# Patient Record
Sex: FEMALE | Race: WHITE
[De-identification: names, ages, dates, MRNs, and addresses within clinical notes are randomized per-mention and may not be internally consistent; named-entity substitution may affect disease eponyms.]

---

## 2017-03-14 ENCOUNTER — MEDICATION RENEWAL (OUTPATIENT)
Age: 62
End: 2017-03-14

## 2017-03-31 ENCOUNTER — APPOINTMENT (OUTPATIENT)
Dept: FAMILY MEDICINE | Facility: CLINIC | Age: 62
End: 2017-03-31

## 2017-03-31 VITALS
SYSTOLIC BLOOD PRESSURE: 128 MMHG | RESPIRATION RATE: 15 BRPM | HEART RATE: 84 BPM | BODY MASS INDEX: 35.61 KG/M2 | HEIGHT: 68 IN | TEMPERATURE: 98 F | DIASTOLIC BLOOD PRESSURE: 88 MMHG | WEIGHT: 235 LBS

## 2017-04-11 ENCOUNTER — MEDICATION RENEWAL (OUTPATIENT)
Age: 62
End: 2017-04-11

## 2017-06-21 ENCOUNTER — MEDICATION RENEWAL (OUTPATIENT)
Age: 62
End: 2017-06-21

## 2017-07-14 ENCOUNTER — APPOINTMENT (OUTPATIENT)
Dept: FAMILY MEDICINE | Facility: CLINIC | Age: 62
End: 2017-07-14

## 2017-07-14 VITALS
RESPIRATION RATE: 16 BRPM | HEART RATE: 86 BPM | HEIGHT: 68 IN | SYSTOLIC BLOOD PRESSURE: 130 MMHG | WEIGHT: 229 LBS | BODY MASS INDEX: 34.71 KG/M2 | DIASTOLIC BLOOD PRESSURE: 76 MMHG

## 2017-09-19 ENCOUNTER — MOBILE ON CALL (OUTPATIENT)
Age: 62
End: 2017-09-19

## 2017-09-20 ENCOUNTER — MESSAGE (OUTPATIENT)
Age: 62
End: 2017-09-20

## 2017-10-03 ENCOUNTER — MEDICATION RENEWAL (OUTPATIENT)
Age: 62
End: 2017-10-03

## 2017-10-20 ENCOUNTER — APPOINTMENT (OUTPATIENT)
Dept: FAMILY MEDICINE | Facility: CLINIC | Age: 62
End: 2017-10-20
Payer: COMMERCIAL

## 2017-10-20 VITALS
TEMPERATURE: 98.2 F | RESPIRATION RATE: 16 BRPM | OXYGEN SATURATION: 98 % | SYSTOLIC BLOOD PRESSURE: 120 MMHG | HEIGHT: 68 IN | WEIGHT: 226 LBS | DIASTOLIC BLOOD PRESSURE: 80 MMHG | HEART RATE: 93 BPM | BODY MASS INDEX: 34.25 KG/M2

## 2017-10-20 DIAGNOSIS — S83.511S SPRAIN OF ANTERIOR CRUCIATE LIGAMENT OF RIGHT KNEE, SEQUELA: ICD-10-CM

## 2017-10-20 DIAGNOSIS — Z79.4 TYPE 2 DIABETES MELLITUS WITH HYPERGLYCEMIA: ICD-10-CM

## 2017-10-20 DIAGNOSIS — E11.65 TYPE 2 DIABETES MELLITUS WITH HYPERGLYCEMIA: ICD-10-CM

## 2017-10-20 PROCEDURE — G0008: CPT

## 2017-10-20 PROCEDURE — 99214 OFFICE O/P EST MOD 30 MIN: CPT | Mod: 25

## 2017-10-20 PROCEDURE — 90686 IIV4 VACC NO PRSV 0.5 ML IM: CPT

## 2017-10-24 ENCOUNTER — MEDICATION RENEWAL (OUTPATIENT)
Age: 62
End: 2017-10-24

## 2017-10-27 ENCOUNTER — OTHER (OUTPATIENT)
Age: 62
End: 2017-10-27

## 2017-10-27 DIAGNOSIS — I87.8 OTHER SPECIFIED DISORDERS OF VEINS: ICD-10-CM

## 2017-11-05 ENCOUNTER — TRANSCRIPTION ENCOUNTER (OUTPATIENT)
Age: 62
End: 2017-11-05

## 2017-11-13 ENCOUNTER — RESULT REVIEW (OUTPATIENT)
Age: 62
End: 2017-11-13

## 2017-11-21 ENCOUNTER — APPOINTMENT (OUTPATIENT)
Dept: RHEUMATOLOGY | Facility: CLINIC | Age: 62
End: 2017-11-21
Payer: COMMERCIAL

## 2017-11-21 ENCOUNTER — LABORATORY RESULT (OUTPATIENT)
Age: 62
End: 2017-11-21

## 2017-11-21 VITALS
SYSTOLIC BLOOD PRESSURE: 122 MMHG | DIASTOLIC BLOOD PRESSURE: 82 MMHG | WEIGHT: 225 LBS | TEMPERATURE: 98 F | RESPIRATION RATE: 16 BRPM | BODY MASS INDEX: 34.1 KG/M2 | HEIGHT: 68 IN | HEART RATE: 90 BPM | OXYGEN SATURATION: 98 %

## 2017-11-21 PROCEDURE — 99205 OFFICE O/P NEW HI 60 MIN: CPT

## 2017-11-28 ENCOUNTER — MEDICATION RENEWAL (OUTPATIENT)
Age: 62
End: 2017-11-28

## 2017-11-30 ENCOUNTER — MEDICATION RENEWAL (OUTPATIENT)
Age: 62
End: 2017-11-30

## 2017-12-11 ENCOUNTER — RX RENEWAL (OUTPATIENT)
Age: 62
End: 2017-12-11

## 2017-12-12 ENCOUNTER — MEDICATION RENEWAL (OUTPATIENT)
Age: 62
End: 2017-12-12

## 2017-12-13 DIAGNOSIS — E56.9 VITAMIN DEFICIENCY, UNSPECIFIED: ICD-10-CM

## 2017-12-13 LAB
24R-OH-CALCIDIOL SERPL-MCNC: 33.8 PG/ML
25(OH)D3 SERPL-MCNC: 5.2 NG/ML
ACE BLD-CCNC: 30 U/L
ALBUMIN MFR SERPL ELPH: 59.9 %
ALBUMIN SERPL-MCNC: 3.9 G/DL
ALBUMIN/GLOB SERPL: 1.5 RATIO
ALPHA1 GLOB MFR SERPL ELPH: 5 %
ALPHA1 GLOB SERPL ELPH-MCNC: 0.3 G/DL
ALPHA2 GLOB MFR SERPL ELPH: 12.3 %
ALPHA2 GLOB SERPL ELPH-MCNC: 0.8 G/DL
ANA SER IF-ACNC: NEGATIVE
B-GLOBULIN MFR SERPL ELPH: 11.7 %
B-GLOBULIN SERPL ELPH-MCNC: 0.8 G/DL
CCP AB SER IA-ACNC: <8 UNITS
CK SERPL-CCNC: 54 U/L
CRP SERPL-MCNC: 0.4 MG/DL
DEPRECATED KAPPA LC FREE/LAMBDA SER: 1.22 RATIO
GAMMA GLOB FLD ELPH-MCNC: 0.7 G/DL
GAMMA GLOB MFR SERPL ELPH: 11.1 %
IGA SER QL IEP: 81 MG/DL
IGE SER-MCNC: 314 IU/ML
IGG SER QL IEP: 814 MG/DL
IGM SER QL IEP: 50 MG/DL
INTERPRETATION SERPL IEP-IMP: NORMAL
KAPPA LC CSF-MCNC: 1.17 MG/DL
KAPPA LC SERPL-MCNC: 1.43 MG/DL
M PROTEIN SPEC IFE-MCNC: NORMAL
MISCELLANEOUS TEST: NORMAL
MPO AB + PR3 PNL SER: NORMAL
MYOGLOBIN SERPL-MCNC: 45 NG/ML
MYOMARKER PANEL 1: NORMAL
PROC NAME: NORMAL
PROT SERPL-MCNC: 6.5 G/DL
PROT SERPL-MCNC: 6.5 G/DL
RF+CCP IGG SER-IMP: NEGATIVE
RHEUMATOID FACT SER QL: <7 IU/ML

## 2017-12-21 ENCOUNTER — APPOINTMENT (OUTPATIENT)
Dept: FAMILY MEDICINE | Facility: CLINIC | Age: 62
End: 2017-12-21
Payer: COMMERCIAL

## 2017-12-21 VITALS
HEART RATE: 95 BPM | SYSTOLIC BLOOD PRESSURE: 110 MMHG | RESPIRATION RATE: 16 BRPM | WEIGHT: 225.5 LBS | TEMPERATURE: 98.1 F | DIASTOLIC BLOOD PRESSURE: 80 MMHG | OXYGEN SATURATION: 96 % | BODY MASS INDEX: 34.17 KG/M2 | HEIGHT: 68 IN

## 2017-12-21 DIAGNOSIS — R25.1 TREMOR, UNSPECIFIED: ICD-10-CM

## 2017-12-21 PROCEDURE — 99214 OFFICE O/P EST MOD 30 MIN: CPT

## 2017-12-21 RX ORDER — POLYETHYLENE GLYCOL 3350, SODIUM SULFATE, SODIUM CHLORIDE, POTASSIUM CHLORIDE, ASCORBIC ACID, SODIUM ASCORBATE 7.5-2.691G
100 KIT ORAL
Qty: 1 | Refills: 0 | Status: COMPLETED | COMMUNITY
Start: 2017-10-30

## 2017-12-22 ENCOUNTER — RESULT REVIEW (OUTPATIENT)
Age: 62
End: 2017-12-22

## 2017-12-22 LAB
CREAT SPEC-SCNC: 99 MG/DL
MICROALBUMIN 24H UR DL<=1MG/L-MCNC: 2.2 MG/DL
MICROALBUMIN/CREAT 24H UR-RTO: 22 MG/G

## 2017-12-31 ENCOUNTER — RX RENEWAL (OUTPATIENT)
Age: 62
End: 2017-12-31

## 2018-01-15 PROBLEM — Z87.09 HISTORY OF ACUTE BRONCHITIS: Status: RESOLVED | Noted: 2017-12-21 | Resolved: 2018-01-15

## 2018-01-16 ENCOUNTER — APPOINTMENT (OUTPATIENT)
Dept: FAMILY MEDICINE | Facility: CLINIC | Age: 63
End: 2018-01-16
Payer: COMMERCIAL

## 2018-01-16 VITALS
HEART RATE: 102 BPM | BODY MASS INDEX: 34.36 KG/M2 | TEMPERATURE: 97.6 F | WEIGHT: 226 LBS | OXYGEN SATURATION: 97 % | SYSTOLIC BLOOD PRESSURE: 120 MMHG | DIASTOLIC BLOOD PRESSURE: 82 MMHG | RESPIRATION RATE: 16 BRPM

## 2018-01-16 DIAGNOSIS — M54.17 RADICULOPATHY, LUMBOSACRAL REGION: ICD-10-CM

## 2018-01-16 DIAGNOSIS — M54.5 LOW BACK PAIN: ICD-10-CM

## 2018-01-16 DIAGNOSIS — Z87.09 PERSONAL HISTORY OF OTHER DISEASES OF THE RESPIRATORY SYSTEM: ICD-10-CM

## 2018-01-16 PROCEDURE — 99214 OFFICE O/P EST MOD 30 MIN: CPT

## 2018-01-16 RX ORDER — CHOLECALCIFEROL (VITAMIN D3) 1250 MCG
1.25 MG CAPSULE ORAL
Qty: 4 | Refills: 0 | Status: COMPLETED | COMMUNITY
Start: 2017-12-13 | End: 2017-12-31

## 2018-02-09 ENCOUNTER — MEDICATION RENEWAL (OUTPATIENT)
Age: 63
End: 2018-02-09

## 2018-02-23 ENCOUNTER — MEDICATION RENEWAL (OUTPATIENT)
Age: 63
End: 2018-02-23

## 2018-03-20 ENCOUNTER — APPOINTMENT (OUTPATIENT)
Dept: RHEUMATOLOGY | Facility: CLINIC | Age: 63
End: 2018-03-20
Payer: COMMERCIAL

## 2018-03-20 VITALS
SYSTOLIC BLOOD PRESSURE: 160 MMHG | TEMPERATURE: 97.8 F | HEIGHT: 68 IN | BODY MASS INDEX: 34.25 KG/M2 | HEART RATE: 98 BPM | OXYGEN SATURATION: 97 % | RESPIRATION RATE: 16 BRPM | WEIGHT: 226 LBS | DIASTOLIC BLOOD PRESSURE: 82 MMHG

## 2018-03-20 PROCEDURE — 99214 OFFICE O/P EST MOD 30 MIN: CPT | Mod: 25

## 2018-03-20 PROCEDURE — 20610 DRAIN/INJ JOINT/BURSA W/O US: CPT | Mod: RT

## 2018-03-20 RX ORDER — LIDOCAINE HYDROCHLORIDE 10 MG/ML
1 INJECTION, SOLUTION INFILTRATION; PERINEURAL
Refills: 0 | Status: COMPLETED | OUTPATIENT
Start: 2018-03-20

## 2018-03-20 RX ORDER — METHYLPRED ACET/NACL,ISO-OS/PF 40 MG/ML
40 VIAL (ML) INJECTION
Qty: 1 | Refills: 0 | Status: COMPLETED | OUTPATIENT
Start: 2018-03-20

## 2018-03-20 RX ADMIN — METHYLPREDNISOLONE ACETATE 1 MG/ML: 40 INJECTION, SUSPENSION INTRA-ARTICULAR; INTRALESIONAL; INTRAMUSCULAR; SOFT TISSUE at 00:00

## 2018-03-20 RX ADMIN — LIDOCAINE HYDROCHLORIDE %: 10 INJECTION, SOLUTION INFILTRATION; PERINEURAL at 00:00

## 2018-03-21 LAB — 25(OH)D3 SERPL-MCNC: 29.8 NG/ML

## 2018-03-23 LAB
HBA1C MFR BLD HPLC: 6.7
LDLC SERPL DIRECT ASSAY-MCNC: 133

## 2018-04-02 ENCOUNTER — MOBILE ON CALL (OUTPATIENT)
Age: 63
End: 2018-04-02

## 2018-04-04 ENCOUNTER — MESSAGE (OUTPATIENT)
Age: 63
End: 2018-04-04

## 2018-04-09 LAB — LDLC SERPL DIRECT ASSAY-MCNC: 129

## 2018-04-10 ENCOUNTER — APPOINTMENT (OUTPATIENT)
Dept: FAMILY MEDICINE | Facility: CLINIC | Age: 63
End: 2018-04-10
Payer: COMMERCIAL

## 2018-04-10 VITALS — SYSTOLIC BLOOD PRESSURE: 110 MMHG | DIASTOLIC BLOOD PRESSURE: 74 MMHG

## 2018-04-10 VITALS
HEIGHT: 68 IN | WEIGHT: 220 LBS | BODY MASS INDEX: 33.34 KG/M2 | RESPIRATION RATE: 16 BRPM | HEART RATE: 89 BPM | OXYGEN SATURATION: 97 % | TEMPERATURE: 97 F

## 2018-04-10 DIAGNOSIS — R26.9 UNSPECIFIED ABNORMALITIES OF GAIT AND MOBILITY: ICD-10-CM

## 2018-04-10 PROCEDURE — 99214 OFFICE O/P EST MOD 30 MIN: CPT

## 2018-04-10 RX ORDER — AZITHROMYCIN 250 MG/1
250 TABLET, FILM COATED ORAL
Qty: 1 | Refills: 0 | Status: COMPLETED | COMMUNITY
Start: 2017-12-21 | End: 2018-04-10

## 2018-04-10 NOTE — COUNSELING
[Weight management counseling provided] : Weight management [Healthy eating counseling provided] : healthy eating [Activity counseling provided] : activity [Disease Management counseling provided] : disease management  [Behavioral health counseling provided] : behavioral health  [Take medications as directed] : Take medications as directed [Check feet daily] : Check feet daily [Take your weight daily] : Take your weight daily [Avoid asthma triggers] : Avoid asthma triggers [Sleep ___ hours/day] : Sleep [unfilled] hours/day [Engage in a relaxing activity] : Engage in a relaxing activity

## 2018-04-10 NOTE — HEALTH RISK ASSESSMENT
[Any fall with injury in past year] : Patient reported fall with injury in the past year [0] : 2) Feeling down, depressed, or hopeless: Not at all (0) [] : No [de-identified] : rhuematology and neuro- notes reviewed [[id number]] : 0

## 2018-04-10 NOTE — CURRENT MEDS
[Takes medication as prescribed] : takes [None] : Patient does not have any barriers to medication adherence

## 2018-04-10 NOTE — PHYSICAL EXAM
[No Acute Distress] : no acute distress [Well Nourished] : well nourished [PERRL] : pupils equal round and reactive to light [Fundoscopic Exam Performed] : fundoscopic ~T exam ~C was performed [Normal Outer Ear/Nose] : the outer ears and nose were normal in appearance [Normal TMs] : both tympanic membranes were normal [No JVD] : no jugular venous distention [Thyroid Normal, No Nodules] : the thyroid was normal and there were no nodules present [No Respiratory Distress] : no respiratory distress  [Clear to Auscultation] : lungs were clear to auscultation bilaterally [No Accessory Muscle Use] : no accessory muscle use [Normal Rate] : normal rate  [Regular Rhythm] : with a regular rhythm [Normal S1, S2] : normal S1 and S2 [No Murmur] : no murmur heard [Pedal Pulses Present] : the pedal pulses are present [No Edema] : there was no peripheral edema [Soft] : abdomen soft [Non Tender] : non-tender [Non-distended] : non-distended [No Masses] : no abdominal mass palpated [No HSM] : no HSM [Normal Supraclavicular Nodes] : no supraclavicular lymphadenopathy [Normal Posterior Cervical Nodes] : no posterior cervical lymphadenopathy [Normal Anterior Cervical Nodes] : no anterior cervical lymphadenopathy [No Spinal Tenderness] : no spinal tenderness [No Rash] : no rash [No Focal Deficits] : no focal deficits [Normal Affect] : the affect was normal [Alert and Oriented x3] : oriented to person, place, and time [Normal Insight/Judgement] : insight and judgment were intact [de-identified] : obese [de-identified] : minimal rhinorrhea [de-identified] : mildly decreased breath sounds but clear [de-identified] : some right sciatic notch tenderness [de-identified] : walker in use for ambulation, mild hand tremor noted again [de-identified] : unable to examine since patient was just seen by podiatry will examine at next visit

## 2018-04-10 NOTE — HISTORY OF PRESENT ILLNESS
[Asthma] : Asthma [Diabetes Mellitus] : Diabetes Mellitus [Hyperlipidemia] : Hyperlipidemia [Obesity] : Obesity [Check glucose ___ x/week] : Patient checks blood glucose [unfilled]  times a week [Regular podiatrist visits] : Patient had regular podiatrist visits [Understanding of foot care] : Patient expressed understanding of foot care [Most Recent A1C: ___] : Most recent A1C was [unfilled] [Target A1C:  ___] : Target A1C is [unfilled] [Target goal met] : A1C target goal met [Neuropathy] :  neuropathy [Severe Persistent] : severe persistent [Cough] : cough [Wheezing] : wheezing [Missed School/Work ___ days/yr] : [unfilled] has missed [unfilled] days of school/work in the past twelve months [No nocturnal awakening] : Patient denies nocturnal awakening [Inhaler Use] : inhaler use [Other: ___] : [unfilled] [Does not check Peak Flow] : The patient is not checking peak flow [Daily] : Daily [Stable] : Condition is stable [Fasting:  ___] : Fasting Blood Sugar: [unfilled] mg/dL [Managed with medications] : managed with  medication [Bile acid sequestrants] : Patient is on nonstatin therapy - Bile acid sequestrants

## 2018-04-10 NOTE — REVIEW OF SYSTEMS
[Fatigue] : fatigue [Wheezing] : wheezing [Dyspnea on Exertion] : dyspnea on exertion [Nausea] : nausea [Diarrhea] : diarrhea [Heartburn] : heartburn [Joint Pain] : joint pain [Muscle Weakness] : muscle weakness [Muscle Pain] : muscle pain [Memory Loss] : memory loss [Easy Bruising] : easy bruising [Negative] : Psychiatric [Abdominal Pain] : no abdominal pain [Constipation] : no constipation [Vomiting] : no vomiting [Melena] : no melena [Dysuria] : no dysuria [Incontinence] : no incontinence [Hematuria] : no hematuria [Frequency] : no frequency [Headache] : no headache [Easy Bleeding] : no easy bleeding [Swollen Glands] : no swollen glands [FreeTextEntry2] : feels poorly in general [FreeTextEntry8] : nocturia [de-identified] : none

## 2018-05-21 ENCOUNTER — MOBILE ON CALL (OUTPATIENT)
Age: 63
End: 2018-05-21

## 2018-05-24 ENCOUNTER — APPOINTMENT (OUTPATIENT)
Dept: RHEUMATOLOGY | Facility: CLINIC | Age: 63
End: 2018-05-24
Payer: COMMERCIAL

## 2018-05-24 VITALS
SYSTOLIC BLOOD PRESSURE: 122 MMHG | OXYGEN SATURATION: 97 % | HEART RATE: 80 BPM | WEIGHT: 220 LBS | TEMPERATURE: 97.8 F | BODY MASS INDEX: 33.34 KG/M2 | HEIGHT: 68 IN | RESPIRATION RATE: 16 BRPM | DIASTOLIC BLOOD PRESSURE: 78 MMHG

## 2018-05-24 PROCEDURE — 99214 OFFICE O/P EST MOD 30 MIN: CPT

## 2018-06-18 ENCOUNTER — RX RENEWAL (OUTPATIENT)
Age: 63
End: 2018-06-18

## 2018-06-21 ENCOUNTER — MESSAGE (OUTPATIENT)
Age: 63
End: 2018-06-21

## 2018-06-27 ENCOUNTER — RX RENEWAL (OUTPATIENT)
Age: 63
End: 2018-06-27

## 2018-06-27 ENCOUNTER — MEDICATION RENEWAL (OUTPATIENT)
Age: 63
End: 2018-06-27

## 2018-07-09 LAB
HBA1C MFR BLD HPLC: 6.7
LDLC SERPL DIRECT ASSAY-MCNC: 160

## 2018-07-10 ENCOUNTER — APPOINTMENT (OUTPATIENT)
Dept: FAMILY MEDICINE | Facility: CLINIC | Age: 63
End: 2018-07-10
Payer: MEDICARE

## 2018-07-10 VITALS
HEIGHT: 68 IN | BODY MASS INDEX: 33.34 KG/M2 | TEMPERATURE: 97 F | RESPIRATION RATE: 18 BRPM | OXYGEN SATURATION: 97 % | HEART RATE: 93 BPM | WEIGHT: 220 LBS

## 2018-07-10 VITALS — DIASTOLIC BLOOD PRESSURE: 60 MMHG | SYSTOLIC BLOOD PRESSURE: 110 MMHG

## 2018-07-10 DIAGNOSIS — M17.11 UNILATERAL PRIMARY OSTEOARTHRITIS, RIGHT KNEE: ICD-10-CM

## 2018-07-10 PROCEDURE — 90732 PPSV23 VACC 2 YRS+ SUBQ/IM: CPT

## 2018-07-10 PROCEDURE — G0009: CPT

## 2018-07-10 PROCEDURE — 99214 OFFICE O/P EST MOD 30 MIN: CPT | Mod: 25

## 2018-07-10 NOTE — HISTORY OF PRESENT ILLNESS
[Asthma] : Asthma [Diabetes Mellitus] : Diabetes Mellitus [Hyperlipidemia] : Hyperlipidemia [Obesity] : Obesity [No episodes] : No hypoglycemic episodes since the last visit. [Check glucose ___ x/day] : Patient checks  blood glucose [unfilled]  times a day [Review glucose log over ___ months] : Glucose logs reviewed over the past [unfilled] months reveal: [Fasting:  ___] : Fasting Blood Sugar: [unfilled] mg/dL [Post-Prandial: ___] : Post-Prandial Blood Sugar: [unfilled] mg/dL [Severe Persistent] : severe persistent [Shortness of Breath] : shortness of breath [Dyspnea on Exertion] : dyspnea on exertion [Wheezing] : wheezing [___ x/month] : Patient awakes at night [unfilled] times per month [Allergies] : allergies [Inhaler Use] : inhaler use [Does not check Peak Flow] : The patient is not checking peak flow [Daily] : Daily [Most Recent A1C: ___] : Most recent A1C was [unfilled] [Target A1C:  ___] : Target A1C is [unfilled] [Target goal met] : A1C target goal met [Neuropathy] :  neuropathy [Contraindication to therapy ___] : Contraindications to statin  therapy: [unfilled] [Doing Poorly] : Patient is doing poorly [Lindaifestyle management] : lifestyle management [No therapy] : Patient is not on statin therapy [Regular podiatrist visits] : Patient had regular podiatrist visits [Understanding of foot care] : Patient expressed understanding of foot care [de-identified] : recent well controlled numbers fasting and also postprandial on current regimen.  [Sputum Production] : non productive cough [FreeTextEntry1] : also has tremor and muscle weakness - now off statin and was advised by neurology to consider inderal for tremor suppression but this would be a poor plan given her severe asthma requiring regular use of beta agonists for reversal. She agrees this is not a plan of treatment she will pursue.

## 2018-07-10 NOTE — DATA REVIEWED
[FreeTextEntry1] : reviewed entire labs from outside lab and discusses with patient need to continue current diet reduced in fat and carbs, with attmepts at weight loss

## 2018-07-10 NOTE — PHYSICAL EXAM
[No Acute Distress] : no acute distress [Well Nourished] : well nourished [PERRL] : pupils equal round and reactive to light [Fundoscopic Exam Performed] : fundoscopic ~T exam ~C was performed [Normal Outer Ear/Nose] : the outer ears and nose were normal in appearance [No JVD] : no jugular venous distention [Thyroid Normal, No Nodules] : the thyroid was normal and there were no nodules present [No Respiratory Distress] : no respiratory distress  [Clear to Auscultation] : lungs were clear to auscultation bilaterally [No Accessory Muscle Use] : no accessory muscle use [Normal Rate] : normal rate  [Regular Rhythm] : with a regular rhythm [Normal S1, S2] : normal S1 and S2 [No Murmur] : no murmur heard [Pedal Pulses Present] : the pedal pulses are present [No Edema] : there was no peripheral edema [No Spinal Tenderness] : no spinal tenderness [No Rash] : no rash [No Focal Deficits] : no focal deficits [Normal Affect] : the affect was normal [Alert and Oriented x3] : oriented to person, place, and time [Normal Insight/Judgement] : insight and judgment were intact [] : both feet [de-identified] : obese [de-identified] : minimal rhinorrhea [de-identified] : mildly decreased breath sounds but clear [de-identified] : walker in use for ambulation, mild hand tremor noted again but less than prior exam [de-identified] : unable to examine since patient was just seen by podiatry will examine at next visit [de-identified] : some hyperesthesia of the left foot

## 2018-07-10 NOTE — HEALTH RISK ASSESSMENT
[0] : 2) Feeling down, depressed, or hopeless: Not at all (0) [] : No [de-identified] : rheumatology, neuro- notes reviewed [de-identified] : near misses with use of cane for balance [[id number]] : 0

## 2018-07-10 NOTE — REVIEW OF SYSTEMS
[Fatigue] : fatigue [Wheezing] : wheezing [Dyspnea on Exertion] : dyspnea on exertion [Joint Pain] : joint pain [Muscle Pain] : muscle pain [Back Pain] : back pain [Nail Changes] : nail changes [Unsteady Walking] : ataxia [Easy Bleeding] : easy bleeding [Easy Bruising] : easy bruising [Negative] : Psychiatric [Swollen Glands] : no swollen glands [FreeTextEntry2] : increasing fatigue, not driving much at this time, stopped acupuncture not really improving anything at this point [FreeTextEntry6] : had one episode of asthma [de-identified] : both great toes with some changes- seen by podiatry [de-identified] : using walker, rollator, tremors about the same maybe slightly improved

## 2018-07-12 ENCOUNTER — APPOINTMENT (OUTPATIENT)
Dept: RHEUMATOLOGY | Facility: CLINIC | Age: 63
End: 2018-07-12
Payer: MEDICARE

## 2018-07-12 VITALS
HEIGHT: 68 IN | SYSTOLIC BLOOD PRESSURE: 146 MMHG | RESPIRATION RATE: 16 BRPM | OXYGEN SATURATION: 98 % | HEART RATE: 88 BPM | DIASTOLIC BLOOD PRESSURE: 81 MMHG

## 2018-07-12 PROCEDURE — 99214 OFFICE O/P EST MOD 30 MIN: CPT

## 2018-08-07 ENCOUNTER — APPOINTMENT (OUTPATIENT)
Dept: FAMILY MEDICINE | Facility: CLINIC | Age: 63
End: 2018-08-07
Payer: MEDICARE

## 2018-08-07 VITALS — DIASTOLIC BLOOD PRESSURE: 84 MMHG | SYSTOLIC BLOOD PRESSURE: 130 MMHG

## 2018-08-07 VITALS
WEIGHT: 225 LBS | OXYGEN SATURATION: 98 % | HEART RATE: 93 BPM | RESPIRATION RATE: 15 BRPM | HEIGHT: 68 IN | TEMPERATURE: 97 F | BODY MASS INDEX: 34.1 KG/M2

## 2018-08-07 DIAGNOSIS — Z80.3 FAMILY HISTORY OF MALIGNANT NEOPLASM OF BREAST: ICD-10-CM

## 2018-08-07 DIAGNOSIS — Z82.61 FAMILY HISTORY OF ARTHRITIS: ICD-10-CM

## 2018-08-07 PROCEDURE — 99214 OFFICE O/P EST MOD 30 MIN: CPT

## 2018-08-07 NOTE — HISTORY OF PRESENT ILLNESS
[___ Weeks ago] :  [unfilled] weeks ago [Paroxysmal] : paroxysmal [Cough] : cough [Wheezing] : wheezing [Moderate] : moderate [Congestion] : congestion [Shortness Of Breath] : shortness of breath [Headache] : headache [At Night] : at night [Worsening] : worsening [Sore Throat] : no sore throat [Chills] : no chills [Anorexia] : no anorexia [Earache] : no earache [Fatigue] : not fatigue [Fever] : no fever [de-identified] : asthma symptoms for about 3 weeks and getting worse with increased mucous production now thick and colored.  [FreeTextEntry7] : anterior chest [FreeTextEntry5] : prednisone and mucinexmucinex

## 2018-08-07 NOTE — COUNSELING
[Weight management counseling provided] : Weight management [Healthy eating counseling provided] : healthy eating [Activity counseling provided] : activity [Behavioral health counseling provided] : behavioral health  [Decrease Portions] : Decrease food portions [Sleep ___ hours/day] : Sleep [unfilled] hours/day [Engage in a relaxing activity] : Engage in a relaxing activity [None] : None [Good understanding] : Patient has a good understanding of lifestyle changes and the steps needed to achieve self management goals

## 2018-08-07 NOTE — HEALTH RISK ASSESSMENT
[Any fall with injury in past year] : Patient reported fall with injury in the past year [0] : 2) Feeling down, depressed, or hopeless: Not at all (0) [] : No [de-identified] : neuro- notes reviewed [[id number]] : 0

## 2018-08-07 NOTE — REVIEW OF SYSTEMS
[Postnasal Drip] : postnasal drip [Wheezing] : wheezing [Cough] : cough [Dyspnea on Exertion] : dyspnea on exertion [Negative] : Cardiovascular [Earache] : no earache [Hearing Loss] : no hearing loss [Hoarseness] : no hoarseness [Nasal Discharge] : no nasal discharge [Sore Throat] : no sore throat [Shortness Of Breath] : no shortness of breath [FreeTextEntry6] : her usual sob with activity but not at rest, increased wheezing over the last few weeks worst the last couple days

## 2018-08-07 NOTE — PHYSICAL EXAM
[No Acute Distress] : no acute distress [Normal Sclera/Conjunctiva] : normal sclera/conjunctiva [Normal Outer Ear/Nose] : the outer ears and nose were normal in appearance [Normal Oropharynx] : the oropharynx was normal [Normal TMs] : both tympanic membranes were normal [No JVD] : no jugular venous distention [No Respiratory Distress] : no respiratory distress  [No Accessory Muscle Use] : no accessory muscle use [Normal Rate] : normal rate  [Regular Rhythm] : with a regular rhythm [Normal S1, S2] : normal S1 and S2 [No Murmur] : no murmur heard [No Edema] : there was no peripheral edema [Normal Affect] : the affect was normal [Alert and Oriented x3] : oriented to person, place, and time [Normal Insight/Judgement] : insight and judgment were intact [de-identified] : obese [de-identified] : minimal rhinorrhea [de-identified] : diffuse wheezes throughout both lung fields [de-identified] : walker in use for ambulation, mild hand tremor noted again but less than prior exam

## 2018-08-24 ENCOUNTER — MEDICATION RENEWAL (OUTPATIENT)
Age: 63
End: 2018-08-24

## 2018-10-05 ENCOUNTER — APPOINTMENT (OUTPATIENT)
Dept: FAMILY MEDICINE | Facility: CLINIC | Age: 63
End: 2018-10-05
Payer: MEDICARE

## 2018-10-05 VITALS
TEMPERATURE: 98 F | WEIGHT: 220 LBS | BODY MASS INDEX: 33.34 KG/M2 | SYSTOLIC BLOOD PRESSURE: 116 MMHG | HEART RATE: 95 BPM | DIASTOLIC BLOOD PRESSURE: 80 MMHG | RESPIRATION RATE: 14 BRPM | HEIGHT: 68 IN | OXYGEN SATURATION: 98 %

## 2018-10-05 PROCEDURE — G0008: CPT

## 2018-10-05 PROCEDURE — 90686 IIV4 VACC NO PRSV 0.5 ML IM: CPT

## 2018-10-05 PROCEDURE — 99214 OFFICE O/P EST MOD 30 MIN: CPT | Mod: 25

## 2018-10-05 NOTE — COUNSELING
[Weight management counseling provided] : Weight management [Healthy eating counseling provided] : healthy eating [Activity counseling provided] : activity [Disease Management counseling provided] : disease management  [Behavioral health counseling provided] : behavioral health  [Take medications as directed] : Take medications as directed [Check feet daily] : Check feet daily [Keep FS  log to bring to next visit] : Keep finger stick log and  bring  to next visit [Take your weight daily] : Take your weight daily [Maintain weight log] : Maintain weight log [Sleep ___ hours/day] : Sleep [unfilled] hours/day [Engage in a relaxing activity] : Engage in a relaxing activity

## 2018-10-06 RX ORDER — METHOCARBAMOL 500 MG/1
500 TABLET, FILM COATED ORAL
Qty: 120 | Refills: 0 | Status: DISCONTINUED | COMMUNITY
Start: 2018-06-04 | End: 2018-10-06

## 2018-10-06 NOTE — PHYSICAL EXAM
[No Acute Distress] : no acute distress [Normal Sclera/Conjunctiva] : normal sclera/conjunctiva [Normal Outer Ear/Nose] : the outer ears and nose were normal in appearance [Normal Oropharynx] : the oropharynx was normal [Normal TMs] : both tympanic membranes were normal [No JVD] : no jugular venous distention [No Respiratory Distress] : no respiratory distress  [Clear to Auscultation] : lungs were clear to auscultation bilaterally [No Accessory Muscle Use] : no accessory muscle use [Normal Rate] : normal rate  [Regular Rhythm] : with a regular rhythm [Normal S1, S2] : normal S1 and S2 [No Murmur] : no murmur heard [No Edema] : there was no peripheral edema [Normal Affect] : the affect was normal [Alert and Oriented x3] : oriented to person, place, and time [Normal Insight/Judgement] : insight and judgment were intact [de-identified] : obese [de-identified] : minimal rhinorrhea [de-identified] : walker in use for ambulation, mild hand tremor noted again but less than prior exam

## 2018-10-06 NOTE — REVIEW OF SYSTEMS
[Postnasal Drip] : postnasal drip [Cough] : cough [Negative] : Gastrointestinal [Earache] : no earache [Hearing Loss] : no hearing loss [Hoarseness] : no hoarseness [Nasal Discharge] : no nasal discharge [Sore Throat] : no sore throat [Shortness Of Breath] : no shortness of breath [Wheezing] : no wheezing [Dyspnea on Exertion] : no dyspnea on exertion [FreeTextEntry6] : much better still slight cough

## 2018-10-06 NOTE — HEALTH RISK ASSESSMENT
[No falls in past year] : Patient reported no falls in the past year [] : No [de-identified] : PT- notes reviewed

## 2018-10-06 NOTE — HISTORY OF PRESENT ILLNESS
[Diabetes Mellitus] : Diabetes Mellitus [Hyperlipidemia] : Hyperlipidemia [Hypertension] : Hypertension [Obesity] : Obesity [Most Recent A1C: ___] : Most recent A1C was [unfilled] [Target A1C:  ___] : Target A1C is [unfilled] [Contraindication to therapy ___] : Contraindications to statin  therapy: [unfilled] [No episodes] : No hypoglycemic episodes since the last visit. [Check glucose ___ x/day] : Patient checks  blood glucose [unfilled]  times a day [Fasting:  ___] : Fasting Blood Sugar: [unfilled] mg/dL [Post-Prandial: ___] : Post-Prandial Blood Sugar: [unfilled] mg/dL [Near target goal] : A1C near target goal [Neuropathy] :  neuropathy [Checks BP Sporadically] : The patient checks ~his/her~ blood pressure sporadically [<130/90] : Target blood pressure is  <130/90 [Target goal met] : BP target goal met [Managed with medications] : managed with  medication [No therapy] : Patient is not on statin therapy [Bile acid sequestrants] : Patient is on nonstatin therapy - Bile acid sequestrants [Severe Persistent] : severe persistent [Cough] : cough [No nocturnal awakening] : Patient denies nocturnal awakening [Allergies] : allergies [Inhaler Use] : inhaler use [Other: ___] : [unfilled] [Retinopathy] : No retinopathy [Shortness of Breath] : no shortness of breath [Dyspnea on Exertion] : no dyspnea on exertion [Chest Pain] : no chest pain [Sputum Production] : non productive cough [Wheezing] : no wheezing [FreeTextEntry1] : Myopathy remain problematic as does her tremor. She has had some improvement in her quadriceps strength  with PT. The tremor goes from barely noticeable to significant enough to prevent her from using a hand for writing or eating etc. She continues f/u with Rhematology for her joint symptoms work up has been basically negative,.

## 2018-10-15 ENCOUNTER — MEDICATION RENEWAL (OUTPATIENT)
Age: 63
End: 2018-10-15

## 2018-10-16 ENCOUNTER — MESSAGE (OUTPATIENT)
Age: 63
End: 2018-10-16

## 2018-10-16 ENCOUNTER — MEDICATION RENEWAL (OUTPATIENT)
Age: 63
End: 2018-10-16

## 2018-10-25 ENCOUNTER — MEDICATION RENEWAL (OUTPATIENT)
Age: 63
End: 2018-10-25

## 2018-11-07 ENCOUNTER — APPOINTMENT (OUTPATIENT)
Dept: RHEUMATOLOGY | Facility: CLINIC | Age: 63
End: 2018-11-07
Payer: MEDICARE

## 2018-11-07 VITALS
OXYGEN SATURATION: 98 % | SYSTOLIC BLOOD PRESSURE: 124 MMHG | RESPIRATION RATE: 16 BRPM | BODY MASS INDEX: 33.34 KG/M2 | HEIGHT: 68 IN | TEMPERATURE: 98.1 F | HEART RATE: 90 BPM | DIASTOLIC BLOOD PRESSURE: 68 MMHG | WEIGHT: 220 LBS

## 2018-11-07 DIAGNOSIS — M54.31 SCIATICA, RIGHT SIDE: ICD-10-CM

## 2018-11-07 PROCEDURE — 99214 OFFICE O/P EST MOD 30 MIN: CPT

## 2018-12-04 LAB
HBA1C MFR BLD HPLC: 7.2
LDLC SERPL DIRECT ASSAY-MCNC: 128

## 2018-12-07 ENCOUNTER — EMERGENCY (EMERGENCY)
Facility: HOSPITAL | Age: 63
LOS: 1 days | Discharge: ANOTHER HOSPITAL | End: 2018-12-07
Attending: EMERGENCY MEDICINE
Payer: MEDICARE

## 2018-12-07 ENCOUNTER — INPATIENT (INPATIENT)
Facility: HOSPITAL | Age: 63
LOS: 11 days | Discharge: NURSING, REHAB OR LONG-TERM CARE | DRG: 299 | End: 2018-12-19
Attending: THORACIC SURGERY (CARDIOTHORACIC VASCULAR SURGERY) | Admitting: THORACIC SURGERY (CARDIOTHORACIC VASCULAR SURGERY)
Payer: MEDICARE

## 2018-12-07 ENCOUNTER — MESSAGE (OUTPATIENT)
Age: 63
End: 2018-12-07

## 2018-12-07 VITALS
DIASTOLIC BLOOD PRESSURE: 94 MMHG | HEART RATE: 119 BPM | SYSTOLIC BLOOD PRESSURE: 159 MMHG | OXYGEN SATURATION: 99 % | TEMPERATURE: 98 F | RESPIRATION RATE: 24 BRPM

## 2018-12-07 VITALS
HEART RATE: 125 BPM | DIASTOLIC BLOOD PRESSURE: 87 MMHG | HEIGHT: 68 IN | WEIGHT: 225.09 LBS | TEMPERATURE: 98 F | OXYGEN SATURATION: 97 % | SYSTOLIC BLOOD PRESSURE: 148 MMHG | RESPIRATION RATE: 22 BRPM

## 2018-12-07 VITALS
RESPIRATION RATE: 25 BRPM | HEIGHT: 68 IN | TEMPERATURE: 100 F | OXYGEN SATURATION: 92 % | WEIGHT: 235.23 LBS | HEART RATE: 79 BPM

## 2018-12-07 DIAGNOSIS — Z90.49 ACQUIRED ABSENCE OF OTHER SPECIFIED PARTS OF DIGESTIVE TRACT: Chronic | ICD-10-CM

## 2018-12-07 DIAGNOSIS — I71.01 DISSECTION OF THORACIC AORTA: ICD-10-CM

## 2018-12-07 DIAGNOSIS — I71.00 DISSECTION OF UNSPECIFIED SITE OF AORTA: ICD-10-CM

## 2018-12-07 DIAGNOSIS — Z98.49 CATARACT EXTRACTION STATUS, UNSPECIFIED EYE: Chronic | ICD-10-CM

## 2018-12-07 LAB
ALBUMIN SERPL ELPH-MCNC: 3.1 G/DL — LOW (ref 3.3–5)
ALBUMIN SERPL ELPH-MCNC: 3.6 G/DL — SIGNIFICANT CHANGE UP (ref 3.3–5)
ALBUMIN SERPL ELPH-MCNC: 4 G/DL — SIGNIFICANT CHANGE UP (ref 3.3–5)
ALP SERPL-CCNC: 62 U/L — SIGNIFICANT CHANGE UP (ref 40–120)
ALP SERPL-CCNC: 65 U/L — SIGNIFICANT CHANGE UP (ref 40–120)
ALP SERPL-CCNC: 71 U/L — SIGNIFICANT CHANGE UP (ref 40–120)
ALT FLD-CCNC: 33 U/L — SIGNIFICANT CHANGE UP (ref 12–78)
ALT FLD-CCNC: 34 U/L — SIGNIFICANT CHANGE UP (ref 10–45)
ALT FLD-CCNC: 38 U/L — SIGNIFICANT CHANGE UP (ref 10–45)
AMYLASE P1 CFR SERPL: 34 U/L — SIGNIFICANT CHANGE UP (ref 25–125)
ANION GAP SERPL CALC-SCNC: 18 MMOL/L — HIGH (ref 5–17)
ANION GAP SERPL CALC-SCNC: 19 MMOL/L — HIGH (ref 5–17)
ANION GAP SERPL CALC-SCNC: 9 MMOL/L — SIGNIFICANT CHANGE UP (ref 5–17)
APPEARANCE UR: CLEAR — SIGNIFICANT CHANGE UP
APTT BLD: 23.6 SEC — LOW (ref 27.5–36.3)
APTT BLD: 26.4 SEC — LOW (ref 27.5–36.3)
APTT BLD: 26.5 SEC — LOW (ref 27.5–36.3)
AST SERPL-CCNC: 19 U/L — SIGNIFICANT CHANGE UP (ref 10–40)
AST SERPL-CCNC: 22 U/L — SIGNIFICANT CHANGE UP (ref 10–40)
AST SERPL-CCNC: 29 U/L — SIGNIFICANT CHANGE UP (ref 15–37)
BACTERIA # UR AUTO: ABNORMAL
BASE EXCESS BLDV CALC-SCNC: -0.5 MMOL/L — SIGNIFICANT CHANGE UP (ref -2–2)
BASOPHILS # BLD AUTO: 0 K/UL — SIGNIFICANT CHANGE UP (ref 0–0.2)
BASOPHILS # BLD AUTO: 0 K/UL — SIGNIFICANT CHANGE UP (ref 0–0.2)
BASOPHILS NFR BLD AUTO: 0 % — SIGNIFICANT CHANGE UP (ref 0–2)
BILIRUB SERPL-MCNC: 0.4 MG/DL — SIGNIFICANT CHANGE UP (ref 0.2–1.2)
BILIRUB SERPL-MCNC: 0.5 MG/DL — SIGNIFICANT CHANGE UP (ref 0.2–1.2)
BILIRUB SERPL-MCNC: 0.5 MG/DL — SIGNIFICANT CHANGE UP (ref 0.2–1.2)
BILIRUB UR-MCNC: NEGATIVE — SIGNIFICANT CHANGE UP
BLD GP AB SCN SERPL QL: NEGATIVE — SIGNIFICANT CHANGE UP
BUN SERPL-MCNC: 17 MG/DL — SIGNIFICANT CHANGE UP (ref 7–23)
BUN SERPL-MCNC: 20 MG/DL — SIGNIFICANT CHANGE UP (ref 7–23)
BUN SERPL-MCNC: 21 MG/DL — SIGNIFICANT CHANGE UP (ref 7–23)
CALCIUM SERPL-MCNC: 10 MG/DL — SIGNIFICANT CHANGE UP (ref 8.4–10.5)
CALCIUM SERPL-MCNC: 9.3 MG/DL — SIGNIFICANT CHANGE UP (ref 8.5–10.1)
CALCIUM SERPL-MCNC: 9.5 MG/DL — SIGNIFICANT CHANGE UP (ref 8.4–10.5)
CHLORIDE SERPL-SCNC: 103 MMOL/L — SIGNIFICANT CHANGE UP (ref 96–108)
CHLORIDE SERPL-SCNC: 104 MMOL/L — SIGNIFICANT CHANGE UP (ref 96–108)
CHLORIDE SERPL-SCNC: 108 MMOL/L — SIGNIFICANT CHANGE UP (ref 96–108)
CK MB BLD-MCNC: 1.7 % — SIGNIFICANT CHANGE UP (ref 0–3.5)
CK MB CFR SERPL CALC: 1.4 NG/ML — SIGNIFICANT CHANGE UP (ref 0–3.6)
CK SERPL-CCNC: 84 U/L — SIGNIFICANT CHANGE UP (ref 26–192)
CO2 BLDV-SCNC: 26 MMOL/L — SIGNIFICANT CHANGE UP (ref 22–30)
CO2 SERPL-SCNC: 18 MMOL/L — LOW (ref 22–31)
CO2 SERPL-SCNC: 19 MMOL/L — LOW (ref 22–31)
CO2 SERPL-SCNC: 25 MMOL/L — SIGNIFICANT CHANGE UP (ref 22–31)
COLOR SPEC: SIGNIFICANT CHANGE UP
CREAT SERPL-MCNC: 0.57 MG/DL — SIGNIFICANT CHANGE UP (ref 0.5–1.3)
CREAT SERPL-MCNC: 0.59 MG/DL — SIGNIFICANT CHANGE UP (ref 0.5–1.3)
CREAT SERPL-MCNC: 0.75 MG/DL — SIGNIFICANT CHANGE UP (ref 0.5–1.3)
D DIMER BLD IA.RAPID-MCNC: HIGH NG/ML DDU
DIFF PNL FLD: NEGATIVE — SIGNIFICANT CHANGE UP
EOSINOPHIL # BLD AUTO: 0 K/UL — SIGNIFICANT CHANGE UP (ref 0–0.5)
EOSINOPHIL # BLD AUTO: 0 K/UL — SIGNIFICANT CHANGE UP (ref 0–0.5)
EOSINOPHIL NFR BLD AUTO: 0 % — SIGNIFICANT CHANGE UP (ref 0–6)
EPI CELLS # UR: 0 /HPF — SIGNIFICANT CHANGE UP
GAS PNL BLDA: SIGNIFICANT CHANGE UP
GLUCOSE SERPL-MCNC: 286 MG/DL — HIGH (ref 70–99)
GLUCOSE SERPL-MCNC: 295 MG/DL — HIGH (ref 70–99)
GLUCOSE SERPL-MCNC: 432 MG/DL — HIGH (ref 70–99)
GLUCOSE UR QL: ABNORMAL
HBA1C BLD-MCNC: 6.4 % — HIGH (ref 4–5.6)
HCO3 BLDV-SCNC: 25 MMOL/L — SIGNIFICANT CHANGE UP (ref 21–29)
HCT VFR BLD CALC: 30.4 % — LOW (ref 34.5–45)
HCT VFR BLD CALC: 30.4 % — LOW (ref 34.5–45)
HCT VFR BLD CALC: 38.5 % — SIGNIFICANT CHANGE UP (ref 34.5–45)
HGB BLD-MCNC: 11.6 G/DL — SIGNIFICANT CHANGE UP (ref 11.5–15.5)
HGB BLD-MCNC: 9.7 G/DL — LOW (ref 11.5–15.5)
HGB BLD-MCNC: 9.8 G/DL — LOW (ref 11.5–15.5)
HOROWITZ INDEX BLDV+IHG-RTO: 40 — SIGNIFICANT CHANGE UP
HYALINE CASTS # UR AUTO: 3 /LPF — HIGH (ref 0–2)
INR BLD: 1.36 RATIO — HIGH (ref 0.88–1.16)
INR BLD: 1.52 RATIO — HIGH (ref 0.88–1.16)
INR BLD: 1.81 RATIO — HIGH (ref 0.88–1.16)
KETONES UR-MCNC: NEGATIVE — SIGNIFICANT CHANGE UP
LEUKOCYTE ESTERASE UR-ACNC: NEGATIVE — SIGNIFICANT CHANGE UP
LIDOCAIN IGE QN: 29 U/L — SIGNIFICANT CHANGE UP (ref 7–60)
LYMPHOCYTES # BLD AUTO: 0.4 K/UL — LOW (ref 1–3.3)
LYMPHOCYTES # BLD AUTO: 1.33 K/UL — SIGNIFICANT CHANGE UP (ref 1–3.3)
LYMPHOCYTES # BLD AUTO: 2 % — LOW (ref 13–44)
LYMPHOCYTES # BLD AUTO: 6 % — LOW (ref 13–44)
MAGNESIUM SERPL-MCNC: 1.3 MG/DL — LOW (ref 1.6–2.6)
MCHC RBC-ENTMCNC: 28.8 PG — SIGNIFICANT CHANGE UP (ref 27–34)
MCHC RBC-ENTMCNC: 29 PG — SIGNIFICANT CHANGE UP (ref 27–34)
MCHC RBC-ENTMCNC: 29.2 PG — SIGNIFICANT CHANGE UP (ref 27–34)
MCHC RBC-ENTMCNC: 30.1 GM/DL — LOW (ref 32–36)
MCHC RBC-ENTMCNC: 31.8 GM/DL — LOW (ref 32–36)
MCHC RBC-ENTMCNC: 32.3 GM/DL — SIGNIFICANT CHANGE UP (ref 32–36)
MCV RBC AUTO: 90.6 FL — SIGNIFICANT CHANGE UP (ref 80–100)
MCV RBC AUTO: 91 FL — SIGNIFICANT CHANGE UP (ref 80–100)
MCV RBC AUTO: 95.5 FL — SIGNIFICANT CHANGE UP (ref 80–100)
MONOCYTES # BLD AUTO: 0.5 K/UL — SIGNIFICANT CHANGE UP (ref 0–0.9)
MONOCYTES # BLD AUTO: 1.11 K/UL — HIGH (ref 0–0.9)
MONOCYTES NFR BLD AUTO: 3 % — SIGNIFICANT CHANGE UP (ref 2–14)
MONOCYTES NFR BLD AUTO: 5 % — SIGNIFICANT CHANGE UP (ref 2–14)
NEUTROPHILS # BLD AUTO: 14.6 K/UL — HIGH (ref 1.8–7.4)
NEUTROPHILS # BLD AUTO: 18.81 K/UL — HIGH (ref 1.8–7.4)
NEUTROPHILS NFR BLD AUTO: 77 % — SIGNIFICANT CHANGE UP (ref 43–77)
NEUTROPHILS NFR BLD AUTO: 91 % — HIGH (ref 43–77)
NEUTS BAND # BLD: 4 % — SIGNIFICANT CHANGE UP (ref 0–8)
NITRITE UR-MCNC: POSITIVE
NT-PROBNP SERPL-SCNC: 1056 PG/ML — HIGH (ref 0–300)
PCO2 BLDV: 48 MMHG — SIGNIFICANT CHANGE UP (ref 35–50)
PH BLDV: 7.34 — LOW (ref 7.35–7.45)
PH UR: 6 — SIGNIFICANT CHANGE UP (ref 5–8)
PHOSPHATE SERPL-MCNC: 3.8 MG/DL — SIGNIFICANT CHANGE UP (ref 2.5–4.5)
PLAT MORPH BLD: NORMAL — SIGNIFICANT CHANGE UP
PLATELET # BLD AUTO: 185 K/UL — SIGNIFICANT CHANGE UP (ref 150–400)
PLATELET # BLD AUTO: 192 K/UL — SIGNIFICANT CHANGE UP (ref 150–400)
PLATELET # BLD AUTO: 294 K/UL — SIGNIFICANT CHANGE UP (ref 150–400)
PO2 BLDV: 34 MMHG — SIGNIFICANT CHANGE UP (ref 25–45)
POTASSIUM SERPL-MCNC: 4.6 MMOL/L — SIGNIFICANT CHANGE UP (ref 3.5–5.3)
POTASSIUM SERPL-MCNC: 5.1 MMOL/L — SIGNIFICANT CHANGE UP (ref 3.5–5.3)
POTASSIUM SERPL-MCNC: 5.2 MMOL/L — SIGNIFICANT CHANGE UP (ref 3.5–5.3)
POTASSIUM SERPL-SCNC: 4.6 MMOL/L — SIGNIFICANT CHANGE UP (ref 3.5–5.3)
POTASSIUM SERPL-SCNC: 5.1 MMOL/L — SIGNIFICANT CHANGE UP (ref 3.5–5.3)
POTASSIUM SERPL-SCNC: 5.2 MMOL/L — SIGNIFICANT CHANGE UP (ref 3.5–5.3)
PROT SERPL-MCNC: 5.6 G/DL — LOW (ref 6–8.3)
PROT SERPL-MCNC: 6.2 G/DL — SIGNIFICANT CHANGE UP (ref 6–8.3)
PROT SERPL-MCNC: 6.2 G/DL — SIGNIFICANT CHANGE UP (ref 6–8.3)
PROT UR-MCNC: ABNORMAL
PROTHROM AB SERPL-ACNC: 15.6 SEC — HIGH (ref 10–12.9)
PROTHROM AB SERPL-ACNC: 17.6 SEC — HIGH (ref 10–12.9)
PROTHROM AB SERPL-ACNC: 20.8 SEC — HIGH (ref 10–12.9)
RBC # BLD: 3.34 M/UL — LOW (ref 3.8–5.2)
RBC # BLD: 3.36 M/UL — LOW (ref 3.8–5.2)
RBC # BLD: 4.03 M/UL — SIGNIFICANT CHANGE UP (ref 3.8–5.2)
RBC # FLD: 15.3 % — HIGH (ref 10.3–14.5)
RBC # FLD: 15.4 % — HIGH (ref 10.3–14.5)
RBC # FLD: 15.9 % — HIGH (ref 10.3–14.5)
RBC BLD AUTO: SIGNIFICANT CHANGE UP
RBC CASTS # UR COMP ASSIST: 3 /HPF — SIGNIFICANT CHANGE UP (ref 0–4)
RH IG SCN BLD-IMP: POSITIVE — SIGNIFICANT CHANGE UP
RH IG SCN BLD-IMP: POSITIVE — SIGNIFICANT CHANGE UP
SAO2 % BLDV: 54 % — LOW (ref 67–88)
SODIUM SERPL-SCNC: 139 MMOL/L — SIGNIFICANT CHANGE UP (ref 135–145)
SODIUM SERPL-SCNC: 142 MMOL/L — SIGNIFICANT CHANGE UP (ref 135–145)
SODIUM SERPL-SCNC: 142 MMOL/L — SIGNIFICANT CHANGE UP (ref 135–145)
SP GR SPEC: >1.05 (ref 1.01–1.02)
TROPONIN I SERPL-MCNC: 0.08 NG/ML — HIGH (ref 0.01–0.04)
UROBILINOGEN FLD QL: NEGATIVE — SIGNIFICANT CHANGE UP
WBC # BLD: 15.6 K/UL — HIGH (ref 3.8–10.5)
WBC # BLD: 16.2 K/UL — HIGH (ref 3.8–10.5)
WBC # BLD: 22.13 K/UL — HIGH (ref 3.8–10.5)
WBC # FLD AUTO: 15.6 K/UL — HIGH (ref 3.8–10.5)
WBC # FLD AUTO: 16.2 K/UL — HIGH (ref 3.8–10.5)
WBC # FLD AUTO: 22.13 K/UL — HIGH (ref 3.8–10.5)
WBC UR QL: 1 /HPF — SIGNIFICANT CHANGE UP (ref 0–5)

## 2018-12-07 PROCEDURE — 82553 CREATINE MB FRACTION: CPT

## 2018-12-07 PROCEDURE — 99291 CRITICAL CARE FIRST HOUR: CPT

## 2018-12-07 PROCEDURE — 99291 CRITICAL CARE FIRST HOUR: CPT | Mod: 24

## 2018-12-07 PROCEDURE — 85730 THROMBOPLASTIN TIME PARTIAL: CPT

## 2018-12-07 PROCEDURE — 71045 X-RAY EXAM CHEST 1 VIEW: CPT | Mod: 26

## 2018-12-07 PROCEDURE — 74174 CTA ABD&PLVS W/CONTRAST: CPT

## 2018-12-07 PROCEDURE — 99291 CRITICAL CARE FIRST HOUR: CPT | Mod: 25

## 2018-12-07 PROCEDURE — 96375 TX/PRO/DX INJ NEW DRUG ADDON: CPT

## 2018-12-07 PROCEDURE — 94640 AIRWAY INHALATION TREATMENT: CPT

## 2018-12-07 PROCEDURE — 71275 CT ANGIOGRAPHY CHEST: CPT

## 2018-12-07 PROCEDURE — 71045 X-RAY EXAM CHEST 1 VIEW: CPT | Mod: 26,77

## 2018-12-07 PROCEDURE — 82550 ASSAY OF CK (CPK): CPT

## 2018-12-07 PROCEDURE — 93005 ELECTROCARDIOGRAM TRACING: CPT

## 2018-12-07 PROCEDURE — 74174 CTA ABD&PLVS W/CONTRAST: CPT | Mod: 26

## 2018-12-07 PROCEDURE — 93306 TTE W/DOPPLER COMPLETE: CPT | Mod: 26

## 2018-12-07 PROCEDURE — 71045 X-RAY EXAM CHEST 1 VIEW: CPT

## 2018-12-07 PROCEDURE — 36415 COLL VENOUS BLD VENIPUNCTURE: CPT

## 2018-12-07 PROCEDURE — 74174 CTA ABD&PLVS W/CONTRAST: CPT | Mod: 26,77

## 2018-12-07 PROCEDURE — 96374 THER/PROPH/DIAG INJ IV PUSH: CPT

## 2018-12-07 PROCEDURE — 84484 ASSAY OF TROPONIN QUANT: CPT

## 2018-12-07 PROCEDURE — 80053 COMPREHEN METABOLIC PANEL: CPT

## 2018-12-07 PROCEDURE — 71275 CT ANGIOGRAPHY CHEST: CPT | Mod: 26,77

## 2018-12-07 PROCEDURE — 85027 COMPLETE CBC AUTOMATED: CPT

## 2018-12-07 PROCEDURE — 85610 PROTHROMBIN TIME: CPT

## 2018-12-07 PROCEDURE — 71275 CT ANGIOGRAPHY CHEST: CPT | Mod: 26

## 2018-12-07 RX ORDER — ALBUTEROL 90 UG/1
1 AEROSOL, METERED ORAL EVERY 4 HOURS
Qty: 0 | Refills: 0 | Status: DISCONTINUED | OUTPATIENT
Start: 2018-12-07 | End: 2018-12-19

## 2018-12-07 RX ORDER — ALBUMIN HUMAN 25 %
250 VIAL (ML) INTRAVENOUS ONCE
Qty: 0 | Refills: 0 | Status: COMPLETED | OUTPATIENT
Start: 2018-12-07 | End: 2018-12-07

## 2018-12-07 RX ORDER — INSULIN HUMAN 100 [IU]/ML
2 INJECTION, SOLUTION SUBCUTANEOUS
Qty: 100 | Refills: 0 | Status: DISCONTINUED | OUTPATIENT
Start: 2018-12-07 | End: 2018-12-08

## 2018-12-07 RX ORDER — TIOTROPIUM BROMIDE 18 UG/1
1 CAPSULE ORAL; RESPIRATORY (INHALATION) DAILY
Qty: 0 | Refills: 0 | Status: DISCONTINUED | OUTPATIENT
Start: 2018-12-07 | End: 2018-12-19

## 2018-12-07 RX ORDER — LEVOTHYROXINE SODIUM 125 MCG
68 TABLET ORAL AT BEDTIME
Qty: 0 | Refills: 0 | Status: DISCONTINUED | OUTPATIENT
Start: 2018-12-07 | End: 2018-12-08

## 2018-12-07 RX ORDER — PHYTONADIONE (VIT K1) 5 MG
10 TABLET ORAL ONCE
Qty: 0 | Refills: 0 | Status: COMPLETED | OUTPATIENT
Start: 2018-12-07 | End: 2018-12-07

## 2018-12-07 RX ORDER — LABETALOL HCL 100 MG
10 TABLET ORAL ONCE
Qty: 0 | Refills: 0 | Status: COMPLETED | OUTPATIENT
Start: 2018-12-07 | End: 2018-12-07

## 2018-12-07 RX ORDER — LABETALOL HCL 100 MG
0.5 TABLET ORAL
Qty: 200 | Refills: 0 | Status: DISCONTINUED | OUTPATIENT
Start: 2018-12-07 | End: 2018-12-10

## 2018-12-07 RX ORDER — IPRATROPIUM/ALBUTEROL SULFATE 18-103MCG
3 AEROSOL WITH ADAPTER (GRAM) INHALATION ONCE
Qty: 0 | Refills: 0 | Status: COMPLETED | OUTPATIENT
Start: 2018-12-07 | End: 2018-12-07

## 2018-12-07 RX ORDER — PANTOPRAZOLE SODIUM 20 MG/1
40 TABLET, DELAYED RELEASE ORAL
Qty: 0 | Refills: 0 | Status: DISCONTINUED | OUTPATIENT
Start: 2018-12-07 | End: 2018-12-08

## 2018-12-07 RX ORDER — IPRATROPIUM/ALBUTEROL SULFATE 18-103MCG
3 AEROSOL WITH ADAPTER (GRAM) INHALATION EVERY 6 HOURS
Qty: 0 | Refills: 0 | Status: DISCONTINUED | OUTPATIENT
Start: 2018-12-07 | End: 2018-12-19

## 2018-12-07 RX ORDER — LABETALOL HCL 100 MG
0.5 TABLET ORAL
Qty: 200 | Refills: 0 | Status: DISCONTINUED | OUTPATIENT
Start: 2018-12-07 | End: 2018-12-09

## 2018-12-07 RX ORDER — BUDESONIDE, MICRONIZED 100 %
0.5 POWDER (GRAM) MISCELLANEOUS
Qty: 0 | Refills: 0 | Status: DISCONTINUED | OUTPATIENT
Start: 2018-12-07 | End: 2018-12-11

## 2018-12-07 RX ORDER — DEXTROSE 50 % IN WATER 50 %
50 SYRINGE (ML) INTRAVENOUS
Qty: 0 | Refills: 0 | Status: DISCONTINUED | OUTPATIENT
Start: 2018-12-07 | End: 2018-12-19

## 2018-12-07 RX ORDER — NICARDIPINE HYDROCHLORIDE 30 MG/1
5 CAPSULE, EXTENDED RELEASE ORAL
Qty: 40 | Refills: 0 | Status: DISCONTINUED | OUTPATIENT
Start: 2018-12-07 | End: 2018-12-12

## 2018-12-07 RX ORDER — HYDROMORPHONE HYDROCHLORIDE 2 MG/ML
0.5 INJECTION INTRAMUSCULAR; INTRAVENOUS; SUBCUTANEOUS ONCE
Qty: 0 | Refills: 0 | Status: DISCONTINUED | OUTPATIENT
Start: 2018-12-07 | End: 2018-12-07

## 2018-12-07 RX ORDER — MORPHINE SULFATE 50 MG/1
2 CAPSULE, EXTENDED RELEASE ORAL ONCE
Qty: 0 | Refills: 0 | Status: DISCONTINUED | OUTPATIENT
Start: 2018-12-07 | End: 2018-12-07

## 2018-12-07 RX ADMIN — MORPHINE SULFATE 2 MILLIGRAM(S): 50 CAPSULE, EXTENDED RELEASE ORAL at 12:57

## 2018-12-07 RX ADMIN — Medication 125 MILLILITER(S): at 22:59

## 2018-12-07 RX ADMIN — Medication 40 MILLIGRAM(S): at 19:50

## 2018-12-07 RX ADMIN — Medication 10 MILLIGRAM(S): at 14:34

## 2018-12-07 RX ADMIN — Medication 0.5 MILLIGRAM(S): at 19:30

## 2018-12-07 RX ADMIN — Medication 125 MILLIGRAM(S): at 12:13

## 2018-12-07 RX ADMIN — Medication 3 MILLILITER(S): at 12:14

## 2018-12-07 RX ADMIN — HYDROMORPHONE HYDROCHLORIDE 0.5 MILLIGRAM(S): 2 INJECTION INTRAMUSCULAR; INTRAVENOUS; SUBCUTANEOUS at 21:00

## 2018-12-07 RX ADMIN — Medication 3 MILLILITER(S): at 19:30

## 2018-12-07 RX ADMIN — Medication 102 MILLIGRAM(S): at 19:00

## 2018-12-07 RX ADMIN — PANTOPRAZOLE SODIUM 40 MILLIGRAM(S): 20 TABLET, DELAYED RELEASE ORAL at 19:01

## 2018-12-07 RX ADMIN — Medication 30 MG/MIN: at 14:44

## 2018-12-07 RX ADMIN — HYDROMORPHONE HYDROCHLORIDE 0.5 MILLIGRAM(S): 2 INJECTION INTRAMUSCULAR; INTRAVENOUS; SUBCUTANEOUS at 20:50

## 2018-12-07 RX ADMIN — Medication 3 MILLILITER(S): at 12:13

## 2018-12-07 NOTE — ED PROVIDER NOTE - OBJECTIVE STATEMENT
64 y/o female presents to ED c/o sob and upper back pain between her shoulder blades x 1 hour. Rates pain 8/10, no radiation of pain, sudden onset, no qualifying factors. Pt with h/o PEs, on coumadin and has green field filter. Pt with h/o asthma also, on advair BID. +chest pain. Denies any other complaints. Denies n/v, f/c, numbness, tingling, headache, lightheadedness, dizziness, visual changes, recent traveling.

## 2018-12-07 NOTE — H&P ADULT - PMH
Arthritis    Asthma    Diabetes mellitus    Hyperlipidemia    Hypothyroidism    PE (pulmonary embolism)    Shingles

## 2018-12-07 NOTE — H&P ADULT - NSHPPHYSICALEXAM_GEN_ALL_CORE
PHYSICAL EXAM:  Constitutional: obese F ill appearing   Respiratory: SOB , mild resp distress,   Cardiovascular: S1, S2. Weak radial and pedal pulses palpable. Cool extremities  Gastrointestinal: obese Nt, ND  Genitourinary: clear urine  Extremities: cool , no edema. Weal palpable DP  Neurological: A and O x 4  Skin: dry and intact  Psychiatric: normal affect

## 2018-12-07 NOTE — ED PROVIDER NOTE - CHPI ED SYMPTOMS NEG
no diaphoresis/no headache/no wheezing/no edema/no fever/no hemoptysis/no chills/no body aches/no cough

## 2018-12-07 NOTE — ED PROVIDER NOTE - ATTENDING CONTRIBUTION TO CARE
pt with hx dm, pe, asthma, hypothyroid c/o approx 1 hr of sharp upper back pain between scapulas and sob. no fever, cp, abd pain, d/n/v, edema, calf pain.  wd, wn, female, mild distress, perrl, mmm, s1s2 rrr, lungs diminished b/l, abd soft, nt, ext nt, full rom, cn 2-12 intact, no motor or sensory deficit, skin warm dry no rash

## 2018-12-07 NOTE — PROGRESS NOTE ADULT - SUBJECTIVE AND OBJECTIVE BOX
7:30pm-8pm    Remained critically ill on continuos ICU monitoring.      OBJECTIVE:  ICU Vital Signs Last 24 Hrs  T(C): 37.4 (07 Dec 2018 20:00), Max: 37.8 (07 Dec 2018 16:30)  T(F): 99.3 (07 Dec 2018 20:00), Max: 100 (07 Dec 2018 16:30)  HR: 85 (07 Dec 2018 21:15) (75 - 125)  BP: 159/94 (07 Dec 2018 14:29) (120/55 - 159/94)  BP(mean): --  ABP: 132/50 (07 Dec 2018 21:15) (103/49 - 142/53)  ABP(mean): 72 (07 Dec 2018 21:15) (66 - 95)  RR: 38 (07 Dec 2018 21:15) (22 - 45)  SpO2: 97% (07 Dec 2018 21:15) (90% - 99%)         @ 07:01  -  07 @ 21:55  --------------------------------------------------------  IN: 840 mL / OUT: 850 mL / NET: -10 mL      CAPILLARY BLOOD GLUCOSE          PHYSICAL EXAM:Daily Height in cm: 172.72 (07 Dec 2018 16:30)    Daily Weight in k.7 (07 Dec 2018 16:30)  General: morbidly obese in bed c/o lower back pain,  Neurology: A&Ox3, nonfocal, ODONNELL x 4  Eyes: PERRLA/ EOMI, Gross vision intact  ENT/Neck: Neck supple, trachea midline, No JVD, Gross hearing intact  Respiratory: B/L,  rales,  CV: RRR, S1S2, no murmurs, rubs or gallops  Abdominal: Soft, NT, ND +BS, + scar   Extremities: No edema, + peripheral pulses  Skin: No Rashes, Hematoma, Ecchymosis          HOSPITAL MEDICATIONS:  MEDICATIONS  (STANDING):  ALBUTerol    90 MICROgram(s) HFA Inhaler 1 Puff(s) Inhalation every 4 hours  ALBUTerol/ipratropium for Nebulization 3 milliLiter(s) Nebulizer every 6 hours  buDESOnide   0.5 milliGRAM(s) Respule 0.5 milliGRAM(s) Inhalation two times a day  dextrose 50% Injectable 50 milliLiter(s) IV Push every 15 minutes  insulin Infusion 2 Unit(s)/Hr (2 mL/Hr) IV Continuous <Continuous>  labetalol Infusion 0.5 mG/Min (30 mL/Hr) IV Continuous <Continuous>  levothyroxine Injectable 68 MICROGram(s) IV Push at bedtime  methylPREDNISolone sodium succinate Injectable 40 milliGRAM(s) IV Push every 12 hours  niCARdipine Infusion 5 mG/Hr (25 mL/Hr) IV Continuous <Continuous>  pantoprazole  Injectable 40 milliGRAM(s) IV Push two times a day  tiotropium 18 MICROgram(s) Capsule 1 Capsule(s) Inhalation daily    MEDICATIONS  (PRN):      LABS:                        9.7    15.6  )-----------( 192      ( 07 Dec 2018 18:49 )             30.4         139  |  103  |  20  ----------------------------<  432<H>  5.2   |  18<L>  |  0.57    Ca    9.5      07 Dec 2018 18:49  Phos  3.8       Mg     1.3         TPro  5.6<L>  /  Alb  3.6  /  TBili  0.4  /  DBili  x   /  AST  22  /  ALT  34  /  AlkPhos  62      PT/INR - ( 07 Dec 2018 18:49 )   PT: 17.6 sec;   INR: 1.52 ratio         PTT - ( 07 Dec 2018 18:49 )  PTT:26.4 sec  Urinalysis Basic - ( 07 Dec 2018 18:49 )    Color: Light Yellow / Appearance: Clear / SG: >1.050 / pH: x  Gluc: x / Ketone: Negative  / Bili: Negative / Urobili: Negative   Blood: x / Protein: Trace / Nitrite: Positive   Leuk Esterase: Negative / RBC: 3 /hpf / WBC 1 /hpf   Sq Epi: x / Non Sq Epi: 0 /hpf / Bacteria: Many      Arterial Blood Gas:   @ 21:22  7.40/36/81/22/96/-2.2  ABG lactate: --  Arterial Blood Gas:   @ 19:12  7.39/37/76/22/93/-2.2  ABG lactate: --    Venous Blood Gas:   @ 19:56  7.34/48/34/25/54  VBG Lactate: --    CARDIAC MARKERS ( 07 Dec 2018 12:26 )  .083 ng/mL / x     / 84 U/L / x     / 1.4 ng/mL    LIVER FUNCTIONS - ( 07 Dec 2018 18:49 )  Alb: 3.6 g/dL / Pro: 5.6 g/dL / ALK PHOS: 62 U/L / ALT: 34 U/L / AST: 22 U/L / GGT: x           Urinalysis Basic - ( 07 Dec 2018 18:49 )    Color: Light Yellow / Appearance: Clear / SG: >1.050 / pH: x  Gluc: x / Ketone: Negative  / Bili: Negative / Urobili: Negative   Blood: x / Protein: Trace / Nitrite: Positive   Leuk Esterase: Negative / RBC: 3 /hpf / WBC 1 /hpf   Sq Epi: x / Non Sq Epi: 0 /hpf / Bacteria: Many    MICROBIOLOGY:     RADIOLOGY:  X Reviewed and interpreted by me        .

## 2018-12-07 NOTE — PROCEDURE NOTE - NSPOSTPRCRAD_GEN_A_CORE
no pneumothorax/depth of insertion/post-procedure radiography performed/central line located in the superior vena cava

## 2018-12-07 NOTE — PROGRESS NOTE ADULT - ASSESSMENT
63 yr old morbidly obese female, with long standing history of asthma, with chronic steroid use, hypothyroid, DM2 pulmonary emboli with IVC filter placement, colon Ca s/p hemicolectomy with multiple f/u surgeries for ventral hernia, admitted today at outside hospital with acute chest  & back pain with CTA + Type B aortic dissection with hemopericardium, transferred here for surgical intervention.    Plan cont supplemental Oxygen, A-alisa & CVP line placement, NPO, type & screen, supplemental oxygen, cont IV solumedrol, inhaled bronchodilators.  SR, HTN, started on IV Cardene target SBP < 120,    TTE + pericardial effusion with tamponade, plan to correct coagulopathy, hold Coumadin repeat INR s/p Vit K & FFP, f/u perfusion indices, Cardiac CT done reviewed by Dr Kumar   Hyperglycemia on INS gtt.  Panda f/u UO  +ua f/u Urine culture  Synthroid IV   Pat at risk for sudden catastrophic decompensation   I have spent 30 minutes providing critical care management to this patient.

## 2018-12-07 NOTE — H&P ADULT - PROBLEM SELECTOR PLAN 1
Type B w/ intramural hematoma  - Labetolol gtt and possibly cardene to maintain MAPs around 50. Currently Mapping 70  - STAT echo to check for tamponade; may need drain  - Given steroid use and comorbidities she is not a surgical canditate. Medical therapy.  - INR 1.8 give Vit K and FFP to reverse INR and stop progression of hemoperotenuem  - Case discussed w/ Dr. Kumar and patient

## 2018-12-07 NOTE — ED ADULT NURSE NOTE - NSIMPLEMENTINTERV_GEN_ALL_ED
Implemented All Universal Safety Interventions:  [place] to call system. Call bell, personal items and telephone within reach. Instruct patient to call for assistance. Room bathroom lighting operational. Non-slip footwear when patient is off stretcher. Physically safe environment: no spills, clutter or unnecessary equipment. Stretcher in lowest position, wheels locked, appropriate side rails in place.

## 2018-12-07 NOTE — PROCEDURE NOTE - NSINDICATIONS_GEN_A_CORE
critical illness/hypertonic/irritant infusion/hemodynamic monitoring/emergency venous access/venous access 23-Jan-2018 05:46

## 2018-12-07 NOTE — H&P ADULT - HISTORY OF PRESENT ILLNESS
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 Pt states pain was 8/10, nonradiating. Pt denies LOC, fevers/chills, dizziness, numbness/tingling, chest pain, N/V/D.     At [place], CTA of the chest revealed probable type A dissection w/ intramural hematoma w/ active hemmorhage. Pt transferred urgently to [facility] for possible CT surgery.  Upon arrival to [facility], pt placed on Labetolol drip for blood pressure control and patient taken for a CT of the Chest. Dr. Kumar reviewed images which revealed Type B dissection w/ hemoperiteneum. INR at outside hospital 1.8.

## 2018-12-07 NOTE — H&P ADULT - NSHPREVIEWOFSYSTEMS_GEN_ALL_CORE
REVIEW OF SYSTEMS  General:	feeling unwell with 8/10 back pain  Ophthalmologic: denies visual changes  ENMT:	denies   Respiratory and Thorax: complaining of shortness of breath with chest tightness  Cardiovascular:	chest pain but states that likely secondary to asthma and chest tightness  Gastrointestinal:	denies N/V/D  Genitourinary:	denies  Musculoskeletal:	denies  Neurological:	denies loss of conciousness  Psychiatric:	denies  Hematology/Lymphatics:	denies  Endocrine:	denies  Allergic/Immunologic: denies

## 2018-12-07 NOTE — H&P ADULT - NSHPLABSRESULTS_GEN_ALL_CORE
11.6   22.13 )-----------( 294      ( 07 Dec 2018 12:26 )             38.5     12-07    142  |  108  |  17  ----------------------------<  286<H>  5.1   |  25  |  0.75    Ca    9.3      07 Dec 2018 12:26    TPro  6.2  /  Alb  3.1<L>  /  TBili  0.5  /  DBili  x   /  AST  29  /  ALT  33  /  AlkPhos  71  12-07    PT/INR - ( 07 Dec 2018 14:28 )   PT: 20.8 sec;   INR: 1.81 ratio         PTT - ( 07 Dec 2018 14:28 )  PTT:26.5 sec    < from: CT Angio Abdomen and Pelvis w/ IV Cont (12.07.18 @ 16:18) >    Impression: Findings consistent with aortic dissection from the proximal   aortic arch extending into the distal abdominal aorta as described above.   The ascending aorta is unremarkable.    Findings suggestive of hemorrhage within the mediastinum of uncertain   etiology.    Hemopericardium.    < end of copied text >

## 2018-12-07 NOTE — ED PROVIDER NOTE - PROGRESS NOTE DETAILS
d/w christina cardiothoracic NP, will contact attending dr puri ([facility]) will accept xfer to CTU

## 2018-12-07 NOTE — H&P ADULT - ASSESSMENT
62 yo F w. multiple comorbities including long standing asthma ( chronic steroid use), DM, HLD, hypothyroid who p/w Type B dissection c/b intramural hematoma

## 2018-12-08 LAB
ALBUMIN SERPL ELPH-MCNC: 4.2 G/DL — SIGNIFICANT CHANGE UP (ref 3.3–5)
ALP SERPL-CCNC: 59 U/L — SIGNIFICANT CHANGE UP (ref 40–120)
ALT FLD-CCNC: 33 U/L — SIGNIFICANT CHANGE UP (ref 10–45)
ANION GAP SERPL CALC-SCNC: 17 MMOL/L — SIGNIFICANT CHANGE UP (ref 5–17)
AST SERPL-CCNC: 19 U/L — SIGNIFICANT CHANGE UP (ref 10–40)
BASE EXCESS BLDV CALC-SCNC: 1 MMOL/L — SIGNIFICANT CHANGE UP (ref -2–2)
BILIRUB SERPL-MCNC: 0.5 MG/DL — SIGNIFICANT CHANGE UP (ref 0.2–1.2)
BUN SERPL-MCNC: 20 MG/DL — SIGNIFICANT CHANGE UP (ref 7–23)
CALCIUM SERPL-MCNC: 9.9 MG/DL — SIGNIFICANT CHANGE UP (ref 8.4–10.5)
CHLORIDE SERPL-SCNC: 105 MMOL/L — SIGNIFICANT CHANGE UP (ref 96–108)
CO2 BLDV-SCNC: 27 MMOL/L — SIGNIFICANT CHANGE UP (ref 22–30)
CO2 SERPL-SCNC: 21 MMOL/L — LOW (ref 22–31)
CREAT SERPL-MCNC: 0.53 MG/DL — SIGNIFICANT CHANGE UP (ref 0.5–1.3)
GAS PNL BLDA: SIGNIFICANT CHANGE UP
GAS PNL BLDV: SIGNIFICANT CHANGE UP
GLUCOSE BLDC GLUCOMTR-MCNC: 124 MG/DL — HIGH (ref 70–99)
GLUCOSE BLDC GLUCOMTR-MCNC: 138 MG/DL — HIGH (ref 70–99)
GLUCOSE BLDC GLUCOMTR-MCNC: 153 MG/DL — HIGH (ref 70–99)
GLUCOSE BLDC GLUCOMTR-MCNC: 184 MG/DL — HIGH (ref 70–99)
GLUCOSE BLDC GLUCOMTR-MCNC: 266 MG/DL — HIGH (ref 70–99)
GLUCOSE SERPL-MCNC: 160 MG/DL — HIGH (ref 70–99)
HCO3 BLDV-SCNC: 26 MMOL/L — SIGNIFICANT CHANGE UP (ref 21–29)
HCT VFR BLD CALC: 28.2 % — LOW (ref 34.5–45)
HGB BLD-MCNC: 9 G/DL — LOW (ref 11.5–15.5)
HOROWITZ INDEX BLDV+IHG-RTO: 35 — SIGNIFICANT CHANGE UP
MAGNESIUM SERPL-MCNC: 1.4 MG/DL — LOW (ref 1.6–2.6)
MCHC RBC-ENTMCNC: 28.8 PG — SIGNIFICANT CHANGE UP (ref 27–34)
MCHC RBC-ENTMCNC: 32.1 GM/DL — SIGNIFICANT CHANGE UP (ref 32–36)
MCV RBC AUTO: 89.6 FL — SIGNIFICANT CHANGE UP (ref 80–100)
PCO2 BLDV: 43 MMHG — SIGNIFICANT CHANGE UP (ref 35–50)
PH BLDV: 7.39 — SIGNIFICANT CHANGE UP (ref 7.35–7.45)
PHOSPHATE SERPL-MCNC: 2.7 MG/DL — SIGNIFICANT CHANGE UP (ref 2.5–4.5)
PLATELET # BLD AUTO: 168 K/UL — SIGNIFICANT CHANGE UP (ref 150–400)
PO2 BLDV: 34 MMHG — SIGNIFICANT CHANGE UP (ref 25–45)
POTASSIUM SERPL-MCNC: 4.2 MMOL/L — SIGNIFICANT CHANGE UP (ref 3.5–5.3)
POTASSIUM SERPL-SCNC: 4.2 MMOL/L — SIGNIFICANT CHANGE UP (ref 3.5–5.3)
PROT SERPL-MCNC: 6.2 G/DL — SIGNIFICANT CHANGE UP (ref 6–8.3)
RBC # BLD: 3.14 M/UL — LOW (ref 3.8–5.2)
RBC # FLD: 14.7 % — HIGH (ref 10.3–14.5)
SAO2 % BLDV: 63 % — LOW (ref 67–88)
SODIUM SERPL-SCNC: 143 MMOL/L — SIGNIFICANT CHANGE UP (ref 135–145)
TSH SERPL-MCNC: 0.58 UIU/ML — SIGNIFICANT CHANGE UP (ref 0.27–4.2)
WBC # BLD: 15.2 K/UL — HIGH (ref 3.8–10.5)
WBC # FLD AUTO: 15.2 K/UL — HIGH (ref 3.8–10.5)

## 2018-12-08 PROCEDURE — 99291 CRITICAL CARE FIRST HOUR: CPT

## 2018-12-08 RX ORDER — SODIUM CHLORIDE 9 MG/ML
250 INJECTION INTRAMUSCULAR; INTRAVENOUS; SUBCUTANEOUS ONCE
Qty: 0 | Refills: 0 | Status: COMPLETED | OUTPATIENT
Start: 2018-12-08 | End: 2018-12-08

## 2018-12-08 RX ORDER — CHOLESTYRAMINE 4 G/9G
4 POWDER, FOR SUSPENSION ORAL DAILY
Qty: 0 | Refills: 0 | Status: DISCONTINUED | OUTPATIENT
Start: 2018-12-08 | End: 2018-12-19

## 2018-12-08 RX ORDER — ALBUMIN HUMAN 25 %
250 VIAL (ML) INTRAVENOUS ONCE
Qty: 0 | Refills: 0 | Status: COMPLETED | OUTPATIENT
Start: 2018-12-08 | End: 2018-12-08

## 2018-12-08 RX ORDER — MONTELUKAST 4 MG/1
10 TABLET, CHEWABLE ORAL DAILY
Qty: 0 | Refills: 0 | Status: DISCONTINUED | OUTPATIENT
Start: 2018-12-08 | End: 2018-12-19

## 2018-12-08 RX ORDER — GABAPENTIN 400 MG/1
100 CAPSULE ORAL DAILY
Qty: 0 | Refills: 0 | Status: DISCONTINUED | OUTPATIENT
Start: 2018-12-08 | End: 2018-12-19

## 2018-12-08 RX ORDER — LABETALOL HCL 100 MG
100 TABLET ORAL EVERY 12 HOURS
Qty: 0 | Refills: 0 | Status: DISCONTINUED | OUTPATIENT
Start: 2018-12-08 | End: 2018-12-09

## 2018-12-08 RX ORDER — GABAPENTIN 400 MG/1
300 CAPSULE ORAL AT BEDTIME
Qty: 0 | Refills: 0 | Status: DISCONTINUED | OUTPATIENT
Start: 2018-12-08 | End: 2018-12-19

## 2018-12-08 RX ORDER — HYDROMORPHONE HYDROCHLORIDE 2 MG/ML
0.5 INJECTION INTRAMUSCULAR; INTRAVENOUS; SUBCUTANEOUS ONCE
Qty: 0 | Refills: 0 | Status: DISCONTINUED | OUTPATIENT
Start: 2018-12-08 | End: 2018-12-08

## 2018-12-08 RX ORDER — LEVOTHYROXINE SODIUM 125 MCG
137 TABLET ORAL DAILY
Qty: 0 | Refills: 0 | Status: DISCONTINUED | OUTPATIENT
Start: 2018-12-08 | End: 2018-12-19

## 2018-12-08 RX ORDER — PANTOPRAZOLE SODIUM 20 MG/1
40 TABLET, DELAYED RELEASE ORAL
Qty: 0 | Refills: 0 | Status: DISCONTINUED | OUTPATIENT
Start: 2018-12-08 | End: 2018-12-19

## 2018-12-08 RX ORDER — INSULIN HUMAN 100 [IU]/ML
INJECTION, SOLUTION SUBCUTANEOUS
Qty: 0 | Refills: 0 | Status: DISCONTINUED | OUTPATIENT
Start: 2018-12-08 | End: 2018-12-09

## 2018-12-08 RX ORDER — INSULIN HUMAN 100 [IU]/ML
4 INJECTION, SOLUTION SUBCUTANEOUS ONCE
Qty: 0 | Refills: 0 | Status: COMPLETED | OUTPATIENT
Start: 2018-12-08 | End: 2018-12-08

## 2018-12-08 RX ORDER — AMLODIPINE BESYLATE 2.5 MG/1
10 TABLET ORAL DAILY
Qty: 0 | Refills: 0 | Status: DISCONTINUED | OUTPATIENT
Start: 2018-12-08 | End: 2018-12-09

## 2018-12-08 RX ORDER — INSULIN GLARGINE 100 [IU]/ML
8 INJECTION, SOLUTION SUBCUTANEOUS AT BEDTIME
Qty: 0 | Refills: 0 | Status: DISCONTINUED | OUTPATIENT
Start: 2018-12-08 | End: 2018-12-09

## 2018-12-08 RX ADMIN — HYDROMORPHONE HYDROCHLORIDE 0.5 MILLIGRAM(S): 2 INJECTION INTRAMUSCULAR; INTRAVENOUS; SUBCUTANEOUS at 01:00

## 2018-12-08 RX ADMIN — SODIUM CHLORIDE 3000 MILLILITER(S): 9 INJECTION INTRAMUSCULAR; INTRAVENOUS; SUBCUTANEOUS at 13:11

## 2018-12-08 RX ADMIN — NICARDIPINE HYDROCHLORIDE 25 MG/HR: 30 CAPSULE, EXTENDED RELEASE ORAL at 07:55

## 2018-12-08 RX ADMIN — Medication 3 MILLILITER(S): at 17:56

## 2018-12-08 RX ADMIN — PANTOPRAZOLE SODIUM 40 MILLIGRAM(S): 20 TABLET, DELAYED RELEASE ORAL at 06:15

## 2018-12-08 RX ADMIN — HYDROMORPHONE HYDROCHLORIDE 0.5 MILLIGRAM(S): 2 INJECTION INTRAMUSCULAR; INTRAVENOUS; SUBCUTANEOUS at 07:56

## 2018-12-08 RX ADMIN — HYDROMORPHONE HYDROCHLORIDE 0.5 MILLIGRAM(S): 2 INJECTION INTRAMUSCULAR; INTRAVENOUS; SUBCUTANEOUS at 00:45

## 2018-12-08 RX ADMIN — INSULIN HUMAN 6: 100 INJECTION, SOLUTION SUBCUTANEOUS at 12:39

## 2018-12-08 RX ADMIN — Medication 40 MILLIGRAM(S): at 06:15

## 2018-12-08 RX ADMIN — CHOLESTYRAMINE 4 GRAM(S): 4 POWDER, FOR SUSPENSION ORAL at 13:09

## 2018-12-08 RX ADMIN — Medication 125 MILLILITER(S): at 00:32

## 2018-12-08 RX ADMIN — Medication 0.5 MILLIGRAM(S): at 05:59

## 2018-12-08 RX ADMIN — Medication 3 MILLILITER(S): at 11:12

## 2018-12-08 RX ADMIN — Medication 3 MILLILITER(S): at 22:47

## 2018-12-08 RX ADMIN — Medication 0.5 MILLIGRAM(S): at 17:57

## 2018-12-08 RX ADMIN — HYDROMORPHONE HYDROCHLORIDE 0.5 MILLIGRAM(S): 2 INJECTION INTRAMUSCULAR; INTRAVENOUS; SUBCUTANEOUS at 14:12

## 2018-12-08 RX ADMIN — Medication 3 MILLILITER(S): at 05:59

## 2018-12-08 RX ADMIN — GABAPENTIN 300 MILLIGRAM(S): 400 CAPSULE ORAL at 21:54

## 2018-12-08 RX ADMIN — Medication 100 MILLIGRAM(S): at 22:48

## 2018-12-08 RX ADMIN — INSULIN GLARGINE 8 UNIT(S): 100 INJECTION, SOLUTION SUBCUTANEOUS at 21:54

## 2018-12-08 RX ADMIN — NICARDIPINE HYDROCHLORIDE 25 MG/HR: 30 CAPSULE, EXTENDED RELEASE ORAL at 13:11

## 2018-12-08 RX ADMIN — Medication 3 MILLILITER(S): at 00:01

## 2018-12-08 RX ADMIN — PANTOPRAZOLE SODIUM 40 MILLIGRAM(S): 20 TABLET, DELAYED RELEASE ORAL at 09:53

## 2018-12-08 RX ADMIN — HYDROMORPHONE HYDROCHLORIDE 0.5 MILLIGRAM(S): 2 INJECTION INTRAMUSCULAR; INTRAVENOUS; SUBCUTANEOUS at 06:00

## 2018-12-08 RX ADMIN — HYDROMORPHONE HYDROCHLORIDE 0.5 MILLIGRAM(S): 2 INJECTION INTRAMUSCULAR; INTRAVENOUS; SUBCUTANEOUS at 14:30

## 2018-12-08 RX ADMIN — Medication 7.5 MILLIGRAM(S): at 19:41

## 2018-12-08 RX ADMIN — HYDROMORPHONE HYDROCHLORIDE 0.5 MILLIGRAM(S): 2 INJECTION INTRAMUSCULAR; INTRAVENOUS; SUBCUTANEOUS at 08:30

## 2018-12-08 RX ADMIN — INSULIN HUMAN 4 UNIT(S): 100 INJECTION, SOLUTION SUBCUTANEOUS at 13:10

## 2018-12-08 RX ADMIN — Medication 137 MICROGRAM(S): at 12:40

## 2018-12-08 RX ADMIN — HYDROMORPHONE HYDROCHLORIDE 0.5 MILLIGRAM(S): 2 INJECTION INTRAMUSCULAR; INTRAVENOUS; SUBCUTANEOUS at 05:44

## 2018-12-08 RX ADMIN — Medication 7.5 MILLIGRAM(S): at 09:34

## 2018-12-08 NOTE — PROGRESS NOTE ADULT - ASSESSMENT
63 yr old morbidly obese female, with long standing history of asthma, with chronic steroid use, hypothyroid, DM2 pulmonary emboli with IVC filter placement, colon Ca s/p hemicolectomy with multiple f/u surgeries for ventral hernia, admitted today at outside hospital with acute chest  & back pain with CTA + Type B aortic dissection with hemopericardium, transferred here for surgical intervention.    Plan cont supplemental Oxygen, A-alisa & CVP monitoring  type & screen, supplemental oxygen, Prednisone, inhaled bronchodilators.  Chest & back pain free.  SR, HTN, started on IV Cardene target SBP < 120,    TTE + pericardial effusion with tamponade, plan to correct coagulopathy, hold Coumadin repeat INR s/p Vit K & FFP, f/u perfusion indices,  Hyperglycemia on INS gtt.  Panda f/u UO  +ua f/u Urine culture  Synthroid IV   Pat at risk for sudden catastrophic decompensation   I have spent 30 minutes providing critical care management to this patient.

## 2018-12-08 NOTE — PROGRESS NOTE ADULT - SUBJECTIVE AND OBJECTIVE BOX
4:30pm-5pm    Remained critically ill on continuos ICU monitoring.      OBJECTIVE:  ICU Vital Signs Last 24 Hrs  T(C): 37.1 (08 Dec 2018 16:00), Max: 37.3 (08 Dec 2018 00:00)  T(F): 98.8 (08 Dec 2018 16:00), Max: 99.1 (08 Dec 2018 00:00)  HR: 83 (08 Dec 2018 19:30) (77 - 92)  BP: 150/75 (08 Dec 2018 00:45) (150/75 - 150/75)  BP(mean): 104 (08 Dec 2018 00:45) (104 - 104)  ABP: 126/52 (08 Dec 2018 19:30) (1/ - 174/67)  ABP(mean): 73 (08 Dec 2018 19:30) (1 - 98)  RR: 30 (08 Dec 2018 19:30) (17 - 52)  SpO2: 96% (08 Dec 2018 19:30) (88% - 100%)         @ :  -   @ 07:00  --------------------------------------------------------  IN: 1924 mL / OUT: 1460 mL / NET: 464 mL     @ 07:  -   @ 21:33  --------------------------------------------------------  IN: 1814 mL / OUT: 350 mL / NET: 1464 mL      CAPILLARY BLOOD GLUCOSE      POCT Blood Glucose.: 153 mg/dL (08 Dec 2018 18:27)      PHYSICAL EXAM:    Daily Weight in k.8 (08 Dec 2018 00:00)  General: in bed   Neurology: A&Ox3, nonfocal, ODONNELL x 4  Eyes: PERRLA/ EOMI, Gross vision intact  ENT/Neck: Neck supple, trachea midline, No JVD, Gross hearing intact  Respiratory:  B/L, fine  rales,   CV: RRR, S1S2, no murmurs, rubs or gallops  Abdominal: Soft, NT, ND +BS,   Extremities: No edema, + peripheral pulses  Skin: No Rashes, Hematoma, Ecchymosis          HOSPITAL MEDICATIONS:  MEDICATIONS  (STANDING):  ALBUTerol    90 MICROgram(s) HFA Inhaler 1 Puff(s) Inhalation every 4 hours  ALBUTerol/ipratropium for Nebulization 3 milliLiter(s) Nebulizer every 6 hours  buDESOnide   0.5 milliGRAM(s) Respule 0.5 milliGRAM(s) Inhalation two times a day  cholestyramine Powder (Sugar-Free) 4 Gram(s) Oral daily  dextrose 50% Injectable 50 milliLiter(s) IV Push every 15 minutes  gabapentin 300 milliGRAM(s) Oral at bedtime  gabapentin 100 milliGRAM(s) Oral daily  insulin glargine Injectable (LANTUS) 8 Unit(s) SubCutaneous at bedtime  insulin regular  human corrective regimen sliding scale   SubCutaneous Before meals and at bedtime  labetalol Infusion 0.5 mG/Min (30 mL/Hr) IV Continuous <Continuous>  levothyroxine 137 MICROGram(s) Oral daily  montelukast 10 milliGRAM(s) Oral daily  niCARdipine Infusion 5 mG/Hr (25 mL/Hr) IV Continuous <Continuous>  pantoprazole    Tablet 40 milliGRAM(s) Oral before breakfast  predniSONE   Tablet 7.5 milliGRAM(s) Oral every 12 hours  tiotropium 18 MICROgram(s) Capsule 1 Capsule(s) Inhalation daily    MEDICATIONS  (PRN):      LABS:                        9.0    15.2  )-----------( 168      ( 08 Dec 2018 01:45 )             28.2     12-    143  |  105  |  20  ----------------------------<  160<H>  4.2   |  21<L>  |  0.53    Ca    9.9      08 Dec 2018 01:45  Phos  2.7     12-  Mg     1.4         TPro  6.2  /  Alb  4.2  /  TBili  0.5  /  DBili  x   /  AST  19  /  ALT  33  /  AlkPhos  59  12-08    PT/INR - ( 07 Dec 2018 22:51 )   PT: 15.6 sec;   INR: 1.36 ratio         PTT - ( 07 Dec 2018 22:51 )  PTT:23.6 sec  Urinalysis Basic - ( 07 Dec 2018 18:49 )    Color: Light Yellow / Appearance: Clear / SG: >1.050 / pH: x  Gluc: x / Ketone: Negative  / Bili: Negative / Urobili: Negative   Blood: x / Protein: Trace / Nitrite: Positive   Leuk Esterase: Negative / RBC: 3 /hpf / WBC 1 /hpf   Sq Epi: x / Non Sq Epi: 0 /hpf / Bacteria: Many      Arterial Blood Gas:   @ 20:06  7.43/35/77/23/96/-.3  ABG lactate: --  Arterial Blood Gas:   @ 14:08  7.40/39/58/24/87/-.1  ABG lactate: --  Arterial Blood Gas:   @ 09:21  7.43/37/72/24/95/.4  ABG lactate: --  Arterial Blood Gas:   @ 06:54  7.44/38/81/25/96/1.3  ABG lactate: --  Arterial Blood Gas:   @ 04:41  7.44/39/75/26/95/2.4  ABG lactate: --  Arterial Blood Gas:   @ 02:58  7.43/40/86/26/97/2.0  ABG lactate: --  Arterial Blood Gas:   @ 01:33  7.43/38/96/24/97/.8  ABG lactate: --  Arterial Blood Gas:   @ 22:40  7.40/34/90/21/97/-2.8  ABG lactate: --  Arterial Blood Gas:   @ 21:22  7.40/36/81/22/96/-2.2  ABG lactate: --  Arterial Blood Gas:   @ 19:12  7.39/37/76/22/93/-2.2  ABG lactate: --    Venous Blood Gas:   @ 01:33  7.39/43/34//63  VBG Lactate: --  Venous Blood Gas:   @ 19:56  7.34/48/34/25/54  VBG Lactate: --    CARDIAC MARKERS ( 07 Dec 2018 12:26 )  .083 ng/mL / x     / 84 U/L / x     / 1.4 ng/mL    LIVER FUNCTIONS - ( 08 Dec 2018 01:45 )  Alb: 4.2 g/dL / Pro: 6.2 g/dL / ALK PHOS: 59 U/L / ALT: 33 U/L / AST: 19 U/L / GGT: x           Urinalysis Basic - ( 07 Dec 2018 18:49 )    Color: Light Yellow / Appearance: Clear / SG: >1.050 / pH: x  Gluc: x / Ketone: Negative  / Bili: Negative / Urobili: Negative   Blood: x / Protein: Trace / Nitrite: Positive   Leuk Esterase: Negative / RBC: 3 /hpf / WBC 1 /hpf   Sq Epi: x / Non Sq Epi: 0 /hpf / Bacteria: Many    MICROBIOLOGY:     RADIOLOGY:  X Reviewed and interpreted by me        I have spent 30 minutes providing critical care management to this patient. 4:30pm-5pm    Remained critically ill on continuos ICU monitoring.      OBJECTIVE:  ICU Vital Signs Last 24 Hrs  T(C): 37.1 (08 Dec 2018 16:00), Max: 37.3 (08 Dec 2018 00:00)  T(F): 98.8 (08 Dec 2018 16:00), Max: 99.1 (08 Dec 2018 00:00)  HR: 83 (08 Dec 2018 19:30) (77 - 92)  BP: 150/75 (08 Dec 2018 00:45) (150/75 - 150/75)  BP(mean): 104 (08 Dec 2018 00:45) (104 - 104)  ABP: 126/52 (08 Dec 2018 19:30) (1/ - 174/67)  ABP(mean): 73 (08 Dec 2018 19:30) (1 - 98)  RR: 30 (08 Dec 2018 19:30) (17 - 52)  SpO2: 96% (08 Dec 2018 19:30) (88% - 100%)         @ :  -   @ 07:00  --------------------------------------------------------  IN: 1924 mL / OUT: 1460 mL / NET: 464 mL     @ 07:  -   @ 21:33  --------------------------------------------------------  IN: 1814 mL / OUT: 350 mL / NET: 1464 mL      CAPILLARY BLOOD GLUCOSE      POCT Blood Glucose.: 153 mg/dL (08 Dec 2018 18:27)      PHYSICAL EXAM:    Daily Weight in k.8 (08 Dec 2018 00:00)  General: in bed   Neurology: A&Ox3, nonfocal, ODONNELL x 4  Eyes: PERRLA/ EOMI, Gross vision intact  ENT/Neck: Neck supple, trachea midline, No JVD, Gross hearing intact  Respiratory:  B/L, fine  rales,   CV: RRR, S1S2, no murmurs, rubs or gallops  Abdominal: Soft, NT, ND +BS,   Extremities: No edema, + peripheral pulses  Skin: No Rashes, Hematoma, Ecchymosis          HOSPITAL MEDICATIONS:  MEDICATIONS  (STANDING):  ALBUTerol    90 MICROgram(s) HFA Inhaler 1 Puff(s) Inhalation every 4 hours  ALBUTerol/ipratropium for Nebulization 3 milliLiter(s) Nebulizer every 6 hours  buDESOnide   0.5 milliGRAM(s) Respule 0.5 milliGRAM(s) Inhalation two times a day  cholestyramine Powder (Sugar-Free) 4 Gram(s) Oral daily  dextrose 50% Injectable 50 milliLiter(s) IV Push every 15 minutes  gabapentin 300 milliGRAM(s) Oral at bedtime  gabapentin 100 milliGRAM(s) Oral daily  insulin glargine Injectable (LANTUS) 8 Unit(s) SubCutaneous at bedtime  insulin regular  human corrective regimen sliding scale   SubCutaneous Before meals and at bedtime  labetalol Infusion 0.5 mG/Min (30 mL/Hr) IV Continuous <Continuous>  levothyroxine 137 MICROGram(s) Oral daily  montelukast 10 milliGRAM(s) Oral daily  niCARdipine Infusion 5 mG/Hr (25 mL/Hr) IV Continuous <Continuous>  pantoprazole    Tablet 40 milliGRAM(s) Oral before breakfast  predniSONE   Tablet 7.5 milliGRAM(s) Oral every 12 hours  tiotropium 18 MICROgram(s) Capsule 1 Capsule(s) Inhalation daily    MEDICATIONS  (PRN):      LABS:                        9.0    15.2  )-----------( 168      ( 08 Dec 2018 01:45 )             28.2     12-    143  |  105  |  20  ----------------------------<  160<H>  4.2   |  21<L>  |  0.53    Ca    9.9      08 Dec 2018 01:45  Phos  2.7     12-  Mg     1.4         TPro  6.2  /  Alb  4.2  /  TBili  0.5  /  DBili  x   /  AST  19  /  ALT  33  /  AlkPhos  59  12-08    PT/INR - ( 07 Dec 2018 22:51 )   PT: 15.6 sec;   INR: 1.36 ratio         PTT - ( 07 Dec 2018 22:51 )  PTT:23.6 sec  Urinalysis Basic - ( 07 Dec 2018 18:49 )    Color: Light Yellow / Appearance: Clear / SG: >1.050 / pH: x  Gluc: x / Ketone: Negative  / Bili: Negative / Urobili: Negative   Blood: x / Protein: Trace / Nitrite: Positive   Leuk Esterase: Negative / RBC: 3 /hpf / WBC 1 /hpf   Sq Epi: x / Non Sq Epi: 0 /hpf / Bacteria: Many      Arterial Blood Gas:   @ 20:06  7.43/35/77/23/96/-.3  ABG lactate: --  Arterial Blood Gas:   @ 14:08  7.40/39/58/24/87/-.1  ABG lactate: --  Arterial Blood Gas:   @ 09:21  7.43/37/72/24/95/.4  ABG lactate: --  Arterial Blood Gas:   @ 06:54  7.44/38/81/25/96/1.3  ABG lactate: --  Arterial Blood Gas:   @ 04:41  7.44/39/75/26/95/2.4  ABG lactate: --  Arterial Blood Gas:   @ 02:58  7.43/40/86/26/97/2.0  ABG lactate: --  Arterial Blood Gas:   @ 01:33  7.43/38/96/24/97/.8  ABG lactate: --  Arterial Blood Gas:   @ 22:40  7.40/34/90/21/97/-2.8  ABG lactate: --  Arterial Blood Gas:   @ 21:22  7.40/36/81/22/96/-2.2  ABG lactate: --  Arterial Blood Gas:   @ 19:12  7.39/37/76/22/93/-2.2  ABG lactate: --    Venous Blood Gas:   @ 01:33  7.39/43/34//63  VBG Lactate: --  Venous Blood Gas:   @ 19:56  7.34/48/34/25/54  VBG Lactate: --    CARDIAC MARKERS ( 07 Dec 2018 12:26 )  .083 ng/mL / x     / 84 U/L / x     / 1.4 ng/mL    LIVER FUNCTIONS - ( 08 Dec 2018 01:45 )  Alb: 4.2 g/dL / Pro: 6.2 g/dL / ALK PHOS: 59 U/L / ALT: 33 U/L / AST: 19 U/L / GGT: x           Urinalysis Basic - ( 07 Dec 2018 18:49 )    Color: Light Yellow / Appearance: Clear / SG: >1.050 / pH: x  Gluc: x / Ketone: Negative  / Bili: Negative / Urobili: Negative   Blood: x / Protein: Trace / Nitrite: Positive   Leuk Esterase: Negative / RBC: 3 /hpf / WBC 1 /hpf   Sq Epi: x / Non Sq Epi: 0 /hpf / Bacteria: Many    MICROBIOLOGY:     RADIOLOGY:  X Reviewed and interpreted by me

## 2018-12-09 LAB
ALBUMIN SERPL ELPH-MCNC: 3.7 G/DL — SIGNIFICANT CHANGE UP (ref 3.3–5)
ALBUMIN SERPL ELPH-MCNC: 4 G/DL — SIGNIFICANT CHANGE UP (ref 3.3–5)
ALP SERPL-CCNC: 54 U/L — SIGNIFICANT CHANGE UP (ref 40–120)
ALP SERPL-CCNC: 56 U/L — SIGNIFICANT CHANGE UP (ref 40–120)
ALT FLD-CCNC: 32 U/L — SIGNIFICANT CHANGE UP (ref 10–45)
ALT FLD-CCNC: 33 U/L — SIGNIFICANT CHANGE UP (ref 10–45)
ANION GAP SERPL CALC-SCNC: 14 MMOL/L — SIGNIFICANT CHANGE UP (ref 5–17)
ANION GAP SERPL CALC-SCNC: 15 MMOL/L — SIGNIFICANT CHANGE UP (ref 5–17)
AST SERPL-CCNC: 13 U/L — SIGNIFICANT CHANGE UP (ref 10–40)
AST SERPL-CCNC: 15 U/L — SIGNIFICANT CHANGE UP (ref 10–40)
BILIRUB SERPL-MCNC: 0.8 MG/DL — SIGNIFICANT CHANGE UP (ref 0.2–1.2)
BILIRUB SERPL-MCNC: 1 MG/DL — SIGNIFICANT CHANGE UP (ref 0.2–1.2)
BUN SERPL-MCNC: 27 MG/DL — HIGH (ref 7–23)
BUN SERPL-MCNC: 28 MG/DL — HIGH (ref 7–23)
CALCIUM SERPL-MCNC: 9.5 MG/DL — SIGNIFICANT CHANGE UP (ref 8.4–10.5)
CALCIUM SERPL-MCNC: 9.6 MG/DL — SIGNIFICANT CHANGE UP (ref 8.4–10.5)
CHLORIDE SERPL-SCNC: 105 MMOL/L — SIGNIFICANT CHANGE UP (ref 96–108)
CHLORIDE SERPL-SCNC: 106 MMOL/L — SIGNIFICANT CHANGE UP (ref 96–108)
CO2 SERPL-SCNC: 21 MMOL/L — LOW (ref 22–31)
CO2 SERPL-SCNC: 22 MMOL/L — SIGNIFICANT CHANGE UP (ref 22–31)
CREAT SERPL-MCNC: 0.57 MG/DL — SIGNIFICANT CHANGE UP (ref 0.5–1.3)
CREAT SERPL-MCNC: 0.58 MG/DL — SIGNIFICANT CHANGE UP (ref 0.5–1.3)
GAS PNL BLDA: SIGNIFICANT CHANGE UP
GLUCOSE BLDC GLUCOMTR-MCNC: 100 MG/DL — HIGH (ref 70–99)
GLUCOSE BLDC GLUCOMTR-MCNC: 106 MG/DL — HIGH (ref 70–99)
GLUCOSE BLDC GLUCOMTR-MCNC: 118 MG/DL — HIGH (ref 70–99)
GLUCOSE BLDC GLUCOMTR-MCNC: 144 MG/DL — HIGH (ref 70–99)
GLUCOSE BLDC GLUCOMTR-MCNC: 188 MG/DL — HIGH (ref 70–99)
GLUCOSE BLDC GLUCOMTR-MCNC: 231 MG/DL — HIGH (ref 70–99)
GLUCOSE BLDC GLUCOMTR-MCNC: 248 MG/DL — HIGH (ref 70–99)
GLUCOSE BLDC GLUCOMTR-MCNC: 285 MG/DL — HIGH (ref 70–99)
GLUCOSE SERPL-MCNC: 108 MG/DL — HIGH (ref 70–99)
GLUCOSE SERPL-MCNC: 152 MG/DL — HIGH (ref 70–99)
HCT VFR BLD CALC: 26.7 % — LOW (ref 34.5–45)
HCT VFR BLD CALC: 28.1 % — LOW (ref 34.5–45)
HGB BLD-MCNC: 8.7 G/DL — LOW (ref 11.5–15.5)
HGB BLD-MCNC: 9.2 G/DL — LOW (ref 11.5–15.5)
MCHC RBC-ENTMCNC: 29.6 PG — SIGNIFICANT CHANGE UP (ref 27–34)
MCHC RBC-ENTMCNC: 29.6 PG — SIGNIFICANT CHANGE UP (ref 27–34)
MCHC RBC-ENTMCNC: 32.6 GM/DL — SIGNIFICANT CHANGE UP (ref 32–36)
MCHC RBC-ENTMCNC: 32.6 GM/DL — SIGNIFICANT CHANGE UP (ref 32–36)
MCV RBC AUTO: 90.8 FL — SIGNIFICANT CHANGE UP (ref 80–100)
MCV RBC AUTO: 91 FL — SIGNIFICANT CHANGE UP (ref 80–100)
PHOSPHATE SERPL-MCNC: 2.7 MG/DL — SIGNIFICANT CHANGE UP (ref 2.5–4.5)
PLATELET # BLD AUTO: 158 K/UL — SIGNIFICANT CHANGE UP (ref 150–400)
PLATELET # BLD AUTO: 158 K/UL — SIGNIFICANT CHANGE UP (ref 150–400)
POTASSIUM SERPL-MCNC: 3.9 MMOL/L — SIGNIFICANT CHANGE UP (ref 3.5–5.3)
POTASSIUM SERPL-MCNC: 4.3 MMOL/L — SIGNIFICANT CHANGE UP (ref 3.5–5.3)
POTASSIUM SERPL-SCNC: 3.9 MMOL/L — SIGNIFICANT CHANGE UP (ref 3.5–5.3)
POTASSIUM SERPL-SCNC: 4.3 MMOL/L — SIGNIFICANT CHANGE UP (ref 3.5–5.3)
PROT SERPL-MCNC: 5.8 G/DL — LOW (ref 6–8.3)
PROT SERPL-MCNC: 6.1 G/DL — SIGNIFICANT CHANGE UP (ref 6–8.3)
RBC # BLD: 2.94 M/UL — LOW (ref 3.8–5.2)
RBC # BLD: 3.09 M/UL — LOW (ref 3.8–5.2)
RBC # FLD: 15 % — HIGH (ref 10.3–14.5)
RBC # FLD: 15.1 % — HIGH (ref 10.3–14.5)
SODIUM SERPL-SCNC: 141 MMOL/L — SIGNIFICANT CHANGE UP (ref 135–145)
SODIUM SERPL-SCNC: 142 MMOL/L — SIGNIFICANT CHANGE UP (ref 135–145)
WBC # BLD: 14.3 K/UL — HIGH (ref 3.8–10.5)
WBC # BLD: 16 K/UL — HIGH (ref 3.8–10.5)
WBC # FLD AUTO: 14.3 K/UL — HIGH (ref 3.8–10.5)
WBC # FLD AUTO: 16 K/UL — HIGH (ref 3.8–10.5)

## 2018-12-09 PROCEDURE — 71045 X-RAY EXAM CHEST 1 VIEW: CPT | Mod: 26

## 2018-12-09 PROCEDURE — 71045 X-RAY EXAM CHEST 1 VIEW: CPT | Mod: 26,77

## 2018-12-09 PROCEDURE — 99291 CRITICAL CARE FIRST HOUR: CPT

## 2018-12-09 RX ORDER — INSULIN GLARGINE 100 [IU]/ML
15 INJECTION, SOLUTION SUBCUTANEOUS AT BEDTIME
Qty: 0 | Refills: 0 | Status: DISCONTINUED | OUTPATIENT
Start: 2018-12-09 | End: 2018-12-13

## 2018-12-09 RX ORDER — INSULIN LISPRO 100/ML
5 VIAL (ML) SUBCUTANEOUS
Qty: 0 | Refills: 0 | Status: DISCONTINUED | OUTPATIENT
Start: 2018-12-09 | End: 2018-12-13

## 2018-12-09 RX ORDER — INSULIN HUMAN 100 [IU]/ML
2 INJECTION, SOLUTION SUBCUTANEOUS
Qty: 100 | Refills: 0 | Status: DISCONTINUED | OUTPATIENT
Start: 2018-12-09 | End: 2018-12-09

## 2018-12-09 RX ORDER — LABETALOL HCL 100 MG
200 TABLET ORAL EVERY 8 HOURS
Qty: 0 | Refills: 0 | Status: DISCONTINUED | OUTPATIENT
Start: 2018-12-09 | End: 2018-12-09

## 2018-12-09 RX ORDER — INSULIN LISPRO 100/ML
VIAL (ML) SUBCUTANEOUS
Qty: 0 | Refills: 0 | Status: DISCONTINUED | OUTPATIENT
Start: 2018-12-09 | End: 2018-12-13

## 2018-12-09 RX ORDER — AMLODIPINE BESYLATE 2.5 MG/1
10 TABLET ORAL DAILY
Qty: 0 | Refills: 0 | Status: DISCONTINUED | OUTPATIENT
Start: 2018-12-10 | End: 2018-12-11

## 2018-12-09 RX ORDER — HEPARIN SODIUM 5000 [USP'U]/ML
5000 INJECTION INTRAVENOUS; SUBCUTANEOUS EVERY 8 HOURS
Qty: 0 | Refills: 0 | Status: DISCONTINUED | OUTPATIENT
Start: 2018-12-09 | End: 2018-12-19

## 2018-12-09 RX ORDER — POLYETHYLENE GLYCOL 3350 17 G/17G
17 POWDER, FOR SOLUTION ORAL DAILY
Qty: 0 | Refills: 0 | Status: DISCONTINUED | OUTPATIENT
Start: 2018-12-09 | End: 2018-12-19

## 2018-12-09 RX ORDER — LABETALOL HCL 100 MG
400 TABLET ORAL EVERY 8 HOURS
Qty: 0 | Refills: 0 | Status: DISCONTINUED | OUTPATIENT
Start: 2018-12-09 | End: 2018-12-12

## 2018-12-09 RX ORDER — INSULIN LISPRO 100/ML
VIAL (ML) SUBCUTANEOUS AT BEDTIME
Qty: 0 | Refills: 0 | Status: DISCONTINUED | OUTPATIENT
Start: 2018-12-09 | End: 2018-12-13

## 2018-12-09 RX ORDER — ACETAMINOPHEN 500 MG
650 TABLET ORAL EVERY 6 HOURS
Qty: 0 | Refills: 0 | Status: DISCONTINUED | OUTPATIENT
Start: 2018-12-09 | End: 2018-12-19

## 2018-12-09 RX ORDER — SENNA PLUS 8.6 MG/1
2 TABLET ORAL AT BEDTIME
Qty: 0 | Refills: 0 | Status: DISCONTINUED | OUTPATIENT
Start: 2018-12-09 | End: 2018-12-19

## 2018-12-09 RX ORDER — HYDROMORPHONE HYDROCHLORIDE 2 MG/ML
0.5 INJECTION INTRAMUSCULAR; INTRAVENOUS; SUBCUTANEOUS ONCE
Qty: 0 | Refills: 0 | Status: DISCONTINUED | OUTPATIENT
Start: 2018-12-09 | End: 2018-12-09

## 2018-12-09 RX ORDER — FENTANYL CITRATE 50 UG/ML
50 INJECTION INTRAVENOUS ONCE
Qty: 0 | Refills: 0 | Status: DISCONTINUED | OUTPATIENT
Start: 2018-12-09 | End: 2018-12-09

## 2018-12-09 RX ORDER — FUROSEMIDE 40 MG
20 TABLET ORAL ONCE
Qty: 0 | Refills: 0 | Status: COMPLETED | OUTPATIENT
Start: 2018-12-09 | End: 2018-12-09

## 2018-12-09 RX ADMIN — GABAPENTIN 100 MILLIGRAM(S): 400 CAPSULE ORAL at 14:12

## 2018-12-09 RX ADMIN — Medication 3 MILLILITER(S): at 12:27

## 2018-12-09 RX ADMIN — Medication 0.5 MILLIGRAM(S): at 17:02

## 2018-12-09 RX ADMIN — PANTOPRAZOLE SODIUM 40 MILLIGRAM(S): 20 TABLET, DELAYED RELEASE ORAL at 10:04

## 2018-12-09 RX ADMIN — NICARDIPINE HYDROCHLORIDE 25 MG/HR: 30 CAPSULE, EXTENDED RELEASE ORAL at 07:34

## 2018-12-09 RX ADMIN — Medication 7.5 MILLIGRAM(S): at 07:32

## 2018-12-09 RX ADMIN — NICARDIPINE HYDROCHLORIDE 25 MG/HR: 30 CAPSULE, EXTENDED RELEASE ORAL at 14:13

## 2018-12-09 RX ADMIN — NICARDIPINE HYDROCHLORIDE 25 MG/HR: 30 CAPSULE, EXTENDED RELEASE ORAL at 08:45

## 2018-12-09 RX ADMIN — Medication 650 MILLIGRAM(S): at 15:15

## 2018-12-09 RX ADMIN — HEPARIN SODIUM 5000 UNIT(S): 5000 INJECTION INTRAVENOUS; SUBCUTANEOUS at 14:13

## 2018-12-09 RX ADMIN — Medication 7.5 MILLIGRAM(S): at 17:49

## 2018-12-09 RX ADMIN — Medication 3 MILLILITER(S): at 06:49

## 2018-12-09 RX ADMIN — INSULIN HUMAN 2: 100 INJECTION, SOLUTION SUBCUTANEOUS at 08:44

## 2018-12-09 RX ADMIN — Medication 4: at 17:45

## 2018-12-09 RX ADMIN — Medication 400 MILLIGRAM(S): at 14:12

## 2018-12-09 RX ADMIN — SENNA PLUS 2 TABLET(S): 8.6 TABLET ORAL at 22:26

## 2018-12-09 RX ADMIN — NICARDIPINE HYDROCHLORIDE 25 MG/HR: 30 CAPSULE, EXTENDED RELEASE ORAL at 03:21

## 2018-12-09 RX ADMIN — AMLODIPINE BESYLATE 10 MILLIGRAM(S): 2.5 TABLET ORAL at 06:05

## 2018-12-09 RX ADMIN — HEPARIN SODIUM 5000 UNIT(S): 5000 INJECTION INTRAVENOUS; SUBCUTANEOUS at 22:27

## 2018-12-09 RX ADMIN — HYDROMORPHONE HYDROCHLORIDE 0.5 MILLIGRAM(S): 2 INJECTION INTRAMUSCULAR; INTRAVENOUS; SUBCUTANEOUS at 23:00

## 2018-12-09 RX ADMIN — Medication 20 MILLIGRAM(S): at 03:46

## 2018-12-09 RX ADMIN — GABAPENTIN 300 MILLIGRAM(S): 400 CAPSULE ORAL at 22:26

## 2018-12-09 RX ADMIN — Medication 20 MILLIGRAM(S): at 03:41

## 2018-12-09 RX ADMIN — Medication 0.5 MILLIGRAM(S): at 06:49

## 2018-12-09 RX ADMIN — Medication 400 MILLIGRAM(S): at 22:26

## 2018-12-09 RX ADMIN — Medication 650 MILLIGRAM(S): at 14:15

## 2018-12-09 RX ADMIN — Medication 100 MILLIGRAM(S): at 06:05

## 2018-12-09 RX ADMIN — POLYETHYLENE GLYCOL 3350 17 GRAM(S): 17 POWDER, FOR SOLUTION ORAL at 14:13

## 2018-12-09 RX ADMIN — CHOLESTYRAMINE 4 GRAM(S): 4 POWDER, FOR SUSPENSION ORAL at 12:49

## 2018-12-09 RX ADMIN — Medication 6: at 13:17

## 2018-12-09 RX ADMIN — Medication 5 UNIT(S): at 17:45

## 2018-12-09 RX ADMIN — Medication 137 MICROGRAM(S): at 06:05

## 2018-12-09 RX ADMIN — Medication 3 MILLILITER(S): at 17:01

## 2018-12-09 RX ADMIN — INSULIN GLARGINE 15 UNIT(S): 100 INJECTION, SOLUTION SUBCUTANEOUS at 22:26

## 2018-12-09 RX ADMIN — Medication 1 TABLET(S): at 14:12

## 2018-12-09 RX ADMIN — HYDROMORPHONE HYDROCHLORIDE 0.5 MILLIGRAM(S): 2 INJECTION INTRAMUSCULAR; INTRAVENOUS; SUBCUTANEOUS at 22:27

## 2018-12-09 NOTE — CONSULT NOTE ADULT - SUBJECTIVE AND OBJECTIVE BOX
HPI:  Patient is a 63 year old female with PMHX of asthma (with chronic steroid use) DM, HLD, obesity, hypothyroid, pulmonary emboli (on coumadin and w/ IVC filter) who presented initially to [facility] complaining of sudden onset upper back pain between her shoulder blades for one hour associated with shortness of breath. Pt states pain was 8/10, nonradiating. Pt denies LOC, fevers/chills, dizziness, numbness/tingling, chest pain, N/V/D.   At [place], CTA of the chest revealed probable type A dissection w/ intramural hematoma w/ active hemmorhage. Pt transferred urgently to [facility] for possible CT surgery.  Upon arrival to [facility], pt placed on Labetolol drip for blood pressure control and patient taken for a CT of the Chest. Dr. Kumar reviewed images which revealed Type B dissection w/ hemoperiteneum. INR at outside hospital 1.8.   Neurology was consulted for bilateral LE weakness which began at approximately the same time as the backpain. Patient states that at baseline she is able to walk, work out, but the day of the event she had to essentially be carried into the ED. Denies any back pain or trauma to the back, this has never occurred before.     MEDICATIONS  (STANDING):  ALBUTerol    90 MICROgram(s) HFA Inhaler 1 Puff(s) Inhalation every 4 hours  ALBUTerol/ipratropium for Nebulization 3 milliLiter(s) Nebulizer every 6 hours  buDESOnide   0.5 milliGRAM(s) Respule 0.5 milliGRAM(s) Inhalation two times a day  cholestyramine Powder (Sugar-Free) 4 Gram(s) Oral daily  dextrose 50% Injectable 50 milliLiter(s) IV Push every 15 minutes  gabapentin 300 milliGRAM(s) Oral at bedtime  gabapentin 100 milliGRAM(s) Oral daily  heparin  Injectable 5000 Unit(s) SubCutaneous every 8 hours  insulin glargine Injectable (LANTUS) 8 Unit(s) SubCutaneous at bedtime  insulin lispro (HumaLOG) corrective regimen sliding scale   SubCutaneous at bedtime  insulin lispro (HumaLOG) corrective regimen sliding scale   SubCutaneous three times a day before meals  labetalol 400 milliGRAM(s) Oral every 8 hours  levothyroxine 137 MICROGram(s) Oral daily  montelukast 10 milliGRAM(s) Oral daily  niCARdipine Infusion 5 mG/Hr (25 mL/Hr) IV Continuous <Continuous>  pantoprazole    Tablet 40 milliGRAM(s) Oral before breakfast  polyethylene glycol 3350 17 Gram(s) Oral daily  predniSONE   Tablet 7.5 milliGRAM(s) Oral every 12 hours  senna 2 Tablet(s) Oral at bedtime  tiotropium 18 MICROgram(s) Capsule 1 Capsule(s) Inhalation daily  trimethoprim  160 mG/sulfamethoxazole 800 mG 1 Tablet(s) Oral daily    MEDICATIONS  (PRN):  acetaminophen   Tablet .. 650 milliGRAM(s) Oral every 6 hours PRN Mild Pain (1 - 3)    PAST MEDICAL & SURGICAL HISTORY:  Arthritis  Shingles  PE (pulmonary embolism)  Hypothyroidism  Hyperlipidemia  Diabetes mellitus  Asthma  History of cataract surgery  S/P cholecystectomy    Allergies  adhesives (Unknown)  Ceftin (Rash)  erythromycin (Rash)  fish (Unknown)  Levaquin (Rash)    Intolerances  SHx - No smoking, No ETOH, No drug abuse    Review of Systems:  CONSTITUTIONAL:  No weight loss, fever, chills, weakness or fatigue.  HEENT:  Eyes:  No visual loss, blurred vision, double vision or yellow sclerae. Ears, Nose, Throat:  No hearing loss, sneezing, congestion, runny nose or sore throat.  CARDIOVASCULAR:  No chest pain, chest pressure or chest discomfort. No palpitations or edema.  GASTROINTESTINAL:  No anorexia, nausea, vomiting or diarrhea. No abdominal pain or blood.  NEUROLOGICAL: See HPI  MUSCULOSKELETAL:  No muscle, back pain, joint pain or stiffness.  PSYCHIATRIC:  No history of depression or anxiety.    Vital Signs Last 24 Hrs  T(C): 36.1 (09 Dec 2018 12:00), Max: 37.1 (08 Dec 2018 16:00)  T(F): 97 (09 Dec 2018 12:00), Max: 98.8 (08 Dec 2018 16:00)  HR: 87 (09 Dec 2018 12:45) (75 - 87)  BP: 108/55 (09 Dec 2018 03:15) (108/55 - 108/55)  BP(mean): 75 (09 Dec 2018 03:15) (75 - 75)  RR: 50 (09 Dec 2018 12:45) (19 - 50)  SpO2: 90% (09 Dec 2018 12:45) (89% - 100%)    General Exam:   General appearance: No acute distress                 Neurological Exam:  Mental Status: Orientated to self, date and place.    No dysarthria, aphasia or neglect.      Cranial Nerves: CN I - not tested.  PERRL, EOMI, VFF, no nystagmus or diplopia.  No APD.    Fundi not visualized bilaterally.    No facial asymmetry.  Hearing intact to finger rub bilaterally.     Motor:   Tone: normal.                  Strength:     [] Upper extremity                      Delt       Bicep    Tricep                                                  R         5/5 5/5 5/5 5/5                                               L          5/5 5/5 5/5 5/5  [] Lower extremity                       HF          KE          KF        DF         PF                                               R        2/5 2/5 2/5 4/5 4/5                                               L          2/5 2/5 2/5 4/5 4/5  Pronator drift: none                 Dysmetria: BL NL FTN  No truncal ataxia.    Tremor: No resting, postural or action tremor.  No myoclonus.    Sensation: decreased to light touch (R better than L), pinprick (R better than L), vibration intact.     Deep Tendon Reflexes:   Right 2+ : BC, TC, BRD   Left 2+ : BC, TC, BRD  R, L knee: 0    Toes mute bilaterally  Gait: deferred    Other:  Radiology  CT A/P: aortic dissection from the proximal aortic arch extending into the distal abdominal aorta

## 2018-12-09 NOTE — CONSULT NOTE ADULT - ASSESSMENT
63 year old female with PMHX of asthma (with chronic steroid use) DM, HLD, obesity, hypothyroid, pulmonary emboli (on coumadin and w/ IVC filter) who presented initially to [facility] complaining of sudden onset upper back pain between her shoulder blades for one hour associated with shortness of breath. Pt states pain was 8/10, nonradiating. Pt denies LOC, fevers/chills, dizziness, numbness/tingling, chest pain, N/V/D.   At [place], CTA of the chest revealed probable type A dissection w/ intramural hematoma w/ active hemmorhage. Pt transferred urgently to [facility] for possible CT surgery.  Upon arrival to [facility], pt placed on Labetolol drip for blood pressure control and patient taken for a CT of the Chest. Dr. Kumar reviewed images which revealed Type B dissection w/ hemoperiteneum. INR at outside hospital 1.8.   Neurology was consulted for bilateral LE weakness which began at approximately the same time as the backpain. Patient states that at baseline she is able to walk, work out, but the day of the event she had to essentially be carried into the ED. Denies any back pain or trauma to the back, this has never occurred before.  Neurologic exam is notable for decrease strength in bilateral lower extremities and decreased sensation to light touch (R better than L), pinprick (R better than L), vibration intact.   Etiology: due to the occurance of the LE weakness concurrently with the presentation of the type B dissection, need to r/o dissection, ischemia or occlusion of the ASA. A of skylar less likely to be affected since patient has only been treated medically.    Plan  [] MRI, MRA T, L spine with and without contrast  [] strict BP control  [] rest of management per CT ICU team    Will discuss w attending 63 year old female with PMHX of asthma (with chronic steroid use) DM, HLD, obesity, hypothyroid, pulmonary emboli (on coumadin and w/ IVC filter) who presented initially to [facility] complaining of sudden onset upper back pain between her shoulder blades for one hour associated with shortness of breath. Pt states pain was 8/10, nonradiating. Pt denies LOC, fevers/chills, dizziness, numbness/tingling, chest pain, N/V/D.   At [place], CTA of the chest revealed probable type A dissection w/ intramural hematoma w/ active hemmorhage. Pt transferred urgently to [facility] for possible CT surgery.  Upon arrival to [facility], pt placed on Labetolol drip for blood pressure control and patient taken for a CT of the Chest. Dr. Kumar reviewed images which revealed Type B dissection w/ hemoperiteneum. INR at outside hospital 1.8.   Neurology was consulted for bilateral LE weakness which began at approximately the same time as the backpain. Patient states that at baseline she is able to walk, work out, but the day of the event she had to essentially be carried into the ED. Denies any back pain or trauma to the back, this has never occurred before.  Neurologic exam is notable for decrease strength in bilateral lower extremities and decreased sensation to light touch (R better than L), pinprick (R better than L), vibration intact.   Etiology: due to the occurance of the LE weakness concurrently with the presentation of the type B dissection, need to r/o dissection, ischemia or occlusion of the ASA.    Plan  [] MRI, MRA T, L spine with and without contrast  [] strict BP control, avoid hypotension  [] rest of management per CT ICU team    Will discuss w attending

## 2018-12-09 NOTE — PROGRESS NOTE ADULT - SUBJECTIVE AND OBJECTIVE BOX
KASHIF NATARAJAN   MRN#: [id number]     The patient is a 63y Female who was seen, evaluated, & examined with the CTICU staff on rounds with a multidisciplinary care plan formulated & implemented.  All available clinical, laboratory, radiographic, pharmacologic, and electrocardiographic data were reviewed & analyzed.      The patient was in the CTICU in critical condition secondary to:     malignant hypertension-cardiovascular dysfunction    For support and evaluation & prevention of further decompensation secondary to persistent cardiopulmonary dysfunction and malignant hypertension-cardiovascular dysfunction, respiratory status required:     supplemental oxygen with nasal cannula  continuous pulse oximetry monitoring  following ABG’s with A-line monitoring    Invasive hemodynamic monitoring with an A-line was required for  continuous MAP/BP monitoring     to ensure adequate cardiovascular support and to evaluate for & help prevent decompensation while receiving     intermittent volume expansion  IV Cardene infusion    secondary to malignant hypertension-cardiovascular dysfunction    Metabolic stability, uncontrolled type II Diabetes mellitus-stress hyperglycemia, & infection prophylaxis required an IV regular Insulin drip & the following of serial glucose levels to help achieve & maintain euglycemia.      In addition:  On Cardene infusion, off of Labetalol infusion  Will start PO Labetalol in addition to PO Norvasc already onboard to wean off of Cardene gtt  +UA on chronic steroids for asthma, will treat with Bactrim and d/c Panda  Elevated blood sugar on steroids, diabetic as an outpatient, will consult Endocrine    The patient required critical care management and I provided 80 minutes of non-continuous care to the patient in addition to  discussing the patient and plan at length with the CTICU staff and helping coordinate care. KASHIF NATARAJAN   MRN#: [id number]     The patient is a 63y Female who was seen, evaluated, & examined with the CTICU staff on rounds with a multidisciplinary care plan formulated & implemented.  All available clinical, laboratory, radiographic, pharmacologic, and electrocardiographic data were reviewed & analyzed.      The patient was in the CTICU in critical condition secondary to:     malignant hypertension-cardiovascular dysfunction    For support and evaluation & prevention of further decompensation secondary to persistent cardiopulmonary dysfunction and malignant hypertension-cardiovascular dysfunction, respiratory status required:     supplemental oxygen with nasal cannula  continuous pulse oximetry monitoring  following ABG’s with A-line monitoring    Invasive hemodynamic monitoring with an A-line was required for  continuous MAP/BP monitoring     to ensure adequate cardiovascular support and to evaluate for & help prevent decompensation while receiving     intermittent volume expansion  IV Cardene infusion    secondary to malignant hypertension-cardiovascular dysfunction    Metabolic stability, uncontrolled type II Diabetes mellitus-stress hyperglycemia, & infection prophylaxis required an IV regular Insulin drip & the following of serial glucose levels to help achieve & maintain euglycemia.      In addition:  On Cardene infusion, off of Labetalol infusion  Will start PO Labetalol in addition to PO Norvasc already onboard to wean off of Cardene gtt  +UA on chronic steroids for asthma, will treat with Bactrim and d/c Panda  Elevated blood sugar on steroids, diabetic as an outpatient, will consult Endocrine  Bilateral lower extremity weakness, 2/4 strength on exam; start of symptoms coincided with back pain/dissection; Neuro is following, will await recs    The patient required critical care management and I provided 30 minutes of non-continuous care to the patient in addition to  discussing the patient and plan at length with the CTICU staff and helping coordinate care.

## 2018-12-10 ENCOUNTER — MOBILE ON CALL (OUTPATIENT)
Age: 63
End: 2018-12-10

## 2018-12-10 DIAGNOSIS — E78.5 HYPERLIPIDEMIA, UNSPECIFIED: ICD-10-CM

## 2018-12-10 DIAGNOSIS — E03.9 HYPOTHYROIDISM, UNSPECIFIED: ICD-10-CM

## 2018-12-10 DIAGNOSIS — E11.9 TYPE 2 DIABETES MELLITUS WITHOUT COMPLICATIONS: ICD-10-CM

## 2018-12-10 LAB
-  AMIKACIN: SIGNIFICANT CHANGE UP
-  AMOXICILLIN/CLAVULANIC ACID: SIGNIFICANT CHANGE UP
-  AMPICILLIN/SULBACTAM: SIGNIFICANT CHANGE UP
-  AMPICILLIN: SIGNIFICANT CHANGE UP
-  AZTREONAM: SIGNIFICANT CHANGE UP
-  CEFAZOLIN: SIGNIFICANT CHANGE UP
-  CEFEPIME: SIGNIFICANT CHANGE UP
-  CEFOXITIN: SIGNIFICANT CHANGE UP
-  CEFTRIAXONE: SIGNIFICANT CHANGE UP
-  CIPROFLOXACIN: SIGNIFICANT CHANGE UP
-  ERTAPENEM: SIGNIFICANT CHANGE UP
-  GENTAMICIN: SIGNIFICANT CHANGE UP
-  IMIPENEM: SIGNIFICANT CHANGE UP
-  LEVOFLOXACIN: SIGNIFICANT CHANGE UP
-  MEROPENEM: SIGNIFICANT CHANGE UP
-  NITROFURANTOIN: SIGNIFICANT CHANGE UP
-  PIPERACILLIN/TAZOBACTAM: SIGNIFICANT CHANGE UP
-  TIGECYCLINE: SIGNIFICANT CHANGE UP
-  TOBRAMYCIN: SIGNIFICANT CHANGE UP
-  TRIMETHOPRIM/SULFAMETHOXAZOLE: SIGNIFICANT CHANGE UP
ANION GAP SERPL CALC-SCNC: 12 MMOL/L — SIGNIFICANT CHANGE UP (ref 5–17)
BUN SERPL-MCNC: 29 MG/DL — HIGH (ref 7–23)
CALCIUM SERPL-MCNC: 9.4 MG/DL — SIGNIFICANT CHANGE UP (ref 8.4–10.5)
CHLORIDE SERPL-SCNC: 108 MMOL/L — SIGNIFICANT CHANGE UP (ref 96–108)
CO2 SERPL-SCNC: 22 MMOL/L — SIGNIFICANT CHANGE UP (ref 22–31)
CREAT SERPL-MCNC: 0.6 MG/DL — SIGNIFICANT CHANGE UP (ref 0.5–1.3)
CULTURE RESULTS: SIGNIFICANT CHANGE UP
GAS PNL BLDA: SIGNIFICANT CHANGE UP
GAS PNL BLDA: SIGNIFICANT CHANGE UP
GLUCOSE BLDC GLUCOMTR-MCNC: 126 MG/DL — HIGH (ref 70–99)
GLUCOSE BLDC GLUCOMTR-MCNC: 165 MG/DL — HIGH (ref 70–99)
GLUCOSE BLDC GLUCOMTR-MCNC: 181 MG/DL — HIGH (ref 70–99)
GLUCOSE BLDC GLUCOMTR-MCNC: 227 MG/DL — HIGH (ref 70–99)
GLUCOSE SERPL-MCNC: 197 MG/DL — HIGH (ref 70–99)
HCT VFR BLD CALC: 25.5 % — LOW (ref 34.5–45)
HGB BLD-MCNC: 8.5 G/DL — LOW (ref 11.5–15.5)
MCHC RBC-ENTMCNC: 30.2 PG — SIGNIFICANT CHANGE UP (ref 27–34)
MCHC RBC-ENTMCNC: 33.4 GM/DL — SIGNIFICANT CHANGE UP (ref 32–36)
MCV RBC AUTO: 90.2 FL — SIGNIFICANT CHANGE UP (ref 80–100)
METHOD TYPE: SIGNIFICANT CHANGE UP
ORGANISM # SPEC MICROSCOPIC CNT: SIGNIFICANT CHANGE UP
ORGANISM # SPEC MICROSCOPIC CNT: SIGNIFICANT CHANGE UP
PLATELET # BLD AUTO: 160 K/UL — SIGNIFICANT CHANGE UP (ref 150–400)
POTASSIUM SERPL-MCNC: 4.6 MMOL/L — SIGNIFICANT CHANGE UP (ref 3.5–5.3)
POTASSIUM SERPL-SCNC: 4.6 MMOL/L — SIGNIFICANT CHANGE UP (ref 3.5–5.3)
RBC # BLD: 2.83 M/UL — LOW (ref 3.8–5.2)
RBC # FLD: 15.1 % — HIGH (ref 10.3–14.5)
SODIUM SERPL-SCNC: 142 MMOL/L — SIGNIFICANT CHANGE UP (ref 135–145)
SPECIMEN SOURCE: SIGNIFICANT CHANGE UP
WBC # BLD: 12.1 K/UL — HIGH (ref 3.8–10.5)
WBC # FLD AUTO: 12.1 K/UL — HIGH (ref 3.8–10.5)

## 2018-12-10 PROCEDURE — 72148 MRI LUMBAR SPINE W/O DYE: CPT | Mod: 26

## 2018-12-10 PROCEDURE — 93321 DOPPLER ECHO F-UP/LMTD STD: CPT | Mod: 26

## 2018-12-10 PROCEDURE — 99291 CRITICAL CARE FIRST HOUR: CPT

## 2018-12-10 PROCEDURE — 93308 TTE F-UP OR LMTD: CPT | Mod: 26

## 2018-12-10 RX ORDER — SPIRONOLACTONE 25 MG/1
25 TABLET, FILM COATED ORAL DAILY
Qty: 0 | Refills: 0 | Status: DISCONTINUED | OUTPATIENT
Start: 2018-12-10 | End: 2018-12-17

## 2018-12-10 RX ORDER — HYDROMORPHONE HYDROCHLORIDE 2 MG/ML
0.5 INJECTION INTRAMUSCULAR; INTRAVENOUS; SUBCUTANEOUS ONCE
Qty: 0 | Refills: 0 | Status: DISCONTINUED | OUTPATIENT
Start: 2018-12-10 | End: 2018-12-10

## 2018-12-10 RX ORDER — FUROSEMIDE 40 MG
20 TABLET ORAL
Qty: 0 | Refills: 0 | Status: DISCONTINUED | OUTPATIENT
Start: 2018-12-10 | End: 2018-12-12

## 2018-12-10 RX ADMIN — HEPARIN SODIUM 5000 UNIT(S): 5000 INJECTION INTRAVENOUS; SUBCUTANEOUS at 21:48

## 2018-12-10 RX ADMIN — Medication 137 MICROGRAM(S): at 05:43

## 2018-12-10 RX ADMIN — Medication 20 MILLIGRAM(S): at 17:24

## 2018-12-10 RX ADMIN — Medication 7.5 MILLIGRAM(S): at 07:00

## 2018-12-10 RX ADMIN — Medication 20 MILLIGRAM(S): at 05:46

## 2018-12-10 RX ADMIN — GABAPENTIN 300 MILLIGRAM(S): 400 CAPSULE ORAL at 21:47

## 2018-12-10 RX ADMIN — Medication 650 MILLIGRAM(S): at 22:18

## 2018-12-10 RX ADMIN — GABAPENTIN 100 MILLIGRAM(S): 400 CAPSULE ORAL at 11:52

## 2018-12-10 RX ADMIN — AMLODIPINE BESYLATE 10 MILLIGRAM(S): 2.5 TABLET ORAL at 05:42

## 2018-12-10 RX ADMIN — Medication 5 UNIT(S): at 12:59

## 2018-12-10 RX ADMIN — Medication 650 MILLIGRAM(S): at 21:48

## 2018-12-10 RX ADMIN — Medication 1 TABLET(S): at 12:58

## 2018-12-10 RX ADMIN — Medication 5 UNIT(S): at 17:24

## 2018-12-10 RX ADMIN — INSULIN GLARGINE 15 UNIT(S): 100 INJECTION, SOLUTION SUBCUTANEOUS at 22:26

## 2018-12-10 RX ADMIN — HYDROMORPHONE HYDROCHLORIDE 0.5 MILLIGRAM(S): 2 INJECTION INTRAMUSCULAR; INTRAVENOUS; SUBCUTANEOUS at 11:00

## 2018-12-10 RX ADMIN — HEPARIN SODIUM 5000 UNIT(S): 5000 INJECTION INTRAVENOUS; SUBCUTANEOUS at 13:21

## 2018-12-10 RX ADMIN — HEPARIN SODIUM 5000 UNIT(S): 5000 INJECTION INTRAVENOUS; SUBCUTANEOUS at 05:42

## 2018-12-10 RX ADMIN — Medication 0.5 MILLIGRAM(S): at 05:21

## 2018-12-10 RX ADMIN — Medication 3 MILLILITER(S): at 05:17

## 2018-12-10 RX ADMIN — Medication 5 UNIT(S): at 08:57

## 2018-12-10 RX ADMIN — Medication 2: at 12:59

## 2018-12-10 RX ADMIN — Medication 650 MILLIGRAM(S): at 14:58

## 2018-12-10 RX ADMIN — Medication 3 MILLILITER(S): at 23:53

## 2018-12-10 RX ADMIN — Medication 3 MILLILITER(S): at 11:11

## 2018-12-10 RX ADMIN — CHOLESTYRAMINE 4 GRAM(S): 4 POWDER, FOR SUSPENSION ORAL at 10:36

## 2018-12-10 RX ADMIN — Medication 7.5 MILLIGRAM(S): at 17:24

## 2018-12-10 RX ADMIN — Medication 0.5 MILLIGRAM(S): at 17:23

## 2018-12-10 RX ADMIN — Medication 400 MILLIGRAM(S): at 05:42

## 2018-12-10 RX ADMIN — HYDROMORPHONE HYDROCHLORIDE 0.5 MILLIGRAM(S): 2 INJECTION INTRAMUSCULAR; INTRAVENOUS; SUBCUTANEOUS at 10:44

## 2018-12-10 RX ADMIN — SPIRONOLACTONE 25 MILLIGRAM(S): 25 TABLET, FILM COATED ORAL at 05:41

## 2018-12-10 RX ADMIN — Medication 3 MILLILITER(S): at 00:23

## 2018-12-10 RX ADMIN — Medication 400 MILLIGRAM(S): at 22:26

## 2018-12-10 RX ADMIN — Medication 2: at 08:56

## 2018-12-10 RX ADMIN — Medication 400 MILLIGRAM(S): at 13:20

## 2018-12-10 RX ADMIN — PANTOPRAZOLE SODIUM 40 MILLIGRAM(S): 20 TABLET, DELAYED RELEASE ORAL at 10:36

## 2018-12-10 RX ADMIN — Medication 3 MILLILITER(S): at 17:23

## 2018-12-10 NOTE — PHYSICAL THERAPY INITIAL EVALUATION ADULT - MANUAL MUSCLE TESTING RESULTS, REHAB EVAL
B UE grossly 2+/5, B hip flexors grossly 3/5, quads 2+ to 3/5 B UE grossly 2+/5, B hip flexors grossly 2/5, quads 2+ to 3/5, RDF 0/5, LDF 1/5

## 2018-12-10 NOTE — PROGRESS NOTE ADULT - SUBJECTIVE AND OBJECTIVE BOX
KASHIF NATARAJAN   MRN#: [id number]     The patient is a 63y Female who was seen, evaluated, & examined with the CTICU staff on rounds with a multidisciplinary care plan formulated & implemented.  All available clinical, laboratory, radiographic, pharmacologic, and electrocardiographic data were reviewed & analyzed.      The patient was in the CTICU in critical condition secondary to:     malignant hypertension-cardiovascular dysfunction    For support and evaluation & prevention of further decompensation secondary to persistent cardiopulmonary dysfunction and malignant hypertension-cardiovascular dysfunction, respiratory status required:     supplemental oxygen with nasal cannula  continuous pulse oximetry monitoring  following ABG’s with A-line monitoring    Invasive hemodynamic monitoring with an A-line was required for continuous MAP/BP monitoring to ensure adequate cardiovascular support and to evaluate for & help prevent decompensation while receiving IV Cardene infusion secondary to malignant hypertension-cardiovascular dysfunction    In addition:  Off of Labetalol infusion yesterday, now off of Cardene infusion on Labetalol PO and Norvasc with MAPs<80  +UA on chronic steroids for asthma, will treat with Bactrim and d/c Panda  Bilateral lower extremity weakness, 2/4 strength on exam; start of symptoms coincided with back pain/dissection; Neuro is following, pending MRI/MRA    The patient required critical care management and I provided 30 minutes of non-continuous care to the patient in addition to  discussing the patient and plan at length with the CTICU staff and helping coordinate care.

## 2018-12-10 NOTE — PHYSICAL THERAPY INITIAL EVALUATION ADULT - ADDITIONAL COMMENTS
Upon arrival to [facility], pt placed on Labetolol drip for blood pressure control and patient taken for a CT of the Chest. Dr. Kumar reviewed images which revealed Type B dissection w/ hemoperiteneum,  INR at outside hospital 1.8. TTE + pericardial effusion with tamponade. Bilateral lower extremity weakness, 2/4 strength on exam- per neuro, due to the occurance of the LE weakness concurrently with the presentation of the type B dissection, need to r/o dissection, ischemia or occlusion of the ASA.  MRI pending of T-spine

## 2018-12-10 NOTE — PROGRESS NOTE ADULT - SUBJECTIVE AND OBJECTIVE BOX
VITAL SIGNS    Telemetry:    Vital Signs Last 24 Hrs  T(C): 36.7 (12-10-18 @ 00:00), Max: 36.9 (18 @ 20:00)  T(F): 98 (12-10-18 @ 00:00), Max: 98.4 (18 @ 20:00)  HR: 78 (12-10-18 @ 12:21) (69 - 87)  BP: --  RR: 24 (12-10-18 @ 12:21) (19 - 57)  SpO2: 95% (12-10-18 @ 12:21) (88% - 99%)                       8.5<L>                142  | 22   | 29<H>        12.1<H> >-----------< 160     ------------------------< 197<H>                 25.5<L>                4.6  | 108  | 0.60                                                                      Ca 9.4   Mg x     Ph x        ,             9.2<L>                141  | 22   | 27<H>        14.3<H> >-----------< 158     ------------------------< 152<H>                 28.1<L>                3.9  | 105  | 0.57                                                                      Ca 9.6   Mg x     Ph x                  Daily     Daily Weight in k.7 (10 Dec 2018 01:00)        CAPILLARY BLOOD GLUCOSE      POCT Blood Glucose.: 181 mg/dL (10 Dec 2018 12:00)  POCT Blood Glucose.: 165 mg/dL (10 Dec 2018 08:53)  POCT Blood Glucose.: 231 mg/dL (09 Dec 2018 22:02)  POCT Blood Glucose.: 248 mg/dL (09 Dec 2018 17:39)                Coumadin    [ ] YES          [  ]      NO                                   PHYSICAL EXAM        Neurology: alert and oriented x 3, nonfocal, no gross deficits  CV : .S1S2 RRR  Sternal Wound :  CDI , Stable  Pacing Wires        [  ]   Settings:                                  Isolated  [  ]  Lungs: bibasilar crackles   Drains:     MS         [  ] Drainage:                 L Pleural  [  ]  Drainage:                R Pleural  [  ]  Drainage:  Abdomen: soft, nontender, nondistended, positive bowel sounds, last bowel movement   :         voiding    Extremities:               acetaminophen   Tablet .. 650 milliGRAM(s) Oral every 6 hours PRN  ALBUTerol    90 MICROgram(s) HFA Inhaler 1 Puff(s) Inhalation every 4 hours  ALBUTerol/ipratropium for Nebulization 3 milliLiter(s) Nebulizer every 6 hours  amLODIPine   Tablet 10 milliGRAM(s) Oral daily  buDESOnide   0.5 milliGRAM(s) Respule 0.5 milliGRAM(s) Inhalation two times a day  cholestyramine Powder (Sugar-Free) 4 Gram(s) Oral daily  dextrose 50% Injectable 50 milliLiter(s) IV Push every 15 minutes  furosemide    Tablet 20 milliGRAM(s) Oral two times a day  gabapentin 300 milliGRAM(s) Oral at bedtime  gabapentin 100 milliGRAM(s) Oral daily  heparin  Injectable 5000 Unit(s) SubCutaneous every 8 hours  insulin glargine Injectable (LANTUS) 15 Unit(s) SubCutaneous at bedtime  insulin lispro (HumaLOG) corrective regimen sliding scale   SubCutaneous at bedtime  insulin lispro (HumaLOG) corrective regimen sliding scale   SubCutaneous three times a day before meals  insulin lispro Injectable (HumaLOG) 5 Unit(s) SubCutaneous three times a day before meals  labetalol 400 milliGRAM(s) Oral every 8 hours  levothyroxine 137 MICROGram(s) Oral daily  montelukast 10 milliGRAM(s) Oral daily  niCARdipine Infusion 5 mG/Hr IV Continuous <Continuous>  pantoprazole    Tablet 40 milliGRAM(s) Oral before breakfast  polyethylene glycol 3350 17 Gram(s) Oral daily  predniSONE   Tablet 7.5 milliGRAM(s) Oral every 12 hours  senna 2 Tablet(s) Oral at bedtime  spironolactone 25 milliGRAM(s) Oral daily  tiotropium 18 MICROgram(s) Capsule 1 Capsule(s) Inhalation daily  trimethoprim  160 mG/sulfamethoxazole 800 mG 1 Tablet(s) Oral daily                    Physical Therapy Rec:   Home  [  ]   Home w/ PT  [  ]  Rehab  [  ]  Discussed with Cardiothoracic Team at AM rounds. im ok    VITAL SIGNS    Telemetry:   nsr 80  Vital Signs Last 24 Hrs  T(C): 36.7 (12-10-18 @ 00:00), Max: 36.9 (18 @ 20:00)  T(F): 98 (12-10-18 @ 00:00), Max: 98.4 (18 @ 20:00)  HR: 78 (12-10-18 @ 12:21) (69 - 87)  BP: --80  RR: 24 (12-10-18 @ 12:21) (19 - 57)  SpO2: 95% (12-10-18 @ 12:21) (88% - 99%)                       8.5<L>                142  | 22   | 29<H>        12.1<H> >-----------< 160     ------------------------< 197<H>                 25.5<L>                4.6  | 108  | 0.60                                                                      Ca 9.4   Mg x     Ph x        ,             9.2<L>                141  | 22   | 27<H>        14.3<H> >-----------< 158     ------------------------< 152<H>                 28.1<L>                3.9  | 105  | 0.57                                                                      Ca 9.6   Mg x     Ph x                  Daily     Daily Weight in k.7 (10 Dec 2018 01:00)        CAPILLARY BLOOD GLUCOSE      POCT Blood Glucose.: 181 mg/dL (10 Dec 2018 12:00)  POCT Blood Glucose.: 165 mg/dL (10 Dec 2018 08:53)  POCT Blood Glucose.: 231 mg/dL (09 Dec 2018 22:02)  POCT Blood Glucose.: 248 mg/dL (09 Dec 2018 17:39)                Coumadin    [ ] YES          [  ]      NO                                   PHYSICAL EXAM        Neurology: alert and oriented x 3, LE numbness bilat, weakness noted  on presentation to the er  CV : .S1S2 RRR  Sternal Wound :  CDI , Stable  Pacing Wires        [  ]   Settings:                                  Isolated  [  ]  Lungs: cta  Drains:     MS         [  ] Drainage:                 L Pleural  [  ]  Drainage:                R Pleural  [  ]  Drainage:  Abdomen: soft, nontender, nondistended, positive bowel sounds, last bowel movement  pre hosp  :         voiding    Extremities:     - edema - calve tenderness  Bilat LE dp pulses signal w doppler.  Bilat LE warm good mobility.            acetaminophen   Tablet .. 650 milliGRAM(s) Oral every 6 hours PRN  ALBUTerol    90 MICROgram(s) HFA Inhaler 1 Puff(s) Inhalation every 4 hours  ALBUTerol/ipratropium for Nebulization 3 milliLiter(s) Nebulizer every 6 hours  amLODIPine   Tablet 10 milliGRAM(s) Oral daily  buDESOnide   0.5 milliGRAM(s) Respule 0.5 milliGRAM(s) Inhalation two times a day  cholestyramine Powder (Sugar-Free) 4 Gram(s) Oral daily  dextrose 50% Injectable 50 milliLiter(s) IV Push every 15 minutes  furosemide    Tablet 20 milliGRAM(s) Oral two times a day  gabapentin 300 milliGRAM(s) Oral at bedtime  gabapentin 100 milliGRAM(s) Oral daily  heparin  Injectable 5000 Unit(s) SubCutaneous every 8 hours  insulin glargine Injectable (LANTUS) 15 Unit(s) SubCutaneous at bedtime  insulin lispro (HumaLOG) corrective regimen sliding scale   SubCutaneous at bedtime  insulin lispro (HumaLOG) corrective regimen sliding scale   SubCutaneous three times a day before meals  insulin lispro Injectable (HumaLOG) 5 Unit(s) SubCutaneous three times a day before meals  labetalol 400 milliGRAM(s) Oral every 8 hours  levothyroxine 137 MICROGram(s) Oral daily  montelukast 10 milliGRAM(s) Oral daily  niCARdipine Infusion 5 mG/Hr IV Continuous <Continuous>  pantoprazole    Tablet 40 milliGRAM(s) Oral before breakfast  polyethylene glycol 3350 17 Gram(s) Oral daily  predniSONE   Tablet 7.5 milliGRAM(s) Oral every 12 hours  senna 2 Tablet(s) Oral at bedtime  spironolactone 25 milliGRAM(s) Oral daily  tiotropium 18 MICROgram(s) Capsule 1 Capsule(s) Inhalation daily  trimethoprim  160 mG/sulfamethoxazole 800 mG 1 Tablet(s) Oral daily                    Physical Therapy Rec:   Home  [  ]   Home w/ PT  [  ]  Rehab  [  ]  Discussed with Cardiothoracic Team at AM rounds.

## 2018-12-10 NOTE — CONSULT NOTE ADULT - ASSESSMENT
Assessment  DMT2: 63y Female with DM T2 with hyperglycemia on insulin, blood sugars in acceptable range, no hypoglycemic episode, eating meals,  non compliant with low carb diet.  Aortic Disection: S/P Sx, on medications,  no chest pain, stable, monitored.  Hypothyroidism:  On synthroid 137 mcg po daily, asymptomatic.  HTN: Controlled, no headaches, on medications.        Kaleb Andrews MD  Cell: [phone]  Office: [phone]

## 2018-12-10 NOTE — CONSULT NOTE ADULT - SUBJECTIVE AND OBJECTIVE BOX
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 Pt states pain was 8/10, nonradiating. Pt denies LOC, fevers/chills, dizziness, numbness/tingling, chest pain, N/V/D.     At [place], CTA of the chest revealed probable type A dissection w/ intramural hematoma w/ active hemmorhage. Pt transferred urgently to [facility] for possible CT surgery.  Upon arrival to [facility], pt placed on Labetolol drip for blood pressure control and patient taken for a CT of the Chest. Dr. Kumar reviewed images which revealed Type B dissection w/ hemoperiteneum. INR at outside hospital 1.8. (07 Dec 2018 16:32)  Patient has history of diabetes, on oral meds at home, no recent hypoglycemic episodes, no polyuria polydipsia. Patient follows up with PCP for diabetes management. patient has hypothyroidism on Synthroid 137 mcg daily, compliant with intake.    PAST MEDICAL & SURGICAL HISTORY:  Arthritis  Shingles  PE (pulmonary embolism)  Hypothyroidism  Hyperlipidemia  Diabetes mellitus  Asthma  History of cataract surgery  S/P cholecystectomy      FAMILY HISTORY:      Social History:    Outpatient Medications:    MEDICATIONS  (STANDING):  ALBUTerol    90 MICROgram(s) HFA Inhaler 1 Puff(s) Inhalation every 4 hours  ALBUTerol/ipratropium for Nebulization 3 milliLiter(s) Nebulizer every 6 hours  amLODIPine   Tablet 10 milliGRAM(s) Oral daily  buDESOnide   0.5 milliGRAM(s) Respule 0.5 milliGRAM(s) Inhalation two times a day  cholestyramine Powder (Sugar-Free) 4 Gram(s) Oral daily  dextrose 50% Injectable 50 milliLiter(s) IV Push every 15 minutes  furosemide    Tablet 20 milliGRAM(s) Oral two times a day  gabapentin 300 milliGRAM(s) Oral at bedtime  gabapentin 100 milliGRAM(s) Oral daily  heparin  Injectable 5000 Unit(s) SubCutaneous every 8 hours  insulin glargine Injectable (LANTUS) 15 Unit(s) SubCutaneous at bedtime  insulin lispro (HumaLOG) corrective regimen sliding scale   SubCutaneous at bedtime  insulin lispro (HumaLOG) corrective regimen sliding scale   SubCutaneous three times a day before meals  insulin lispro Injectable (HumaLOG) 5 Unit(s) SubCutaneous three times a day before meals  labetalol 400 milliGRAM(s) Oral every 8 hours  levothyroxine 137 MICROGram(s) Oral daily  montelukast 10 milliGRAM(s) Oral daily  niCARdipine Infusion 5 mG/Hr (25 mL/Hr) IV Continuous <Continuous>  pantoprazole    Tablet 40 milliGRAM(s) Oral before breakfast  polyethylene glycol 3350 17 Gram(s) Oral daily  predniSONE   Tablet 7.5 milliGRAM(s) Oral every 12 hours  senna 2 Tablet(s) Oral at bedtime  spironolactone 25 milliGRAM(s) Oral daily  tiotropium 18 MICROgram(s) Capsule 1 Capsule(s) Inhalation daily  trimethoprim  160 mG/sulfamethoxazole 800 mG 1 Tablet(s) Oral daily    MEDICATIONS  (PRN):  acetaminophen   Tablet .. 650 milliGRAM(s) Oral every 6 hours PRN Mild Pain (1 - 3)      Allergies    adhesives (Unknown)  Ceftin (Rash)  erythromycin (Rash)  fish (Unknown)  Levaquin (Rash)    Intolerances      Review of Systems:  Constitutional: No fever, no chills  Eyes: No blurry vision  Neuro: No tremors  HEENT: No pain, no neck swelling  Cardiovascular: No chest pain, no palpitations  Respiratory: Has SOB, no cough  GI: No nausea, vomiting, abdominal pain  : No dysuria  Skin: no rash  MSK: Has leg swelling.  Psych: no depression  Endocrine: no polyuria, polydipsia    ALL OTHER SYSTEMS REVIEWED AND NEGATIVE    UNABLE TO OBTAIN    PHYSICAL EXAM:  VITALS: T(C): 36.6 (12-10-18 @ 12:25)  T(F): 97.9 (12-10-18 @ 12:25), Max: 98.4 (12-09-18 @ 20:00)  HR: 77 (12-10-18 @ 12:25) (69 - 86)  BP: 124/74 (12-10-18 @ 12:25) (124/74 - 124/74)  RR:  (18 - 57)  SpO2:  (88% - 99%)  Wt(kg): --  GENERAL: NAD, well-groomed, well-developed  EYES: No proptosis, no lid lag  HEENT:  Atraumatic, Normocephalic  THYROID: Normal size, no palpable nodules  RESPIRATORY: Clear to auscultation bilaterally; No rales, rhonchi, wheezing  CARDIOVASCULAR: Si S2, No murmurs;  GI: Soft, non distended, normal bowel sounds  SKIN: Dry, intact, No rashes or lesions  MUSCULOSKELETAL: Has BL lower extremity edema.  NEURO:  no tremor, sensation decreased in feet BL,    POCT Blood Glucose.: 181 mg/dL (12-10-18 @ 12:00)  POCT Blood Glucose.: 165 mg/dL (12-10-18 @ 08:53)  POCT Blood Glucose.: 231 mg/dL (12-09-18 @ 22:02)  POCT Blood Glucose.: 248 mg/dL (12-09-18 @ 17:39)  POCT Blood Glucose.: 285 mg/dL (12-09-18 @ 12:58)  POCT Blood Glucose.: 188 mg/dL (12-09-18 @ 08:40)  POCT Blood Glucose.: 144 mg/dL (12-09-18 @ 05:27)  POCT Blood Glucose.: 100 mg/dL (12-09-18 @ 02:43)  POCT Blood Glucose.: 106 mg/dL (12-09-18 @ 01:43)  POCT Blood Glucose.: 118 mg/dL (12-09-18 @ 00:11)  POCT Blood Glucose.: 124 mg/dL (12-08-18 @ 23:06)  POCT Blood Glucose.: 138 mg/dL (12-08-18 @ 21:50)  POCT Blood Glucose.: 153 mg/dL (12-08-18 @ 18:27)  POCT Blood Glucose.: 184 mg/dL (12-08-18 @ 16:29)  POCT Blood Glucose.: 266 mg/dL (12-08-18 @ 12:24)                            8.5    12.1  )-----------( 160      ( 10 Dec 2018 02:39 )             25.5       12-10    142  |  108  |  29<H>  ----------------------------<  197<H>  4.6   |  22  |  0.60    EGFR if : 111  EGFR if non : 96    Ca    9.4      12-10  Mg     1.4     12-08  Phos  2.7     12-09    TPro  6.1  /  Alb  4.0  /  TBili  1.0  /  DBili  x   /  AST  13  /  ALT  32  /  AlkPhos  56  12-09      Thyroid Function Tests:  12-07 @ 22:39 TSH 0.58 FreeT4 -- T3 -- Anti TPO -- Anti Thyroglobulin Ab -- TSI --      Hemoglobin A1C, Whole Blood: 6.4 % <H> [4.0 - 5.6] (12-07-18 @ 22:39)          Radiology:

## 2018-12-10 NOTE — PHYSICAL THERAPY INITIAL EVALUATION ADULT - IMPAIRMENTS FOUND, PT EVAL
aerobic capacity/endurance/gait, locomotion, and balance aerobic capacity/endurance/gait, locomotion, and balance/muscle strength

## 2018-12-10 NOTE — PHYSICAL THERAPY INITIAL EVALUATION ADULT - BALANCE TRAINING, PT EVAL
GOAL: Pt's static/dynamic sitting/standing balance will improve to fair/fair+ to increase stability, and decrease risk for falls when ambulating or performing ADL's

## 2018-12-10 NOTE — PROGRESS NOTE ADULT - ASSESSMENT
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 Pt states pain was 8/10, nonradiating. Pt denies LOC, fevers/chills, dizziness, numbness/tingling, chest pain, N/V/D.     At [place], CTA of the chest revealed probable type A dissection w/ intramural hematoma w/ active hemmorhage. Pt transferred urgently to [facility] for possible CT surgery.  Upon arrival to [facility], pt placed on Labetolol drip for blood pressure control and patient taken for a CT of the Chest. Dr. Kumar reviewed images which revealed Type B dissection w/ hemoperiteneum. INR at outside hospital 1.8.   TTE with pericardial effusion, tamponade physiology..  Anticoagulation reversed.  Labetolol and cardene for BP control  Hyperglycemia, chronic steroid use for asthma, endo consulted  + Ua , on bactrim.  Changed to oral labetolol and  norvasc for HTN. 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 Pt states pain was 8/10, nonradiating. Pt denies LOC, fevers/chills, dizziness, numbness/tingling, chest pain, N/V/D.     At [place], CTA of the chest revealed probable type A dissection w/ intramural hematoma w/ active hemmorhage. Pt transferred urgently to [facility] for possible CT surgery.  Upon arrival to [facility], pt placed on Labetolol drip for blood pressure control and patient taken for a CT of the Chest. Dr. Kumar reviewed images which revealed Type B dissection w/ hemoperiteneum. INR at outside hospital 1.8.   TTE with pericardial effusion, tamponade physiology..  Anticoagulation reversed.  Labetolol and cardene for BP control  Hyperglycemia, chronic steroid use for asthma, endo consulted  + Ua , on bactrim.  Changed to oral labetolol and  norvasc for HTN.  Bilateral lower extremity weakness, 2/4 strength on exam; start of symptoms coincided with back pain/dissection;   Neuro is following, pending MRI/MRA, due to the occurance of the LE weakness concurrently with the presentation of the type B dissection, need to r/o dissection, ischemia or occlusion of the ASA.

## 2018-12-10 NOTE — PHYSICAL THERAPY INITIAL EVALUATION ADULT - LIVES WITH, PROFILE
spouse/Lives in a house with 3-4 steps to enter with 1 railing. Pt was ambulatory at home I with a rolling walker but shorter distances. Occasionally uses a cane for very short distances in the house

## 2018-12-11 DIAGNOSIS — I71.01 DISSECTION OF THORACIC AORTA: ICD-10-CM

## 2018-12-11 LAB
ANION GAP SERPL CALC-SCNC: 13 MMOL/L — SIGNIFICANT CHANGE UP (ref 5–17)
BUN SERPL-MCNC: 24 MG/DL — HIGH (ref 7–23)
CALCIUM SERPL-MCNC: 10.1 MG/DL — SIGNIFICANT CHANGE UP (ref 8.4–10.5)
CHLORIDE SERPL-SCNC: 104 MMOL/L — SIGNIFICANT CHANGE UP (ref 96–108)
CO2 SERPL-SCNC: 25 MMOL/L — SIGNIFICANT CHANGE UP (ref 22–31)
CREAT SERPL-MCNC: 0.62 MG/DL — SIGNIFICANT CHANGE UP (ref 0.5–1.3)
GLUCOSE BLDC GLUCOMTR-MCNC: 140 MG/DL — HIGH (ref 70–99)
GLUCOSE BLDC GLUCOMTR-MCNC: 149 MG/DL — HIGH (ref 70–99)
GLUCOSE BLDC GLUCOMTR-MCNC: 166 MG/DL — HIGH (ref 70–99)
GLUCOSE BLDC GLUCOMTR-MCNC: 172 MG/DL — HIGH (ref 70–99)
GLUCOSE SERPL-MCNC: 155 MG/DL — HIGH (ref 70–99)
HCT VFR BLD CALC: 33.8 % — LOW (ref 34.5–45)
HGB BLD-MCNC: 10.9 G/DL — LOW (ref 11.5–15.5)
MCHC RBC-ENTMCNC: 29.1 PG — SIGNIFICANT CHANGE UP (ref 27–34)
MCHC RBC-ENTMCNC: 32.3 GM/DL — SIGNIFICANT CHANGE UP (ref 32–36)
MCV RBC AUTO: 90.1 FL — SIGNIFICANT CHANGE UP (ref 80–100)
PLATELET # BLD AUTO: 170 K/UL — SIGNIFICANT CHANGE UP (ref 150–400)
POTASSIUM SERPL-MCNC: 4.3 MMOL/L — SIGNIFICANT CHANGE UP (ref 3.5–5.3)
POTASSIUM SERPL-SCNC: 4.3 MMOL/L — SIGNIFICANT CHANGE UP (ref 3.5–5.3)
RBC # BLD: 3.76 M/UL — LOW (ref 3.8–5.2)
RBC # FLD: 15.6 % — HIGH (ref 10.3–14.5)
SODIUM SERPL-SCNC: 142 MMOL/L — SIGNIFICANT CHANGE UP (ref 135–145)
WBC # BLD: 9.2 K/UL — SIGNIFICANT CHANGE UP (ref 3.8–10.5)
WBC # FLD AUTO: 9.2 K/UL — SIGNIFICANT CHANGE UP (ref 3.8–10.5)

## 2018-12-11 PROCEDURE — 71045 X-RAY EXAM CHEST 1 VIEW: CPT | Mod: 26

## 2018-12-11 PROCEDURE — 93010 ELECTROCARDIOGRAM REPORT: CPT

## 2018-12-11 RX ORDER — AMIODARONE HYDROCHLORIDE 400 MG/1
400 TABLET ORAL EVERY 8 HOURS
Qty: 0 | Refills: 0 | Status: COMPLETED | OUTPATIENT
Start: 2018-12-11 | End: 2018-12-15

## 2018-12-11 RX ORDER — FLUTICASONE PROPIONATE AND SALMETEROL 50; 250 UG/1; UG/1
1 POWDER ORAL; RESPIRATORY (INHALATION)
Qty: 0 | Refills: 0 | Status: DISCONTINUED | OUTPATIENT
Start: 2018-12-11 | End: 2018-12-19

## 2018-12-11 RX ORDER — MAGNESIUM SULFATE 500 MG/ML
2 VIAL (ML) INJECTION ONCE
Qty: 0 | Refills: 0 | Status: COMPLETED | OUTPATIENT
Start: 2018-12-11 | End: 2018-12-11

## 2018-12-11 RX ORDER — AMIODARONE HYDROCHLORIDE 400 MG/1
200 TABLET ORAL DAILY
Qty: 0 | Refills: 0 | Status: DISCONTINUED | OUTPATIENT
Start: 2018-12-15 | End: 2018-12-19

## 2018-12-11 RX ORDER — ASPIRIN/CALCIUM CARB/MAGNESIUM 324 MG
81 TABLET ORAL DAILY
Qty: 0 | Refills: 0 | Status: DISCONTINUED | OUTPATIENT
Start: 2018-12-11 | End: 2018-12-19

## 2018-12-11 RX ORDER — OXYCODONE AND ACETAMINOPHEN 5; 325 MG/1; MG/1
1 TABLET ORAL ONCE
Qty: 0 | Refills: 0 | Status: DISCONTINUED | OUTPATIENT
Start: 2018-12-11 | End: 2018-12-11

## 2018-12-11 RX ORDER — METOPROLOL TARTRATE 50 MG
2.5 TABLET ORAL ONCE
Qty: 0 | Refills: 0 | Status: COMPLETED | OUTPATIENT
Start: 2018-12-11 | End: 2018-12-11

## 2018-12-11 RX ADMIN — Medication 400 MILLIGRAM(S): at 05:58

## 2018-12-11 RX ADMIN — SENNA PLUS 2 TABLET(S): 8.6 TABLET ORAL at 22:15

## 2018-12-11 RX ADMIN — AMIODARONE HYDROCHLORIDE 400 MILLIGRAM(S): 400 TABLET ORAL at 20:36

## 2018-12-11 RX ADMIN — Medication 20 MILLIGRAM(S): at 06:07

## 2018-12-11 RX ADMIN — AMLODIPINE BESYLATE 10 MILLIGRAM(S): 2.5 TABLET ORAL at 06:06

## 2018-12-11 RX ADMIN — Medication 650 MILLIGRAM(S): at 03:47

## 2018-12-11 RX ADMIN — Medication 7.5 MILLIGRAM(S): at 06:06

## 2018-12-11 RX ADMIN — Medication 2.5 MILLIGRAM(S): at 17:38

## 2018-12-11 RX ADMIN — Medication 650 MILLIGRAM(S): at 10:39

## 2018-12-11 RX ADMIN — Medication 1 TABLET(S): at 12:33

## 2018-12-11 RX ADMIN — Medication 20 MILLIGRAM(S): at 17:02

## 2018-12-11 RX ADMIN — Medication 5 UNIT(S): at 08:11

## 2018-12-11 RX ADMIN — Medication 3 MILLILITER(S): at 05:58

## 2018-12-11 RX ADMIN — Medication 400 MILLIGRAM(S): at 17:02

## 2018-12-11 RX ADMIN — Medication 50 GRAM(S): at 23:47

## 2018-12-11 RX ADMIN — Medication 2: at 12:32

## 2018-12-11 RX ADMIN — Medication 650 MILLIGRAM(S): at 10:09

## 2018-12-11 RX ADMIN — Medication 2.5 MILLIGRAM(S): at 23:45

## 2018-12-11 RX ADMIN — Medication 3 MILLILITER(S): at 12:33

## 2018-12-11 RX ADMIN — Medication 7.5 MILLIGRAM(S): at 17:03

## 2018-12-11 RX ADMIN — GABAPENTIN 300 MILLIGRAM(S): 400 CAPSULE ORAL at 22:15

## 2018-12-11 RX ADMIN — POLYETHYLENE GLYCOL 3350 17 GRAM(S): 17 POWDER, FOR SOLUTION ORAL at 12:33

## 2018-12-11 RX ADMIN — HEPARIN SODIUM 5000 UNIT(S): 5000 INJECTION INTRAVENOUS; SUBCUTANEOUS at 06:07

## 2018-12-11 RX ADMIN — HEPARIN SODIUM 5000 UNIT(S): 5000 INJECTION INTRAVENOUS; SUBCUTANEOUS at 17:02

## 2018-12-11 RX ADMIN — PANTOPRAZOLE SODIUM 40 MILLIGRAM(S): 20 TABLET, DELAYED RELEASE ORAL at 08:11

## 2018-12-11 RX ADMIN — SPIRONOLACTONE 25 MILLIGRAM(S): 25 TABLET, FILM COATED ORAL at 06:06

## 2018-12-11 RX ADMIN — Medication 137 MICROGRAM(S): at 05:58

## 2018-12-11 RX ADMIN — Medication 5 UNIT(S): at 17:01

## 2018-12-11 RX ADMIN — GABAPENTIN 100 MILLIGRAM(S): 400 CAPSULE ORAL at 12:42

## 2018-12-11 RX ADMIN — Medication 5 UNIT(S): at 12:32

## 2018-12-11 RX ADMIN — FLUTICASONE PROPIONATE AND SALMETEROL 1 DOSE(S): 50; 250 POWDER ORAL; RESPIRATORY (INHALATION) at 18:00

## 2018-12-11 RX ADMIN — INSULIN GLARGINE 15 UNIT(S): 100 INJECTION, SOLUTION SUBCUTANEOUS at 22:26

## 2018-12-11 RX ADMIN — OXYCODONE AND ACETAMINOPHEN 1 TABLET(S): 5; 325 TABLET ORAL at 23:26

## 2018-12-11 RX ADMIN — Medication 650 MILLIGRAM(S): at 04:17

## 2018-12-11 RX ADMIN — Medication 0.5 MILLIGRAM(S): at 06:11

## 2018-12-11 RX ADMIN — OXYCODONE AND ACETAMINOPHEN 1 TABLET(S): 5; 325 TABLET ORAL at 22:46

## 2018-12-11 NOTE — CHART NOTE - NSCHARTNOTEFT_GEN_A_CORE
Patient seen and examined:     S: currently in mild distress due to back pain    O:  Vital Signs Last 24 Hrs  T(C): 36.7 (11 Dec 2018 11:27), Max: 36.9 (10 Dec 2018 19:29)  T(F): 98.1 (11 Dec 2018 11:27), Max: 98.5 (10 Dec 2018 19:29)  HR: 142 (11 Dec 2018 17:37) (67 - 142)  BP: 132/78 (11 Dec 2018 17:37) (115/57 - 134/74)  BP(mean): 98 (10 Dec 2018 23:15) (87 - 98)  RR: 18 (11 Dec 2018 17:00) (18 - 18)  SpO2: 94% (11 Dec 2018 17:00) (90% - 95%)    Neurological Exam:  Mental Status: Orientated to self, date and place.  Attention intact.  No dysarthria, aphasia or neglect.  Knowledge intact.  Registration intact.  Short and long term memory grossly intact.      Cranial Nerves: CN I - not tested.  EOMI, VFF, no nystagmus or diplopia. No facial asymmetry. Tongue, uvula and palate midline.     Motor:             Strength:  5/5 RUE/LUE  LLE: 2/5 hip flexors, 3/5 knee ext/flex, 3/5 dorsi/plantarflexion  RLE: 2/5 hip flexors, 3/5 knee ext/flex, 3/5 dorsi/plantarflexion  Pronator drift: none                 Dysmeria: None to finger-nose-finger  Tremor: No resting, postural or action tremor.  No myoclonus.    Sensation: intact to light touch    Deep Tendon Reflexes: 2+ bilateral biceps, brachioradialis, +1 knee and ankle  Toes mute bilaterally    A: MRI L spine w/ abnormal signal within the conus medullaris and distal thoracic cord,   with expansion of the conus. In the setting of aortic dissection, acute   cortical infarction is strongly suspected    Recs:  Aspirin 81mg qD  DVT prophylaxis  Neurosurg on board  Continue mgmt/care as per primary team

## 2018-12-11 NOTE — CHART NOTE - NSCHARTNOTEFT_GEN_A_CORE
Consulted regarding placement of lumbar drain for acute spinal cord infarct  - There is no evidence that placement of lumbar drains for acute spine infarcts improves outcomes  - Placement of lumbar drains s/p spinal cord infarction is therefore not indicated and exposes patients to increased risk of bleeding, infection, and nervous system damage. Consulted regarding placement of lumbar drain for acute spinal cord infarct  - There is no evidence that placement of lumbar drains for acute spine infarcts improves outcomes  - While we are not opposed to it,  Placement of lumbar drains s/p spinal cord infarction is not a real indication and exposes patients to increased risk of bleeding, infection, and nervous system damage. But we leave the ultimate decision to the primary team.

## 2018-12-11 NOTE — PROGRESS NOTE ADULT - ASSESSMENT
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 Pt states pain was 8/10, nonradiating. Pt denies LOC, fevers/chills, dizziness, numbness/tingling, chest pain, N/V/D.     At [place], CTA of the chest revealed probable type A dissection w/ intramural hematoma w/ active hemmorhage. Pt transferred urgently to [facility] for possible CT surgery.  Upon arrival to [facility], pt placed on Labetolol drip for blood pressure control and patient taken for a CT of the Chest. Dr. Kumar reviewed images which revealed Type B dissection w/ hemoperiteneum. INR at outside hospital 1.8.   TTE with pericardial effusion, tamponade physiology..  Anticoagulation reversed.  Labetolol and cardene for BP control  Hyperglycemia, chronic steroid use for asthma, endo consulted  + Ua , on bactrim.  Changed to oral labetolol and  norvasc for HTN.  Bilateral lower extremity weakness, 2/4 strength on exam; start of symptoms coincided with back pain/dissection;   Neuro is following, pending MRI/MRA, due to the occurance of the LE weakness concurrently with the presentation of the type B dissection, need to r/o dissection, ischemia or occlusion of the ASA.  12/11 BP stable.   MRI complete,  pending official reading and neuro f/u.  PT eval, subacute recommended

## 2018-12-11 NOTE — PROGRESS NOTE ADULT - ASSESSMENT
Assessment  DMT2: 63y Female with DM T2 with hyperglycemia on insulin, blood sugars in acceptable range, no hypoglycemic episode, eating meals,  non compliant with low carb diet.  Aortic Disection: S/P Sx, on medications,  no chest pain, stable, monitored.  Hypothyroidism:  On synthroid 137 mcg po daily, asymptomatic.  HTN: Controlled, no headaches, on medications.        Kaleb Andrews MD  Cell: [phone]  Office: [phone]

## 2018-12-11 NOTE — CHART NOTE - NSCHARTNOTEFT_GEN_A_CORE
I received a call this am in reference  to the report on the MRI stating  that the patient had a spinal cord infarct at the level of the conus. I called #[phone] and was referred to  [id number].  I told them the situation and was informed they would see the patient on rounds.  At 5:30p I spoke with Dr weston who said Dr Hua was in house , however   he reviewed the films and agreed with the MRI findings. In some  cases Neurosurgery may be of some value but there  is usually nothing to be done for a spinal infarct.  I called  Neurosurgery Dr Saba on call , who stated he would d/w his attending but does not see any possible intervention.  The patient went into elsie b, 120's then remained mostly 110 with increases to 150 .  I sent the patient for an MRa to assess the aorta.   She did not tolerate laying flat so the MRA was canceled.  Will reschedule.

## 2018-12-11 NOTE — PROGRESS NOTE ADULT - SUBJECTIVE AND OBJECTIVE BOX
Chief complaint  Patient is a 63y old  Female who presents with a chief complaint of Back Pain transfer for Type B dissection (11 Dec 2018 11:36)   Review of systems  Patient in bed, looks comfortable, no fever, no hypoglycemia.    Labs and Fingersticks  CAPILLARY BLOOD GLUCOSE      POCT Blood Glucose.: 166 mg/dL (11 Dec 2018 11:53)  POCT Blood Glucose.: 149 mg/dL (11 Dec 2018 07:44)  POCT Blood Glucose.: 227 mg/dL (10 Dec 2018 22:23)  POCT Blood Glucose.: 126 mg/dL (10 Dec 2018 17:17)      Anion Gap, Serum: 13 (12-11 @ 09:01)  Anion Gap, Serum: 12 (12-10 @ 02:39)      Calcium, Total Serum: 10.1 (12-11 @ 09:01)  Calcium, Total Serum: 9.4 (12-10 @ 02:39)          12-11    142  |  104  |  24<H>  ----------------------------<  155<H>  4.3   |  25  |  0.62    Ca    10.1      11 Dec 2018 09:01                          10.9   9.2   )-----------( 170      ( 11 Dec 2018 09:01 )             33.8     Medications  MEDICATIONS  (STANDING):  ALBUTerol    90 MICROgram(s) HFA Inhaler 1 Puff(s) Inhalation every 4 hours  ALBUTerol/ipratropium for Nebulization 3 milliLiter(s) Nebulizer every 6 hours  amLODIPine   Tablet 10 milliGRAM(s) Oral daily  cholestyramine Powder (Sugar-Free) 4 Gram(s) Oral daily  dextrose 50% Injectable 50 milliLiter(s) IV Push every 15 minutes  fluticasone propionate/ salmeterol 500-50 MICROgram(s) Diskus 1 Dose(s) Inhalation two times a day  furosemide    Tablet 20 milliGRAM(s) Oral two times a day  gabapentin 300 milliGRAM(s) Oral at bedtime  gabapentin 100 milliGRAM(s) Oral daily  heparin  Injectable 5000 Unit(s) SubCutaneous every 8 hours  insulin glargine Injectable (LANTUS) 15 Unit(s) SubCutaneous at bedtime  insulin lispro (HumaLOG) corrective regimen sliding scale   SubCutaneous at bedtime  insulin lispro (HumaLOG) corrective regimen sliding scale   SubCutaneous three times a day before meals  insulin lispro Injectable (HumaLOG) 5 Unit(s) SubCutaneous three times a day before meals  labetalol 400 milliGRAM(s) Oral every 8 hours  levothyroxine 137 MICROGram(s) Oral daily  montelukast 10 milliGRAM(s) Oral daily  niCARdipine Infusion 5 mG/Hr (25 mL/Hr) IV Continuous <Continuous>  pantoprazole    Tablet 40 milliGRAM(s) Oral before breakfast  polyethylene glycol 3350 17 Gram(s) Oral daily  predniSONE   Tablet 7.5 milliGRAM(s) Oral every 12 hours  senna 2 Tablet(s) Oral at bedtime  spironolactone 25 milliGRAM(s) Oral daily  tiotropium 18 MICROgram(s) Capsule 1 Capsule(s) Inhalation daily  trimethoprim  160 mG/sulfamethoxazole 800 mG 1 Tablet(s) Oral daily      Physical Exam  General: Patient comfortable in bed  Vital Signs Last 12 Hrs  T(F): 98.1 (12-11-18 @ 11:27), Max: 98.1 (12-11-18 @ 11:27)  HR: 72 (12-11-18 @ 11:27) (67 - 72)  BP: 133/63 (12-11-18 @ 11:27) (115/57 - 133/63)  BP(mean): --  RR: 18 (12-11-18 @ 11:27) (18 - 18)  SpO2: 92% (12-11-18 @ 11:27) (92% - 94%)  Neck: No palpable thyroid nodules.  CVS: S1S2, No murmurs  Respiratory: No wheezing, no crepitations  GI: Abdomen soft, bowel sounds positive  Musculoskeletal:  edema lower extremities.   Skin: No skin rashes, no ecchymosis    Diagnostics

## 2018-12-11 NOTE — PROGRESS NOTE ADULT - SUBJECTIVE AND OBJECTIVE BOX
VITAL SIGNS    Telemetry:    Vital Signs Last 24 Hrs  T(C): 36.7 (12-11-18 @ 11:27), Max: 36.9 (12-10-18 @ 15:50)  T(F): 98.1 (12-11-18 @ 11:27), Max: 98.5 (12-10-18 @ 15:50)  HR: 72 (12-11-18 @ 11:27) (67 - 79)  BP: 133/63 (12-11-18 @ 11:27) (115/57 - 134/74)  RR: 18 (12-11-18 @ 11:27) (18 - 24)  SpO2: 92% (12-11-18 @ 11:27) (90% - 95%)                       10.9<L>                142  | 25   | 24<H>        9.2   >-----------< 170     ------------------------< 155<H>                 33.8<L>                4.3  | 104  | 0.62                                                                      Ca 10.1  Mg x     Ph x        ,             8.5<L>                142  | 22   | 29<H>        12.1<H> >-----------< 160     ------------------------< 197<H>                 25.5<L>                4.6  | 108  | 0.60                                                                      Ca 9.4   Mg x     Ph x                  Daily     Daily         CAPILLARY BLOOD GLUCOSE      POCT Blood Glucose.: 149 mg/dL (11 Dec 2018 07:44)  POCT Blood Glucose.: 227 mg/dL (10 Dec 2018 22:23)  POCT Blood Glucose.: 126 mg/dL (10 Dec 2018 17:17)  POCT Blood Glucose.: 181 mg/dL (10 Dec 2018 12:00)                Coumadin    [ ] YES          [  ]      NO                                   PHYSICAL EXAM        Neurology: alert and oriented x 3, nonfocal, no gross deficits  CV : .S1S2 RRR  Sternal Wound :  CDI , Stable  Pacing Wires        [  ]   Settings:                                  Isolated  [  ]  Lungs: bibasilar crackles   Drains:     MS         [  ] Drainage:                 L Pleural  [  ]  Drainage:                R Pleural  [  ]  Drainage:  Abdomen: soft, nontender, nondistended, positive bowel sounds, last bowel movement   :         voiding    Extremities:               acetaminophen   Tablet .. 650 milliGRAM(s) Oral every 6 hours PRN  ALBUTerol    90 MICROgram(s) HFA Inhaler 1 Puff(s) Inhalation every 4 hours  ALBUTerol/ipratropium for Nebulization 3 milliLiter(s) Nebulizer every 6 hours  amLODIPine   Tablet 10 milliGRAM(s) Oral daily  buDESOnide   0.5 milliGRAM(s) Respule 0.5 milliGRAM(s) Inhalation two times a day  cholestyramine Powder (Sugar-Free) 4 Gram(s) Oral daily  dextrose 50% Injectable 50 milliLiter(s) IV Push every 15 minutes  furosemide    Tablet 20 milliGRAM(s) Oral two times a day  gabapentin 300 milliGRAM(s) Oral at bedtime  gabapentin 100 milliGRAM(s) Oral daily  heparin  Injectable 5000 Unit(s) SubCutaneous every 8 hours  insulin glargine Injectable (LANTUS) 15 Unit(s) SubCutaneous at bedtime  insulin lispro (HumaLOG) corrective regimen sliding scale   SubCutaneous at bedtime  insulin lispro (HumaLOG) corrective regimen sliding scale   SubCutaneous three times a day before meals  insulin lispro Injectable (HumaLOG) 5 Unit(s) SubCutaneous three times a day before meals  labetalol 400 milliGRAM(s) Oral every 8 hours  levothyroxine 137 MICROGram(s) Oral daily  montelukast 10 milliGRAM(s) Oral daily  niCARdipine Infusion 5 mG/Hr IV Continuous <Continuous>  pantoprazole    Tablet 40 milliGRAM(s) Oral before breakfast  polyethylene glycol 3350 17 Gram(s) Oral daily  predniSONE   Tablet 7.5 milliGRAM(s) Oral every 12 hours  senna 2 Tablet(s) Oral at bedtime  spironolactone 25 milliGRAM(s) Oral daily  tiotropium 18 MICROgram(s) Capsule 1 Capsule(s) Inhalation daily  trimethoprim  160 mG/sulfamethoxazole 800 mG 1 Tablet(s) Oral daily                    Physical Therapy Rec:   Home  [  ]   Home w/ PT  [  ]  Rehab  [  ]  Discussed with Cardiothoracic Team at AM rounds. my legs are weak    VITAL SIGNS    Telemetry:  nsr 60-80  Vital Signs Last 24 Hrs  T(C): 36.7 (12-11-18 @ 11:27), Max: 36.9 (12-10-18 @ 15:50)  T(F): 98.1 (12-11-18 @ 11:27), Max: 98.5 (12-10-18 @ 15:50)  HR: 72 (12-11-18 @ 11:27) (67 - 79)  BP: 133/63 (12-11-18 @ 11:27) (115/57 - 134/74)  RR: 18 (12-11-18 @ 11:27) (18 - 24)  SpO2: 92% (12-11-18 @ 11:27) (90% - 95%)                       10.9<L>                142  | 25   | 24<H>        9.2   >-----------< 170     ------------------------< 155<H>                 33.8<L>                4.3  | 104  | 0.62                                                                      Ca 10.1  Mg x     Ph x        ,             8.5<L>                142  | 22   | 29<H>        12.1<H> >-----------< 160     ------------------------< 197<H>                 25.5<L>                4.6  | 108  | 0.60                                                                      Ca 9.4   Mg x     Ph x                  Daily     Daily         CAPILLARY BLOOD GLUCOSE      POCT Blood Glucose.: 149 mg/dL (11 Dec 2018 07:44)  POCT Blood Glucose.: 227 mg/dL (10 Dec 2018 22:23)  POCT Blood Glucose.: 126 mg/dL (10 Dec 2018 17:17)  POCT Blood Glucose.: 181 mg/dL (10 Dec 2018 12:00)                Coumadin    [ ] YES          [  x      NO                                   PHYSICAL EXAM        Neurology: alert and oriented x 3, nonfocal, no gross deficits  CV : .S1S2 RRR  Lungs: cta, wheeze with cough    Abdomen: soft, nontender, nondistended, positive bowel sounds, last bowel movement  -  :         voiding    Extremities:    Trace edema - calve tendernesss. feet warm bilat, + doppler signal          acetaminophen   Tablet .. 650 milliGRAM(s) Oral every 6 hours PRN  ALBUTerol    90 MICROgram(s) HFA Inhaler 1 Puff(s) Inhalation every 4 hours  ALBUTerol/ipratropium for Nebulization 3 milliLiter(s) Nebulizer every 6 hours  amLODIPine   Tablet 10 milliGRAM(s) Oral daily  buDESOnide   0.5 milliGRAM(s) Respule 0.5 milliGRAM(s) Inhalation two times a day  cholestyramine Powder (Sugar-Free) 4 Gram(s) Oral daily  dextrose 50% Injectable 50 milliLiter(s) IV Push every 15 minutes  furosemide    Tablet 20 milliGRAM(s) Oral two times a day  gabapentin 300 milliGRAM(s) Oral at bedtime  gabapentin 100 milliGRAM(s) Oral daily  heparin  Injectable 5000 Unit(s) SubCutaneous every 8 hours  insulin glargine Injectable (LANTUS) 15 Unit(s) SubCutaneous at bedtime  insulin lispro (HumaLOG) corrective regimen sliding scale   SubCutaneous at bedtime  insulin lispro (HumaLOG) corrective regimen sliding scale   SubCutaneous three times a day before meals  insulin lispro Injectable (HumaLOG) 5 Unit(s) SubCutaneous three times a day before meals  labetalol 400 milliGRAM(s) Oral every 8 hours  levothyroxine 137 MICROGram(s) Oral daily  montelukast 10 milliGRAM(s) Oral daily  niCARdipine Infusion 5 mG/Hr IV Continuous <Continuous>  pantoprazole    Tablet 40 milliGRAM(s) Oral before breakfast  polyethylene glycol 3350 17 Gram(s) Oral daily  predniSONE   Tablet 7.5 milliGRAM(s) Oral every 12 hours  senna 2 Tablet(s) Oral at bedtime  spironolactone 25 milliGRAM(s) Oral daily  tiotropium 18 MICROgram(s) Capsule 1 Capsule(s) Inhalation daily  trimethoprim  160 mG/sulfamethoxazole 800 mG 1 Tablet(s) Oral daily                    Physical Therapy Rec:   Home  [  ]   Home w/ PT  [  ]  Rehab  [  ]  Discussed with Cardiothoracic Team at AM rounds.

## 2018-12-12 DIAGNOSIS — G95.11 ACUTE INFARCTION OF SPINAL CORD (EMBOLIC) (NONEMBOLIC): ICD-10-CM

## 2018-12-12 LAB
ANION GAP SERPL CALC-SCNC: 13 MMOL/L — SIGNIFICANT CHANGE UP (ref 5–17)
BUN SERPL-MCNC: 22 MG/DL — SIGNIFICANT CHANGE UP (ref 7–23)
CALCIUM SERPL-MCNC: 9 MG/DL — SIGNIFICANT CHANGE UP (ref 8.4–10.5)
CHLORIDE SERPL-SCNC: 106 MMOL/L — SIGNIFICANT CHANGE UP (ref 96–108)
CO2 SERPL-SCNC: 22 MMOL/L — SIGNIFICANT CHANGE UP (ref 22–31)
CREAT SERPL-MCNC: 0.64 MG/DL — SIGNIFICANT CHANGE UP (ref 0.5–1.3)
GLUCOSE BLDC GLUCOMTR-MCNC: 139 MG/DL — HIGH (ref 70–99)
GLUCOSE BLDC GLUCOMTR-MCNC: 159 MG/DL — HIGH (ref 70–99)
GLUCOSE BLDC GLUCOMTR-MCNC: 161 MG/DL — HIGH (ref 70–99)
GLUCOSE BLDC GLUCOMTR-MCNC: 265 MG/DL — HIGH (ref 70–99)
GLUCOSE SERPL-MCNC: 171 MG/DL — HIGH (ref 70–99)
HCT VFR BLD CALC: 31.7 % — LOW (ref 34.5–45)
HGB BLD-MCNC: 10.3 G/DL — LOW (ref 11.5–15.5)
INR BLD: 1.08 RATIO — SIGNIFICANT CHANGE UP (ref 0.88–1.16)
MCHC RBC-ENTMCNC: 29.4 PG — SIGNIFICANT CHANGE UP (ref 27–34)
MCHC RBC-ENTMCNC: 32.7 GM/DL — SIGNIFICANT CHANGE UP (ref 32–36)
MCV RBC AUTO: 90.1 FL — SIGNIFICANT CHANGE UP (ref 80–100)
PLATELET # BLD AUTO: 188 K/UL — SIGNIFICANT CHANGE UP (ref 150–400)
POTASSIUM SERPL-MCNC: 4.3 MMOL/L — SIGNIFICANT CHANGE UP (ref 3.5–5.3)
POTASSIUM SERPL-SCNC: 4.3 MMOL/L — SIGNIFICANT CHANGE UP (ref 3.5–5.3)
PROTHROM AB SERPL-ACNC: 12.3 SEC — SIGNIFICANT CHANGE UP (ref 10–12.9)
RBC # BLD: 3.51 M/UL — LOW (ref 3.8–5.2)
RBC # FLD: 14.9 % — HIGH (ref 10.3–14.5)
SODIUM SERPL-SCNC: 141 MMOL/L — SIGNIFICANT CHANGE UP (ref 135–145)
WBC # BLD: 10.8 K/UL — HIGH (ref 3.8–10.5)
WBC # FLD AUTO: 10.8 K/UL — HIGH (ref 3.8–10.5)

## 2018-12-12 PROCEDURE — 99233 SBSQ HOSP IP/OBS HIGH 50: CPT

## 2018-12-12 PROCEDURE — 99221 1ST HOSP IP/OBS SF/LOW 40: CPT

## 2018-12-12 RX ORDER — SORBITOL SOLUTION 70 %
30 SOLUTION, ORAL MISCELLANEOUS ONCE
Qty: 0 | Refills: 0 | Status: COMPLETED | OUTPATIENT
Start: 2018-12-12 | End: 2018-12-12

## 2018-12-12 RX ORDER — FUROSEMIDE 40 MG
40 TABLET ORAL
Qty: 0 | Refills: 0 | Status: DISCONTINUED | OUTPATIENT
Start: 2018-12-12 | End: 2018-12-17

## 2018-12-12 RX ORDER — AMIODARONE HYDROCHLORIDE 400 MG/1
150 TABLET ORAL ONCE
Qty: 0 | Refills: 0 | Status: COMPLETED | OUTPATIENT
Start: 2018-12-12 | End: 2018-12-12

## 2018-12-12 RX ORDER — OXYCODONE AND ACETAMINOPHEN 5; 325 MG/1; MG/1
1 TABLET ORAL EVERY 4 HOURS
Qty: 0 | Refills: 0 | Status: DISCONTINUED | OUTPATIENT
Start: 2018-12-12 | End: 2018-12-18

## 2018-12-12 RX ORDER — LABETALOL HCL 100 MG
100 TABLET ORAL EVERY 12 HOURS
Qty: 0 | Refills: 0 | Status: DISCONTINUED | OUTPATIENT
Start: 2018-12-12 | End: 2018-12-12

## 2018-12-12 RX ADMIN — Medication 137 MICROGRAM(S): at 05:28

## 2018-12-12 RX ADMIN — AMIODARONE HYDROCHLORIDE 400 MILLIGRAM(S): 400 TABLET ORAL at 05:22

## 2018-12-12 RX ADMIN — SPIRONOLACTONE 25 MILLIGRAM(S): 25 TABLET, FILM COATED ORAL at 05:28

## 2018-12-12 RX ADMIN — OXYCODONE AND ACETAMINOPHEN 1 TABLET(S): 5; 325 TABLET ORAL at 22:02

## 2018-12-12 RX ADMIN — Medication 5 UNIT(S): at 08:15

## 2018-12-12 RX ADMIN — Medication 5 UNIT(S): at 16:48

## 2018-12-12 RX ADMIN — Medication 650 MILLIGRAM(S): at 19:44

## 2018-12-12 RX ADMIN — Medication 650 MILLIGRAM(S): at 20:15

## 2018-12-12 RX ADMIN — Medication 100 MILLIGRAM(S): at 10:28

## 2018-12-12 RX ADMIN — Medication 1 TABLET(S): at 13:28

## 2018-12-12 RX ADMIN — Medication 2: at 12:37

## 2018-12-12 RX ADMIN — Medication 81 MILLIGRAM(S): at 13:28

## 2018-12-12 RX ADMIN — OXYCODONE AND ACETAMINOPHEN 1 TABLET(S): 5; 325 TABLET ORAL at 22:32

## 2018-12-12 RX ADMIN — PANTOPRAZOLE SODIUM 40 MILLIGRAM(S): 20 TABLET, DELAYED RELEASE ORAL at 08:16

## 2018-12-12 RX ADMIN — FLUTICASONE PROPIONATE AND SALMETEROL 1 DOSE(S): 50; 250 POWDER ORAL; RESPIRATORY (INHALATION) at 17:47

## 2018-12-12 RX ADMIN — HEPARIN SODIUM 5000 UNIT(S): 5000 INJECTION INTRAVENOUS; SUBCUTANEOUS at 21:45

## 2018-12-12 RX ADMIN — Medication 30 MILLILITER(S): at 16:56

## 2018-12-12 RX ADMIN — Medication 7.5 MILLIGRAM(S): at 05:28

## 2018-12-12 RX ADMIN — Medication 20 MILLIGRAM(S): at 05:28

## 2018-12-12 RX ADMIN — Medication 3 MILLILITER(S): at 05:29

## 2018-12-12 RX ADMIN — Medication 7.5 MILLIGRAM(S): at 17:49

## 2018-12-12 RX ADMIN — Medication 3 MILLILITER(S): at 17:47

## 2018-12-12 RX ADMIN — GABAPENTIN 100 MILLIGRAM(S): 400 CAPSULE ORAL at 13:28

## 2018-12-12 RX ADMIN — HEPARIN SODIUM 5000 UNIT(S): 5000 INJECTION INTRAVENOUS; SUBCUTANEOUS at 01:21

## 2018-12-12 RX ADMIN — GABAPENTIN 300 MILLIGRAM(S): 400 CAPSULE ORAL at 21:45

## 2018-12-12 RX ADMIN — AMIODARONE HYDROCHLORIDE 600 MILLIGRAM(S): 400 TABLET ORAL at 01:35

## 2018-12-12 RX ADMIN — AMIODARONE HYDROCHLORIDE 400 MILLIGRAM(S): 400 TABLET ORAL at 13:28

## 2018-12-12 RX ADMIN — Medication 3 MILLILITER(S): at 12:38

## 2018-12-12 RX ADMIN — Medication 3 MILLILITER(S): at 00:24

## 2018-12-12 RX ADMIN — Medication 650 MILLIGRAM(S): at 13:27

## 2018-12-12 RX ADMIN — Medication 40 MILLIGRAM(S): at 17:51

## 2018-12-12 RX ADMIN — FLUTICASONE PROPIONATE AND SALMETEROL 1 DOSE(S): 50; 250 POWDER ORAL; RESPIRATORY (INHALATION) at 05:29

## 2018-12-12 RX ADMIN — Medication 5 UNIT(S): at 12:38

## 2018-12-12 RX ADMIN — HEPARIN SODIUM 5000 UNIT(S): 5000 INJECTION INTRAVENOUS; SUBCUTANEOUS at 08:17

## 2018-12-12 RX ADMIN — HEPARIN SODIUM 5000 UNIT(S): 5000 INJECTION INTRAVENOUS; SUBCUTANEOUS at 15:27

## 2018-12-12 RX ADMIN — Medication 650 MILLIGRAM(S): at 14:00

## 2018-12-12 RX ADMIN — INSULIN GLARGINE 15 UNIT(S): 100 INJECTION, SOLUTION SUBCUTANEOUS at 21:45

## 2018-12-12 RX ADMIN — POLYETHYLENE GLYCOL 3350 17 GRAM(S): 17 POWDER, FOR SOLUTION ORAL at 13:27

## 2018-12-12 RX ADMIN — AMIODARONE HYDROCHLORIDE 400 MILLIGRAM(S): 400 TABLET ORAL at 21:45

## 2018-12-12 RX ADMIN — Medication 6: at 16:49

## 2018-12-12 NOTE — PROGRESS NOTE ADULT - ASSESSMENT
63 F with pmh of asthma chronic steroid use DM, HLD, obesity, hypothyroid, pulmonary emboli with AC coumadin, +IVC filter who presented initially to an outside hospital with  sudden onset upper back pain, bilateral lower extremity weakness,  and shortness of breath.  Imaging revealed probable type A dissection however after review by attending was found to have Type B dissection w/ hemoperiteneum and is being medically managed.   Called for rapid atrial fibrillation with a sustained rate in 160's associated with hypotension.      Plan:  1) place 2nd peripheral IV  2) Amio 150mg IV bolus  3) Stat labs to check electrolytes 63 F with pmh of asthma chronic steroid use DM, HLD, obesity, hypothyroid, pulmonary emboli with AC coumadin, +IVC filter who presented initially to an outside hospital with  sudden onset upper back pain, bilateral lower extremity weakness,  and shortness of breath.  Imaging revealed probable type A dissection however after review by attending was found to have Type B dissection w/ hemoperiteneum and is being medically managed.   Called for rapid atrial fibrillation with a sustained rate in 160's associated with hypotension.      Plan:  1) place 2nd peripheral IV  2) Amio 150mg IV bolus  3) Stat labs to check electrolytes  4) discontinue labetolol and norvasc, Will layer back to regimen when appropriate  5) Continue po amio load

## 2018-12-12 NOTE — PROGRESS NOTE ADULT - SUBJECTIVE AND OBJECTIVE BOX
Chief complaint  Patient is a 63y old  Female who presents with a chief complaint of Back Pain transfer for Type B dissection (12 Dec 2018 01:43)   Review of systems  Patient in bed, looks comfortable, no fever, no hypoglycemia.    Labs and Fingersticks  CAPILLARY BLOOD GLUCOSE      POCT Blood Glucose.: 139 mg/dL (12 Dec 2018 07:41)  POCT Blood Glucose.: 172 mg/dL (11 Dec 2018 22:19)  POCT Blood Glucose.: 140 mg/dL (11 Dec 2018 16:40)  POCT Blood Glucose.: 166 mg/dL (11 Dec 2018 11:53)      Anion Gap, Serum: 13 (12-12 @ 01:28)  Anion Gap, Serum: 13 (12-11 @ 09:01)      Calcium, Total Serum: 9.0 (12-12 @ 01:28)  Calcium, Total Serum: 10.1 (12-11 @ 09:01)          12-12    141  |  106  |  22  ----------------------------<  171<H>  4.3   |  22  |  0.64    Ca    9.0      12 Dec 2018 01:28                          10.3   10.8  )-----------( 188      ( 12 Dec 2018 01:28 )             31.7     Medications  MEDICATIONS  (STANDING):  ALBUTerol    90 MICROgram(s) HFA Inhaler 1 Puff(s) Inhalation every 4 hours  ALBUTerol/ipratropium for Nebulization 3 milliLiter(s) Nebulizer every 6 hours  amiodarone    Tablet 400 milliGRAM(s) Oral every 8 hours  aspirin enteric coated 81 milliGRAM(s) Oral daily  cholestyramine Powder (Sugar-Free) 4 Gram(s) Oral daily  dextrose 50% Injectable 50 milliLiter(s) IV Push every 15 minutes  fluticasone propionate/ salmeterol 500-50 MICROgram(s) Diskus 1 Dose(s) Inhalation two times a day  furosemide    Tablet 20 milliGRAM(s) Oral two times a day  gabapentin 300 milliGRAM(s) Oral at bedtime  gabapentin 100 milliGRAM(s) Oral daily  heparin  Injectable 5000 Unit(s) SubCutaneous every 8 hours  insulin glargine Injectable (LANTUS) 15 Unit(s) SubCutaneous at bedtime  insulin lispro (HumaLOG) corrective regimen sliding scale   SubCutaneous at bedtime  insulin lispro (HumaLOG) corrective regimen sliding scale   SubCutaneous three times a day before meals  insulin lispro Injectable (HumaLOG) 5 Unit(s) SubCutaneous three times a day before meals  labetalol 100 milliGRAM(s) Oral every 12 hours  levothyroxine 137 MICROGram(s) Oral daily  montelukast 10 milliGRAM(s) Oral daily  pantoprazole    Tablet 40 milliGRAM(s) Oral before breakfast  polyethylene glycol 3350 17 Gram(s) Oral daily  predniSONE   Tablet 7.5 milliGRAM(s) Oral every 12 hours  senna 2 Tablet(s) Oral at bedtime  spironolactone 25 milliGRAM(s) Oral daily  tiotropium 18 MICROgram(s) Capsule 1 Capsule(s) Inhalation daily  trimethoprim  160 mG/sulfamethoxazole 800 mG 1 Tablet(s) Oral daily      Physical Exam  General: Patient comfortable in bed  Vital Signs Last 12 Hrs  T(F): 97.3 (12-12-18 @ 05:16), Max: 97.8 (12-12-18 @ 01:00)  HR: 74 (12-12-18 @ 10:18) (71 - 148)  BP: 115/80 (12-12-18 @ 10:18) (84/62 - 122/79)  BP(mean): --  RR: 18 (12-12-18 @ 05:16) (18 - 18)  SpO2: 96% (12-12-18 @ 05:16) (95% - 96%)  Neck: No palpable thyroid nodules.  CVS: S1S2, No murmurs  Respiratory: No wheezing, no crepitations  GI: Abdomen soft, bowel sounds positive  Musculoskeletal:  edema lower extremities.   Skin: No skin rashes, no ecchymosis    Diagnostics

## 2018-12-12 NOTE — CONSULT NOTE ADULT - REASON FOR ADMISSION
Back Pain transfer for Type B dissection

## 2018-12-12 NOTE — PROGRESS NOTE ADULT - PROBLEM SELECTOR PLAN 2
MRI of spine for LE weakness  MRI indicative of spinal cord infarction.  Neurology-no intervention required at this time.   Strength improving LE b/l.

## 2018-12-12 NOTE — CONSULT NOTE ADULT - SUBJECTIVE AND OBJECTIVE BOX
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 Patient also noted LE weakness that started around same time as back pain. At [facility], initial CTA of the chest revealed probable type A dissection with intramural hematoma and active hemorrhage, so she was transferred urgently to [facility] for possible CT surgery. Upon arrival to [facility], patient was started on Labetalol gtt for blood pressure control; repeat CTA chest revealed Type B dissection with hemoperitoneum, so patient did not undergo surgical intervention. MRI L-spine revealed abnormal signal within the conus medullaris and distal thoracic cord, with expansion of the conus, suspicious for spinal infarction. Neurosurgery consulted, no surgical intervention recommended. Hospital course significant for Afib on telemetry monitoring; s/p Labetalol and amio load.     REVIEW OF SYSTEMS: +BLE weakness, +gait impairment, +urinary frequency, +constipation; No fevers, chills, headache, dizziness, blurry vision, palpitations, chest pain, shortness of breath, nausea, vomiting, or diarrhea.        PAST MEDICAL & SURGICAL HISTORY  Arthritis  Shingles  PE (pulmonary embolism)  Hypothyroidism  Hyperlipidemia  Diabetes mellitus  Asthma  History of cataract surgery  S/P cholecystectomy      SOCIAL HISTORY  Smoking - Denied  EtOH - Denied   Drugs - Denied    FUNCTIONAL HISTORY  Lives with  in [address] and 1st floor set-up inside.  Independent with ADLs and functional mobility prior to admission. Used RW for ambulation, shower bench.     CURRENT FUNCTIONAL STATUS  Bed mobility mod/max A  Transfers mod/max A      FAMILY HISTORY   Reviewed and non-contributory    RECENT LABS/IMAGING  CBC Full  -  ( 12 Dec 2018 01:28 )  WBC Count : 10.8 K/uL  Hemoglobin : 10.3 g/dL  Hematocrit : 31.7 %  Platelet Count - Automated : 188 K/uL  Mean Cell Volume : 90.1 fl  Mean Cell Hemoglobin : 29.4 pg  Mean Cell Hemoglobin Concentration : 32.7 gm/dL    12-12    141  |  106  |  22  ----------------------------<  171<H>  4.3   |  22  |  0.64    Ca    9.0      12 Dec 2018 01:28      ALLERGIES  adhesives (Unknown)  Ceftin (Rash)  erythromycin (Rash)  fish (Unknown)  Levaquin (Rash)      MEDICATIONS   acetaminophen   Tablet .. 650 milliGRAM(s) Oral every 6 hours PRN  ALBUTerol    90 MICROgram(s) HFA Inhaler 1 Puff(s) Inhalation every 4 hours  ALBUTerol/ipratropium for Nebulization 3 milliLiter(s) Nebulizer every 6 hours  amiodarone    Tablet 400 milliGRAM(s) Oral every 8 hours  aspirin enteric coated 81 milliGRAM(s) Oral daily  cholestyramine Powder (Sugar-Free) 4 Gram(s) Oral daily  fluticasone propionate/ salmeterol 500-50 MICROgram(s) Diskus 1 Dose(s) Inhalation two times a day  furosemide    Tablet 20 milliGRAM(s) Oral two times a day  gabapentin 300 milliGRAM(s) Oral at bedtime  gabapentin 100 milliGRAM(s) Oral daily  heparin  Injectable 5000 Unit(s) SubCutaneous every 8 hours  insulin glargine Injectable (LANTUS) 15 Unit(s) SubCutaneous at bedtime  insulin lispro (HumaLOG) corrective regimen sliding scale   SubCutaneous at bedtime  insulin lispro (HumaLOG) corrective regimen sliding scale   SubCutaneous three times a day before meals  insulin lispro Injectable (HumaLOG) 5 Unit(s) SubCutaneous three times a day before meals  labetalol 100 milliGRAM(s) Oral every 12 hours  levothyroxine 137 MICROGram(s) Oral daily  montelukast 10 milliGRAM(s) Oral daily  pantoprazole    Tablet 40 milliGRAM(s) Oral before breakfast  polyethylene glycol 3350 17 Gram(s) Oral daily  predniSONE   Tablet 7.5 milliGRAM(s) Oral every 12 hours  senna 2 Tablet(s) Oral at bedtime  spironolactone 25 milliGRAM(s) Oral daily  tiotropium 18 MICROgram(s) Capsule 1 Capsule(s) Inhalation daily    ----------------------------------------------------------------------------------------  T(C): 36.8 (12-12-18 @ 13:39), Max: 36.8 (12-12-18 @ 13:39)  HR: 72 (12-12-18 @ 13:39) (71 - 158)  BP: 116/76 (12-12-18 @ 13:39) (84/62 - 132/78)  RR: 18 (12-12-18 @ 13:39) (18 - 18)  SpO2: 96% (12-12-18 @ 13:39) (91% - 96%)    PHYSICAL EXAMINATION  Constitutional - NAD, Comfortable  HEENT - NCAT  Chest - Breathing comfortably, No wheezing  Cardiovascular - RRR   Abdomen - Soft, obese  Extremities - No C/C/E, No calf tenderness   Neurologic Exam -                    Cognitive - Awake, Alert, Oriented to self, place, date, situation     Communication - Fluent, No dysarthria     Tone - Normal     Motor -                     LEFT    UE - ShAB 4/5, EF 5/5, EE 5/5, WE 5/5,  5/5 (shoulder ROM limited 2/2 chronic RTC impingement syndromes B/L)                    LEFT    LE - HF 3/5, KE 3/5, DF 4/5, PF 4/5                    RIGHT UE - ShAB 4/5, EF 5/5, EE 5/5, WE 5/5,  5/5                    RIGHT LE - HF 3/5, KE 2/5, DF 1/5, PF 1/5        Sensory - Intact to LT     Reflexes - DTR Intact, No primitive reflexes  Psychiatric - Mood stable, Affect WNL

## 2018-12-12 NOTE — PROGRESS NOTE ADULT - PROBLEM SELECTOR PLAN 1
BP control  Labetalol d/c  lasix increased to 40mg BID for fluid retention and Mod pleural effusion b/l

## 2018-12-12 NOTE — PROGRESS NOTE ADULT - ASSESSMENT
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 Pt states pain was 8/10, nonradiating. Pt denies LOC, fevers/chills, dizziness, numbness/tingling, chest pain, N/V/D.     At [place], CTA of the chest revealed probable type A dissection w/ intramural hematoma w/ active hemmorhage. Pt transferred urgently to [facility] for possible CT surgery.  Upon arrival to [facility], pt placed on Labetolol drip for blood pressure control and patient taken for a CT of the Chest. Dr. Kumar reviewed images which revealed Type B dissection w/ hemoperiteneum. INR at outside hospital 1.8.   TTE with pericardial effusion, tamponade physiology..  Anticoagulation reversed.  Labetolol and cardene for BP control  Hyperglycemia, chronic steroid use for asthma, endo consulted  + Ua , on bactrim.  Changed to oral labetolol and  norvasc for HTN.  Bilateral lower extremity weakness, 2/4 strength on exam; start of symptoms coincided with back pain/dissection;   Neuro is following, pending MRI/MRA, due to the occurance of the LE weakness concurrently with the presentation of the type B dissection, need to r/o dissection, ischemia or occlusion of the ASA.  12/11 BP stable.   MRI complete,  pending official reading and neuro f/u.  12/12 VSS. OOB. MRI indicative of spinal cord infarct. Neurology-no intervention required at this time. Strength improving LE b/l. Labetalol d/c; lasix increased to 40mg BID. OT recommending ARMANDO as well.  Discharge to subacute when medically cleared

## 2018-12-12 NOTE — PROGRESS NOTE ADULT - ASSESSMENT
Assessment  DMT2: 63y Female with DM T2 with hyperglycemia on insulin, blood sugars in acceptable range, no hypoglycemic episode, eating meals,  non compliant with low carb diet.  Aortic Disection: S/P Sx, on medications,  no chest pain, stable, monitored.  Hypothyroidism:  On synthroid 137 mcg po daily, asymptomatic.  HTN: Controlled, no headaches, on medications.        Kaleb Andrews MD  Cell: [phone]  Office: [phone]

## 2018-12-12 NOTE — PROGRESS NOTE ADULT - ASSESSMENT
63 year old female with PMHX of asthma (with chronic steroid use) DM, HLD, obesity, hypothyroid, pulmonary emboli (on coumadin and w/ IVC filter) who presented initially to [facility] complaining of sudden onset upper back pain between her shoulder blades for one hour associated with shortness of breath. Pt states pain was 8/10, nonradiating. Pt denies LOC, fevers/chills, dizziness, numbness/tingling, chest pain, N/V/D.   At [place], CTA of the chest revealed probable type A dissection w/ intramural hematoma w/ active hemmorhage. Pt transferred urgently to [facility] for possible CT surgery.  Upon arrival to [facility], pt placed on Labetolol drip for blood pressure control and patient taken for a CT of the Chest. Dr. Kumar reviewed images which revealed Type B dissection w/ hemoperiteneum. INR at outside hospital 1.8.   MRI shows 63 year old female with PMHX of asthma (with chronic steroid use) DM, HLD, obesity, hypothyroid, pulmonary emboli (on coumadin and w/ IVC filter) who presented initially to [facility] complaining of sudden onset upper back pain between her shoulder blades for one hour associated with shortness of breath. Pt states pain was 8/10, nonradiating. Pt denies LOC, fevers/chills, dizziness, numbness/tingling, chest pain, N/V/D.   At [place], CTA of the chest revealed probable type A dissection w/ intramural hematoma w/ active hemmorhage. Pt transferred urgently to [facility] for possible CT surgery.  Upon arrival to [facility], pt placed on Labetolol drip for blood pressure control and patient taken for a CT of the Chest. Dr. Kumar reviewed images which revealed Type B dissection w/ hemoperiteneum. INR at outside hospital 1.8.   MRI shows Abnormal signal within the conus medullaris and distal thoracic cord, with expansion of the conus, likely acute cortical infarction.     Plan  [] start ASA 81  [] strict BP control, avoid hypotention,   [] PT/OT     Neurology will sign off, please feel free to call with any questions 63 year old female with PMHX of asthma (with chronic steroid use) DM, HLD, obesity, hypothyroid, pulmonary emboli (on coumadin and w/ IVC filter) who presented initially to [facility] complaining of sudden onset upper back pain between her shoulder blades for one hour associated with shortness of breath. Pt states pain was 8/10, nonradiating. Pt denies LOC, fevers/chills, dizziness, numbness/tingling, chest pain, N/V/D.   At [place], CTA of the chest revealed probable type A dissection w/ intramural hematoma w/ active hemmorhage. Pt transferred urgently to [facility] for possible CT surgery.  Upon arrival to [facility], pt placed on Labetolol drip for blood pressure control and patient taken for a CT of the Chest.MRI shows abnormal signal within the conus medullaris and distal thoracic cord, with expansion of the conus, likely acute  infarction.     Plan  [] start ASA 81  [] strict BP control, avoid hypotension,   [] PT/OT     Neurology will sign off, please feel free to call with any questions

## 2018-12-12 NOTE — PROGRESS NOTE ADULT - SUBJECTIVE AND OBJECTIVE BOX
VITAL SIGNS    Telemetry:  SR 60-80 since 2 AM  Vital Signs Last 24 Hrs  T(C): 36.8 (12-12-18 @ 13:39), Max: 36.8 (12-12-18 @ 13:39)  T(F): 98.2 (12-12-18 @ 13:39), Max: 98.2 (12-12-18 @ 13:39)  HR: 72 (12-12-18 @ 13:39) (71 - 152)  BP: 116/76 (12-12-18 @ 13:39) (84/62 - 132/78)  RR: 18 (12-12-18 @ 13:39) (18 - 18)  SpO2: 96% (12-12-18 @ 13:39) (91% - 96%)            12-11 @ 07:01  -  12-12 @ 07:00  --------------------------------------------------------  IN: 680 mL / OUT: 2050 mL / NET: -1370 mL    12-12 @ 07:01  -  12-12 @ 17:09  --------------------------------------------------------  IN: 320 mL / OUT: 200 mL / NET: 120 mL       Daily     Daily   Admit Wt: Drug Dosing Weight  Height (cm): 172.72 (07 Dec 2018 16:30)  Weight (kg): 106.7 (07 Dec 2018 16:30)  BMI (kg/m2): 35.8 (07 Dec 2018 16:30)  BSA (m2): 2.19 (07 Dec 2018 16:30)      CAPILLARY BLOOD GLUCOSE      POCT Blood Glucose.: 265 mg/dL (12 Dec 2018 16:35)  POCT Blood Glucose.: 159 mg/dL (12 Dec 2018 11:57)  POCT Blood Glucose.: 139 mg/dL (12 Dec 2018 07:41)  POCT Blood Glucose.: 172 mg/dL (11 Dec 2018 22:19)          MEDICATIONS  acetaminophen   Tablet .. 650 milliGRAM(s) Oral every 6 hours PRN  ALBUTerol    90 MICROgram(s) HFA Inhaler 1 Puff(s) Inhalation every 4 hours  ALBUTerol/ipratropium for Nebulization 3 milliLiter(s) Nebulizer every 6 hours  amiodarone    Tablet 400 milliGRAM(s) Oral every 8 hours  aspirin enteric coated 81 milliGRAM(s) Oral daily  cholestyramine Powder (Sugar-Free) 4 Gram(s) Oral daily  dextrose 50% Injectable 50 milliLiter(s) IV Push every 15 minutes  fluticasone propionate/ salmeterol 500-50 MICROgram(s) Diskus 1 Dose(s) Inhalation two times a day  furosemide    Tablet 40 milliGRAM(s) Oral two times a day  gabapentin 300 milliGRAM(s) Oral at bedtime  gabapentin 100 milliGRAM(s) Oral daily  heparin  Injectable 5000 Unit(s) SubCutaneous every 8 hours  insulin glargine Injectable (LANTUS) 15 Unit(s) SubCutaneous at bedtime  insulin lispro (HumaLOG) corrective regimen sliding scale   SubCutaneous at bedtime  insulin lispro (HumaLOG) corrective regimen sliding scale   SubCutaneous three times a day before meals  insulin lispro Injectable (HumaLOG) 5 Unit(s) SubCutaneous three times a day before meals  levothyroxine 137 MICROGram(s) Oral daily  montelukast 10 milliGRAM(s) Oral daily  pantoprazole    Tablet 40 milliGRAM(s) Oral before breakfast  polyethylene glycol 3350 17 Gram(s) Oral daily  predniSONE   Tablet 7.5 milliGRAM(s) Oral every 12 hours  senna 2 Tablet(s) Oral at bedtime  spironolactone 25 milliGRAM(s) Oral daily  tiotropium 18 MICROgram(s) Capsule 1 Capsule(s) Inhalation daily      PHYSICAL EXAM  Subjective: Pt states she is feeling better  Neurology: alert and oriented x 3, nonfocal, no gross deficits, Sensation intact b/l  CV : s1, s2   Sternal Wound :  CDI , Stable  Lungs: Diminished at bases  Abdomen: soft, NT,ND, (+) Bowel sounds  :  voiding  Extremities:  LE edema 2+ b/l. Neg calve tenderness b/l. Strength 3/5 b/l.     LABS  12-12    141  |  106  |  22  ----------------------------<  171<H>  4.3   |  22  |  0.64    Ca    9.0      12 Dec 2018 01:28                                   10.3   10.8  )-----------( 188      ( 12 Dec 2018 01:28 )             31.7          PT/INR - ( 12 Dec 2018 01:28 )   PT: 12.3 sec;   INR: 1.08 ratio                PAST MEDICAL & SURGICAL HISTORY:  Arthritis  Shingles  PE (pulmonary embolism)  Hypothyroidism  Hyperlipidemia  Diabetes mellitus  Asthma  History of cataract surgery  S/P cholecystectomy

## 2018-12-12 NOTE — PROGRESS NOTE ADULT - SUBJECTIVE AND OBJECTIVE BOX
cc:  Called by tele tech for persistent atrial fib rate 150's  HPI: 63 F with pmh of asthma chronic steroid use DM, HLD, obesity, hypothyroid, pulmonary emboli with AC coumadin, +IVC filter who presented initially to an outside hospital with  sudden onset upper back pain, bilateral lower extremity weakness,  and shortness of breath.  Imaging revealed probable type A dissection w/ intramural hematoma w/ active hemorrhage and was transferred urgently to [facility] for possible surgical intervention.  Surgeon reviewed images which revealed Type B dissection w/ hemoperiteneum, anticoagulation was reversed and cardene/labetolol  initiated for blood pressure control which was weaned to oral regimen and patient was transferred to step down.   She continued to experience bilateral lower extremity weakness, 2/4 strength on exam; start of symptoms coincided with back pain/dissection. Neurosurgery was consulted,  patient underwent MRI/MRA  w/ abnormal signal within the conus medullaris and distal thoracic cord, with expansion of the conus, which is suggestive of cortical infarction.  Neurosurgical intervention not indicated at this time.   Patient developed CRYSTAL  at 1730 and was given 2.5 mg lopressor x 1,labetolol,  and an amio load was initiated.  Now contacted by tele tech for persistent rate in 150's and found to be hypotensive with SBP 85.   Patient is awake, alert, denies chest pain, diaphoresis, dizziness, nor shortness of breath.   +persistent back pain.    VITAL SIGNS  T(F): 97.8  HR: 124  BP: 102/58  RR: 18  SpO2: 95% on 4L    12-10-18 @ 07:01  -  12-11-18 @ 07:00  --------------------------------------------------------  OUT: 1300 mL / NET: -1300 mL    12-11-18 @ 07:01  -  12-12-18 @ 02:12  --------------------------------------------------------  OUT: 1250 mL / NET: -1250 mL    LAB                        10.3   10.8  )-----------( 188      ( 12 Dec 2018 01:28 )             31.7     12-12    141  |  106  |  22  ----------------------------<  171<H>  4.3   |  22  |  0.64    Ca    9.0      12 Dec 2018 01:28      CAPILLARY BLOOD GLUCOSE  POCT Blood Glucose.: 172 mg/dL (11 Dec 2018 22:19)  POCT Blood Glucose.: 140 mg/dL (11 Dec 2018 16:40)  POCT Blood Glucose.: 166 mg/dL (11 Dec 2018 11:53)  POCT Blood Glucose.: 149 mg/dL (11 Dec 2018 07:44)        DIAGNOSTICS    MEDICATIONS  ALBUTerol    90 MICROgram(s) HFA Inhaler 1 Puff(s) Inhalation every 4 hours  ALBUTerol/ipratropium for Nebulization 3 milliLiter(s) Nebulizer every 6 hours  amiodarone    Tablet 400 milliGRAM(s) Oral every 8 hours  aspirin enteric coated 81 milliGRAM(s) Oral daily  cholestyramine Powder (Sugar-Free) 4 Gram(s) Oral daily  fluticasone propionate/ salmeterol 500-50 MICROgram(s) Diskus 1 Dose(s) Inhalation two times a day  furosemide    Tablet 20 milliGRAM(s) Oral two times a day  gabapentin 300 milliGRAM(s) Oral at bedtime  gabapentin 100 milliGRAM(s) Oral daily  heparin  Injectable 5000 Unit(s) SubCutaneous every 8 hours  insulin glargine Injectable (LANTUS) 15 Unit(s) SubCutaneous at bedtime  insulin lispro (HumaLOG) corrective regimen sliding scale   SubCutaneous at bedtime  insulin lispro (HumaLOG) corrective regimen sliding scale   SubCutaneous three times a day before meals  insulin lispro Injectable (HumaLOG) 5 Unit(s) SubCutaneous three times a day before meals  levothyroxine 137 MICROGram(s) Oral daily  montelukast 10 milliGRAM(s) Oral daily  pantoprazole    Tablet 40 milliGRAM(s) Oral before breakfast  polyethylene glycol 3350 17 Gram(s) Oral daily  predniSONE   Tablet 7.5 milliGRAM(s) Oral every 12 hours  senna 2 Tablet(s) Oral at bedtime  spironolactone 25 milliGRAM(s) Oral daily  tiotropium 18 MICROgram(s) Capsule 1 Capsule(s) Inhalation daily  trimethoprim  160 mG/sulfamethoxazole 800 mG 1 Tablet(s) Oral daily      PHYSICAL EXAM  GEN: No apparent distress, examined in bed  PSYCH: Appropriate, communicative, A+Ox3  NEURO: Non-focal  CV: S1 S2 without rub or murmur  MEDIASTINUM: Sternal incision covered with dressing, CDI, stable  PACING WIRES:   VVI [  ]  setting:      Isolated/Insulated [  ]  DRAINS:  [place] [  ]  Drainage:         Pleural [  ]  Drainage:    PULMONARY:  CTA, Decreased left base   INCENTIVE SPIROMETRY:  ABDOMEN:  Soft, non-tender, non-distended, bowel sounds active x 4  LBM:  : voiding   VASCULAR: +pp +radial  SKIN: Warm, dry, intact   leg incision:  ACE [  ]  MUSCULOSKELETAL:  Moves all extremities equally  PHYSICAL THERAPY REC:  Home [  ]  Home w PT [   ]   ARMANDO [   ] cc:  Called by tele tech for persistent atrial fib rate 150's  HPI: 63 F with pmh of asthma chronic steroid use DM, HLD, obesity, hypothyroid, pulmonary emboli with AC coumadin, +IVC filter who presented initially to an outside hospital with  sudden onset upper back pain, bilateral lower extremity weakness,  and shortness of breath.  Imaging revealed probable type A dissection w/ intramural hematoma w/ active hemorrhage and was transferred urgently to [facility] for possible surgical intervention.  Surgeon reviewed images which revealed Type B dissection w/ hemoperiteneum, anticoagulation was reversed and cardene/labetolol  initiated for blood pressure control which was weaned to oral regimen and patient was transferred to step down.   She continued to experience bilateral lower extremity weakness, 2/4 strength on exam; start of symptoms coincided with back pain/dissection. Neurosurgery was consulted,  patient underwent MRI/MRA  w/ abnormal signal within the conus medullaris and distal thoracic cord, with expansion of the conus, which is suggestive of cortical infarction.  Neurosurgical intervention not indicated at this time.   Patient developed CRYSTAL  at 1730 and was given 2.5 mg lopressor x 1,labetolol,  and an amio load was initiated.  Now contacted by tele tech for persistent rate in 150's and found to be hypotensive with SBP 85.   Patient is awake, alert, denies chest pain, diaphoresis, dizziness, nor shortness of breath.   +persistent back pain.    VITAL SIGNS  T(F): 97.8  HR: 124  BP: 102/58  RR: 18  SpO2: 95% on 4L    12-10-18 @ 07:01  -  12-11-18 @ 07:00  --------------------------------------------------------  OUT: 1300 mL / NET: -1300 mL    12-11-18 @ 07:01  -  12-12-18 @ 02:12  --------------------------------------------------------  OUT: 1250 mL / NET: -1250 mL    LAB                        10.3   10.8  )-----------( 188      ( 12 Dec 2018 01:28 )             31.7     12-12    141  |  106  |  22  ----------------------------<  171<H>  4.3   |  22  |  0.64    Ca    9.0      12 Dec 2018 01:28      CAPILLARY BLOOD GLUCOSE  POCT Blood Glucose.: 172 mg/dL (11 Dec 2018 22:19)  POCT Blood Glucose.: 140 mg/dL (11 Dec 2018 16:40)  POCT Blood Glucose.: 166 mg/dL (11 Dec 2018 11:53)  POCT Blood Glucose.: 149 mg/dL (11 Dec 2018 07:44)    DIAGNOSTICS Xray Chest 1 View- PORTABLE-Routine (12.11.18 @ 05:11)   Interval removal of the right IJ central venous catheter.  Cardiac silhouette remains enlarged.  Unchanged bilateral pleural effusions. No pneumothorax.  No acute osseous abnormality.  IMPRESSION:    Unchanged bilateral pleural effusions.      MEDICATIONS  ALBUTerol    90 MICROgram(s) HFA Inhaler 1 Puff(s) Inhalation every 4 hours  ALBUTerol/ipratropium for Nebulization 3 milliLiter(s) Nebulizer every 6 hours  amiodarone    Tablet 400 milliGRAM(s) Oral every 8 hours  aspirin enteric coated 81 milliGRAM(s) Oral daily  cholestyramine Powder (Sugar-Free) 4 Gram(s) Oral daily  fluticasone propionate/ salmeterol 500-50 MICROgram(s) Diskus 1 Dose(s) Inhalation two times a day  furosemide    Tablet 20 milliGRAM(s) Oral two times a day  gabapentin 300 milliGRAM(s) Oral at bedtime  gabapentin 100 milliGRAM(s) Oral daily  heparin  Injectable 5000 Unit(s) SubCutaneous every 8 hours  insulin glargine Injectable (LANTUS) 15 Unit(s) SubCutaneous at bedtime  insulin lispro (HumaLOG) corrective regimen sliding scale   SubCutaneous at bedtime  insulin lispro (HumaLOG) corrective regimen sliding scale   SubCutaneous three times a day before meals  insulin lispro Injectable (HumaLOG) 5 Unit(s) SubCutaneous three times a day before meals  levothyroxine 137 MICROGram(s) Oral daily  montelukast 10 milliGRAM(s) Oral daily  pantoprazole    Tablet 40 milliGRAM(s) Oral before breakfast  polyethylene glycol 3350 17 Gram(s) Oral daily  predniSONE   Tablet 7.5 milliGRAM(s) Oral every 12 hours  senna 2 Tablet(s) Oral at bedtime  spironolactone 25 milliGRAM(s) Oral daily  tiotropium 18 MICROgram(s) Capsule 1 Capsule(s) Inhalation daily  trimethoprim  160 mG/sulfamethoxazole 800 mG 1 Tablet(s) Oral daily      PHYSICAL EXAM  GEN: No apparent distress, examined in bed  PSYCH: Appropriate, communicative,  NEURO: Non-focal,A+Ox3  CV: S1 S2 without rub or murmur  PULMONARY:  Diminished at bases but at baseline appears labored  Extremities:  LE edema 2+ b/l.   ABDOMEN:  Soft, non-tender, non-distended, bowel sounds active x 4  : voiding, female incontinence device in place  SKIN: Warm, dry, intact     MUSCULOSKELETAL: Neck supple, limited left arm movement due to chronic pain,  Neg calve tenderness b/l. Strength 3/5 b/l.   PHYSICAL THERAPY REC:  Home [  ]  Home w PT [   ]   ARMANDO [   ] pending disposition cc:  Called by tele tech for persistent atrial fib rate 150's  HPI: 63 F with pmh of asthma chronic steroid use DM, HLD, obesity, hypothyroid, pulmonary emboli with AC coumadin, +IVC filter who presented initially to an outside hospital with  sudden onset upper back pain, bilateral lower extremity weakness,  and shortness of breath.  Imaging revealed probable type A dissection w/ intramural hematoma w/ active hemorrhage and was transferred urgently to [facility] for possible surgical intervention.  Surgeon reviewed images which revealed Type B dissection w/ hemoperiteneum, anticoagulation was reversed and cardene/labetolol  initiated for blood pressure control which was weaned to oral regimen and patient was transferred to step down.   She continued to experience bilateral lower extremity weakness, 2/4 strength on exam; start of symptoms coincided with back pain/dissection. Neurosurgery was consulted,  patient underwent MRI/MRA  w/ abnormal signal within the conus medullaris and distal thoracic cord, with expansion of the conus, which is suggestive of cortical infarction.  Neurosurgical intervention not indicated at this time.   Patient developed CRYSTAL  at 1730 and was given 2.5 mg lopressor x 1,labetolol,  and an amio load was initiated.  Now contacted by tele tech for persistent rate in 150's and found to be hypotensive with SBP 85.   Patient is awake, alert, denies chest pain, diaphoresis, dizziness, nor shortness of breath.   +persistent back pain.    VITAL SIGNS  T(F): 97.8  HR: 124  BP: 102/58  RR: 18  SpO2: 95% on 4L    12-10-18 @ 07:01  -  12-11-18 @ 07:00  --------------------------------------------------------  OUT: 1300 mL / NET: -1300 mL    12-11-18 @ 07:01  -  12-12-18 @ 02:12  --------------------------------------------------------  OUT: 1250 mL / NET: -1250 mL    LAB                        10.3   10.8  )-----------( 188      ( 12 Dec 2018 01:28 )             31.7     12-12    141  |  106  |  22  ----------------------------<  171<H>  4.3   |  22  |  0.64    Ca    9.0      12 Dec 2018 01:28      CAPILLARY BLOOD GLUCOSE  POCT Blood Glucose.: 172 mg/dL (11 Dec 2018 22:19)  POCT Blood Glucose.: 140 mg/dL (11 Dec 2018 16:40)  POCT Blood Glucose.: 166 mg/dL (11 Dec 2018 11:53)  POCT Blood Glucose.: 149 mg/dL (11 Dec 2018 07:44)    DIAGNOSTICS Xray Chest 1 View- PORTABLE-Routine (12.11.18 @ 05:11)   Interval removal of the right IJ central venous catheter.  Cardiac silhouette remains enlarged.  Unchanged bilateral pleural effusions. No pneumothorax.  No acute osseous abnormality.  IMPRESSION:    Unchanged bilateral pleural effusions.      MEDICATIONS  ALBUTerol    90 MICROgram(s) HFA Inhaler 1 Puff(s) Inhalation every 4 hours  ALBUTerol/ipratropium for Nebulization 3 milliLiter(s) Nebulizer every 6 hours  amiodarone    Tablet 400 milliGRAM(s) Oral every 8 hours  aspirin enteric coated 81 milliGRAM(s) Oral daily  cholestyramine Powder (Sugar-Free) 4 Gram(s) Oral daily  fluticasone propionate/ salmeterol 500-50 MICROgram(s) Diskus 1 Dose(s) Inhalation two times a day  furosemide    Tablet 20 milliGRAM(s) Oral two times a day  gabapentin 300 milliGRAM(s) Oral at bedtime  gabapentin 100 milliGRAM(s) Oral daily  heparin  Injectable 5000 Unit(s) SubCutaneous every 8 hours  insulin glargine Injectable (LANTUS) 15 Unit(s) SubCutaneous at bedtime  insulin lispro (HumaLOG) corrective regimen sliding scale   SubCutaneous at bedtime  insulin lispro (HumaLOG) corrective regimen sliding scale   SubCutaneous three times a day before meals  insulin lispro Injectable (HumaLOG) 5 Unit(s) SubCutaneous three times a day before meals  levothyroxine 137 MICROGram(s) Oral daily  montelukast 10 milliGRAM(s) Oral daily  pantoprazole    Tablet 40 milliGRAM(s) Oral before breakfast  polyethylene glycol 3350 17 Gram(s) Oral daily  predniSONE   Tablet 7.5 milliGRAM(s) Oral every 12 hours  senna 2 Tablet(s) Oral at bedtime  spironolactone 25 milliGRAM(s) Oral daily  tiotropium 18 MICROgram(s) Capsule 1 Capsule(s) Inhalation daily  trimethoprim  160 mG/sulfamethoxazole 800 mG 1 Tablet(s) Oral daily     Review of Systems  GENERAL:  Fevers[] chills[] sweats[] fatigue[] unintentional weight loss[] weight gain []                                        NEURO:  paresthesias[] seizures []  syncope []  confusion []                                                                                                                  CV:  chest pain[] palpitations[x] HEATON [] diaphoresis [] edema[]                                                                                             RESPIRATORY:  wheezing[] SOB[x] cough [] sputum[] hemoptysis[]                                                                    GI:  nausea[]  vomiting []  diarrhea[] constipation [] melena []   abdominal pain [  ]                                                                     : hematuria[ ]  dysuria[ ] urgency[] incontinence[]                                                                                              MUSCULOSKELETAL:  arthritis[ ]  joint swelling [ ] muscle weakness [ ]                                                                  SKIN/BREAST:  rash[ ] itching [ ]  hair loss[ ] masses[ ]   incisional drainage [  ]                                                                                                                                                                                                                                                   ENDOCRINE:  cold intolerance[ ] heat intolerance[ ] polydipsia[ ]          PHYSICAL EXAM  Consitutional: No apparent distress, denies fevers, chills, examined in bed  PSYCH: Appropriate, communicative  NEURO: Non-focal,A+Ox3  EYES: clear conjunctiva, PERRLA, EOMI  CV: S1 S2 without rub or murmur, irregular, tachycardic  PULMONARY:  Diminished at bases but at baseline appears labored on 4 L nc  Extremities:  LE edema 2+ b/l.   ABDOMEN:  Obese Soft, non-tender, non-distended, bowel sounds active x 4  : voiding, female incontinence device in place draining yellow urine   SKIN: Warm, dry, intact     MUSCULOSKELETAL: Neck supple, limited left arm movement due to chronic pain,  Neg calve tenderness b/l. Strength 3/5 b/l.   PHYSICAL THERAPY REC:  Home [  ]  Home w PT [   ]   ARMANDO [   ] pending disposition

## 2018-12-12 NOTE — CONSULT NOTE ADULT - ASSESSMENT
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    #PT - strengthening, transfers, gait training  #OT - ADLs  #Bowel/GI - constipation, reports no BM in 5 days. Currently on po bowel regimen; would recommend suppository or enema if continues to have no bowel movements  #Urinary/ - currently with external catheter in place; monitor output  #Skin - turn and position q2hrs to prevent skin breakdown/ulcers  #Pain - Tylenol prn  #DVT PPX - Heparin  #Dispo - Recommend ACUTE inpatient rehabilitation for the functional deficits consisting of 3 hours of therapy/day & 24 hour RN/daily PMR physician for comorbid medical management WHEN MEDICALLY STABLE. Will continue to follow for ongoing rehab needs and recommendations. Patient will be able to tolerate 3 hours a day.

## 2018-12-12 NOTE — PROVIDER CONTACT NOTE (OTHER) - RECOMMENDATIONS
Stat Metoprolol 2.5 mg iv, Magnesium 2GMs iv given. Stat labs sent to lab .   Amiodarone 150mg iv bolus given, vs monitored.

## 2018-12-12 NOTE — PROGRESS NOTE ADULT - SUBJECTIVE AND OBJECTIVE BOX
Neurology Follow up note    Patient is a 63y old  Female who presents with a chief complaint of Back Pain transfer for Type B dissection (12 Dec 2018 11:40)      Subjective:Interval History - No events overnight    Objective:   Vital Signs Last 24 Hrs  T(C): 36.3 (12 Dec 2018 05:16), Max: 36.7 (11 Dec 2018 20:14)  T(F): 97.3 (12 Dec 2018 05:16), Max: 98 (11 Dec 2018 20:14)  HR: 74 (12 Dec 2018 10:18) (71 - 158)  BP: 115/80 (12 Dec 2018 10:18) (84/62 - 132/78)  BP(mean): --  RR: 18 (12 Dec 2018 05:16) (18 - 18)  SpO2: 96% (12 Dec 2018 05:16) (91% - 96%)    General Exam:   General appearance: No acute distress                 Cardiovascular: Pedal dorsalis pulses intact bilaterally    Neurological Exam:  Mental Status: Orientated to self, date and place.  Attention intact.  No dysarthria, aphasia or neglect.  Knowledge intact.  Registration intact.  Short and long term memory grossly intact.      Cranial Nerves:  PERRL, EOMI, VFF, no nystagmus or diplopia.No facial asymmetry.     Motor:   Tone: normal.                  Strength: intact in UE, 3/5 in bilateral LE  Pronator drift: none                 Dysmeria: None to finger-nose-finger  No truncal ataxia.    Tremor: No resting, postural or action tremor.  No myoclonus.    Sensation: intact to light touch  scar on lt knee from previous surgery  Deep Tendon Reflexes: 1+ bilateral biceps, triceps, brachioradialis, 0 in bilateral knee and ankle  Toes flexor bilaterally    Gait: normal and stable.      Other:  12-12    141  |  106  |  22  ----------------------------<  171<H>  4.3   |  22  |  0.64    Ca    9.0      12 Dec 2018 01:28               10.3   10.8  )-----------( 188      ( 12 Dec 2018 01:28 )             31.7     MEDICATIONS  (STANDING):  ALBUTerol    90 MICROgram(s) HFA Inhaler 1 Puff(s) Inhalation every 4 hours  ALBUTerol/ipratropium for Nebulization 3 milliLiter(s) Nebulizer every 6 hours  amiodarone    Tablet 400 milliGRAM(s) Oral every 8 hours  aspirin enteric coated 81 milliGRAM(s) Oral daily  cholestyramine Powder (Sugar-Free) 4 Gram(s) Oral daily  dextrose 50% Injectable 50 milliLiter(s) IV Push every 15 minutes  fluticasone propionate/ salmeterol 500-50 MICROgram(s) Diskus 1 Dose(s) Inhalation two times a day  furosemide    Tablet 20 milliGRAM(s) Oral two times a day  gabapentin 300 milliGRAM(s) Oral at bedtime  gabapentin 100 milliGRAM(s) Oral daily  heparin  Injectable 5000 Unit(s) SubCutaneous every 8 hours  insulin glargine Injectable (LANTUS) 15 Unit(s) SubCutaneous at bedtime  insulin lispro (HumaLOG) corrective regimen sliding scale   SubCutaneous at bedtime  insulin lispro (HumaLOG) corrective regimen sliding scale   SubCutaneous three times a day before meals  insulin lispro Injectable (HumaLOG) 5 Unit(s) SubCutaneous three times a day before meals  labetalol 100 milliGRAM(s) Oral every 12 hours  levothyroxine 137 MICROGram(s) Oral daily  montelukast 10 milliGRAM(s) Oral daily  pantoprazole    Tablet 40 milliGRAM(s) Oral before breakfast  polyethylene glycol 3350 17 Gram(s) Oral daily  predniSONE   Tablet 7.5 milliGRAM(s) Oral every 12 hours  senna 2 Tablet(s) Oral at bedtime  spironolactone 25 milliGRAM(s) Oral daily  tiotropium 18 MICROgram(s) Capsule 1 Capsule(s) Inhalation daily  trimethoprim  160 mG/sulfamethoxazole 800 mG 1 Tablet(s) Oral daily    MEDICATIONS  (PRN):  acetaminophen   Tablet .. 650 milliGRAM(s) Oral every 6 hours PRN Mild Pain (1 - 3) Neurology Follow up note    Patient is a 63y old  Female who presents with a chief complaint of Back Pain transfer for Type B dissection (12 Dec 2018 11:40)    Subjective:Interval History - No events overnight    Objective:   Vital Signs Last 24 Hrs  T(C): 36.3 (12 Dec 2018 05:16), Max: 36.7 (11 Dec 2018 20:14)  T(F): 97.3 (12 Dec 2018 05:16), Max: 98 (11 Dec 2018 20:14)  HR: 74 (12 Dec 2018 10:18) (71 - 158)  BP: 115/80 (12 Dec 2018 10:18) (84/62 - 132/78)  BP(mean): --  RR: 18 (12 Dec 2018 05:16) (18 - 18)  SpO2: 96% (12 Dec 2018 05:16) (91% - 96%)    General Exam:   General appearance: No acute distress                 Neurological Exam:  Mental Status: Orientated to self, date and place.  Attention intact.  No dysarthria, aphasia or neglect.  Knowledge intact.  Registration intact.  Short and long term memory grossly intact.      Cranial Nerves:  PERRL, EOMI, VFF, no nystagmus or diplopia.No facial asymmetry.     Motor:   Tone: normal.                  Strength: intact in UE, 3/5 in bilateral LE  Pronator drift: none                 Dysmeria: None to finger-nose-finger  No truncal ataxia.    Tremor: No resting, postural or action tremor.  No myoclonus.    Sensation: intact to light touch  scar on lt knee from previous surgery  Deep Tendon Reflexes: 1+ bilateral biceps, triceps, brachioradialis, 0 in bilateral knee and ankle  Toes flexor bilaterally    Gait: deferred      Other:  12-12    141  |  106  |  22  ----------------------------<  171<H>  4.3   |  22  |  0.64    Ca    9.0      12 Dec 2018 01:28               10.3   10.8  )-----------( 188      ( 12 Dec 2018 01:28 )             31.7     MEDICATIONS  (STANDING):  ALBUTerol    90 MICROgram(s) HFA Inhaler 1 Puff(s) Inhalation every 4 hours  ALBUTerol/ipratropium for Nebulization 3 milliLiter(s) Nebulizer every 6 hours  amiodarone    Tablet 400 milliGRAM(s) Oral every 8 hours  aspirin enteric coated 81 milliGRAM(s) Oral daily  cholestyramine Powder (Sugar-Free) 4 Gram(s) Oral daily  dextrose 50% Injectable 50 milliLiter(s) IV Push every 15 minutes  fluticasone propionate/ salmeterol 500-50 MICROgram(s) Diskus 1 Dose(s) Inhalation two times a day  furosemide    Tablet 20 milliGRAM(s) Oral two times a day  gabapentin 300 milliGRAM(s) Oral at bedtime  gabapentin 100 milliGRAM(s) Oral daily  heparin  Injectable 5000 Unit(s) SubCutaneous every 8 hours  insulin glargine Injectable (LANTUS) 15 Unit(s) SubCutaneous at bedtime  insulin lispro (HumaLOG) corrective regimen sliding scale   SubCutaneous at bedtime  insulin lispro (HumaLOG) corrective regimen sliding scale   SubCutaneous three times a day before meals  insulin lispro Injectable (HumaLOG) 5 Unit(s) SubCutaneous three times a day before meals  labetalol 100 milliGRAM(s) Oral every 12 hours  levothyroxine 137 MICROGram(s) Oral daily  montelukast 10 milliGRAM(s) Oral daily  pantoprazole    Tablet 40 milliGRAM(s) Oral before breakfast  polyethylene glycol 3350 17 Gram(s) Oral daily  predniSONE   Tablet 7.5 milliGRAM(s) Oral every 12 hours  senna 2 Tablet(s) Oral at bedtime  spironolactone 25 milliGRAM(s) Oral daily  tiotropium 18 MICROgram(s) Capsule 1 Capsule(s) Inhalation daily  trimethoprim  160 mG/sulfamethoxazole 800 mG 1 Tablet(s) Oral daily    MEDICATIONS  (PRN):  acetaminophen   Tablet .. 650 milliGRAM(s) Oral every 6 hours PRN Mild Pain (1 - 3)

## 2018-12-12 NOTE — CONSULT NOTE ADULT - ATTENDING COMMENTS
Seen and examined with resident. Agree with note.   Patient with spinal cord infarction and gait dysfunction.  Patient will need acute rehabilitation when stable.
I have seen and examined this patient with the stroke neurology team.     History was reviewed with the patient and/or available family members.   ROS: All negative except documented in the HPI.   Neurological exam was performed and agree with exam as documented above.   Laboratory results and imaging studies were reviewed by me.   I agree with the neurology resident note as documented above.    63 years old woman with multiple vascular risk factors was admitted to [facility] for type B aortic dissection; noted to have bilateral lower extremity weakness and numbness. CT brain did not show any evidence of acute infarct or hemorrhage. Examination was concerning for anterior spinal artery distribution infarct.     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/artery of Adamkiewicz due to type B aortic dissection     Plan:  - Consider starting aspirin if not contraindicated from CT surgery/vascular surgery/medical standpoint for secondary stroke prevention  - MRI brain, MRI T-L region with DWI/ADC images  - MRA of the aorta  - BP goal - permissive hypertension followed by gradual normotension as permitted by CT surgery/vascular surgery due to aortic dissection  - TTE with bubble study and continued telemetry  - PT/OT/speech and swallow evaluation  - Stroke education  - Would continue to follow    Above-mentioned plan is discussed with patient and her  at bedside in detail. All the questions were answered and concerns were addressed.

## 2018-12-13 LAB
ANION GAP SERPL CALC-SCNC: 14 MMOL/L — SIGNIFICANT CHANGE UP (ref 5–17)
BUN SERPL-MCNC: 23 MG/DL — SIGNIFICANT CHANGE UP (ref 7–23)
CALCIUM SERPL-MCNC: 10.4 MG/DL — SIGNIFICANT CHANGE UP (ref 8.4–10.5)
CHLORIDE SERPL-SCNC: 97 MMOL/L — SIGNIFICANT CHANGE UP (ref 96–108)
CO2 SERPL-SCNC: 26 MMOL/L — SIGNIFICANT CHANGE UP (ref 22–31)
CREAT SERPL-MCNC: 0.74 MG/DL — SIGNIFICANT CHANGE UP (ref 0.5–1.3)
GLUCOSE BLDC GLUCOMTR-MCNC: 175 MG/DL — HIGH (ref 70–99)
GLUCOSE BLDC GLUCOMTR-MCNC: 181 MG/DL — HIGH (ref 70–99)
GLUCOSE BLDC GLUCOMTR-MCNC: 252 MG/DL — HIGH (ref 70–99)
GLUCOSE BLDC GLUCOMTR-MCNC: 312 MG/DL — HIGH (ref 70–99)
GLUCOSE BLDC GLUCOMTR-MCNC: 350 MG/DL — HIGH (ref 70–99)
GLUCOSE SERPL-MCNC: 188 MG/DL — HIGH (ref 70–99)
HCT VFR BLD CALC: 34.2 % — LOW (ref 34.5–45)
HGB BLD-MCNC: 11.1 G/DL — LOW (ref 11.5–15.5)
MCHC RBC-ENTMCNC: 29.4 PG — SIGNIFICANT CHANGE UP (ref 27–34)
MCHC RBC-ENTMCNC: 32.5 GM/DL — SIGNIFICANT CHANGE UP (ref 32–36)
MCV RBC AUTO: 90.5 FL — SIGNIFICANT CHANGE UP (ref 80–100)
PLATELET # BLD AUTO: 216 K/UL — SIGNIFICANT CHANGE UP (ref 150–400)
POTASSIUM SERPL-MCNC: 4.4 MMOL/L — SIGNIFICANT CHANGE UP (ref 3.5–5.3)
POTASSIUM SERPL-SCNC: 4.4 MMOL/L — SIGNIFICANT CHANGE UP (ref 3.5–5.3)
RBC # BLD: 3.78 M/UL — LOW (ref 3.8–5.2)
RBC # FLD: 15.4 % — HIGH (ref 10.3–14.5)
SODIUM SERPL-SCNC: 137 MMOL/L — SIGNIFICANT CHANGE UP (ref 135–145)
WBC # BLD: 11.5 K/UL — HIGH (ref 3.8–10.5)
WBC # FLD AUTO: 11.5 K/UL — HIGH (ref 3.8–10.5)

## 2018-12-13 PROCEDURE — 74174 CTA ABD&PLVS W/CONTRAST: CPT | Mod: 26

## 2018-12-13 PROCEDURE — 71275 CT ANGIOGRAPHY CHEST: CPT | Mod: 26

## 2018-12-13 PROCEDURE — 99232 SBSQ HOSP IP/OBS MODERATE 35: CPT

## 2018-12-13 RX ORDER — INSULIN GLARGINE 100 [IU]/ML
18 INJECTION, SOLUTION SUBCUTANEOUS AT BEDTIME
Qty: 0 | Refills: 0 | Status: DISCONTINUED | OUTPATIENT
Start: 2018-12-13 | End: 2018-12-14

## 2018-12-13 RX ORDER — INSULIN LISPRO 100/ML
VIAL (ML) SUBCUTANEOUS
Qty: 0 | Refills: 0 | Status: DISCONTINUED | OUTPATIENT
Start: 2018-12-13 | End: 2018-12-16

## 2018-12-13 RX ORDER — INSULIN LISPRO 100/ML
7 VIAL (ML) SUBCUTANEOUS
Qty: 0 | Refills: 0 | Status: DISCONTINUED | OUTPATIENT
Start: 2018-12-13 | End: 2018-12-14

## 2018-12-13 RX ORDER — INSULIN LISPRO 100/ML
VIAL (ML) SUBCUTANEOUS AT BEDTIME
Qty: 0 | Refills: 0 | Status: DISCONTINUED | OUTPATIENT
Start: 2018-12-13 | End: 2018-12-16

## 2018-12-13 RX ORDER — SORBITOL SOLUTION 70 %
30 SOLUTION, ORAL MISCELLANEOUS ONCE
Qty: 0 | Refills: 0 | Status: COMPLETED | OUTPATIENT
Start: 2018-12-13 | End: 2018-12-14

## 2018-12-13 RX ADMIN — POLYETHYLENE GLYCOL 3350 17 GRAM(S): 17 POWDER, FOR SOLUTION ORAL at 11:15

## 2018-12-13 RX ADMIN — PANTOPRAZOLE SODIUM 40 MILLIGRAM(S): 20 TABLET, DELAYED RELEASE ORAL at 05:34

## 2018-12-13 RX ADMIN — Medication 7 UNIT(S): at 18:46

## 2018-12-13 RX ADMIN — FLUTICASONE PROPIONATE AND SALMETEROL 1 DOSE(S): 50; 250 POWDER ORAL; RESPIRATORY (INHALATION) at 18:47

## 2018-12-13 RX ADMIN — INSULIN GLARGINE 18 UNIT(S): 100 INJECTION, SOLUTION SUBCUTANEOUS at 22:29

## 2018-12-13 RX ADMIN — Medication 3 MILLILITER(S): at 12:49

## 2018-12-13 RX ADMIN — AMIODARONE HYDROCHLORIDE 400 MILLIGRAM(S): 400 TABLET ORAL at 05:27

## 2018-12-13 RX ADMIN — Medication 3 MILLILITER(S): at 17:55

## 2018-12-13 RX ADMIN — OXYCODONE AND ACETAMINOPHEN 1 TABLET(S): 5; 325 TABLET ORAL at 12:03

## 2018-12-13 RX ADMIN — FLUTICASONE PROPIONATE AND SALMETEROL 1 DOSE(S): 50; 250 POWDER ORAL; RESPIRATORY (INHALATION) at 05:28

## 2018-12-13 RX ADMIN — HEPARIN SODIUM 5000 UNIT(S): 5000 INJECTION INTRAVENOUS; SUBCUTANEOUS at 13:00

## 2018-12-13 RX ADMIN — Medication 3 MILLILITER(S): at 05:26

## 2018-12-13 RX ADMIN — HEPARIN SODIUM 5000 UNIT(S): 5000 INJECTION INTRAVENOUS; SUBCUTANEOUS at 05:27

## 2018-12-13 RX ADMIN — HEPARIN SODIUM 5000 UNIT(S): 5000 INJECTION INTRAVENOUS; SUBCUTANEOUS at 22:30

## 2018-12-13 RX ADMIN — GABAPENTIN 300 MILLIGRAM(S): 400 CAPSULE ORAL at 22:29

## 2018-12-13 RX ADMIN — OXYCODONE AND ACETAMINOPHEN 1 TABLET(S): 5; 325 TABLET ORAL at 22:29

## 2018-12-13 RX ADMIN — Medication 2: at 18:47

## 2018-12-13 RX ADMIN — CHOLESTYRAMINE 4 GRAM(S): 4 POWDER, FOR SUSPENSION ORAL at 08:36

## 2018-12-13 RX ADMIN — Medication 5 UNIT(S): at 08:36

## 2018-12-13 RX ADMIN — SENNA PLUS 2 TABLET(S): 8.6 TABLET ORAL at 22:29

## 2018-12-13 RX ADMIN — Medication 2: at 08:32

## 2018-12-13 RX ADMIN — GABAPENTIN 100 MILLIGRAM(S): 400 CAPSULE ORAL at 12:49

## 2018-12-13 RX ADMIN — Medication 8: at 12:48

## 2018-12-13 RX ADMIN — Medication 40 MILLIGRAM(S): at 18:47

## 2018-12-13 RX ADMIN — Medication 7.5 MILLIGRAM(S): at 17:55

## 2018-12-13 RX ADMIN — Medication 650 MILLIGRAM(S): at 05:27

## 2018-12-13 RX ADMIN — Medication 7.5 MILLIGRAM(S): at 05:27

## 2018-12-13 RX ADMIN — SPIRONOLACTONE 25 MILLIGRAM(S): 25 TABLET, FILM COATED ORAL at 05:27

## 2018-12-13 RX ADMIN — Medication 137 MICROGRAM(S): at 05:27

## 2018-12-13 RX ADMIN — Medication 7 UNIT(S): at 12:47

## 2018-12-13 RX ADMIN — OXYCODONE AND ACETAMINOPHEN 1 TABLET(S): 5; 325 TABLET ORAL at 11:33

## 2018-12-13 RX ADMIN — Medication 40 MILLIGRAM(S): at 05:27

## 2018-12-13 RX ADMIN — OXYCODONE AND ACETAMINOPHEN 1 TABLET(S): 5; 325 TABLET ORAL at 23:00

## 2018-12-13 RX ADMIN — Medication 650 MILLIGRAM(S): at 05:57

## 2018-12-13 RX ADMIN — Medication 81 MILLIGRAM(S): at 12:49

## 2018-12-13 RX ADMIN — AMIODARONE HYDROCHLORIDE 400 MILLIGRAM(S): 400 TABLET ORAL at 22:29

## 2018-12-13 RX ADMIN — Medication 4: at 22:29

## 2018-12-13 RX ADMIN — AMIODARONE HYDROCHLORIDE 400 MILLIGRAM(S): 400 TABLET ORAL at 12:59

## 2018-12-13 NOTE — PROGRESS NOTE ADULT - ASSESSMENT
Assessment  DMT2: 63y Female with DM T2 with hyperglycemia on insulin, blood sugars in acceptable range, no hypoglycemic episode, eating meals,  non compliant with low carb diet.  Aortic Disection: S/P Sx, on medications,  no chest pain, stable, monitored.  Hypothyroidism:  On synthroid 137 mcg po daily, asymptomatic.  HTN: Controlled, no headaches, on medications.        Kaleb Andrews MD  Cell: [phone]  Office: [phone]

## 2018-12-13 NOTE — PROGRESS NOTE ADULT - SUBJECTIVE AND OBJECTIVE BOX
VITAL SIGNS    Telemetry:  SR 60-70  Vital Signs Last 24 Hrs  T(C): 36.7 (18 @ 14:00), Max: 36.8 (18 @ 20:07)  T(F): 98 (18 @ 14:00), Max: 98.3 (18 @ 04:58)  HR: 80 (18 @ 15:50) (74 - 80)  BP: 143/92 (18 @ 15:50) (118/78 - 143/92)  RR: 18 (18 @ 14:00) (18 - 18)  SpO2: 95% (18 @ 15:50) (95% - 97%)             @ 07:01  -   @ 07:00  --------------------------------------------------------  IN: 760 mL / OUT: 1800 mL / NET: -1040 mL     @ 07:01  -   @ 19:22  --------------------------------------------------------  IN: 600 mL / OUT: 900 mL / NET: -300 mL       Daily     Daily Weight in k.5 (13 Dec 2018 10:36)  Admit Wt: Drug Dosing Weight  Height (cm): 172.72 (07 Dec 2018 16:30)  Weight (kg): 106.7 (07 Dec 2018 16:30)  BMI (kg/m2): 35.8 (07 Dec 2018 16:30)  BSA (m2): 2.19 (07 Dec 2018 16:30)      CAPILLARY BLOOD GLUCOSE      POCT Blood Glucose.: 175 mg/dL (13 Dec 2018 18:05)  POCT Blood Glucose.: 312 mg/dL (13 Dec 2018 12:10)  POCT Blood Glucose.: 181 mg/dL (13 Dec 2018 07:42)  POCT Blood Glucose.: 161 mg/dL (12 Dec 2018 21:31)          MEDICATIONS  acetaminophen   Tablet .. 650 milliGRAM(s) Oral every 6 hours PRN  ALBUTerol    90 MICROgram(s) HFA Inhaler 1 Puff(s) Inhalation every 4 hours  ALBUTerol/ipratropium for Nebulization 3 milliLiter(s) Nebulizer every 6 hours  amiodarone    Tablet 400 milliGRAM(s) Oral every 8 hours  aspirin enteric coated 81 milliGRAM(s) Oral daily  cholestyramine Powder (Sugar-Free) 4 Gram(s) Oral daily  dextrose 50% Injectable 50 milliLiter(s) IV Push every 15 minutes  fluticasone propionate/ salmeterol 500-50 MICROgram(s) Diskus 1 Dose(s) Inhalation two times a day  furosemide    Tablet 40 milliGRAM(s) Oral two times a day  gabapentin 300 milliGRAM(s) Oral at bedtime  gabapentin 100 milliGRAM(s) Oral daily  heparin  Injectable 5000 Unit(s) SubCutaneous every 8 hours  insulin glargine Injectable (LANTUS) 18 Unit(s) SubCutaneous at bedtime  insulin lispro (HumaLOG) corrective regimen sliding scale   SubCutaneous three times a day before meals  insulin lispro (HumaLOG) corrective regimen sliding scale   SubCutaneous at bedtime  insulin lispro Injectable (HumaLOG) 7 Unit(s) SubCutaneous three times a day before meals  levothyroxine 137 MICROGram(s) Oral daily  montelukast 10 milliGRAM(s) Oral daily  oxyCODONE    5 mG/acetaminophen 325 mG 1 Tablet(s) Oral every 4 hours PRN  pantoprazole    Tablet 40 milliGRAM(s) Oral before breakfast  polyethylene glycol 3350 17 Gram(s) Oral daily  predniSONE   Tablet 7.5 milliGRAM(s) Oral every 12 hours  senna 2 Tablet(s) Oral at bedtime  sorbitol 70% Solution 30 milliLiter(s) Oral once  spironolactone 25 milliGRAM(s) Oral daily  tiotropium 18 MICROgram(s) Capsule 1 Capsule(s) Inhalation daily      PHYSICAL EXAM  Subjective: Pt states she is feeling better  Neurology: alert and oriented x 3, nonfocal, no gross deficits, Sensation intact b/l  CV : s1, s2   Sternal Wound :  CDI , Stable  Lungs: Diminished at bases  Abdomen: soft, NT,ND, (+) Bowel sounds  :  voiding  Extremities:  LE edema 2+ b/l. Neg calve tenderness b/l. Strength 3/5 b/l.     LABS  12-13    137  |  97  |  23  ----------------------------<  188<H>  4.4   |  26  |  0.74    Ca    10.4      13 Dec 2018 06:10                                   11.1   11.5  )-----------( 216      ( 13 Dec 2018 06:10 )             34.2          PT/INR - ( 12 Dec 2018 01:28 )   PT: 12.3 sec;   INR: 1.08 ratio                PAST MEDICAL & SURGICAL HISTORY:  Arthritis  Shingles  PE (pulmonary embolism)  Hypothyroidism  Hyperlipidemia  Diabetes mellitus  Asthma  History of cataract surgery  S/P cholecystectomy

## 2018-12-13 NOTE — OCCUPATIONAL THERAPY INITIAL EVALUATION ADULT - PRECAUTIONS/LIMITATIONS, REHAB EVAL
cardiac precautions/fall precautions/At [place], CTA of the chest revealed probable type A dissection w/ intramural hematoma w/ active hemmorhage. Pt transferred urgently to [facility] for possible CT surgery.  Upon arrival to [facility], pt placed on Labetolol drip for blood pressure control and patient taken for a CT of the Chest. Dr. Kumar reviewed images which revealed Type B dissection w/ hemoperiteneum. INR at outside hospital 1.8

## 2018-12-13 NOTE — OCCUPATIONAL THERAPY INITIAL EVALUATION ADULT - LIVES WITH, PROFILE
Pt lives in a pvt home with spouse, 3 steps to enter. No stairs inside. +Tub with grab bars and shower chair with curtain. Owns rolling walker and straight cane/spouse

## 2018-12-13 NOTE — PROGRESS NOTE ADULT - ASSESSMENT
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 Pt states pain was 8/10, nonradiating. Pt denies LOC, fevers/chills, dizziness, numbness/tingling, chest pain, N/V/D.     At [place], CTA of the chest revealed probable type A dissection w/ intramural hematoma w/ active hemmorhage. Pt transferred urgently to [facility] for possible CT surgery.  Upon arrival to [facility], pt placed on Labetolol drip for blood pressure control and patient taken for a CT of the Chest. Dr. Kumar reviewed images which revealed Type B dissection w/ hemoperiteneum. INR at outside hospital 1.8.   TTE with pericardial effusion, tamponade physiology..  Anticoagulation reversed.  Labetolol and cardene for BP control  Hyperglycemia, chronic steroid use for asthma, endo consulted  + Ua , on bactrim.  Changed to oral labetolol and  norvasc for HTN.  Bilateral lower extremity weakness, 2/4 strength on exam; start of symptoms coincided with back pain/dissection;   Neuro is following, pending MRI/MRA, due to the occurance of the LE weakness concurrently with the presentation of the type B dissection, need to r/o dissection, ischemia or occlusion of the ASA.  12/11 BP stable.   MRI complete,  pending official reading and neuro f/u.  12/12 VSS. OOB. MRI indicative of spinal cord infarct. Neurology-no intervention required at this time. Strength improving LE b/l. Labetalol d/c; lasix increased to 40mg BID. OT recommending ARMANDO as well.  12/13 VSS; CTA of chest/abd/pelvis todau. F/U results. Abdominal x-ray for constipation. Monitor BM. Received sorbitol/suppositories. D/C planning   Discharge to subacute when medically cleared

## 2018-12-13 NOTE — OCCUPATIONAL THERAPY INITIAL EVALUATION ADULT - DIAGNOSIS, OT EVAL
Patient with decreased ADL status and impairments with functional mobility due to Patient with decreased ADL status and impairments with functional mobility due to decreased balance, ROM, strength, sensation

## 2018-12-13 NOTE — OCCUPATIONAL THERAPY INITIAL EVALUATION ADULT - ADDITIONAL COMMENTS
MR lumbar spine 12/10-Abnormal signal within the conus medullaris and distal thoracic cord, with expansion of the conus. In the setting of aortic dissection, acute cortical infarction is strongly suspected. Other etiologies such as transverse myelitis are considered much less likely. Multilevel degenerative changes as detailed in the body the report  CT Angio chest 12/7- Findings consistent with aortic dissection from the proximal aortic arch extending into the distal abdominal aorta as described above. The ascending aorta is unremarkable. Findings suggestive of hemorrhage within the mediastinum of uncertain etiology. Hemopericardium. Iso to high attenuation noted surrounding the left pulmonary artery as described above most likely represents hemorrhage.

## 2018-12-13 NOTE — PROGRESS NOTE ADULT - SUBJECTIVE AND OBJECTIVE BOX
Chief complaint  Patient is a 63y old  Female who presents with a chief complaint of Back Pain transfer for Type B dissection (12 Dec 2018 17:08)   Review of systems  Patient in bed, looks comfortable, no fever, no hypoglycemia.    Labs and Fingersticks  CAPILLARY BLOOD GLUCOSE      POCT Blood Glucose.: 181 mg/dL (13 Dec 2018 07:42)  POCT Blood Glucose.: 161 mg/dL (12 Dec 2018 21:31)  POCT Blood Glucose.: 265 mg/dL (12 Dec 2018 16:35)  POCT Blood Glucose.: 159 mg/dL (12 Dec 2018 11:57)      Anion Gap, Serum: 14 (12-13 @ 06:10)  Anion Gap, Serum: 13 (12-12 @ 01:28)      Calcium, Total Serum: 10.4 (12-13 @ 06:10)  Calcium, Total Serum: 9.0 (12-12 @ 01:28)          12-13    137  |  97  |  23  ----------------------------<  188<H>  4.4   |  26  |  0.74    Ca    10.4      13 Dec 2018 06:10                          11.1   11.5  )-----------( 216      ( 13 Dec 2018 06:10 )             34.2     Medications  MEDICATIONS  (STANDING):  ALBUTerol    90 MICROgram(s) HFA Inhaler 1 Puff(s) Inhalation every 4 hours  ALBUTerol/ipratropium for Nebulization 3 milliLiter(s) Nebulizer every 6 hours  amiodarone    Tablet 400 milliGRAM(s) Oral every 8 hours  aspirin enteric coated 81 milliGRAM(s) Oral daily  cholestyramine Powder (Sugar-Free) 4 Gram(s) Oral daily  dextrose 50% Injectable 50 milliLiter(s) IV Push every 15 minutes  fluticasone propionate/ salmeterol 500-50 MICROgram(s) Diskus 1 Dose(s) Inhalation two times a day  furosemide    Tablet 40 milliGRAM(s) Oral two times a day  gabapentin 300 milliGRAM(s) Oral at bedtime  gabapentin 100 milliGRAM(s) Oral daily  heparin  Injectable 5000 Unit(s) SubCutaneous every 8 hours  insulin glargine Injectable (LANTUS) 18 Unit(s) SubCutaneous at bedtime  insulin lispro (HumaLOG) corrective regimen sliding scale   SubCutaneous three times a day before meals  insulin lispro (HumaLOG) corrective regimen sliding scale   SubCutaneous at bedtime  insulin lispro Injectable (HumaLOG) 7 Unit(s) SubCutaneous three times a day before meals  levothyroxine 137 MICROGram(s) Oral daily  montelukast 10 milliGRAM(s) Oral daily  pantoprazole    Tablet 40 milliGRAM(s) Oral before breakfast  polyethylene glycol 3350 17 Gram(s) Oral daily  predniSONE   Tablet 7.5 milliGRAM(s) Oral every 12 hours  senna 2 Tablet(s) Oral at bedtime  spironolactone 25 milliGRAM(s) Oral daily  tiotropium 18 MICROgram(s) Capsule 1 Capsule(s) Inhalation daily      Physical Exam  General: Patient comfortable in bed  Vital Signs Last 12 Hrs  T(F): 98.3 (12-13-18 @ 04:58), Max: 98.3 (12-13-18 @ 04:58)  HR: 74 (12-13-18 @ 04:58) (74 - 74)  BP: 118/78 (12-13-18 @ 04:58) (118/78 - 118/78)  BP(mean): --  RR: 18 (12-13-18 @ 04:58) (18 - 18)  SpO2: 96% (12-13-18 @ 04:58) (96% - 96%)  Neck: No palpable thyroid nodules.  CVS: S1S2, No murmurs  Respiratory: No wheezing, no crepitations  GI: Abdomen soft, bowel sounds positive  Musculoskeletal:  edema lower extremities.   Skin: No skin rashes, no ecchymosis    Diagnostics

## 2018-12-13 NOTE — DIETITIAN INITIAL EVALUATION ADULT. - PERTINENT LABORATORY DATA
A1C 6.4 (12/7/18), POCT 181 (12/13), 139-265 (12/12), 140-172 (12/11). Glu 188 (12/13), 171 (12/12), 155 (12/11)

## 2018-12-13 NOTE — OCCUPATIONAL THERAPY INITIAL EVALUATION ADULT - PERTINENT HX OF CURRENT PROBLEM, REHAB EVAL
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 Pt states pain was 8/10

## 2018-12-13 NOTE — DIETITIAN INITIAL EVALUATION ADULT. - PERTINENT MEDS FT
Home Medications:  Januvia, Lantus , metFORMIN, predniSONE.    MEDICATIONS  (STANDING):  furosemide    Tablet 40 milliGRAM(s) Oral two times a day  LANTUS  HumaLOG  predniSONE     senna

## 2018-12-13 NOTE — DIETITIAN INITIAL EVALUATION ADULT. - NS AS NUTRI INTERV ED CONTENT
Priority modifications/Nutrition relationship to health/disease/Recommended modifications/Provided in-depth nutrition education for heart healthy nutrition therapy and low-sodium diet. Discussed the following: low sodium recommendations, food items high in sodium to limit/avoid, tips for cooking & eating healthfully. Reviewed the recommendations for low fat and types of saturated fats. Discussed importance of limiting salt intake. Discussed foods with high sodium content and recommended avoiding them.  Also discussed limiting saturated fats and choosing more vegetable and whole grains in diet. Explained MyPlate meal portioning. Discussed consuming protein with carbohydrates and healthier carbohydrates options. Provided written materials/Purpose of the nutrition education Purpose of the nutrition education/Priority modifications/Nutrition relationship to health/disease/Recommended modifications/Provided in-depth nutrition education for heart healthy nutrition therapy and low-sodium diet. Discussed the following: low sodium recommendations, food items high in sodium to limit/avoid, tips for cooking & eating healthfully. Reviewed the recommendations for low fat and types of saturated fats. Discussed importance of limiting salt intake. Also discussed limiting saturated fats and choosing more vegetable and whole grains in diet. Explained MyPlate meal portioning. Discussed consuming protein with carbohydrates and healthier carbohydrates options. Provided written materials.

## 2018-12-13 NOTE — DIETITIAN INITIAL EVALUATION ADULT. - ORAL INTAKE PTA
Pt reports good po intake/appetite prior to admission. Typical meal intake for breakfast: eggs, toast, cereal and lactate milk. Lunch: roast beef sandwiches, deli meat, unsweetened seltzer. Dinner: hamburgers, grilled chicken with skin or steak, carrots, green beans, pasta, peas. Consumes mostly home cooked meals, cooked by . Takes occasionally multivitamins. No reported supplements PTA. Report fish allergy./good

## 2018-12-13 NOTE — OCCUPATIONAL THERAPY INITIAL EVALUATION ADULT - ADL RETRAINING, OT EVAL
Patient will dress lower body with min A, AE as needed in 2 weeks. Patient will dress upper body with minimal assistance in 2 weeks

## 2018-12-13 NOTE — DIETITIAN INITIAL EVALUATION ADULT. - ENERGY NEEDS
Ht 5 feet 8 inches, wt 235 pounds, pounds   64 y/o female hx of long standing asthma (chronic steroid use) DM, HLD, obesity, hypothyroid, pulmonary emboli. Admitted for possible CT surgery. Per chart, pt with Type B dissection w/ hemoperiteneum., acute thoracic aortic dissection. For now on lasix 40mg BID for fluid retention. For T2DM endocrinologist continue current insulin regimen for now.  Fluids per team. Skin intact. Generalized edema noted per chart. No pressure injuries noted in flowsheet records.

## 2018-12-13 NOTE — OCCUPATIONAL THERAPY INITIAL EVALUATION ADULT - RANGE OF MOTION EXAMINATION, UPPER EXTREMITY
except right shoulder flexion 0-90 AROM, left shoulder flexion 0-80 with active assist/bilateral UE Active ROM was WFL  (within functional limits)

## 2018-12-13 NOTE — DIETITIAN INITIAL EVALUATION ADULT. - ADHERENCE
poor/Pt follows unrestricted diet PTA. Pt stated she is aware of heart healthy food, T2DM recommendations. Cooks without added salt, aware of saturated fats. However, pt’s is not able to recall daily recommend heart healthy sodium intake and sources of saturated fats. Daily diet recall shows high sodium, high saturated fat and starchy carbohydrates.

## 2018-12-13 NOTE — DIETITIAN INITIAL EVALUATION ADULT. - OTHER INFO
Pt seen for LOS initial assessment. Denies difficulty chewing/swallowing. Pt with good appetite. Pt denies nausea/vomiting/diarrhea. Pt confirms she has constipation, currently on Senna. Last BM 2 days ago(12/13). Per pt, UBW of 225 pounds, stable. Wt herself every 3 months when visits MD. Admission wt 235 pounds (12/7/2018). Monitors occasionally fasting BG 2-3xweek with ranges between 100-130 mg/dl. Pt does not recall blood sugars lower than 70. Reports following up with MD for T2DM management and taking medications Januvia, Lantus, Metformin as prescribed; HbA1c as per chart (12/7/18) 6.4% - suspected good BG control. Pt reluctantly agreed to discuss heart healthy education and reinforce prior knowledge of T2DM diet/management recommendations at this time.

## 2018-12-14 LAB
ANION GAP SERPL CALC-SCNC: 14 MMOL/L — SIGNIFICANT CHANGE UP (ref 5–17)
BUN SERPL-MCNC: 23 MG/DL — SIGNIFICANT CHANGE UP (ref 7–23)
CALCIUM SERPL-MCNC: 11 MG/DL — HIGH (ref 8.4–10.5)
CHLORIDE SERPL-SCNC: 97 MMOL/L — SIGNIFICANT CHANGE UP (ref 96–108)
CO2 SERPL-SCNC: 27 MMOL/L — SIGNIFICANT CHANGE UP (ref 22–31)
CREAT SERPL-MCNC: 0.74 MG/DL — SIGNIFICANT CHANGE UP (ref 0.5–1.3)
GLUCOSE BLDC GLUCOMTR-MCNC: 136 MG/DL — HIGH (ref 70–99)
GLUCOSE BLDC GLUCOMTR-MCNC: 183 MG/DL — HIGH (ref 70–99)
GLUCOSE BLDC GLUCOMTR-MCNC: 215 MG/DL — HIGH (ref 70–99)
GLUCOSE BLDC GLUCOMTR-MCNC: 220 MG/DL — HIGH (ref 70–99)
GLUCOSE BLDC GLUCOMTR-MCNC: 281 MG/DL — HIGH (ref 70–99)
GLUCOSE SERPL-MCNC: 145 MG/DL — HIGH (ref 70–99)
HCT VFR BLD CALC: 35.7 % — SIGNIFICANT CHANGE UP (ref 34.5–45)
HGB BLD-MCNC: 11.7 G/DL — SIGNIFICANT CHANGE UP (ref 11.5–15.5)
MCHC RBC-ENTMCNC: 29.4 PG — SIGNIFICANT CHANGE UP (ref 27–34)
MCHC RBC-ENTMCNC: 32.7 GM/DL — SIGNIFICANT CHANGE UP (ref 32–36)
MCV RBC AUTO: 89.9 FL — SIGNIFICANT CHANGE UP (ref 80–100)
PLATELET # BLD AUTO: 239 K/UL — SIGNIFICANT CHANGE UP (ref 150–400)
POTASSIUM SERPL-MCNC: 4.5 MMOL/L — SIGNIFICANT CHANGE UP (ref 3.5–5.3)
POTASSIUM SERPL-SCNC: 4.5 MMOL/L — SIGNIFICANT CHANGE UP (ref 3.5–5.3)
RBC # BLD: 3.97 M/UL — SIGNIFICANT CHANGE UP (ref 3.8–5.2)
RBC # FLD: 14.6 % — HIGH (ref 10.3–14.5)
SODIUM SERPL-SCNC: 138 MMOL/L — SIGNIFICANT CHANGE UP (ref 135–145)
WBC # BLD: 11.5 K/UL — HIGH (ref 3.8–10.5)
WBC # FLD AUTO: 11.5 K/UL — HIGH (ref 3.8–10.5)

## 2018-12-14 PROCEDURE — 99231 SBSQ HOSP IP/OBS SF/LOW 25: CPT

## 2018-12-14 RX ORDER — INSULIN GLARGINE 100 [IU]/ML
22 INJECTION, SOLUTION SUBCUTANEOUS AT BEDTIME
Qty: 0 | Refills: 0 | Status: DISCONTINUED | OUTPATIENT
Start: 2018-12-14 | End: 2018-12-15

## 2018-12-14 RX ORDER — INSULIN LISPRO 100/ML
9 VIAL (ML) SUBCUTANEOUS
Qty: 0 | Refills: 0 | Status: DISCONTINUED | OUTPATIENT
Start: 2018-12-14 | End: 2018-12-15

## 2018-12-14 RX ADMIN — Medication 2: at 08:25

## 2018-12-14 RX ADMIN — Medication 3 MILLILITER(S): at 05:13

## 2018-12-14 RX ADMIN — HEPARIN SODIUM 5000 UNIT(S): 5000 INJECTION INTRAVENOUS; SUBCUTANEOUS at 12:40

## 2018-12-14 RX ADMIN — SENNA PLUS 2 TABLET(S): 8.6 TABLET ORAL at 21:34

## 2018-12-14 RX ADMIN — Medication 30 MILLILITER(S): at 09:54

## 2018-12-14 RX ADMIN — Medication 3 MILLILITER(S): at 12:39

## 2018-12-14 RX ADMIN — OXYCODONE AND ACETAMINOPHEN 1 TABLET(S): 5; 325 TABLET ORAL at 15:55

## 2018-12-14 RX ADMIN — HEPARIN SODIUM 5000 UNIT(S): 5000 INJECTION INTRAVENOUS; SUBCUTANEOUS at 05:05

## 2018-12-14 RX ADMIN — HEPARIN SODIUM 5000 UNIT(S): 5000 INJECTION INTRAVENOUS; SUBCUTANEOUS at 21:34

## 2018-12-14 RX ADMIN — SPIRONOLACTONE 25 MILLIGRAM(S): 25 TABLET, FILM COATED ORAL at 05:05

## 2018-12-14 RX ADMIN — Medication 40 MILLIGRAM(S): at 17:55

## 2018-12-14 RX ADMIN — Medication 7.5 MILLIGRAM(S): at 05:05

## 2018-12-14 RX ADMIN — Medication 650 MILLIGRAM(S): at 12:41

## 2018-12-14 RX ADMIN — AMIODARONE HYDROCHLORIDE 400 MILLIGRAM(S): 400 TABLET ORAL at 05:05

## 2018-12-14 RX ADMIN — OXYCODONE AND ACETAMINOPHEN 1 TABLET(S): 5; 325 TABLET ORAL at 16:25

## 2018-12-14 RX ADMIN — FLUTICASONE PROPIONATE AND SALMETEROL 1 DOSE(S): 50; 250 POWDER ORAL; RESPIRATORY (INHALATION) at 05:11

## 2018-12-14 RX ADMIN — GABAPENTIN 300 MILLIGRAM(S): 400 CAPSULE ORAL at 21:34

## 2018-12-14 RX ADMIN — OXYCODONE AND ACETAMINOPHEN 1 TABLET(S): 5; 325 TABLET ORAL at 05:05

## 2018-12-14 RX ADMIN — Medication 137 MICROGRAM(S): at 05:05

## 2018-12-14 RX ADMIN — PANTOPRAZOLE SODIUM 40 MILLIGRAM(S): 20 TABLET, DELAYED RELEASE ORAL at 05:13

## 2018-12-14 RX ADMIN — Medication 650 MILLIGRAM(S): at 13:11

## 2018-12-14 RX ADMIN — POLYETHYLENE GLYCOL 3350 17 GRAM(S): 17 POWDER, FOR SOLUTION ORAL at 12:40

## 2018-12-14 RX ADMIN — Medication 40 MILLIGRAM(S): at 05:05

## 2018-12-14 RX ADMIN — Medication 2: at 22:19

## 2018-12-14 RX ADMIN — INSULIN GLARGINE 22 UNIT(S): 100 INJECTION, SOLUTION SUBCUTANEOUS at 22:20

## 2018-12-14 RX ADMIN — Medication 7 UNIT(S): at 08:25

## 2018-12-14 RX ADMIN — Medication 7.5 MILLIGRAM(S): at 17:55

## 2018-12-14 RX ADMIN — Medication 9 UNIT(S): at 12:39

## 2018-12-14 RX ADMIN — Medication 4: at 12:39

## 2018-12-14 RX ADMIN — Medication 10 MILLIGRAM(S): at 19:25

## 2018-12-14 RX ADMIN — AMIODARONE HYDROCHLORIDE 400 MILLIGRAM(S): 400 TABLET ORAL at 13:07

## 2018-12-14 RX ADMIN — AMIODARONE HYDROCHLORIDE 400 MILLIGRAM(S): 400 TABLET ORAL at 21:34

## 2018-12-14 RX ADMIN — Medication 9 UNIT(S): at 17:54

## 2018-12-14 RX ADMIN — GABAPENTIN 100 MILLIGRAM(S): 400 CAPSULE ORAL at 12:39

## 2018-12-14 RX ADMIN — OXYCODONE AND ACETAMINOPHEN 1 TABLET(S): 5; 325 TABLET ORAL at 05:35

## 2018-12-14 RX ADMIN — Medication 81 MILLIGRAM(S): at 12:41

## 2018-12-14 RX ADMIN — Medication 3 MILLILITER(S): at 17:54

## 2018-12-14 RX ADMIN — FLUTICASONE PROPIONATE AND SALMETEROL 1 DOSE(S): 50; 250 POWDER ORAL; RESPIRATORY (INHALATION) at 17:54

## 2018-12-14 NOTE — PROGRESS NOTE ADULT - PROBLEM SELECTOR PLAN 2
MRI of spine for LE weakness  MRI indicative of spinal cord infarction.  Neurology-no intervention required at this time.   Strength improving LE b/l. MRI of spine for LE weakness  MRI indicative of spinal cord infarction.  Neurology-no intervention required at this time.   Strength improving LE b/l.  PT/ OT- acute rehab recommended

## 2018-12-14 NOTE — PROVIDER CONTACT NOTE (OTHER) - ACTION/TREATMENT ORDERED:
as per orders, administer 18 u Lantus & 4 units humalog as per sliding scale. will recheck FS at 0000. willc continue to monitor & will notify provider for changes in pt status
JOAN Zacarias made aware. Pending order for metoprolol IV
See above recommendations.

## 2018-12-14 NOTE — PROGRESS NOTE ADULT - PROBLEM SELECTOR PLAN 1
BP control  lasix increased to 40mg BID for fluid retention and Mod pleural effusion b/l medical management as per DR. Kumar   BP control: keep sbp 120-140   lasix increased to 40mg BID for fluid retention and Mod pleural effusion b/l

## 2018-12-14 NOTE — PROGRESS NOTE ADULT - ASSESSMENT
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 Pt states pain was 8/10, nonradiating. Pt denies LOC, fevers/chills, dizziness, numbness/tingling, chest pain, N/V/D.     At [place], CTA of the chest revealed probable type A dissection w/ intramural hematoma w/ active hemmorhage. Pt transferred urgently to [facility] for possible CT surgery.  Upon arrival to [facility], pt placed on Labetolol drip for blood pressure control and patient taken for a CT of the Chest. Dr. Kumar reviewed images which revealed Type B dissection w/ hemoperiteneum. INR at outside hospital 1.8.   TTE with pericardial effusion, tamponade physiology..  Anticoagulation reversed.  Labetolol and cardene for BP control  Hyperglycemia, chronic steroid use for asthma, endo consulted  + Ua , on bactrim.  Changed to oral labetolol and  norvasc for HTN.  Bilateral lower extremity weakness, 2/4 strength on exam; start of symptoms coincided with back pain/dissection;   Neuro is following, pending MRI/MRA, due to the occurance of the LE weakness concurrently with the presentation of the type B dissection, need to r/o dissection, ischemia or occlusion of the ASA.  12/11 BP stable.   MRI complete,  pending official reading and neuro f/u.  12/12 VSS. OOB. MRI indicative of spinal cord infarct. Neurology-no intervention required at this time. Strength improving LE b/l. Labetalol d/c; lasix increased to 40mg BID. OT recommending ARMANDO as well.  12/13 VSS; CTA of chest/abd/pelvis todau. F/U results. Abdominal x-ray for constipation. Monitor BM. Received sorbitol/suppositories. 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 complaining of sudden onset upper back pain between her shoulder blades for one hour associated with shortness of breath. Pt states pain was 8/10, nonradiating. Pt denies LOC, fevers/chills, dizziness, numbness/tingling, chest pain, N/V/D.     At [place], CTA of the chest revealed probable type A dissection w/ intramural hematoma w/ active hemmorhage. Pt transferred urgently to [facility] for possible CT surgery.  Upon arrival to [facility], pt placed on Labetolol drip for blood pressure control and patient taken for a CT of the Chest. Dr. Kumar reviewed images which revealed Type B dissection w/ hemoperiteneum. INR at outside hospital 1.8.   TTE with pericardial effusion, tamponade physiology..  Anticoagulation reversed.  Labetolol and cardene for BP control  Hyperglycemia, chronic steroid use for asthma, endo consulted  + Ua , on bactrim.  Changed to oral labetolol and  norvasc for HTN.  Bilateral lower extremity weakness, 2/4 strength on exam; start of symptoms coincided with back pain/dissection;   Neuro is following, pending MRI/MRA, due to the occurance of the LE weakness concurrently with the presentation of the type B dissection, need to r/o dissection, ischemia or occlusion of the ASA.  12/11 BP stable.   MRI complete,  pending official reading and neuro f/u.  12/12 VSS. OOB. MRI indicative of spinal cord infarct. Neurology-no intervention required at this time. Strength improving LE b/l. Labetalol d/c; lasix increased to 40mg BID. OT recommending ARMANDO as well.  12/13 VSS; CTA of chest/abd/pelvis today: unchanged;  F/U results. + constipation:  Received sorbitol/suppositories: + bm noted  12/14 VSS; Pt denies cp/sob; increase activity as tolerated; continue PT/OT and diuresis   Discharge planning- acute rehab mon ? [place]

## 2018-12-14 NOTE — PROGRESS NOTE ADULT - SUBJECTIVE AND OBJECTIVE BOX
VITAL SIGNS    Telemetry:    Vital Signs Last 24 Hrs  T(C): 36.7 (12-14-18 @ 05:04), Max: 36.9 (12-13-18 @ 20:24)  T(F): 98.1 (12-14-18 @ 05:04), Max: 98.5 (12-13-18 @ 20:24)  HR: 75 (12-14-18 @ 05:04) (75 - 80)  BP: 134/84 (12-14-18 @ 05:04) (122/82 - 143/92)  RR: 18 (12-14-18 @ 05:04) (18 - 18)  SpO2: 94% (12-14-18 @ 05:04) (94% - 97%)            12-13 @ 07:01  -  12-14 @ 07:00  --------------------------------------------------------  IN: 840 mL / OUT: 1500 mL / NET: -660 mL       Daily     Daily   Admit Wt: Drug Dosing Weight  Height (cm): 172.72 (07 Dec 2018 16:30)  Weight (kg): 106.7 (07 Dec 2018 16:30)  BMI (kg/m2): 35.8 (07 Dec 2018 16:30)  BSA (m2): 2.19 (07 Dec 2018 16:30)      CAPILLARY BLOOD GLUCOSE      POCT Blood Glucose.: 183 mg/dL (14 Dec 2018 07:52)  POCT Blood Glucose.: 220 mg/dL (14 Dec 2018 02:06)  POCT Blood Glucose.: 252 mg/dL (13 Dec 2018 23:53)  POCT Blood Glucose.: 350 mg/dL (13 Dec 2018 21:52)  POCT Blood Glucose.: 175 mg/dL (13 Dec 2018 18:05)  POCT Blood Glucose.: 312 mg/dL (13 Dec 2018 12:10)          acetaminophen   Tablet .. 650 milliGRAM(s) Oral every 6 hours PRN  ALBUTerol    90 MICROgram(s) HFA Inhaler 1 Puff(s) Inhalation every 4 hours  ALBUTerol/ipratropium for Nebulization 3 milliLiter(s) Nebulizer every 6 hours  amiodarone    Tablet 400 milliGRAM(s) Oral every 8 hours  aspirin enteric coated 81 milliGRAM(s) Oral daily  bisacodyl Suppository 10 milliGRAM(s) Rectal once  cholestyramine Powder (Sugar-Free) 4 Gram(s) Oral daily  dextrose 50% Injectable 50 milliLiter(s) IV Push every 15 minutes  fluticasone propionate/ salmeterol 500-50 MICROgram(s) Diskus 1 Dose(s) Inhalation two times a day  furosemide    Tablet 40 milliGRAM(s) Oral two times a day  gabapentin 300 milliGRAM(s) Oral at bedtime  gabapentin 100 milliGRAM(s) Oral daily  heparin  Injectable 5000 Unit(s) SubCutaneous every 8 hours  insulin glargine Injectable (LANTUS) 22 Unit(s) SubCutaneous at bedtime  insulin lispro (HumaLOG) corrective regimen sliding scale   SubCutaneous three times a day before meals  insulin lispro (HumaLOG) corrective regimen sliding scale   SubCutaneous at bedtime  insulin lispro Injectable (HumaLOG) 9 Unit(s) SubCutaneous three times a day before meals  levothyroxine 137 MICROGram(s) Oral daily  montelukast 10 milliGRAM(s) Oral daily  oxyCODONE    5 mG/acetaminophen 325 mG 1 Tablet(s) Oral every 4 hours PRN  pantoprazole    Tablet 40 milliGRAM(s) Oral before breakfast  polyethylene glycol 3350 17 Gram(s) Oral daily  predniSONE   Tablet 7.5 milliGRAM(s) Oral every 12 hours  senna 2 Tablet(s) Oral at bedtime  spironolactone 25 milliGRAM(s) Oral daily  tiotropium 18 MICROgram(s) Capsule 1 Capsule(s) Inhalation daily      PHYSICAL EXAM    Subjective: "Hi.   Neurology: alert and oriented x 3, nonfocal, no gross deficits  CV : tele:  RSR  Sternal Wound :  CDI with dressing , Stable  Lungs: clear. RR easy, unlabored   Abdomen: soft, nontender, nondistended, positive bowel sounds, bowel movement   Neg N/V/D   :  pt voiding without difficulty   Extremities:   ODONNELL; edema, neg calf tenderness.   PPP bilaterally      PW:  Chest tubes: VITAL SIGNS    Telemetry:  rsr 60-70  Vital Signs Last 24 Hrs  T(C): 36.7 (12-14-18 @ 05:04), Max: 36.9 (12-13-18 @ 20:24)  T(F): 98.1 (12-14-18 @ 05:04), Max: 98.5 (12-13-18 @ 20:24)  HR: 75 (12-14-18 @ 05:04) (75 - 80)  BP: 134/84 (12-14-18 @ 05:04) (122/82 - 143/92)  RR: 18 (12-14-18 @ 05:04) (18 - 18)  SpO2: 94% (12-14-18 @ 05:04) (94% - 97%)            12-13 @ 07:01  -  12-14 @ 07:00  --------------------------------------------------------  IN: 840 mL / OUT: 1500 mL / NET: -660 mL       Daily     Daily   Admit Wt: Drug Dosing Weight  Height (cm): 172.72 (07 Dec 2018 16:30)  Weight (kg): 106.7 (07 Dec 2018 16:30)  BMI (kg/m2): 35.8 (07 Dec 2018 16:30)  BSA (m2): 2.19 (07 Dec 2018 16:30)      CAPILLARY BLOOD GLUCOSE      POCT Blood Glucose.: 183 mg/dL (14 Dec 2018 07:52)  POCT Blood Glucose.: 220 mg/dL (14 Dec 2018 02:06)  POCT Blood Glucose.: 252 mg/dL (13 Dec 2018 23:53)  POCT Blood Glucose.: 350 mg/dL (13 Dec 2018 21:52)  POCT Blood Glucose.: 175 mg/dL (13 Dec 2018 18:05)  POCT Blood Glucose.: 312 mg/dL (13 Dec 2018 12:10)          acetaminophen   Tablet .. 650 milliGRAM(s) Oral every 6 hours PRN  ALBUTerol    90 MICROgram(s) HFA Inhaler 1 Puff(s) Inhalation every 4 hours  ALBUTerol/ipratropium for Nebulization 3 milliLiter(s) Nebulizer every 6 hours  amiodarone    Tablet 400 milliGRAM(s) Oral every 8 hours  aspirin enteric coated 81 milliGRAM(s) Oral daily  bisacodyl Suppository 10 milliGRAM(s) Rectal once  cholestyramine Powder (Sugar-Free) 4 Gram(s) Oral daily  dextrose 50% Injectable 50 milliLiter(s) IV Push every 15 minutes  fluticasone propionate/ salmeterol 500-50 MICROgram(s) Diskus 1 Dose(s) Inhalation two times a day  furosemide    Tablet 40 milliGRAM(s) Oral two times a day  gabapentin 300 milliGRAM(s) Oral at bedtime  gabapentin 100 milliGRAM(s) Oral daily  heparin  Injectable 5000 Unit(s) SubCutaneous every 8 hours  insulin glargine Injectable (LANTUS) 22 Unit(s) SubCutaneous at bedtime  insulin lispro (HumaLOG) corrective regimen sliding scale   SubCutaneous three times a day before meals  insulin lispro (HumaLOG) corrective regimen sliding scale   SubCutaneous at bedtime  insulin lispro Injectable (HumaLOG) 9 Unit(s) SubCutaneous three times a day before meals  levothyroxine 137 MICROGram(s) Oral daily  montelukast 10 milliGRAM(s) Oral daily  oxyCODONE    5 mG/acetaminophen 325 mG 1 Tablet(s) Oral every 4 hours PRN  pantoprazole    Tablet 40 milliGRAM(s) Oral before breakfast  polyethylene glycol 3350 17 Gram(s) Oral daily  predniSONE   Tablet 7.5 milliGRAM(s) Oral every 12 hours  senna 2 Tablet(s) Oral at bedtime  spironolactone 25 milliGRAM(s) Oral daily  tiotropium 18 MICROgram(s) Capsule 1 Capsule(s) Inhalation daily      PHYSICAL EXAM    Subjective: "I still can't move my legs well."   Neurology: alert and oriented x 3, nonfocal, no gross deficits  CV : tele:  RSR 60-70   Lungs: clear diminished at the bases. RR easy, unlabored   Abdomen: soft, nontender, nondistended, positive bowel sounds, + bowel movement   Neg N/V/D; obese abdomen   :  clear, yellow urine   Extremities:   ODONNELL; trace LE edema, neg calf tenderness.   PPP bilaterally

## 2018-12-14 NOTE — PROGRESS NOTE ADULT - SUBJECTIVE AND OBJECTIVE BOX
Chief complaint  Patient is a 63y old  Female who presents with a chief complaint of Back Pain transfer for Type B dissection (14 Dec 2018 11:27)   Review of systems  Patient in bed, looks comfortable, no fever,  no hypoglycemia.    Labs and Fingersticks  CAPILLARY BLOOD GLUCOSE      POCT Blood Glucose.: 215 mg/dL (14 Dec 2018 11:53)  POCT Blood Glucose.: 183 mg/dL (14 Dec 2018 07:52)  POCT Blood Glucose.: 220 mg/dL (14 Dec 2018 02:06)  POCT Blood Glucose.: 252 mg/dL (13 Dec 2018 23:53)  POCT Blood Glucose.: 350 mg/dL (13 Dec 2018 21:52)  POCT Blood Glucose.: 175 mg/dL (13 Dec 2018 18:05)      Anion Gap, Serum: 14 (12-14 @ 06:45)  Anion Gap, Serum: 14 (12-13 @ 06:10)      Calcium, Total Serum: 11.0 <H> (12-14 @ 06:45)  Calcium, Total Serum: 10.4 (12-13 @ 06:10)          12-14    138  |  97  |  23  ----------------------------<  145<H>  4.5   |  27  |  0.74    Ca    11.0<H>      14 Dec 2018 06:45                          11.7   11.5  )-----------( 239      ( 14 Dec 2018 06:45 )             35.7     Medications  MEDICATIONS  (STANDING):  ALBUTerol    90 MICROgram(s) HFA Inhaler 1 Puff(s) Inhalation every 4 hours  ALBUTerol/ipratropium for Nebulization 3 milliLiter(s) Nebulizer every 6 hours  amiodarone    Tablet 400 milliGRAM(s) Oral every 8 hours  aspirin enteric coated 81 milliGRAM(s) Oral daily  bisacodyl Suppository 10 milliGRAM(s) Rectal once  cholestyramine Powder (Sugar-Free) 4 Gram(s) Oral daily  dextrose 50% Injectable 50 milliLiter(s) IV Push every 15 minutes  fluticasone propionate/ salmeterol 500-50 MICROgram(s) Diskus 1 Dose(s) Inhalation two times a day  furosemide    Tablet 40 milliGRAM(s) Oral two times a day  gabapentin 300 milliGRAM(s) Oral at bedtime  gabapentin 100 milliGRAM(s) Oral daily  heparin  Injectable 5000 Unit(s) SubCutaneous every 8 hours  insulin glargine Injectable (LANTUS) 22 Unit(s) SubCutaneous at bedtime  insulin lispro (HumaLOG) corrective regimen sliding scale   SubCutaneous three times a day before meals  insulin lispro (HumaLOG) corrective regimen sliding scale   SubCutaneous at bedtime  insulin lispro Injectable (HumaLOG) 9 Unit(s) SubCutaneous three times a day before meals  levothyroxine 137 MICROGram(s) Oral daily  montelukast 10 milliGRAM(s) Oral daily  pantoprazole    Tablet 40 milliGRAM(s) Oral before breakfast  polyethylene glycol 3350 17 Gram(s) Oral daily  predniSONE   Tablet 7.5 milliGRAM(s) Oral every 12 hours  senna 2 Tablet(s) Oral at bedtime  spironolactone 25 milliGRAM(s) Oral daily  tiotropium 18 MICROgram(s) Capsule 1 Capsule(s) Inhalation daily      Physical Exam  General: Patient comfortable in bed  Vital Signs Last 12 Hrs  T(F): 98.1 (12-14-18 @ 05:04), Max: 98.1 (12-14-18 @ 05:04)  HR: 75 (12-14-18 @ 05:04) (75 - 75)  BP: 134/84 (12-14-18 @ 05:04) (134/84 - 134/84)  BP(mean): --  RR: 18 (12-14-18 @ 05:04) (18 - 18)  SpO2: 94% (12-14-18 @ 05:04) (94% - 94%)  Neck: No palpable thyroid nodules.  CVS: S1S2, No murmurs  Respiratory: No wheezing, no crepitations  GI: Abdomen soft, bowel sounds positive  Musculoskeletal:  edema lower extremities.   Skin: No skin rashes, no ecchymosis    Diagnostics

## 2018-12-14 NOTE — PROGRESS NOTE ADULT - PROBLEM SELECTOR PLAN 1
Will increase Lantus to 22 units at bed time.  Will increase Humalog to 9 units before each meal in addition to Humalog correction scale coverage.  Patient counseled for compliance with consistent low carb diet.

## 2018-12-14 NOTE — PROGRESS NOTE ADULT - ASSESSMENT
Assessment  DMT2: 63y Female with DM T2 with hyperglycemia on insulin, blood sugars running high, eating meals,  non compliant with low carb diet.  Aortic Disection: S/P Sx, on medications,  no chest pain, stable, monitored.  Hypothyroidism:  On synthroid 137 mcg po daily, asymptomatic.  HTN: Controlled, no headaches, on medications.        Kaleb Andrews MD  Cell: [phone]  Office: [phone]

## 2018-12-15 LAB
ANION GAP SERPL CALC-SCNC: 13 MMOL/L — SIGNIFICANT CHANGE UP (ref 5–17)
BUN SERPL-MCNC: 26 MG/DL — HIGH (ref 7–23)
CALCIUM SERPL-MCNC: 10.6 MG/DL — HIGH (ref 8.4–10.5)
CHLORIDE SERPL-SCNC: 97 MMOL/L — SIGNIFICANT CHANGE UP (ref 96–108)
CO2 SERPL-SCNC: 28 MMOL/L — SIGNIFICANT CHANGE UP (ref 22–31)
CREAT SERPL-MCNC: 0.78 MG/DL — SIGNIFICANT CHANGE UP (ref 0.5–1.3)
GLUCOSE BLDC GLUCOMTR-MCNC: 135 MG/DL — HIGH (ref 70–99)
GLUCOSE BLDC GLUCOMTR-MCNC: 201 MG/DL — HIGH (ref 70–99)
GLUCOSE BLDC GLUCOMTR-MCNC: 234 MG/DL — HIGH (ref 70–99)
GLUCOSE BLDC GLUCOMTR-MCNC: 240 MG/DL — HIGH (ref 70–99)
GLUCOSE SERPL-MCNC: 177 MG/DL — HIGH (ref 70–99)
HCT VFR BLD CALC: 36.1 % — SIGNIFICANT CHANGE UP (ref 34.5–45)
HGB BLD-MCNC: 11.5 G/DL — SIGNIFICANT CHANGE UP (ref 11.5–15.5)
MCHC RBC-ENTMCNC: 28.9 PG — SIGNIFICANT CHANGE UP (ref 27–34)
MCHC RBC-ENTMCNC: 32 GM/DL — SIGNIFICANT CHANGE UP (ref 32–36)
MCV RBC AUTO: 90.4 FL — SIGNIFICANT CHANGE UP (ref 80–100)
PLATELET # BLD AUTO: 247 K/UL — SIGNIFICANT CHANGE UP (ref 150–400)
POTASSIUM SERPL-MCNC: 4.3 MMOL/L — SIGNIFICANT CHANGE UP (ref 3.5–5.3)
POTASSIUM SERPL-SCNC: 4.3 MMOL/L — SIGNIFICANT CHANGE UP (ref 3.5–5.3)
RBC # BLD: 3.99 M/UL — SIGNIFICANT CHANGE UP (ref 3.8–5.2)
RBC # FLD: 15.2 % — HIGH (ref 10.3–14.5)
SODIUM SERPL-SCNC: 138 MMOL/L — SIGNIFICANT CHANGE UP (ref 135–145)
WBC # BLD: 10.4 K/UL — SIGNIFICANT CHANGE UP (ref 3.8–10.5)
WBC # FLD AUTO: 10.4 K/UL — SIGNIFICANT CHANGE UP (ref 3.8–10.5)

## 2018-12-15 RX ORDER — INSULIN LISPRO 100/ML
14 VIAL (ML) SUBCUTANEOUS
Qty: 0 | Refills: 0 | Status: DISCONTINUED | OUTPATIENT
Start: 2018-12-15 | End: 2018-12-16

## 2018-12-15 RX ORDER — INSULIN GLARGINE 100 [IU]/ML
36 INJECTION, SOLUTION SUBCUTANEOUS AT BEDTIME
Qty: 0 | Refills: 0 | Status: DISCONTINUED | OUTPATIENT
Start: 2018-12-15 | End: 2018-12-16

## 2018-12-15 RX ADMIN — Medication 3 MILLILITER(S): at 17:21

## 2018-12-15 RX ADMIN — HEPARIN SODIUM 5000 UNIT(S): 5000 INJECTION INTRAVENOUS; SUBCUTANEOUS at 05:49

## 2018-12-15 RX ADMIN — OXYCODONE AND ACETAMINOPHEN 1 TABLET(S): 5; 325 TABLET ORAL at 07:38

## 2018-12-15 RX ADMIN — INSULIN GLARGINE 36 UNIT(S): 100 INJECTION, SOLUTION SUBCUTANEOUS at 22:13

## 2018-12-15 RX ADMIN — OXYCODONE AND ACETAMINOPHEN 1 TABLET(S): 5; 325 TABLET ORAL at 12:57

## 2018-12-15 RX ADMIN — SPIRONOLACTONE 25 MILLIGRAM(S): 25 TABLET, FILM COATED ORAL at 05:48

## 2018-12-15 RX ADMIN — OXYCODONE AND ACETAMINOPHEN 1 TABLET(S): 5; 325 TABLET ORAL at 06:53

## 2018-12-15 RX ADMIN — FLUTICASONE PROPIONATE AND SALMETEROL 1 DOSE(S): 50; 250 POWDER ORAL; RESPIRATORY (INHALATION) at 05:50

## 2018-12-15 RX ADMIN — OXYCODONE AND ACETAMINOPHEN 1 TABLET(S): 5; 325 TABLET ORAL at 13:30

## 2018-12-15 RX ADMIN — GABAPENTIN 100 MILLIGRAM(S): 400 CAPSULE ORAL at 11:10

## 2018-12-15 RX ADMIN — Medication 40 MILLIGRAM(S): at 05:49

## 2018-12-15 RX ADMIN — CHOLESTYRAMINE 4 GRAM(S): 4 POWDER, FOR SUSPENSION ORAL at 08:25

## 2018-12-15 RX ADMIN — OXYCODONE AND ACETAMINOPHEN 1 TABLET(S): 5; 325 TABLET ORAL at 19:50

## 2018-12-15 RX ADMIN — Medication 7.5 MILLIGRAM(S): at 05:49

## 2018-12-15 RX ADMIN — Medication 4: at 08:24

## 2018-12-15 RX ADMIN — HEPARIN SODIUM 5000 UNIT(S): 5000 INJECTION INTRAVENOUS; SUBCUTANEOUS at 22:08

## 2018-12-15 RX ADMIN — Medication 81 MILLIGRAM(S): at 11:10

## 2018-12-15 RX ADMIN — GABAPENTIN 300 MILLIGRAM(S): 400 CAPSULE ORAL at 22:07

## 2018-12-15 RX ADMIN — Medication 3 MILLILITER(S): at 11:10

## 2018-12-15 RX ADMIN — FLUTICASONE PROPIONATE AND SALMETEROL 1 DOSE(S): 50; 250 POWDER ORAL; RESPIRATORY (INHALATION) at 17:21

## 2018-12-15 RX ADMIN — Medication 14 UNIT(S): at 17:21

## 2018-12-15 RX ADMIN — Medication 14 UNIT(S): at 12:19

## 2018-12-15 RX ADMIN — Medication 3 MILLILITER(S): at 05:50

## 2018-12-15 RX ADMIN — Medication 9 UNIT(S): at 08:24

## 2018-12-15 RX ADMIN — Medication 7.5 MILLIGRAM(S): at 17:21

## 2018-12-15 RX ADMIN — AMIODARONE HYDROCHLORIDE 400 MILLIGRAM(S): 400 TABLET ORAL at 05:49

## 2018-12-15 RX ADMIN — HEPARIN SODIUM 5000 UNIT(S): 5000 INJECTION INTRAVENOUS; SUBCUTANEOUS at 14:27

## 2018-12-15 RX ADMIN — Medication 40 MILLIGRAM(S): at 17:21

## 2018-12-15 RX ADMIN — OXYCODONE AND ACETAMINOPHEN 1 TABLET(S): 5; 325 TABLET ORAL at 20:20

## 2018-12-15 RX ADMIN — Medication 3 MILLILITER(S): at 00:48

## 2018-12-15 RX ADMIN — Medication 137 MICROGRAM(S): at 05:49

## 2018-12-15 RX ADMIN — PANTOPRAZOLE SODIUM 40 MILLIGRAM(S): 20 TABLET, DELAYED RELEASE ORAL at 05:49

## 2018-12-15 RX ADMIN — AMIODARONE HYDROCHLORIDE 400 MILLIGRAM(S): 400 TABLET ORAL at 14:27

## 2018-12-15 RX ADMIN — Medication 4: at 12:19

## 2018-12-15 NOTE — PROGRESS NOTE ADULT - PROBLEM SELECTOR PLAN 1
medical management as per DR. Kumar   BP control: keep sbp 120-140   lasix increased to 40mg BID for fluid retention and Mod pleural effusion b/l

## 2018-12-15 NOTE — PROGRESS NOTE ADULT - PROBLEM SELECTOR PLAN 1
Will increase Lantus to 36 units at bed time.  Will increase Humalog to 14 units before each meal in addition to Humalog correction scale coverage.  Patient counseled for compliance with consistent low carb diet.

## 2018-12-15 NOTE — PROGRESS NOTE ADULT - PROBLEM SELECTOR PLAN 2
MRI of spine for LE weakness  MRI indicative of spinal cord infarction.  Neurology-no intervention required at this time.   Strength improving LE b/l.  PT/ OT- acute rehab recommended MRI of spine for LE weakness  MRI indicative of spinal cord infarction.  Neurology-no intervention required at this time.   Strength improving LE b/l.  PT/ OT- acute rehab recommended- plan for acute rehab monday 12/17 as per Dr. Kumar

## 2018-12-15 NOTE — PROGRESS NOTE ADULT - SUBJECTIVE AND OBJECTIVE BOX
VITAL SIGNS    Telemetry:    Vital Signs Last 24 Hrs  T(C): 37 (12-15-18 @ 05:22), Max: 37 (12-15-18 @ 05:22)  T(F): 98.6 (12-15-18 @ 05:22), Max: 98.6 (12-15-18 @ 05:22)  HR: 74 (12-15-18 @ 05:22) (74 - 82)  BP: 126/78 (12-15-18 @ 05:22) (126/78 - 136/82)  RR: 18 (12-15-18 @ 05:22) (18 - 18)  SpO2: 93% (12-15-18 @ 05:22) (93% - 98%)            12-14 @ 07:01  -  12-15 @ 07:00  --------------------------------------------------------  IN: 400 mL / OUT: 1500 mL / NET: -1100 mL       Daily     Daily   Admit Wt: Drug Dosing Weight  Height (cm): 172.72 (07 Dec 2018 16:30)  Weight (kg): 106.7 (07 Dec 2018 16:30)  BMI (kg/m2): 35.8 (07 Dec 2018 16:30)  BSA (m2): 2.19 (07 Dec 2018 16:30)      CAPILLARY BLOOD GLUCOSE      POCT Blood Glucose.: 240 mg/dL (15 Dec 2018 08:00)  POCT Blood Glucose.: 281 mg/dL (14 Dec 2018 21:51)  POCT Blood Glucose.: 136 mg/dL (14 Dec 2018 16:30)  POCT Blood Glucose.: 215 mg/dL (14 Dec 2018 11:53)          acetaminophen   Tablet .. 650 milliGRAM(s) Oral every 6 hours PRN  ALBUTerol    90 MICROgram(s) HFA Inhaler 1 Puff(s) Inhalation every 4 hours  ALBUTerol/ipratropium for Nebulization 3 milliLiter(s) Nebulizer every 6 hours  amiodarone    Tablet 400 milliGRAM(s) Oral every 8 hours  amiodarone    Tablet 200 milliGRAM(s) Oral daily  aspirin enteric coated 81 milliGRAM(s) Oral daily  cholestyramine Powder (Sugar-Free) 4 Gram(s) Oral daily  dextrose 50% Injectable 50 milliLiter(s) IV Push every 15 minutes  fluticasone propionate/ salmeterol 500-50 MICROgram(s) Diskus 1 Dose(s) Inhalation two times a day  furosemide    Tablet 40 milliGRAM(s) Oral two times a day  gabapentin 300 milliGRAM(s) Oral at bedtime  gabapentin 100 milliGRAM(s) Oral daily  heparin  Injectable 5000 Unit(s) SubCutaneous every 8 hours  insulin glargine Injectable (LANTUS) 36 Unit(s) SubCutaneous at bedtime  insulin lispro (HumaLOG) corrective regimen sliding scale   SubCutaneous three times a day before meals  insulin lispro (HumaLOG) corrective regimen sliding scale   SubCutaneous at bedtime  insulin lispro Injectable (HumaLOG) 14 Unit(s) SubCutaneous three times a day before meals  levothyroxine 137 MICROGram(s) Oral daily  montelukast 10 milliGRAM(s) Oral daily  oxyCODONE    5 mG/acetaminophen 325 mG 1 Tablet(s) Oral every 4 hours PRN  pantoprazole    Tablet 40 milliGRAM(s) Oral before breakfast  polyethylene glycol 3350 17 Gram(s) Oral daily  predniSONE   Tablet 7.5 milliGRAM(s) Oral every 12 hours  senna 2 Tablet(s) Oral at bedtime  spironolactone 25 milliGRAM(s) Oral daily  tiotropium 18 MICROgram(s) Capsule 1 Capsule(s) Inhalation daily      PHYSICAL EXAM    Subjective: "Hi.   Neurology: alert and oriented x 3, nonfocal, no gross deficits  CV : tele:  RSR  Sternal Wound :  CDI with dressing , Stable  Lungs: clear. RR easy, unlabored   Abdomen: soft, nontender, nondistended, positive bowel sounds, bowel movement   Neg N/V/D   :  pt voiding without difficulty   Extremities:   ODONNELL; edema, neg calf tenderness.   PPP bilaterally      PW:  Chest tubes: VITAL SIGNS    Telemetry:  RSR 70-80   Vital Signs Last 24 Hrs  T(C): 37 (12-15-18 @ 05:22), Max: 37 (12-15-18 @ 05:22)  T(F): 98.6 (12-15-18 @ 05:22), Max: 98.6 (12-15-18 @ 05:22)  HR: 74 (12-15-18 @ 05:22) (74 - 82)  BP: 126/78 (12-15-18 @ 05:22) (126/78 - 136/82)  RR: 18 (12-15-18 @ 05:22) (18 - 18)  SpO2: 93% (12-15-18 @ 05:22) (93% - 98%)            12-14 @ 07:01  -  12-15 @ 07:00  --------------------------------------------------------  IN: 400 mL / OUT: 1500 mL / NET: -1100 mL       Daily     Daily   Admit Wt: Drug Dosing Weight  Height (cm): 172.72 (07 Dec 2018 16:30)  Weight (kg): 106.7 (07 Dec 2018 16:30)  BMI (kg/m2): 35.8 (07 Dec 2018 16:30)  BSA (m2): 2.19 (07 Dec 2018 16:30)      CAPILLARY BLOOD GLUCOSE      POCT Blood Glucose.: 240 mg/dL (15 Dec 2018 08:00)  POCT Blood Glucose.: 281 mg/dL (14 Dec 2018 21:51)  POCT Blood Glucose.: 136 mg/dL (14 Dec 2018 16:30)  POCT Blood Glucose.: 215 mg/dL (14 Dec 2018 11:53)          acetaminophen   Tablet .. 650 milliGRAM(s) Oral every 6 hours PRN  ALBUTerol    90 MICROgram(s) HFA Inhaler 1 Puff(s) Inhalation every 4 hours  ALBUTerol/ipratropium for Nebulization 3 milliLiter(s) Nebulizer every 6 hours  amiodarone    Tablet 400 milliGRAM(s) Oral every 8 hours  amiodarone    Tablet 200 milliGRAM(s) Oral daily  aspirin enteric coated 81 milliGRAM(s) Oral daily  cholestyramine Powder (Sugar-Free) 4 Gram(s) Oral daily  dextrose 50% Injectable 50 milliLiter(s) IV Push every 15 minutes  fluticasone propionate/ salmeterol 500-50 MICROgram(s) Diskus 1 Dose(s) Inhalation two times a day  furosemide    Tablet 40 milliGRAM(s) Oral two times a day  gabapentin 300 milliGRAM(s) Oral at bedtime  gabapentin 100 milliGRAM(s) Oral daily  heparin  Injectable 5000 Unit(s) SubCutaneous every 8 hours  insulin glargine Injectable (LANTUS) 36 Unit(s) SubCutaneous at bedtime  insulin lispro (HumaLOG) corrective regimen sliding scale   SubCutaneous three times a day before meals  insulin lispro (HumaLOG) corrective regimen sliding scale   SubCutaneous at bedtime  insulin lispro Injectable (HumaLOG) 14 Unit(s) SubCutaneous three times a day before meals  levothyroxine 137 MICROGram(s) Oral daily  montelukast 10 milliGRAM(s) Oral daily  oxyCODONE    5 mG/acetaminophen 325 mG 1 Tablet(s) Oral every 4 hours PRN  pantoprazole    Tablet 40 milliGRAM(s) Oral before breakfast  polyethylene glycol 3350 17 Gram(s) Oral daily  predniSONE   Tablet 7.5 milliGRAM(s) Oral every 12 hours  senna 2 Tablet(s) Oral at bedtime  spironolactone 25 milliGRAM(s) Oral daily  tiotropium 18 MICROgram(s) Capsule 1 Capsule(s) Inhalation daily      PHYSICAL EXAM    Subjective: "I don't have any chest pain today."   Neurology: alert and oriented x 3, nonfocal, no gross deficits  CV : tele:  RSR 70-80   Lungs: clear but diminished at bases. RR easy, unlabored   Abdomen: soft, nontender, nondistended, positive bowel sounds, + bowel movement   Neg N/V/D   :  pt voiding without difficulty   Extremities:   ODONNELL; + bilateral LE weakness left > right; trace LE edema, neg calf tenderness.   PPP bilaterally

## 2018-12-15 NOTE — PROGRESS NOTE ADULT - ASSESSMENT
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 Pt states pain was 8/10, nonradiating. Pt denies LOC, fevers/chills, dizziness, numbness/tingling, chest pain, N/V/D.     At [place], CTA of the chest revealed probable type A dissection w/ intramural hematoma w/ active hemmorhage. Pt transferred urgently to [facility] for possible CT surgery.  Upon arrival to [facility], pt placed on Labetolol drip for blood pressure control and patient taken for a CT of the Chest. Dr. Kumar reviewed images which revealed Type B dissection w/ hemoperiteneum. INR at outside hospital 1.8.   TTE with pericardial effusion, tamponade physiology..  Anticoagulation reversed.  Labetolol and cardene for BP control  Hyperglycemia, chronic steroid use for asthma, endo consulted  + Ua , on bactrim.  Changed to oral labetolol and  norvasc for HTN.  Bilateral lower extremity weakness, 2/4 strength on exam; start of symptoms coincided with back pain/dissection;   Neuro is following, pending MRI/MRA, due to the occurance of the LE weakness concurrently with the presentation of the type B dissection, need to r/o dissection, ischemia or occlusion of the ASA.  12/11 BP stable.   MRI complete,  pending official reading and neuro f/u.  12/12 VSS. OOB. MRI indicative of spinal cord infarct. Neurology-no intervention required at this time. Strength improving LE b/l. Labetalol d/c; lasix increased to 40mg BID. OT recommending ARMANDO as well.  12/13 VSS; CTA of chest/abd/pelvis today: unchanged;  F/U results. + constipation:  Received sorbitol/suppositories: + bm noted  12/14 VSS; Pt denies cp/sob; increase activity as tolerated; continue PT/OT and diuresis   Discharge planning- acute rehab mon ? 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 Pt states pain was 8/10, nonradiating. Pt denies LOC, fevers/chills, dizziness, numbness/tingling, chest pain, N/V/D.     At [place], CTA of the chest revealed probable type A dissection w/ intramural hematoma w/ active hemmorhage. Pt transferred urgently to [facility] for possible CT surgery.  Upon arrival to [facility], pt placed on Labetolol drip for blood pressure control and patient taken for a CT of the Chest. Dr. Kumar reviewed images which revealed Type B dissection w/ hemoperiteneum. INR at outside hospital 1.8.   TTE with pericardial effusion, tamponade physiology..  Anticoagulation reversed.  Labetolol and cardene for BP control  Hyperglycemia, chronic steroid use for asthma, endo consulted  + Ua , on bactrim.  Changed to oral labetolol and  norvasc for HTN.  Bilateral lower extremity weakness, 2/4 strength on exam; start of symptoms coincided with back pain/dissection;   Neuro is following, pending MRI/MRA, due to the occurance of the LE weakness concurrently with the presentation of the type B dissection, need to r/o dissection, ischemia or occlusion of the ASA.  12/11 BP stable.   MRI complete,  pending official reading and neuro f/u.  12/12 VSS. OOB. MRI indicative of spinal cord infarct. Neurology-no intervention required at this time. Strength improving LE b/l. Labetalol d/c; lasix increased to 40mg BID. OT recommending ARMANDO as well.  12/13 VSS; CTA of chest/abd/pelvis today: unchanged;  F/U results. + constipation:  Received sorbitol/suppositories: + bm noted  12/14 VSS; Pt denies cp/sob; increase activity as tolerated; continue PT/OT and diuresis   12/15 VSS; await discharge to acute rehab; repeat CT scan in 3-4 months as per Dr. Kumar   Discharge planning- acute rehab mon ? [facility]

## 2018-12-15 NOTE — PROGRESS NOTE ADULT - SUBJECTIVE AND OBJECTIVE BOX
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    Labs and Fingersticks  CAPILLARY BLOOD GLUCOSE      POCT Blood Glucose.: 201 mg/dL (15 Dec 2018 11:52)  POCT Blood Glucose.: 240 mg/dL (15 Dec 2018 08:00)  POCT Blood Glucose.: 281 mg/dL (14 Dec 2018 21:51)  POCT Blood Glucose.: 136 mg/dL (14 Dec 2018 16:30)      Anion Gap, Serum: 13 (12-15 @ 06:51)  Anion Gap, Serum: 14 (12-14 @ 06:45)      Calcium, Total Serum: 10.6 <H> (12-15 @ 06:51)  Calcium, Total Serum: 11.0 <H> (12-14 @ 06:45)          12-15    138  |  97  |  26<H>  ----------------------------<  177<H>  4.3   |  28  |  0.78    Ca    10.6<H>      15 Dec 2018 06:51                          11.5   10.4  )-----------( 247      ( 15 Dec 2018 06:51 )             36.1     Medications  MEDICATIONS  (STANDING):  ALBUTerol    90 MICROgram(s) HFA Inhaler 1 Puff(s) Inhalation every 4 hours  ALBUTerol/ipratropium for Nebulization 3 milliLiter(s) Nebulizer every 6 hours  amiodarone    Tablet 200 milliGRAM(s) Oral daily  aspirin enteric coated 81 milliGRAM(s) Oral daily  cholestyramine Powder (Sugar-Free) 4 Gram(s) Oral daily  dextrose 50% Injectable 50 milliLiter(s) IV Push every 15 minutes  fluticasone propionate/ salmeterol 500-50 MICROgram(s) Diskus 1 Dose(s) Inhalation two times a day  furosemide    Tablet 40 milliGRAM(s) Oral two times a day  gabapentin 300 milliGRAM(s) Oral at bedtime  gabapentin 100 milliGRAM(s) Oral daily  heparin  Injectable 5000 Unit(s) SubCutaneous every 8 hours  insulin glargine Injectable (LANTUS) 36 Unit(s) SubCutaneous at bedtime  insulin lispro (HumaLOG) corrective regimen sliding scale   SubCutaneous three times a day before meals  insulin lispro (HumaLOG) corrective regimen sliding scale   SubCutaneous at bedtime  insulin lispro Injectable (HumaLOG) 14 Unit(s) SubCutaneous three times a day before meals  levothyroxine 137 MICROGram(s) Oral daily  montelukast 10 milliGRAM(s) Oral daily  pantoprazole    Tablet 40 milliGRAM(s) Oral before breakfast  polyethylene glycol 3350 17 Gram(s) Oral daily  predniSONE   Tablet 7.5 milliGRAM(s) Oral every 12 hours  senna 2 Tablet(s) Oral at bedtime  spironolactone 25 milliGRAM(s) Oral daily  tiotropium 18 MICROgram(s) Capsule 1 Capsule(s) Inhalation daily      Physical Exam  General: Patient comfortable in bed  Vital Signs Last 12 Hrs  T(F): 98.5 (12-15-18 @ 14:33), Max: 98.6 (12-15-18 @ 05:22)  HR: 77 (12-15-18 @ 14:33) (74 - 77)  BP: 135/83 (12-15-18 @ 14:33) (126/78 - 135/83)  BP(mean): --  RR: 18 (12-15-18 @ 14:33) (18 - 18)  SpO2: 92% (12-15-18 @ 14:33) (92% - 93%)  Neck: No palpable thyroid nodules.  CVS: S1S2, No murmurs  Respiratory: No wheezing, no crepitations  GI: Abdomen soft, bowel sounds positive  Musculoskeletal:  edema lower extremities.   Skin: No skin rashes, no ecchymosis    Diagnostics

## 2018-12-15 NOTE — PROGRESS NOTE ADULT - ASSESSMENT
Assessment  DMT2: 63y Female with DM T2 with hyperglycemia on insulin, blood sugars running high, eating meals,  non compliant with low carb diet.  Aortic Disection: S/P Sx, on medications,  no chest pain, stable, monitored.  Hypothyroidism:  On synthroid 137 mcg po daily, asymptomatic.  HTN: Controlled, no headaches, on medications.        Kaleb Andrews MD  Cell: [phone]  Office: [phone]

## 2018-12-16 LAB
ANION GAP SERPL CALC-SCNC: 12 MMOL/L — SIGNIFICANT CHANGE UP (ref 5–17)
BUN SERPL-MCNC: 30 MG/DL — HIGH (ref 7–23)
CALCIUM SERPL-MCNC: 10.8 MG/DL — HIGH (ref 8.4–10.5)
CHLORIDE SERPL-SCNC: 96 MMOL/L — SIGNIFICANT CHANGE UP (ref 96–108)
CO2 SERPL-SCNC: 31 MMOL/L — SIGNIFICANT CHANGE UP (ref 22–31)
CREAT SERPL-MCNC: 0.87 MG/DL — SIGNIFICANT CHANGE UP (ref 0.5–1.3)
GLUCOSE BLDC GLUCOMTR-MCNC: 120 MG/DL — HIGH (ref 70–99)
GLUCOSE BLDC GLUCOMTR-MCNC: 163 MG/DL — HIGH (ref 70–99)
GLUCOSE BLDC GLUCOMTR-MCNC: 193 MG/DL — HIGH (ref 70–99)
GLUCOSE BLDC GLUCOMTR-MCNC: 206 MG/DL — HIGH (ref 70–99)
GLUCOSE SERPL-MCNC: 211 MG/DL — HIGH (ref 70–99)
HCT VFR BLD CALC: 37.8 % — SIGNIFICANT CHANGE UP (ref 34.5–45)
HGB BLD-MCNC: 11.3 G/DL — LOW (ref 11.5–15.5)
MCHC RBC-ENTMCNC: 26.9 PG — LOW (ref 27–34)
MCHC RBC-ENTMCNC: 29.8 GM/DL — LOW (ref 32–36)
MCV RBC AUTO: 90.3 FL — SIGNIFICANT CHANGE UP (ref 80–100)
PLATELET # BLD AUTO: 236 K/UL — SIGNIFICANT CHANGE UP (ref 150–400)
POTASSIUM SERPL-MCNC: 4.4 MMOL/L — SIGNIFICANT CHANGE UP (ref 3.5–5.3)
POTASSIUM SERPL-SCNC: 4.4 MMOL/L — SIGNIFICANT CHANGE UP (ref 3.5–5.3)
RBC # BLD: 4.19 M/UL — SIGNIFICANT CHANGE UP (ref 3.8–5.2)
RBC # FLD: 14.8 % — HIGH (ref 10.3–14.5)
SODIUM SERPL-SCNC: 139 MMOL/L — SIGNIFICANT CHANGE UP (ref 135–145)
WBC # BLD: 9.6 K/UL — SIGNIFICANT CHANGE UP (ref 3.8–10.5)
WBC # FLD AUTO: 9.6 K/UL — SIGNIFICANT CHANGE UP (ref 3.8–10.5)

## 2018-12-16 RX ORDER — INSULIN GLARGINE 100 [IU]/ML
43 INJECTION, SOLUTION SUBCUTANEOUS AT BEDTIME
Qty: 0 | Refills: 0 | Status: DISCONTINUED | OUTPATIENT
Start: 2018-12-16 | End: 2018-12-17

## 2018-12-16 RX ORDER — DEXTROSE 50 % IN WATER 50 %
15 SYRINGE (ML) INTRAVENOUS ONCE
Qty: 0 | Refills: 0 | Status: DISCONTINUED | OUTPATIENT
Start: 2018-12-16 | End: 2018-12-19

## 2018-12-16 RX ORDER — INSULIN LISPRO 100/ML
VIAL (ML) SUBCUTANEOUS
Qty: 0 | Refills: 0 | Status: DISCONTINUED | OUTPATIENT
Start: 2018-12-16 | End: 2018-12-19

## 2018-12-16 RX ORDER — INSULIN LISPRO 100/ML
17 VIAL (ML) SUBCUTANEOUS
Qty: 0 | Refills: 0 | Status: DISCONTINUED | OUTPATIENT
Start: 2018-12-16 | End: 2018-12-16

## 2018-12-16 RX ORDER — INSULIN LISPRO 100/ML
16 VIAL (ML) SUBCUTANEOUS
Qty: 0 | Refills: 0 | Status: DISCONTINUED | OUTPATIENT
Start: 2018-12-16 | End: 2018-12-19

## 2018-12-16 RX ORDER — SODIUM CHLORIDE 9 MG/ML
1000 INJECTION, SOLUTION INTRAVENOUS
Qty: 0 | Refills: 0 | Status: DISCONTINUED | OUTPATIENT
Start: 2018-12-16 | End: 2018-12-19

## 2018-12-16 RX ORDER — GLUCAGON INJECTION, SOLUTION 0.5 MG/.1ML
1 INJECTION, SOLUTION SUBCUTANEOUS ONCE
Qty: 0 | Refills: 0 | Status: DISCONTINUED | OUTPATIENT
Start: 2018-12-16 | End: 2018-12-19

## 2018-12-16 RX ORDER — INSULIN LISPRO 100/ML
VIAL (ML) SUBCUTANEOUS AT BEDTIME
Qty: 0 | Refills: 0 | Status: DISCONTINUED | OUTPATIENT
Start: 2018-12-16 | End: 2018-12-19

## 2018-12-16 RX ADMIN — Medication 14 UNIT(S): at 08:36

## 2018-12-16 RX ADMIN — FLUTICASONE PROPIONATE AND SALMETEROL 1 DOSE(S): 50; 250 POWDER ORAL; RESPIRATORY (INHALATION) at 06:38

## 2018-12-16 RX ADMIN — OXYCODONE AND ACETAMINOPHEN 1 TABLET(S): 5; 325 TABLET ORAL at 18:03

## 2018-12-16 RX ADMIN — SPIRONOLACTONE 25 MILLIGRAM(S): 25 TABLET, FILM COATED ORAL at 06:35

## 2018-12-16 RX ADMIN — GABAPENTIN 100 MILLIGRAM(S): 400 CAPSULE ORAL at 12:18

## 2018-12-16 RX ADMIN — Medication 2: at 16:54

## 2018-12-16 RX ADMIN — HEPARIN SODIUM 5000 UNIT(S): 5000 INJECTION INTRAVENOUS; SUBCUTANEOUS at 06:35

## 2018-12-16 RX ADMIN — Medication 3 MILLILITER(S): at 01:21

## 2018-12-16 RX ADMIN — Medication 17 UNIT(S): at 16:54

## 2018-12-16 RX ADMIN — GABAPENTIN 300 MILLIGRAM(S): 400 CAPSULE ORAL at 21:37

## 2018-12-16 RX ADMIN — INSULIN GLARGINE 43 UNIT(S): 100 INJECTION, SOLUTION SUBCUTANEOUS at 22:10

## 2018-12-16 RX ADMIN — Medication 40 MILLIGRAM(S): at 16:53

## 2018-12-16 RX ADMIN — OXYCODONE AND ACETAMINOPHEN 1 TABLET(S): 5; 325 TABLET ORAL at 05:33

## 2018-12-16 RX ADMIN — OXYCODONE AND ACETAMINOPHEN 1 TABLET(S): 5; 325 TABLET ORAL at 05:03

## 2018-12-16 RX ADMIN — Medication 2: at 08:36

## 2018-12-16 RX ADMIN — SENNA PLUS 2 TABLET(S): 8.6 TABLET ORAL at 21:37

## 2018-12-16 RX ADMIN — Medication 137 MICROGRAM(S): at 06:34

## 2018-12-16 RX ADMIN — AMIODARONE HYDROCHLORIDE 200 MILLIGRAM(S): 400 TABLET ORAL at 01:21

## 2018-12-16 RX ADMIN — Medication 3 MILLILITER(S): at 12:19

## 2018-12-16 RX ADMIN — OXYCODONE AND ACETAMINOPHEN 1 TABLET(S): 5; 325 TABLET ORAL at 19:17

## 2018-12-16 RX ADMIN — Medication 40 MILLIGRAM(S): at 06:35

## 2018-12-16 RX ADMIN — HEPARIN SODIUM 5000 UNIT(S): 5000 INJECTION INTRAVENOUS; SUBCUTANEOUS at 21:37

## 2018-12-16 RX ADMIN — HEPARIN SODIUM 5000 UNIT(S): 5000 INJECTION INTRAVENOUS; SUBCUTANEOUS at 12:18

## 2018-12-16 RX ADMIN — OXYCODONE AND ACETAMINOPHEN 1 TABLET(S): 5; 325 TABLET ORAL at 19:47

## 2018-12-16 RX ADMIN — AMIODARONE HYDROCHLORIDE 200 MILLIGRAM(S): 400 TABLET ORAL at 06:34

## 2018-12-16 RX ADMIN — Medication 7.5 MILLIGRAM(S): at 16:54

## 2018-12-16 RX ADMIN — FLUTICASONE PROPIONATE AND SALMETEROL 1 DOSE(S): 50; 250 POWDER ORAL; RESPIRATORY (INHALATION) at 16:53

## 2018-12-16 RX ADMIN — Medication 3 MILLILITER(S): at 23:58

## 2018-12-16 RX ADMIN — Medication 7.5 MILLIGRAM(S): at 06:35

## 2018-12-16 RX ADMIN — OXYCODONE AND ACETAMINOPHEN 1 TABLET(S): 5; 325 TABLET ORAL at 12:19

## 2018-12-16 RX ADMIN — CHOLESTYRAMINE 4 GRAM(S): 4 POWDER, FOR SUSPENSION ORAL at 08:37

## 2018-12-16 RX ADMIN — Medication 81 MILLIGRAM(S): at 12:19

## 2018-12-16 RX ADMIN — Medication 4: at 12:18

## 2018-12-16 RX ADMIN — Medication 17 UNIT(S): at 12:18

## 2018-12-16 RX ADMIN — Medication 3 MILLILITER(S): at 06:34

## 2018-12-16 RX ADMIN — PANTOPRAZOLE SODIUM 40 MILLIGRAM(S): 20 TABLET, DELAYED RELEASE ORAL at 08:43

## 2018-12-16 RX ADMIN — Medication 3 MILLILITER(S): at 16:55

## 2018-12-16 NOTE — PROGRESS NOTE ADULT - PROBLEM SELECTOR PLAN 1
Will increase Lantus to 43 units at bed time.  Will increase Humalog to 16 units before each meal in addition to Humalog correction scale coverage.  Patient counseled for compliance with consistent low carb diet.

## 2018-12-16 NOTE — PROGRESS NOTE ADULT - SUBJECTIVE AND OBJECTIVE BOX
Chief complaint  Patient is a 63y old  Female who presents with a chief complaint of Back Pain transfer for Type B dissection (15 Dec 2018 15:51)   Review of systems  Patient in bed, looks comfortable, no fever,  no hypoglycemia.    Labs and Fingersticks  CAPILLARY BLOOD GLUCOSE      POCT Blood Glucose.: 163 mg/dL (16 Dec 2018 16:38)  POCT Blood Glucose.: 206 mg/dL (16 Dec 2018 11:47)  POCT Blood Glucose.: 193 mg/dL (16 Dec 2018 08:19)  POCT Blood Glucose.: 234 mg/dL (15 Dec 2018 21:19)      Anion Gap, Serum: 12 (12-16 @ 06:51)  Anion Gap, Serum: 13 (12-15 @ 06:51)      Calcium, Total Serum: 10.8 <H> (12-16 @ 06:51)  Calcium, Total Serum: 10.6 <H> (12-15 @ 06:51)          12-16    139  |  96  |  30<H>  ----------------------------<  211<H>  4.4   |  31  |  0.87    Ca    10.8<H>      16 Dec 2018 06:51                          11.3   9.6   )-----------( 236      ( 16 Dec 2018 06:51 )             37.8     Medications  MEDICATIONS  (STANDING):  ALBUTerol    90 MICROgram(s) HFA Inhaler 1 Puff(s) Inhalation every 4 hours  ALBUTerol/ipratropium for Nebulization 3 milliLiter(s) Nebulizer every 6 hours  amiodarone    Tablet 200 milliGRAM(s) Oral daily  aspirin enteric coated 81 milliGRAM(s) Oral daily  cholestyramine Powder (Sugar-Free) 4 Gram(s) Oral daily  dextrose 5%. 1000 milliLiter(s) (50 mL/Hr) IV Continuous <Continuous>  dextrose 50% Injectable 50 milliLiter(s) IV Push every 15 minutes  fluticasone propionate/ salmeterol 500-50 MICROgram(s) Diskus 1 Dose(s) Inhalation two times a day  furosemide    Tablet 40 milliGRAM(s) Oral two times a day  gabapentin 300 milliGRAM(s) Oral at bedtime  gabapentin 100 milliGRAM(s) Oral daily  heparin  Injectable 5000 Unit(s) SubCutaneous every 8 hours  insulin glargine Injectable (LANTUS) 43 Unit(s) SubCutaneous at bedtime  insulin lispro (HumaLOG) corrective regimen sliding scale   SubCutaneous three times a day before meals  insulin lispro (HumaLOG) corrective regimen sliding scale   SubCutaneous at bedtime  insulin lispro Injectable (HumaLOG) 16 Unit(s) SubCutaneous three times a day before meals  levothyroxine 137 MICROGram(s) Oral daily  montelukast 10 milliGRAM(s) Oral daily  pantoprazole    Tablet 40 milliGRAM(s) Oral before breakfast  polyethylene glycol 3350 17 Gram(s) Oral daily  predniSONE   Tablet 7.5 milliGRAM(s) Oral every 12 hours  senna 2 Tablet(s) Oral at bedtime  spironolactone 25 milliGRAM(s) Oral daily  tiotropium 18 MICROgram(s) Capsule 1 Capsule(s) Inhalation daily      Physical Exam  General: Patient comfortable in bed  Vital Signs Last 12 Hrs  T(F): 98.2 (12-16-18 @ 13:23), Max: 98.3 (12-16-18 @ 06:15)  HR: 79 (12-16-18 @ 13:23) (72 - 79)  BP: 130/74 (12-16-18 @ 13:23) (130/74 - 136/78)  BP(mean): --  RR: 18 (12-16-18 @ 13:23) (18 - 18)  SpO2: 95% (12-16-18 @ 13:23) (95% - 97%)  Neck: No palpable thyroid nodules.  CVS: S1S2, No murmurs  Respiratory: No wheezing, no crepitations  GI: Abdomen soft, bowel sounds positive  Musculoskeletal:  edema lower extremities.   Skin: No skin rashes, no ecchymosis    Diagnostics    Parathyroid Hormone Intact, Serum: AM Sched. Collection: 17-Dec-2018 06:00 (12-16 @ 10:32)  PTH Related Peptide: AM Sched. Collection: 17-Dec-2018 06:00 (12-16 @ 10:32)  Vitamin D, 1, 25-Dihydroxy: AM Sched. Collection: 17-Dec-2018 06:00 (12-16 @ 10:32)  Vitamin D, 25-Hydroxy: AM Sched. Collection: 17-Dec-2018 06:00 (12-16 @ 10:32)

## 2018-12-16 NOTE — PROGRESS NOTE ADULT - ASSESSMENT
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 Pt states pain was 8/10, nonradiating. Pt denies LOC, fevers/chills, dizziness, numbness/tingling, chest pain, N/V/D.     At [place], CTA of the chest revealed probable type A dissection w/ intramural hematoma w/ active hemmorhage. Pt transferred urgently to [facility] for possible CT surgery.  Upon arrival to [facility], pt placed on Labetolol drip for blood pressure control and patient taken for a CT of the Chest. Dr. Kumar reviewed images which revealed Type B dissection w/ hemoperiteneum. INR at outside hospital 1.8.   TTE with pericardial effusion, tamponade physiology..  Anticoagulation reversed.  Labetolol and cardene for BP control  Hyperglycemia, chronic steroid use for asthma, endo consulted  + Ua , on bactrim.  Changed to oral labetolol and  norvasc for HTN.  Bilateral lower extremity weakness, 2/4 strength on exam; start of symptoms coincided with back pain/dissection;   Neuro is following, pending MRI/MRA, due to the occurance of the LE weakness concurrently with the presentation of the type B dissection, need to r/o dissection, ischemia or occlusion of the ASA.  12/11 BP stable.   MRI complete,  pending official reading and neuro f/u.  12/12 VSS. OOB. MRI indicative of spinal cord infarct. Neurology-no intervention required at this time. Strength improving LE b/l. Labetalol d/c; lasix increased to 40mg BID. OT recommending ARMANDO as well.  12/13 VSS; CTA of chest/abd/pelvis today: unchanged;  F/U results. + constipation:  Received sorbitol/suppositories: + bm noted  12/14 VSS; Pt denies cp/sob; increase activity as tolerated; continue PT/OT and diuresis   12/15 VSS; await discharge to acute rehab; repeat CT scan in 3-4 months as per Dr. Kumar   Discharge planning- acute rehab mon ? [place]

## 2018-12-16 NOTE — PROGRESS NOTE ADULT - SUBJECTIVE AND OBJECTIVE BOX
VITAL SIGNS    Telemetry:  SR 60-80  Vital Signs Last 24 Hrs  T(C): 36.8 (12-16-18 @ 13:23), Max: 36.8 (12-15-18 @ 20:10)  T(F): 98.2 (12-16-18 @ 13:23), Max: 98.3 (12-15-18 @ 20:10)  HR: 79 (12-16-18 @ 13:23) (72 - 79)  BP: 130/74 (12-16-18 @ 13:23) (130/74 - 145/84)  RR: 18 (12-16-18 @ 13:23) (18 - 18)  SpO2: 95% (12-16-18 @ 13:23) (95% - 98%)            12-15 @ 07:01  -  12-16 @ 07:00  --------------------------------------------------------  IN: 460 mL / OUT: 850 mL / NET: -390 mL    12-16 @ 07:01  -  12-16 @ 17:43  --------------------------------------------------------  IN: 480 mL / OUT: 700 mL / NET: -220 mL    Daily   Admit Wt: Drug Dosing Weight  Height (cm): 172.72 (07 Dec 2018 16:30)  Weight (kg): 106.7 (07 Dec 2018 16:30)  BMI (kg/m2): 35.8 (07 Dec 2018 16:30)  BSA (m2): 2.19 (07 Dec 2018 16:30)      CAPILLARY BLOOD GLUCOSE      POCT Blood Glucose.: 163 mg/dL (16 Dec 2018 16:38)  POCT Blood Glucose.: 206 mg/dL (16 Dec 2018 11:47)  POCT Blood Glucose.: 193 mg/dL (16 Dec 2018 08:19)  POCT Blood Glucose.: 234 mg/dL (15 Dec 2018 21:19)          MEDICATIONS  acetaminophen   Tablet .. 650 milliGRAM(s) Oral every 6 hours PRN  ALBUTerol    90 MICROgram(s) HFA Inhaler 1 Puff(s) Inhalation every 4 hours  ALBUTerol/ipratropium for Nebulization 3 milliLiter(s) Nebulizer every 6 hours  amiodarone    Tablet 200 milliGRAM(s) Oral daily  aspirin enteric coated 81 milliGRAM(s) Oral daily  cholestyramine Powder (Sugar-Free) 4 Gram(s) Oral daily  dextrose 40% Gel 15 Gram(s) Oral once PRN  dextrose 5%. 1000 milliLiter(s) IV Continuous <Continuous>  dextrose 50% Injectable 50 milliLiter(s) IV Push every 15 minutes  fluticasone propionate/ salmeterol 500-50 MICROgram(s) Diskus 1 Dose(s) Inhalation two times a day  furosemide    Tablet 40 milliGRAM(s) Oral two times a day  gabapentin 300 milliGRAM(s) Oral at bedtime  gabapentin 100 milliGRAM(s) Oral daily  glucagon  Injectable 1 milliGRAM(s) IntraMuscular once PRN  heparin  Injectable 5000 Unit(s) SubCutaneous every 8 hours  insulin glargine Injectable (LANTUS) 43 Unit(s) SubCutaneous at bedtime  insulin lispro (HumaLOG) corrective regimen sliding scale   SubCutaneous three times a day before meals  insulin lispro (HumaLOG) corrective regimen sliding scale   SubCutaneous at bedtime  insulin lispro Injectable (HumaLOG) 16 Unit(s) SubCutaneous three times a day before meals  levothyroxine 137 MICROGram(s) Oral daily  montelukast 10 milliGRAM(s) Oral daily  oxyCODONE    5 mG/acetaminophen 325 mG 1 Tablet(s) Oral every 4 hours PRN  pantoprazole    Tablet 40 milliGRAM(s) Oral before breakfast  polyethylene glycol 3350 17 Gram(s) Oral daily  predniSONE   Tablet 7.5 milliGRAM(s) Oral every 12 hours  senna 2 Tablet(s) Oral at bedtime  spironolactone 25 milliGRAM(s) Oral daily  tiotropium 18 MICROgram(s) Capsule 1 Capsule(s) Inhalation daily      PHYSICAL EXAM  Subjective: NAD  Neurology: alert and oriented x 3, nonfocal, no gross deficits  CV : S1S2  Lungs: CTA b/l  Abdomen: soft, NT,ND, ( +)BM  :  voiding  Extremities: -c/c/e      LABS  12-16    139  |  96  |  30<H>  ----------------------------<  211<H>  4.4   |  31  |  0.87    Ca    10.8<H>      16 Dec 2018 06:51                                   11.3   9.6   )-----------( 236      ( 16 Dec 2018 06:51 )             37.8                 PAST MEDICAL & SURGICAL HISTORY:  Arthritis  Shingles  PE (pulmonary embolism)  Hypothyroidism  Hyperlipidemia  Diabetes mellitus  Asthma  History of cataract surgery  S/P cholecystectomy

## 2018-12-16 NOTE — PROGRESS NOTE ADULT - PROBLEM SELECTOR PLAN 2
MRI of spine for LE weakness  MRI indicative of spinal cord infarction.  Neurology-no intervention required at this time.   Strength improving LE b/l.  PT/ OT- acute rehab recommended- plan for acute rehab monday 12/17 as per Dr. Kumar

## 2018-12-16 NOTE — PROGRESS NOTE ADULT - ASSESSMENT
Assessment  DMT2: 63y Female with DM T2 with hyperglycemia on insulin, blood sugars running high, eating meals,  non compliant with low carb diet.  Aortic Disection: S/P Sx, on medications,  no chest pain, stable, monitored.  Hypothyroidism:  On synthroid 137 mcg po daily, asymptomatic.  HTN: Controlled, no headaches, on medications.        Kaleb Andrews MD  Cell: [phone]  Office: [phone]

## 2018-12-17 DIAGNOSIS — E83.52 HYPERCALCEMIA: ICD-10-CM

## 2018-12-17 DIAGNOSIS — I95.1 ORTHOSTATIC HYPOTENSION: ICD-10-CM

## 2018-12-17 LAB
24R-OH-CALCIDIOL SERPL-MCNC: 21 NG/ML — LOW (ref 30–80)
ANION GAP SERPL CALC-SCNC: 13 MMOL/L — SIGNIFICANT CHANGE UP (ref 5–17)
BUN SERPL-MCNC: 34 MG/DL — HIGH (ref 7–23)
CALCIUM SERPL-MCNC: 10.4 MG/DL — SIGNIFICANT CHANGE UP (ref 8.4–10.5)
CALCIUM SERPL-MCNC: 11 MG/DL — HIGH (ref 8.4–10.5)
CHLORIDE SERPL-SCNC: 97 MMOL/L — SIGNIFICANT CHANGE UP (ref 96–108)
CO2 SERPL-SCNC: 27 MMOL/L — SIGNIFICANT CHANGE UP (ref 22–31)
CREAT SERPL-MCNC: 0.96 MG/DL — SIGNIFICANT CHANGE UP (ref 0.5–1.3)
GLUCOSE BLDC GLUCOMTR-MCNC: 113 MG/DL — HIGH (ref 70–99)
GLUCOSE BLDC GLUCOMTR-MCNC: 124 MG/DL — HIGH (ref 70–99)
GLUCOSE BLDC GLUCOMTR-MCNC: 156 MG/DL — HIGH (ref 70–99)
GLUCOSE BLDC GLUCOMTR-MCNC: 163 MG/DL — HIGH (ref 70–99)
GLUCOSE BLDC GLUCOMTR-MCNC: 97 MG/DL — SIGNIFICANT CHANGE UP (ref 70–99)
GLUCOSE SERPL-MCNC: 111 MG/DL — HIGH (ref 70–99)
HCT VFR BLD CALC: 37.3 % — SIGNIFICANT CHANGE UP (ref 34.5–45)
HGB BLD-MCNC: 12 G/DL — SIGNIFICANT CHANGE UP (ref 11.5–15.5)
MCHC RBC-ENTMCNC: 28.9 PG — SIGNIFICANT CHANGE UP (ref 27–34)
MCHC RBC-ENTMCNC: 32.1 GM/DL — SIGNIFICANT CHANGE UP (ref 32–36)
MCV RBC AUTO: 90.1 FL — SIGNIFICANT CHANGE UP (ref 80–100)
PLATELET # BLD AUTO: 219 K/UL — SIGNIFICANT CHANGE UP (ref 150–400)
POTASSIUM SERPL-MCNC: 4.2 MMOL/L — SIGNIFICANT CHANGE UP (ref 3.5–5.3)
POTASSIUM SERPL-SCNC: 4.2 MMOL/L — SIGNIFICANT CHANGE UP (ref 3.5–5.3)
PTH-INTACT FLD-MCNC: 98 PG/ML — HIGH (ref 15–65)
RBC # BLD: 4.14 M/UL — SIGNIFICANT CHANGE UP (ref 3.8–5.2)
RBC # FLD: 15.3 % — HIGH (ref 10.3–14.5)
SODIUM SERPL-SCNC: 137 MMOL/L — SIGNIFICANT CHANGE UP (ref 135–145)
VIT D25+D1,25 OH+D1,25 PNL SERPL-MCNC: 31.2 PG/ML — SIGNIFICANT CHANGE UP (ref 19.9–79.3)
WBC # BLD: 12 K/UL — HIGH (ref 3.8–10.5)
WBC # FLD AUTO: 12 K/UL — HIGH (ref 3.8–10.5)

## 2018-12-17 PROCEDURE — 99232 SBSQ HOSP IP/OBS MODERATE 35: CPT | Mod: GC

## 2018-12-17 PROCEDURE — 99231 SBSQ HOSP IP/OBS SF/LOW 25: CPT

## 2018-12-17 RX ORDER — INSULIN GLARGINE 100 [IU]/ML
30 INJECTION, SOLUTION SUBCUTANEOUS AT BEDTIME
Qty: 0 | Refills: 0 | Status: DISCONTINUED | OUTPATIENT
Start: 2018-12-17 | End: 2018-12-19

## 2018-12-17 RX ORDER — SODIUM CHLORIDE 9 MG/ML
500 INJECTION INTRAMUSCULAR; INTRAVENOUS; SUBCUTANEOUS ONCE
Qty: 0 | Refills: 0 | Status: COMPLETED | OUTPATIENT
Start: 2018-12-17 | End: 2018-12-17

## 2018-12-17 RX ADMIN — HEPARIN SODIUM 5000 UNIT(S): 5000 INJECTION INTRAVENOUS; SUBCUTANEOUS at 14:17

## 2018-12-17 RX ADMIN — CHOLESTYRAMINE 4 GRAM(S): 4 POWDER, FOR SUSPENSION ORAL at 08:09

## 2018-12-17 RX ADMIN — Medication 7.5 MILLIGRAM(S): at 17:55

## 2018-12-17 RX ADMIN — Medication 2: at 12:16

## 2018-12-17 RX ADMIN — Medication 3 MILLILITER(S): at 23:32

## 2018-12-17 RX ADMIN — FLUTICASONE PROPIONATE AND SALMETEROL 1 DOSE(S): 50; 250 POWDER ORAL; RESPIRATORY (INHALATION) at 17:54

## 2018-12-17 RX ADMIN — OXYCODONE AND ACETAMINOPHEN 1 TABLET(S): 5; 325 TABLET ORAL at 05:51

## 2018-12-17 RX ADMIN — HEPARIN SODIUM 5000 UNIT(S): 5000 INJECTION INTRAVENOUS; SUBCUTANEOUS at 22:04

## 2018-12-17 RX ADMIN — INSULIN GLARGINE 30 UNIT(S): 100 INJECTION, SOLUTION SUBCUTANEOUS at 23:28

## 2018-12-17 RX ADMIN — SPIRONOLACTONE 25 MILLIGRAM(S): 25 TABLET, FILM COATED ORAL at 05:50

## 2018-12-17 RX ADMIN — Medication 7.5 MILLIGRAM(S): at 05:51

## 2018-12-17 RX ADMIN — OXYCODONE AND ACETAMINOPHEN 1 TABLET(S): 5; 325 TABLET ORAL at 20:07

## 2018-12-17 RX ADMIN — GABAPENTIN 300 MILLIGRAM(S): 400 CAPSULE ORAL at 22:04

## 2018-12-17 RX ADMIN — GABAPENTIN 100 MILLIGRAM(S): 400 CAPSULE ORAL at 12:19

## 2018-12-17 RX ADMIN — FLUTICASONE PROPIONATE AND SALMETEROL 1 DOSE(S): 50; 250 POWDER ORAL; RESPIRATORY (INHALATION) at 05:51

## 2018-12-17 RX ADMIN — Medication 3 MILLILITER(S): at 17:54

## 2018-12-17 RX ADMIN — Medication 3 MILLILITER(S): at 05:51

## 2018-12-17 RX ADMIN — PANTOPRAZOLE SODIUM 40 MILLIGRAM(S): 20 TABLET, DELAYED RELEASE ORAL at 08:09

## 2018-12-17 RX ADMIN — HEPARIN SODIUM 5000 UNIT(S): 5000 INJECTION INTRAVENOUS; SUBCUTANEOUS at 05:51

## 2018-12-17 RX ADMIN — OXYCODONE AND ACETAMINOPHEN 1 TABLET(S): 5; 325 TABLET ORAL at 06:21

## 2018-12-17 RX ADMIN — Medication 16 UNIT(S): at 08:10

## 2018-12-17 RX ADMIN — Medication 3 MILLILITER(S): at 12:18

## 2018-12-17 RX ADMIN — Medication 137 MICROGRAM(S): at 05:50

## 2018-12-17 RX ADMIN — AMIODARONE HYDROCHLORIDE 200 MILLIGRAM(S): 400 TABLET ORAL at 05:50

## 2018-12-17 RX ADMIN — POLYETHYLENE GLYCOL 3350 17 GRAM(S): 17 POWDER, FOR SOLUTION ORAL at 12:20

## 2018-12-17 RX ADMIN — Medication 16 UNIT(S): at 12:17

## 2018-12-17 RX ADMIN — SODIUM CHLORIDE 1000 MILLILITER(S): 9 INJECTION INTRAMUSCULAR; INTRAVENOUS; SUBCUTANEOUS at 09:20

## 2018-12-17 RX ADMIN — SENNA PLUS 2 TABLET(S): 8.6 TABLET ORAL at 22:04

## 2018-12-17 RX ADMIN — OXYCODONE AND ACETAMINOPHEN 1 TABLET(S): 5; 325 TABLET ORAL at 19:30

## 2018-12-17 RX ADMIN — Medication 40 MILLIGRAM(S): at 05:50

## 2018-12-17 RX ADMIN — Medication 16 UNIT(S): at 17:57

## 2018-12-17 RX ADMIN — Medication 81 MILLIGRAM(S): at 12:18

## 2018-12-17 NOTE — PROGRESS NOTE ADULT - SUBJECTIVE AND OBJECTIVE BOX
VITAL SIGNS    Telemetry:    Vital Signs Last 24 Hrs  T(C): 36.9 (12-17-18 @ 04:37), Max: 36.9 (12-17-18 @ 04:37)  T(F): 98.5 (12-17-18 @ 04:37), Max: 98.5 (12-17-18 @ 04:37)  HR: 89 (12-17-18 @ 10:14) (79 - 90)  BP: 127/73 (12-17-18 @ 10:14) (68/55 - 130/74)  RR: 18 (12-17-18 @ 04:37) (18 - 18)  SpO2: 98% (12-17-18 @ 04:37) (93% - 98%)            12-16 @ 07:01  -  12-17 @ 07:00  --------------------------------------------------------  IN: 580 mL / OUT: 1000 mL / NET: -420 mL    12-17 @ 07:01  -  12-17 @ 11:36  --------------------------------------------------------  IN: 360 mL / OUT: 0 mL / NET: 360 mL       Daily     Daily   Admit Wt: Drug Dosing Weight  Height (cm): 172.72 (07 Dec 2018 16:30)  Weight (kg): 106.7 (07 Dec 2018 16:30)  BMI (kg/m2): 35.8 (07 Dec 2018 16:30)  BSA (m2): 2.19 (07 Dec 2018 16:30)      CAPILLARY BLOOD GLUCOSE      POCT Blood Glucose.: 124 mg/dL (17 Dec 2018 08:05)  POCT Blood Glucose.: 120 mg/dL (16 Dec 2018 21:47)  POCT Blood Glucose.: 163 mg/dL (16 Dec 2018 16:38)  POCT Blood Glucose.: 206 mg/dL (16 Dec 2018 11:47)          acetaminophen   Tablet .. 650 milliGRAM(s) Oral every 6 hours PRN  ALBUTerol    90 MICROgram(s) HFA Inhaler 1 Puff(s) Inhalation every 4 hours  ALBUTerol/ipratropium for Nebulization 3 milliLiter(s) Nebulizer every 6 hours  amiodarone    Tablet 200 milliGRAM(s) Oral daily  aspirin enteric coated 81 milliGRAM(s) Oral daily  cholestyramine Powder (Sugar-Free) 4 Gram(s) Oral daily  dextrose 40% Gel 15 Gram(s) Oral once PRN  dextrose 5%. 1000 milliLiter(s) IV Continuous <Continuous>  dextrose 50% Injectable 50 milliLiter(s) IV Push every 15 minutes  fluticasone propionate/ salmeterol 500-50 MICROgram(s) Diskus 1 Dose(s) Inhalation two times a day  gabapentin 300 milliGRAM(s) Oral at bedtime  gabapentin 100 milliGRAM(s) Oral daily  glucagon  Injectable 1 milliGRAM(s) IntraMuscular once PRN  heparin  Injectable 5000 Unit(s) SubCutaneous every 8 hours  insulin glargine Injectable (LANTUS) 43 Unit(s) SubCutaneous at bedtime  insulin lispro (HumaLOG) corrective regimen sliding scale   SubCutaneous three times a day before meals  insulin lispro (HumaLOG) corrective regimen sliding scale   SubCutaneous at bedtime  insulin lispro Injectable (HumaLOG) 16 Unit(s) SubCutaneous three times a day before meals  levothyroxine 137 MICROGram(s) Oral daily  montelukast 10 milliGRAM(s) Oral daily  oxyCODONE    5 mG/acetaminophen 325 mG 1 Tablet(s) Oral every 4 hours PRN  pantoprazole    Tablet 40 milliGRAM(s) Oral before breakfast  polyethylene glycol 3350 17 Gram(s) Oral daily  predniSONE   Tablet 7.5 milliGRAM(s) Oral every 12 hours  senna 2 Tablet(s) Oral at bedtime  tiotropium 18 MICROgram(s) Capsule 1 Capsule(s) Inhalation daily      PHYSICAL EXAM    Subjective: "Hi.   Neurology: alert and oriented x 3, nonfocal, no gross deficits  CV : tele:  RSR  Sternal Wound :  CDI with dressing , Stable  Lungs: clear. RR easy, unlabored   Abdomen: soft, nontender, nondistended, positive bowel sounds, bowel movement   Neg N/V/D   :  pt voiding without difficulty   Extremities:   ODONNELL; edema, neg calf tenderness.   PPP bilaterally      PW:  Chest tubes: VITAL SIGNS    Telemetry:  RSR 60-80   Vital Signs Last 24 Hrs  T(C): 36.9 (12-17-18 @ 04:37), Max: 36.9 (12-17-18 @ 04:37)  T(F): 98.5 (12-17-18 @ 04:37), Max: 98.5 (12-17-18 @ 04:37)  HR: 89 (12-17-18 @ 10:14) (79 - 90)  BP: 127/73 (12-17-18 @ 10:14) (68/55 - 130/74)  RR: 18 (12-17-18 @ 04:37) (18 - 18)  SpO2: 98% (12-17-18 @ 04:37) (93% - 98%)            12-16 @ 07:01  -  12-17 @ 07:00  --------------------------------------------------------  IN: 580 mL / OUT: 1000 mL / NET: -420 mL    12-17 @ 07:01  -  12-17 @ 11:36  --------------------------------------------------------  IN: 360 mL / OUT: 0 mL / NET: 360 mL       Daily     Daily   Admit Wt: Drug Dosing Weight  Height (cm): 172.72 (07 Dec 2018 16:30)  Weight (kg): 106.7 (07 Dec 2018 16:30)  BMI (kg/m2): 35.8 (07 Dec 2018 16:30)  BSA (m2): 2.19 (07 Dec 2018 16:30)      CAPILLARY BLOOD GLUCOSE      POCT Blood Glucose.: 124 mg/dL (17 Dec 2018 08:05)  POCT Blood Glucose.: 120 mg/dL (16 Dec 2018 21:47)  POCT Blood Glucose.: 163 mg/dL (16 Dec 2018 16:38)  POCT Blood Glucose.: 206 mg/dL (16 Dec 2018 11:47)          acetaminophen   Tablet .. 650 milliGRAM(s) Oral every 6 hours PRN  ALBUTerol    90 MICROgram(s) HFA Inhaler 1 Puff(s) Inhalation every 4 hours  ALBUTerol/ipratropium for Nebulization 3 milliLiter(s) Nebulizer every 6 hours  amiodarone    Tablet 200 milliGRAM(s) Oral daily  aspirin enteric coated 81 milliGRAM(s) Oral daily  cholestyramine Powder (Sugar-Free) 4 Gram(s) Oral daily  dextrose 40% Gel 15 Gram(s) Oral once PRN  dextrose 5%. 1000 milliLiter(s) IV Continuous <Continuous>  dextrose 50% Injectable 50 milliLiter(s) IV Push every 15 minutes  fluticasone propionate/ salmeterol 500-50 MICROgram(s) Diskus 1 Dose(s) Inhalation two times a day  gabapentin 300 milliGRAM(s) Oral at bedtime  gabapentin 100 milliGRAM(s) Oral daily  glucagon  Injectable 1 milliGRAM(s) IntraMuscular once PRN  heparin  Injectable 5000 Unit(s) SubCutaneous every 8 hours  insulin glargine Injectable (LANTUS) 43 Unit(s) SubCutaneous at bedtime  insulin lispro (HumaLOG) corrective regimen sliding scale   SubCutaneous three times a day before meals  insulin lispro (HumaLOG) corrective regimen sliding scale   SubCutaneous at bedtime  insulin lispro Injectable (HumaLOG) 16 Unit(s) SubCutaneous three times a day before meals  levothyroxine 137 MICROGram(s) Oral daily  montelukast 10 milliGRAM(s) Oral daily  oxyCODONE    5 mG/acetaminophen 325 mG 1 Tablet(s) Oral every 4 hours PRN  pantoprazole    Tablet 40 milliGRAM(s) Oral before breakfast  polyethylene glycol 3350 17 Gram(s) Oral daily  predniSONE   Tablet 7.5 milliGRAM(s) Oral every 12 hours  senna 2 Tablet(s) Oral at bedtime  tiotropium 18 MICROgram(s) Capsule 1 Capsule(s) Inhalation daily      PHYSICAL EXAM    Subjective: "I felt dizzy before."   Neurology: alert and oriented x 3, nonfocal, no gross deficits  CV : tele:  RSR 60-80   Lungs: clear but diminished at the bases.  RR easy, unlabored   Abdomen: soft, nontender, nondistended, positive bowel sounds, + bowel movement   Neg N/V/D; obese abdomen   :  pt voiding without difficulty   Extremities:   ODONNELL; trace LE edema, neg calf tenderness.   PPP bilaterally ; bilateral LE weakness noted

## 2018-12-17 NOTE — PROGRESS NOTE ADULT - PROBLEM SELECTOR PLAN 2
MRI of spine for LE weakness  MRI indicative of spinal cord infarction.  Neurology-no intervention required at this time.   Strength improving LE b/l.  PT/ OT- acute rehab recommended- plan for acute rehab monday 12/17 as per Dr. Kumar MRI of spine for LE weakness  MRI indicative of spinal cord infarction.  Neurology-no intervention required at this time.   Strength improving LE b/l.  PT/ OT- acute rehab recommended- plan for acute rehab

## 2018-12-17 NOTE — PROGRESS NOTE ADULT - ASSESSMENT
Assessment  DMT2: 63y Female with DM T2 with hyperglycemia on insulin, dose was increased yesterday,  blood sugars improving, eating meals,  non compliant with low carb diet.  Aortic Dissection S/P Sx, on medications,  no chest pain, stable, monitored.  Hypercalcemia: Due to primary hyperparathyroidism, calcium improving.  Hypothyroidism:  On synthroid 137 mcg po daily, asymptomatic.  HTN: Controlled, no headaches, on medications.        Kaleb Andrews MD  Cell: [phone]  Office: [phone]

## 2018-12-17 NOTE — PROGRESS NOTE ADULT - ATTENDING COMMENTS
Seen and examined with resident. Agree with note.   Patient with incomplete paraplegia,  gait dysfunction.  Monitor BP  Patient will need acute rehabilitation when stable.
Impression: Spinal cord infarction in the setting of Aortic dissection  ASA 81 mg, will need PT/OT assessment and intense rehabilitation

## 2018-12-17 NOTE — PROGRESS NOTE ADULT - ASSESSMENT
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 Pt states pain was 8/10, nonradiating. Pt denies LOC, fevers/chills, dizziness, numbness/tingling, chest pain, N/V/D.     At [place], CTA of the chest revealed probable type A dissection w/ intramural hematoma w/ active hemmorhage. Pt transferred urgently to [facility] for possible CT surgery.  Upon arrival to [facility], pt placed on Labetolol drip for blood pressure control and patient taken for a CT of the Chest. Dr. Kuamr reviewed images which revealed Type B dissection w/ hemoperiteneum. INR at outside hospital 1.8.   TTE with pericardial effusion, tamponade physiology..  Anticoagulation reversed.  Labetolol and cardene for BP control  Hyperglycemia, chronic steroid use for asthma, endo consulted  + Ua , on bactrim.  Changed to oral labetolol and  norvasc for HTN.  Bilateral lower extremity weakness, 2/4 strength on exam; start of symptoms coincided with back pain/dissection;   Neuro is following, pending MRI/MRA, due to the occurance of the LE weakness concurrently with the presentation of the type B dissection, need to r/o dissection, ischemia or occlusion of the ASA.  12/11 BP stable.   MRI complete,  pending official reading and neuro f/u.  12/12 VSS. OOB. MRI indicative of spinal cord infarct. Neurology-no intervention required at this time. Strength improving LE b/l. Labetalol d/c; lasix increased to 40mg BID. OT recommending ARMANDO as well.  12/13 VSS; CTA of chest/abd/pelvis today: unchanged;  F/U results. + constipation:  Received sorbitol/suppositories: + bm noted  12/14 VSS; Pt denies cp/sob; increase activity as tolerated; continue PT/OT and diuresis   12/15 VSS; await discharge to acute rehab; repeat CT scan in 3-4 months as per Dr. Kumar   Discharge planning- acute rehab mon ? 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 Pt states pain was 8/10, nonradiating. Pt denies LOC, fevers/chills, dizziness, numbness/tingling, chest pain, N/V/D.     At [place], CTA of the chest revealed probable type A dissection w/ intramural hematoma w/ active hemmorhage. Pt transferred urgently to [facility] for possible CT surgery.  Upon arrival to [facility], pt placed on Labetolol drip for blood pressure control and patient taken for a CT of the Chest. Dr. Kumar reviewed images which revealed Type B dissection w/ hemoperiteneum. INR at outside hospital 1.8.   TTE with pericardial effusion, tamponade physiology..  Anticoagulation reversed.  Labetolol and cardene for BP control  Hyperglycemia, chronic steroid use for asthma, endo consulted  + Ua , on bactrim.  Changed to oral labetolol and  norvasc for HTN.  Bilateral lower extremity weakness, 2/4 strength on exam; start of symptoms coincided with back pain/dissection;   Neuro is following, pending MRI/MRA, due to the occurance of the LE weakness concurrently with the presentation of the type B dissection, need to r/o dissection, ischemia or occlusion of the ASA.  12/11 BP stable.   MRI complete,  pending official reading and neuro f/u.  12/12 VSS. OOB. MRI indicative of spinal cord infarct. Neurology-no intervention required at this time. Strength improving LE b/l. Labetalol d/c; lasix increased to 40mg BID. OT recommending ARMANDO as well.  12/13 VSS; CTA of chest/abd/pelvis today: unchanged;  F/U results. + constipation:  Received sorbitol/suppositories: + bm noted  12/14 VSS; Pt denies cp/sob; increase activity as tolerated; continue PT/OT and diuresis   12/15 VSS; await discharge to acute rehab; repeat CT scan in 3-4 months as per Dr. Kumar   12/17 +orthostasis while working with PT today - diuretics d/c and ivf given; bp recovered sbp 120; continue to monitor and repeat orthostatics later today   Discharge planning- acute rehab mon ? [facility] - repeat CT scan in 3-4 months as per Dr. Kumar

## 2018-12-17 NOTE — PROGRESS NOTE ADULT - SUBJECTIVE AND OBJECTIVE BOX
Chief complaint  Patient is a 63y old  Female who presents with a chief complaint of Back Pain transfer for Type B dissection (17 Dec 2018 14:32)   Review of systems  Patient in bed, looks comfortable, no fever,  no hypoglycemia.    Labs and Fingersticks  CAPILLARY BLOOD GLUCOSE      POCT Blood Glucose.: 163 mg/dL (17 Dec 2018 11:52)  POCT Blood Glucose.: 124 mg/dL (17 Dec 2018 08:05)  POCT Blood Glucose.: 120 mg/dL (16 Dec 2018 21:47)  POCT Blood Glucose.: 163 mg/dL (16 Dec 2018 16:38)      Anion Gap, Serum: 13 (12-17 @ 06:32)  Anion Gap, Serum: 12 (12-16 @ 06:51)      Calcium, Total Serum: 11.0 <H> (12-17 @ 06:32)  Calcium, Total Serum: 10.8 <H> (12-16 @ 06:51)  Intact PTH: 98 <H> (12-17 @ 09:37)  Vitamin D, 25-Hydroxy: 21.0 <L> (12-17 @ 09:37)          12-17    137  |  97  |  34<H>  ----------------------------<  111<H>  4.2   |  27  |  0.96    Ca    11.0<H>      17 Dec 2018 06:32                          12.0   12.0  )-----------( 219      ( 17 Dec 2018 06:35 )             37.3     Medications  MEDICATIONS  (STANDING):  ALBUTerol    90 MICROgram(s) HFA Inhaler 1 Puff(s) Inhalation every 4 hours  ALBUTerol/ipratropium for Nebulization 3 milliLiter(s) Nebulizer every 6 hours  amiodarone    Tablet 200 milliGRAM(s) Oral daily  aspirin enteric coated 81 milliGRAM(s) Oral daily  cholestyramine Powder (Sugar-Free) 4 Gram(s) Oral daily  dextrose 5%. 1000 milliLiter(s) (50 mL/Hr) IV Continuous <Continuous>  dextrose 50% Injectable 50 milliLiter(s) IV Push every 15 minutes  fluticasone propionate/ salmeterol 500-50 MICROgram(s) Diskus 1 Dose(s) Inhalation two times a day  gabapentin 300 milliGRAM(s) Oral at bedtime  gabapentin 100 milliGRAM(s) Oral daily  heparin  Injectable 5000 Unit(s) SubCutaneous every 8 hours  insulin glargine Injectable (LANTUS) 43 Unit(s) SubCutaneous at bedtime  insulin lispro (HumaLOG) corrective regimen sliding scale   SubCutaneous three times a day before meals  insulin lispro (HumaLOG) corrective regimen sliding scale   SubCutaneous at bedtime  insulin lispro Injectable (HumaLOG) 16 Unit(s) SubCutaneous three times a day before meals  levothyroxine 137 MICROGram(s) Oral daily  montelukast 10 milliGRAM(s) Oral daily  pantoprazole    Tablet 40 milliGRAM(s) Oral before breakfast  polyethylene glycol 3350 17 Gram(s) Oral daily  predniSONE   Tablet 7.5 milliGRAM(s) Oral every 12 hours  senna 2 Tablet(s) Oral at bedtime  tiotropium 18 MICROgram(s) Capsule 1 Capsule(s) Inhalation daily      Physical Exam  General: Patient comfortable in bed  Vital Signs Last 12 Hrs  T(F): 97.6 (12-17-18 @ 13:46), Max: 98.5 (12-17-18 @ 04:37)  HR: 87 (12-17-18 @ 13:46) (80 - 90)  BP: 127/73 (12-17-18 @ 10:14) (68/55 - 130/41)  BP(mean): --  RR: 18 (12-17-18 @ 13:46) (18 - 18)  SpO2: 94% (12-17-18 @ 13:46) (94% - 98%)  Neck: No palpable thyroid nodules.  CVS: S1S2, No murmurs  Respiratory: No wheezing, no crepitations  GI: Abdomen soft, bowel sounds positive  Musculoskeletal:  edema lower extremities.   Skin: No skin rashes, no ecchymosis    Diagnostics    Parathyroid Hormone Intact, Serum: AM Sched. Collection: 17-Dec-2018 06:00 (12-16 @ 10:32)  PTH Related Peptide: AM Sched. Collection: 17-Dec-2018 06:00 (12-16 @ 10:32)  Vitamin D, 1, 25-Dihydroxy: AM Sched. Collection: 17-Dec-2018 06:00 (12-16 @ 10:32)  Vitamin D, 25-Hydroxy: AM Sched. Collection: 17-Dec-2018 06:00 (12-16 @ 10:32)

## 2018-12-17 NOTE — PROGRESS NOTE ADULT - ASSESSMENT
64yo Female with functional deficits after Type B aortic dissection and distal thoracic/conus spinal infarction.    #PT - strengthening, transfers, gait training; recommend having PT return to work with patient later today or early tomorrow to re-assess for orthostasis  #OT - ADLs  #Skin - turn and position q2hrs to prevent skin breakdown/ulcers  #Orthostasis - s/p IVF. Recommend ANJANA stockings, abdominal binder when OOB with therapies. Encouraged patient for po hydration.  #Pain - Tylenol prn  #DVT PPX - Heparin  #Dispo - Recommend ACUTE inpatient rehabilitation for the functional deficits consisting of 3 hours of therapy/day & 24 hour RN/daily PMR physician for comorbid medical management WHEN MEDICALLY STABLE. Will continue to follow for ongoing rehab needs and recommendations. Patient will be able to tolerate 3 hours a day.

## 2018-12-17 NOTE — PROGRESS NOTE ADULT - PROBLEM SELECTOR PLAN 1
medical management as per DR. Kumar   BP control: keep sbp 120-140   lasix increased to 40mg BID for fluid retention and Mod pleural effusion b/l medical management as per DR. Kumar   BP control: keep sbp 120-140

## 2018-12-18 LAB
ANION GAP SERPL CALC-SCNC: 18 MMOL/L — HIGH (ref 5–17)
BUN SERPL-MCNC: 48 MG/DL — HIGH (ref 7–23)
CALCIUM SERPL-MCNC: 11.2 MG/DL — HIGH (ref 8.4–10.5)
CHLORIDE SERPL-SCNC: 92 MMOL/L — LOW (ref 96–108)
CO2 SERPL-SCNC: 23 MMOL/L — SIGNIFICANT CHANGE UP (ref 22–31)
CREAT SERPL-MCNC: 1.21 MG/DL — SIGNIFICANT CHANGE UP (ref 0.5–1.3)
GLUCOSE BLDC GLUCOMTR-MCNC: 102 MG/DL — HIGH (ref 70–99)
GLUCOSE BLDC GLUCOMTR-MCNC: 119 MG/DL — HIGH (ref 70–99)
GLUCOSE BLDC GLUCOMTR-MCNC: 176 MG/DL — HIGH (ref 70–99)
GLUCOSE BLDC GLUCOMTR-MCNC: 189 MG/DL — HIGH (ref 70–99)
GLUCOSE SERPL-MCNC: 164 MG/DL — HIGH (ref 70–99)
HCT VFR BLD CALC: 36.2 % — SIGNIFICANT CHANGE UP (ref 34.5–45)
HCT VFR BLD CALC: 40.8 % — SIGNIFICANT CHANGE UP (ref 34.5–45)
HGB BLD-MCNC: 11.8 G/DL — SIGNIFICANT CHANGE UP (ref 11.5–15.5)
HGB BLD-MCNC: 12.2 G/DL — SIGNIFICANT CHANGE UP (ref 11.5–15.5)
MCHC RBC-ENTMCNC: 27 PG — SIGNIFICANT CHANGE UP (ref 27–34)
MCHC RBC-ENTMCNC: 29.1 PG — SIGNIFICANT CHANGE UP (ref 27–34)
MCHC RBC-ENTMCNC: 30 GM/DL — LOW (ref 32–36)
MCHC RBC-ENTMCNC: 32.7 GM/DL — SIGNIFICANT CHANGE UP (ref 32–36)
MCV RBC AUTO: 88.9 FL — SIGNIFICANT CHANGE UP (ref 80–100)
MCV RBC AUTO: 89.9 FL — SIGNIFICANT CHANGE UP (ref 80–100)
PLATELET # BLD AUTO: 208 K/UL — SIGNIFICANT CHANGE UP (ref 150–400)
PLATELET # BLD AUTO: 246 K/UL — SIGNIFICANT CHANGE UP (ref 150–400)
POTASSIUM SERPL-MCNC: 4.4 MMOL/L — SIGNIFICANT CHANGE UP (ref 3.5–5.3)
POTASSIUM SERPL-SCNC: 4.4 MMOL/L — SIGNIFICANT CHANGE UP (ref 3.5–5.3)
RBC # BLD: 4.07 M/UL — SIGNIFICANT CHANGE UP (ref 3.8–5.2)
RBC # BLD: 4.54 M/UL — SIGNIFICANT CHANGE UP (ref 3.8–5.2)
RBC # FLD: 15.1 % — HIGH (ref 10.3–14.5)
RBC # FLD: 15.3 % — HIGH (ref 10.3–14.5)
SODIUM SERPL-SCNC: 133 MMOL/L — LOW (ref 135–145)
WBC # BLD: 15.3 K/UL — HIGH (ref 3.8–10.5)
WBC # BLD: 15.8 K/UL — HIGH (ref 3.8–10.5)
WBC # FLD AUTO: 15.3 K/UL — HIGH (ref 3.8–10.5)
WBC # FLD AUTO: 15.8 K/UL — HIGH (ref 3.8–10.5)

## 2018-12-18 RX ADMIN — PANTOPRAZOLE SODIUM 40 MILLIGRAM(S): 20 TABLET, DELAYED RELEASE ORAL at 07:59

## 2018-12-18 RX ADMIN — Medication 2: at 12:20

## 2018-12-18 RX ADMIN — FLUTICASONE PROPIONATE AND SALMETEROL 1 DOSE(S): 50; 250 POWDER ORAL; RESPIRATORY (INHALATION) at 17:42

## 2018-12-18 RX ADMIN — Medication 137 MICROGRAM(S): at 05:32

## 2018-12-18 RX ADMIN — GABAPENTIN 100 MILLIGRAM(S): 400 CAPSULE ORAL at 13:34

## 2018-12-18 RX ADMIN — AMIODARONE HYDROCHLORIDE 200 MILLIGRAM(S): 400 TABLET ORAL at 05:32

## 2018-12-18 RX ADMIN — OXYCODONE AND ACETAMINOPHEN 1 TABLET(S): 5; 325 TABLET ORAL at 20:09

## 2018-12-18 RX ADMIN — Medication 16 UNIT(S): at 07:58

## 2018-12-18 RX ADMIN — OXYCODONE AND ACETAMINOPHEN 1 TABLET(S): 5; 325 TABLET ORAL at 07:00

## 2018-12-18 RX ADMIN — Medication 2: at 07:58

## 2018-12-18 RX ADMIN — HEPARIN SODIUM 5000 UNIT(S): 5000 INJECTION INTRAVENOUS; SUBCUTANEOUS at 21:49

## 2018-12-18 RX ADMIN — INSULIN GLARGINE 30 UNIT(S): 100 INJECTION, SOLUTION SUBCUTANEOUS at 21:49

## 2018-12-18 RX ADMIN — GABAPENTIN 300 MILLIGRAM(S): 400 CAPSULE ORAL at 21:49

## 2018-12-18 RX ADMIN — Medication 3 MILLILITER(S): at 17:42

## 2018-12-18 RX ADMIN — OXYCODONE AND ACETAMINOPHEN 1 TABLET(S): 5; 325 TABLET ORAL at 05:50

## 2018-12-18 RX ADMIN — Medication 81 MILLIGRAM(S): at 13:34

## 2018-12-18 RX ADMIN — Medication 16 UNIT(S): at 16:36

## 2018-12-18 RX ADMIN — FLUTICASONE PROPIONATE AND SALMETEROL 1 DOSE(S): 50; 250 POWDER ORAL; RESPIRATORY (INHALATION) at 05:51

## 2018-12-18 RX ADMIN — HEPARIN SODIUM 5000 UNIT(S): 5000 INJECTION INTRAVENOUS; SUBCUTANEOUS at 13:34

## 2018-12-18 RX ADMIN — Medication 7.5 MILLIGRAM(S): at 17:43

## 2018-12-18 RX ADMIN — Medication 3 MILLILITER(S): at 12:23

## 2018-12-18 RX ADMIN — OXYCODONE AND ACETAMINOPHEN 1 TABLET(S): 5; 325 TABLET ORAL at 20:42

## 2018-12-18 RX ADMIN — HEPARIN SODIUM 5000 UNIT(S): 5000 INJECTION INTRAVENOUS; SUBCUTANEOUS at 05:32

## 2018-12-18 RX ADMIN — SENNA PLUS 2 TABLET(S): 8.6 TABLET ORAL at 21:49

## 2018-12-18 RX ADMIN — CHOLESTYRAMINE 4 GRAM(S): 4 POWDER, FOR SUSPENSION ORAL at 12:23

## 2018-12-18 RX ADMIN — POLYETHYLENE GLYCOL 3350 17 GRAM(S): 17 POWDER, FOR SOLUTION ORAL at 13:34

## 2018-12-18 RX ADMIN — Medication 16 UNIT(S): at 12:20

## 2018-12-18 RX ADMIN — Medication 7.5 MILLIGRAM(S): at 05:32

## 2018-12-18 RX ADMIN — Medication 3 MILLILITER(S): at 05:32

## 2018-12-18 NOTE — PROGRESS NOTE ADULT - ASSESSMENT
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 Pt states pain was 8/10, nonradiating. Pt denies LOC, fevers/chills, dizziness, numbness/tingling, chest pain, N/V/D.     At [place], CTA of the chest revealed probable type A dissection w/ intramural hematoma w/ active hemmorhage. Pt transferred urgently to [facility] for possible CT surgery.  Upon arrival to [facility], pt placed on Labetolol drip for blood pressure control and patient taken for a CT of the Chest. Dr. Kumar reviewed images which revealed Type B dissection w/ hemoperiteneum. INR at outside hospital 1.8.   TTE with pericardial effusion, tamponade physiology..  Anticoagulation reversed.  Labetolol and cardene for BP control  Hyperglycemia, chronic steroid use for asthma, endo consulted  + Ua , on bactrim.  Changed to oral labetolol and  norvasc for HTN.  Bilateral lower extremity weakness, 2/4 strength on exam; start of symptoms coincided with back pain/dissection;   Neuro is following, pending MRI/MRA, due to the occurance of the LE weakness concurrently with the presentation of the type B dissection, need to r/o dissection, ischemia or occlusion of the ASA.  12/11 BP stable.   MRI complete,  pending official reading and neuro f/u.  12/12 VSS. OOB. MRI indicative of spinal cord infarct. Neurology-no intervention required at this time. Strength improving LE b/l. Labetalol d/c; lasix increased to 40mg BID. OT recommending ARMANDO as well.  12/13 VSS; CTA of chest/abd/pelvis today: unchanged;  F/U results. + constipation:  Received sorbitol/suppositories: + bm noted  12/14 VSS; Pt denies cp/sob; increase activity as tolerated; continue PT/OT and diuresis   12/15 VSS; await discharge to acute rehab; repeat CT scan in 3-4 months as per Dr. Kumar   12/17 +orthostasis while working with PT today - diuretics d/c and ivf given; bp recovered sbp 120; continue to monitor and repeat orthostatics later today   12/18 VSS pt is not orthostatic today diuretics have been discontinued.  WBC 15 - afebrile.  will monitor CBC in am   d/c to acute rehab Wednesday [facility] - repeat CT scan in 3-4 months as per Dr. Kumar

## 2018-12-18 NOTE — PROGRESS NOTE ADULT - ASSESSMENT
Assessment  DMT2: 63y Female with DM T2 with hyperglycemia on insulin, dose was increased yesterday,  blood sugars improving, eating meals,  non compliant with low carb diet.  Aortic Dissection S/P Sx, on medications,  no chest pain, stable, monitored.  Hypercalcemia: Due to primary hyperparathyroidism, calcium improving.  Hypothyroidism:  On synthroid 137 mcg po daily, asymptomatic.  HTN: Controlled, no headaches, on medications.        Kaleb Andrews MD  Cell: [phone]  Office: [phone]

## 2018-12-18 NOTE — PROGRESS NOTE ADULT - PROBLEM SELECTOR PLAN 2
MRI of spine for LE weakness  MRI indicative of spinal cord infarction.  Neurology-no intervention required at this time.   Strength improving LE b/l.  PT/ OT- acute rehab recommended- plan for acute rehab

## 2018-12-18 NOTE — PROGRESS NOTE ADULT - SUBJECTIVE AND OBJECTIVE BOX
VITAL SIGNS-Telemetry:  SR 70-90  Vital Signs Last 24 Hrs  T(C): 36.8 (12-18-18 @ 13:43), Max: 36.8 (12-18-18 @ 13:43)  T(F): 98.3 (12-18-18 @ 13:43), Max: 98.3 (12-18-18 @ 13:43)  HR: 85 (12-18-18 @ 13:43) (85 - 87)  BP: 133/83 (12-18-18 @ 13:43) (114/76 - 133/83)  RR: 18 (12-18-18 @ 13:43) (18 - 18)  SpO2: 96% (12-18-18 @ 13:43) (92% - 96%)         12-17 @ 07:01  -  12-18 @ 07:00  --------------------------------------------------------  IN: 1220 mL / OUT: 250 mL / NET: 970 mL    12-18 @ 07:01  -  12-18 @ 13:49  --------------------------------------------------------  IN: 600 mL / OUT: 0 mL / NET: 600 mL    Daily     Daily     CAPILLARY BLOOD GLUCOSE  POCT Blood Glucose.: 176 mg/dL (18 Dec 2018 11:55)  POCT Blood Glucose.: 189 mg/dL (18 Dec 2018 07:43)  POCT Blood Glucose.: 156 mg/dL (17 Dec 2018 23:16)  POCT Blood Glucose.: 113 mg/dL (17 Dec 2018 21:50)        PHYSICAL EXAM:  Neurology: alert and oriented x 3, nonfocal, no gross deficits  CV : S1S2  Sternal Wound :  CDI , Stable  Lungs:   Abdomen: soft, nontender, nondistended, positive bowel sounds, last bowel movement         Extremities:         acetaminophen   Tablet .. 650 milliGRAM(s) Oral every 6 hours PRN  ALBUTerol    90 MICROgram(s) HFA Inhaler 1 Puff(s) Inhalation every 4 hours  ALBUTerol/ipratropium for Nebulization 3 milliLiter(s) Nebulizer every 6 hours  amiodarone    Tablet 200 milliGRAM(s) Oral daily  aspirin enteric coated 81 milliGRAM(s) Oral daily  cholestyramine Powder (Sugar-Free) 4 Gram(s) Oral daily  dextrose 40% Gel 15 Gram(s) Oral once PRN  dextrose 5%. 1000 milliLiter(s) IV Continuous <Continuous>  dextrose 50% Injectable 50 milliLiter(s) IV Push every 15 minutes  fluticasone propionate/ salmeterol 500-50 MICROgram(s) Diskus 1 Dose(s) Inhalation two times a day  gabapentin 300 milliGRAM(s) Oral at bedtime  gabapentin 100 milliGRAM(s) Oral daily  glucagon  Injectable 1 milliGRAM(s) IntraMuscular once PRN  heparin  Injectable 5000 Unit(s) SubCutaneous every 8 hours  insulin glargine Injectable (LANTUS) 30 Unit(s) SubCutaneous at bedtime  insulin lispro (HumaLOG) corrective regimen sliding scale   SubCutaneous three times a day before meals  insulin lispro (HumaLOG) corrective regimen sliding scale   SubCutaneous at bedtime  insulin lispro Injectable (HumaLOG) 16 Unit(s) SubCutaneous three times a day before meals  levothyroxine 137 MICROGram(s) Oral daily  montelukast 10 milliGRAM(s) Oral daily  oxyCODONE    5 mG/acetaminophen 325 mG 1 Tablet(s) Oral every 4 hours PRN  pantoprazole    Tablet 40 milliGRAM(s) Oral before breakfast  polyethylene glycol 3350 17 Gram(s) Oral daily  predniSONE   Tablet 7.5 milliGRAM(s) Oral every 12 hours  senna 2 Tablet(s) Oral at bedtime  tiotropium 18 MICROgram(s) Capsule 1 Capsule(s) Inhalation daily    Physical Therapy Rec:   Home  [  ]   Home w/ PT  [  ]  Rehab  [ x ]  Discussed with Cardiothoracic Team at AM rounds. VITAL SIGNS-Telemetry:  SR 70-90  Vital Signs Last 24 Hrs  T(C): 36.8 (12-18-18 @ 13:43), Max: 36.8 (12-18-18 @ 13:43)  T(F): 98.3 (12-18-18 @ 13:43), Max: 98.3 (12-18-18 @ 13:43)  HR: 85 (12-18-18 @ 13:43) (85 - 87)  BP: 133/83 (12-18-18 @ 13:43) (114/76 - 133/83)  RR: 18 (12-18-18 @ 13:43) (18 - 18)  SpO2: 96% (12-18-18 @ 13:43) (92% - 96%)         12-17 @ 07:01  -  12-18 @ 07:00  --------------------------------------------------------  IN: 1220 mL / OUT: 250 mL / NET: 970 mL    12-18 @ 07:01  -  12-18 @ 13:49  --------------------------------------------------------  IN: 600 mL / OUT: 0 mL / NET: 600 mL    Daily     Daily     CAPILLARY BLOOD GLUCOSE  POCT Blood Glucose.: 176 mg/dL (18 Dec 2018 11:55)  POCT Blood Glucose.: 189 mg/dL (18 Dec 2018 07:43)  POCT Blood Glucose.: 156 mg/dL (17 Dec 2018 23:16)  POCT Blood Glucose.: 113 mg/dL (17 Dec 2018 21:50)        PHYSICAL EXAM:  Neurology: alert and oriented x 3, nonfocal, LE weakness  CV : S1S2  Sternal Wound :  CDI , Stable  Lungs: CTA  Abdomen: soft, nontender, nondistended, positive bowel sounds, last bowel movement       +BM 12/17  Extremities:     no edema - LE weakness-    acetaminophen   Tablet .. 650 milliGRAM(s) Oral every 6 hours PRN  ALBUTerol    90 MICROgram(s) HFA Inhaler 1 Puff(s) Inhalation every 4 hours  ALBUTerol/ipratropium for Nebulization 3 milliLiter(s) Nebulizer every 6 hours  amiodarone    Tablet 200 milliGRAM(s) Oral daily  aspirin enteric coated 81 milliGRAM(s) Oral daily  cholestyramine Powder (Sugar-Free) 4 Gram(s) Oral daily  dextrose 40% Gel 15 Gram(s) Oral once PRN  dextrose 5%. 1000 milliLiter(s) IV Continuous <Continuous>  dextrose 50% Injectable 50 milliLiter(s) IV Push every 15 minutes  fluticasone propionate/ salmeterol 500-50 MICROgram(s) Diskus 1 Dose(s) Inhalation two times a day  gabapentin 300 milliGRAM(s) Oral at bedtime  gabapentin 100 milliGRAM(s) Oral daily  glucagon  Injectable 1 milliGRAM(s) IntraMuscular once PRN  heparin  Injectable 5000 Unit(s) SubCutaneous every 8 hours  insulin glargine Injectable (LANTUS) 30 Unit(s) SubCutaneous at bedtime  insulin lispro (HumaLOG) corrective regimen sliding scale   SubCutaneous three times a day before meals  insulin lispro (HumaLOG) corrective regimen sliding scale   SubCutaneous at bedtime  insulin lispro Injectable (HumaLOG) 16 Unit(s) SubCutaneous three times a day before meals  levothyroxine 137 MICROGram(s) Oral daily  montelukast 10 milliGRAM(s) Oral daily  oxyCODONE    5 mG/acetaminophen 325 mG 1 Tablet(s) Oral every 4 hours PRN  pantoprazole    Tablet 40 milliGRAM(s) Oral before breakfast  polyethylene glycol 3350 17 Gram(s) Oral daily  predniSONE   Tablet 7.5 milliGRAM(s) Oral every 12 hours  senna 2 Tablet(s) Oral at bedtime  tiotropium 18 MICROgram(s) Capsule 1 Capsule(s) Inhalation daily    Physical Therapy Rec:   Home  [  ]   Home w/ PT  [  ]  Rehab  [ x ]  Discussed with Cardiothoracic Team at AM rounds.

## 2018-12-18 NOTE — PROGRESS NOTE ADULT - SUBJECTIVE AND OBJECTIVE BOX
Chief complaint  Patient is a 63y old  Female who presents with a chief complaint of Back Pain transfer for Type B dissection (18 Dec 2018 13:49)   Review of systems  Patient in bed, looks comfortable, no fever, no hypoglycemia.    Labs and Fingersticks  CAPILLARY BLOOD GLUCOSE      POCT Blood Glucose.: 176 mg/dL (18 Dec 2018 11:55)  POCT Blood Glucose.: 189 mg/dL (18 Dec 2018 07:43)  POCT Blood Glucose.: 156 mg/dL (17 Dec 2018 23:16)  POCT Blood Glucose.: 113 mg/dL (17 Dec 2018 21:50)  POCT Blood Glucose.: 97 mg/dL (17 Dec 2018 16:34)      Anion Gap, Serum: 18 <H> (12-18 @ 06:03)  Anion Gap, Serum: 13 (12-17 @ 06:32)      Calcium, Total Serum: 11.2 <H> (12-18 @ 06:03)  Calcium, Total Serum: 11.0 <H> (12-17 @ 06:32)  Intact PTH: 98 <H> (12-17 @ 09:37)  Vitamin D, 25-Hydroxy: 21.0 <L> (12-17 @ 09:37)  Vitamin D, 1, 25-Dihydroxy: 31.2 (12-17 @ 09:37)          12-18    133<L>  |  92<L>  |  48<H>  ----------------------------<  164<H>  4.4   |  23  |  1.21    Ca    11.2<H>      18 Dec 2018 06:03                          11.8   15.8  )-----------( 208      ( 18 Dec 2018 10:37 )             36.2     Medications  MEDICATIONS  (STANDING):  ALBUTerol    90 MICROgram(s) HFA Inhaler 1 Puff(s) Inhalation every 4 hours  ALBUTerol/ipratropium for Nebulization 3 milliLiter(s) Nebulizer every 6 hours  amiodarone    Tablet 200 milliGRAM(s) Oral daily  aspirin enteric coated 81 milliGRAM(s) Oral daily  cholestyramine Powder (Sugar-Free) 4 Gram(s) Oral daily  dextrose 5%. 1000 milliLiter(s) (50 mL/Hr) IV Continuous <Continuous>  dextrose 50% Injectable 50 milliLiter(s) IV Push every 15 minutes  fluticasone propionate/ salmeterol 500-50 MICROgram(s) Diskus 1 Dose(s) Inhalation two times a day  gabapentin 300 milliGRAM(s) Oral at bedtime  gabapentin 100 milliGRAM(s) Oral daily  heparin  Injectable 5000 Unit(s) SubCutaneous every 8 hours  insulin glargine Injectable (LANTUS) 30 Unit(s) SubCutaneous at bedtime  insulin lispro (HumaLOG) corrective regimen sliding scale   SubCutaneous three times a day before meals  insulin lispro (HumaLOG) corrective regimen sliding scale   SubCutaneous at bedtime  insulin lispro Injectable (HumaLOG) 16 Unit(s) SubCutaneous three times a day before meals  levothyroxine 137 MICROGram(s) Oral daily  montelukast 10 milliGRAM(s) Oral daily  pantoprazole    Tablet 40 milliGRAM(s) Oral before breakfast  polyethylene glycol 3350 17 Gram(s) Oral daily  predniSONE   Tablet 7.5 milliGRAM(s) Oral every 12 hours  senna 2 Tablet(s) Oral at bedtime  tiotropium 18 MICROgram(s) Capsule 1 Capsule(s) Inhalation daily      Physical Exam  General: Patient comfortable in bed  Vital Signs Last 12 Hrs  T(F): 98.3 (12-18-18 @ 13:43), Max: 98.3 (12-18-18 @ 13:43)  HR: 85 (12-18-18 @ 13:43) (85 - 86)  BP: 133/83 (12-18-18 @ 13:43) (114/76 - 133/83)  BP(mean): --  RR: 18 (12-18-18 @ 13:43) (18 - 18)  SpO2: 96% (12-18-18 @ 13:43) (92% - 96%)  Neck: No palpable thyroid nodules.  CVS: S1S2, No murmurs  Respiratory: No wheezing, no crepitations  GI: Abdomen soft, bowel sounds positive  Musculoskeletal:  edema lower extremities.   Skin: No skin rashes, no ecchymosis    Diagnostics    Parathyroid Hormone Intact, Serum: AM Sched. Collection: 17-Dec-2018 06:00 (12-16 @ 10:32)  PTH Related Peptide: AM Sched. Collection: 17-Dec-2018 06:00 (12-16 @ 10:32)  Vitamin D, 1, 25-Dihydroxy: AM Sched. Collection: 17-Dec-2018 06:00 (12-16 @ 10:32)  Vitamin D, 25-Hydroxy: AM Sched. Collection: 17-Dec-2018 06:00 (12-16 @ 10:32)

## 2018-12-19 ENCOUNTER — TRANSCRIPTION ENCOUNTER (OUTPATIENT)
Age: 63
End: 2018-12-19

## 2018-12-19 ENCOUNTER — INPATIENT (INPATIENT)
Facility: HOSPITAL | Age: 63
LOS: 14 days | Discharge: NURSING, REHAB OR LONG-TERM CARE | DRG: 949 | End: 2019-01-03
Attending: PHYSICAL MEDICINE & REHABILITATION | Admitting: PHYSICAL MEDICINE & REHABILITATION
Payer: MEDICARE

## 2018-12-19 VITALS
DIASTOLIC BLOOD PRESSURE: 66 MMHG | RESPIRATION RATE: 18 BRPM | HEART RATE: 82 BPM | TEMPERATURE: 98 F | SYSTOLIC BLOOD PRESSURE: 122 MMHG | OXYGEN SATURATION: 95 %

## 2018-12-19 VITALS
HEIGHT: 68 IN | DIASTOLIC BLOOD PRESSURE: 73 MMHG | SYSTOLIC BLOOD PRESSURE: 111 MMHG | OXYGEN SATURATION: 98 % | HEART RATE: 79 BPM | TEMPERATURE: 98 F | WEIGHT: 222.45 LBS | RESPIRATION RATE: 14 BRPM

## 2018-12-19 DIAGNOSIS — Z90.49 ACQUIRED ABSENCE OF OTHER SPECIFIED PARTS OF DIGESTIVE TRACT: Chronic | ICD-10-CM

## 2018-12-19 DIAGNOSIS — G95.11 ACUTE INFARCTION OF SPINAL CORD (EMBOLIC) (NONEMBOLIC): ICD-10-CM

## 2018-12-19 DIAGNOSIS — Z98.49 CATARACT EXTRACTION STATUS, UNSPECIFIED EYE: Chronic | ICD-10-CM

## 2018-12-19 PROBLEM — M19.90 UNSPECIFIED OSTEOARTHRITIS, UNSPECIFIED SITE: Chronic | Status: ACTIVE | Noted: 2018-12-07

## 2018-12-19 LAB
ANION GAP SERPL CALC-SCNC: 15 MMOL/L — SIGNIFICANT CHANGE UP (ref 5–17)
BUN SERPL-MCNC: 50 MG/DL — HIGH (ref 7–23)
CALCIUM SERPL-MCNC: 10.5 MG/DL — SIGNIFICANT CHANGE UP (ref 8.4–10.5)
CHLORIDE SERPL-SCNC: 96 MMOL/L — SIGNIFICANT CHANGE UP (ref 96–108)
CO2 SERPL-SCNC: 24 MMOL/L — SIGNIFICANT CHANGE UP (ref 22–31)
CREAT SERPL-MCNC: 1.01 MG/DL — SIGNIFICANT CHANGE UP (ref 0.5–1.3)
GLUCOSE BLDC GLUCOMTR-MCNC: 115 MG/DL — HIGH (ref 70–99)
GLUCOSE BLDC GLUCOMTR-MCNC: 257 MG/DL — HIGH (ref 70–99)
GLUCOSE SERPL-MCNC: 117 MG/DL — HIGH (ref 70–99)
HCT VFR BLD CALC: 35.6 % — SIGNIFICANT CHANGE UP (ref 34.5–45)
HGB BLD-MCNC: 10.7 G/DL — LOW (ref 11.5–15.5)
MCHC RBC-ENTMCNC: 27.1 PG — SIGNIFICANT CHANGE UP (ref 27–34)
MCHC RBC-ENTMCNC: 30.2 GM/DL — LOW (ref 32–36)
MCV RBC AUTO: 89.6 FL — SIGNIFICANT CHANGE UP (ref 80–100)
PLATELET # BLD AUTO: 201 K/UL — SIGNIFICANT CHANGE UP (ref 150–400)
POTASSIUM SERPL-MCNC: 4.3 MMOL/L — SIGNIFICANT CHANGE UP (ref 3.5–5.3)
POTASSIUM SERPL-SCNC: 4.3 MMOL/L — SIGNIFICANT CHANGE UP (ref 3.5–5.3)
RBC # BLD: 3.97 M/UL — SIGNIFICANT CHANGE UP (ref 3.8–5.2)
RBC # FLD: 14.9 % — HIGH (ref 10.3–14.5)
SODIUM SERPL-SCNC: 135 MMOL/L — SIGNIFICANT CHANGE UP (ref 135–145)
WBC # BLD: 13.6 K/UL — HIGH (ref 3.8–10.5)
WBC # FLD AUTO: 13.6 K/UL — HIGH (ref 3.8–10.5)

## 2018-12-19 PROCEDURE — 97110 THERAPEUTIC EXERCISES: CPT

## 2018-12-19 PROCEDURE — 82565 ASSAY OF CREATININE: CPT

## 2018-12-19 PROCEDURE — 81001 URINALYSIS AUTO W/SCOPE: CPT

## 2018-12-19 PROCEDURE — 85014 HEMATOCRIT: CPT

## 2018-12-19 PROCEDURE — 71045 X-RAY EXAM CHEST 1 VIEW: CPT

## 2018-12-19 PROCEDURE — 94640 AIRWAY INHALATION TREATMENT: CPT

## 2018-12-19 PROCEDURE — 87086 URINE CULTURE/COLONY COUNT: CPT

## 2018-12-19 PROCEDURE — 83735 ASSAY OF MAGNESIUM: CPT

## 2018-12-19 PROCEDURE — 82652 VIT D 1 25-DIHYDROXY: CPT

## 2018-12-19 PROCEDURE — 85379 FIBRIN DEGRADATION QUANT: CPT

## 2018-12-19 PROCEDURE — 86901 BLOOD TYPING SEROLOGIC RH(D): CPT

## 2018-12-19 PROCEDURE — 85027 COMPLETE CBC AUTOMATED: CPT

## 2018-12-19 PROCEDURE — 83880 ASSAY OF NATRIURETIC PEPTIDE: CPT

## 2018-12-19 PROCEDURE — 86850 RBC ANTIBODY SCREEN: CPT

## 2018-12-19 PROCEDURE — 83519 RIA NONANTIBODY: CPT

## 2018-12-19 PROCEDURE — P9016: CPT

## 2018-12-19 PROCEDURE — 83970 ASSAY OF PARATHORMONE: CPT

## 2018-12-19 PROCEDURE — 82150 ASSAY OF AMYLASE: CPT

## 2018-12-19 PROCEDURE — 85730 THROMBOPLASTIN TIME PARTIAL: CPT

## 2018-12-19 PROCEDURE — 71275 CT ANGIOGRAPHY CHEST: CPT

## 2018-12-19 PROCEDURE — 93308 TTE F-UP OR LMTD: CPT

## 2018-12-19 PROCEDURE — 85610 PROTHROMBIN TIME: CPT

## 2018-12-19 PROCEDURE — 83036 HEMOGLOBIN GLYCOSYLATED A1C: CPT

## 2018-12-19 PROCEDURE — 84100 ASSAY OF PHOSPHORUS: CPT

## 2018-12-19 PROCEDURE — P9059: CPT

## 2018-12-19 PROCEDURE — 82310 ASSAY OF CALCIUM: CPT

## 2018-12-19 PROCEDURE — 82803 BLOOD GASES ANY COMBINATION: CPT

## 2018-12-19 PROCEDURE — 87186 SC STD MICRODIL/AGAR DIL: CPT

## 2018-12-19 PROCEDURE — 80053 COMPREHEN METABOLIC PANEL: CPT

## 2018-12-19 PROCEDURE — P9045: CPT

## 2018-12-19 PROCEDURE — 82435 ASSAY OF BLOOD CHLORIDE: CPT

## 2018-12-19 PROCEDURE — C8929: CPT

## 2018-12-19 PROCEDURE — 80048 BASIC METABOLIC PNL TOTAL CA: CPT

## 2018-12-19 PROCEDURE — 82306 VITAMIN D 25 HYDROXY: CPT

## 2018-12-19 PROCEDURE — 36430 TRANSFUSION BLD/BLD COMPNT: CPT

## 2018-12-19 PROCEDURE — 82962 GLUCOSE BLOOD TEST: CPT

## 2018-12-19 PROCEDURE — 83605 ASSAY OF LACTIC ACID: CPT

## 2018-12-19 PROCEDURE — 72148 MRI LUMBAR SPINE W/O DYE: CPT

## 2018-12-19 PROCEDURE — 99239 HOSP IP/OBS DSCHRG MGMT >30: CPT

## 2018-12-19 PROCEDURE — 93321 DOPPLER ECHO F-UP/LMTD STD: CPT

## 2018-12-19 PROCEDURE — 74174 CTA ABD&PLVS W/CONTRAST: CPT

## 2018-12-19 PROCEDURE — 93005 ELECTROCARDIOGRAM TRACING: CPT

## 2018-12-19 PROCEDURE — 84295 ASSAY OF SERUM SODIUM: CPT

## 2018-12-19 PROCEDURE — 83690 ASSAY OF LIPASE: CPT

## 2018-12-19 PROCEDURE — 97530 THERAPEUTIC ACTIVITIES: CPT

## 2018-12-19 PROCEDURE — 82947 ASSAY GLUCOSE BLOOD QUANT: CPT

## 2018-12-19 PROCEDURE — 86900 BLOOD TYPING SEROLOGIC ABO: CPT

## 2018-12-19 PROCEDURE — 97162 PT EVAL MOD COMPLEX 30 MIN: CPT

## 2018-12-19 PROCEDURE — 84132 ASSAY OF SERUM POTASSIUM: CPT

## 2018-12-19 PROCEDURE — 84443 ASSAY THYROID STIM HORMONE: CPT

## 2018-12-19 PROCEDURE — 82330 ASSAY OF CALCIUM: CPT

## 2018-12-19 PROCEDURE — 86923 COMPATIBILITY TEST ELECTRIC: CPT

## 2018-12-19 PROCEDURE — 97166 OT EVAL MOD COMPLEX 45 MIN: CPT

## 2018-12-19 RX ORDER — GABAPENTIN 400 MG/1
300 CAPSULE ORAL AT BEDTIME
Qty: 0 | Refills: 0 | Status: DISCONTINUED | OUTPATIENT
Start: 2018-12-19 | End: 2019-01-03

## 2018-12-19 RX ORDER — FLUTICASONE PROPIONATE 50 MCG
1 SPRAY, SUSPENSION NASAL
Qty: 0 | Refills: 0 | COMMUNITY

## 2018-12-19 RX ORDER — OXYCODONE HYDROCHLORIDE 5 MG/1
5 TABLET ORAL EVERY 4 HOURS
Qty: 0 | Refills: 0 | Status: DISCONTINUED | OUTPATIENT
Start: 2018-12-19 | End: 2018-12-25

## 2018-12-19 RX ORDER — LEVOTHYROXINE SODIUM 125 MCG
1 TABLET ORAL
Qty: 0 | Refills: 0 | COMMUNITY
Start: 2018-12-19

## 2018-12-19 RX ORDER — GABAPENTIN 400 MG/1
1 CAPSULE ORAL
Qty: 0 | Refills: 0 | COMMUNITY
Start: 2018-12-19

## 2018-12-19 RX ORDER — OXYCODONE HYDROCHLORIDE 5 MG/1
10 TABLET ORAL EVERY 4 HOURS
Qty: 0 | Refills: 0 | Status: DISCONTINUED | OUTPATIENT
Start: 2018-12-19 | End: 2018-12-25

## 2018-12-19 RX ORDER — SENNA PLUS 8.6 MG/1
2 TABLET ORAL
Qty: 0 | Refills: 0 | COMMUNITY
Start: 2018-12-19

## 2018-12-19 RX ORDER — ALBUTEROL 90 UG/1
1 AEROSOL, METERED ORAL EVERY 4 HOURS
Qty: 0 | Refills: 0 | Status: DISCONTINUED | OUTPATIENT
Start: 2018-12-19 | End: 2019-01-03

## 2018-12-19 RX ORDER — AMIODARONE HYDROCHLORIDE 400 MG/1
1 TABLET ORAL
Qty: 0 | Refills: 0 | DISCHARGE
Start: 2018-12-19

## 2018-12-19 RX ORDER — DEXTROSE 50 % IN WATER 50 %
25 SYRINGE (ML) INTRAVENOUS ONCE
Qty: 0 | Refills: 0 | Status: DISCONTINUED | OUTPATIENT
Start: 2018-12-19 | End: 2019-01-03

## 2018-12-19 RX ORDER — GABAPENTIN 400 MG/1
100 CAPSULE ORAL
Qty: 0 | Refills: 0 | COMMUNITY

## 2018-12-19 RX ORDER — METFORMIN HYDROCHLORIDE 850 MG/1
1 TABLET ORAL
Qty: 0 | Refills: 0 | COMMUNITY

## 2018-12-19 RX ORDER — MONTELUKAST 4 MG/1
10 TABLET, CHEWABLE ORAL DAILY
Qty: 0 | Refills: 0 | Status: DISCONTINUED | OUTPATIENT
Start: 2018-12-19 | End: 2018-12-21

## 2018-12-19 RX ORDER — PANTOPRAZOLE SODIUM 20 MG/1
40 TABLET, DELAYED RELEASE ORAL
Qty: 0 | Refills: 0 | Status: DISCONTINUED | OUTPATIENT
Start: 2018-12-19 | End: 2019-01-03

## 2018-12-19 RX ORDER — HEPARIN SODIUM 5000 [USP'U]/ML
5000 INJECTION INTRAVENOUS; SUBCUTANEOUS EVERY 8 HOURS
Qty: 0 | Refills: 0 | Status: DISCONTINUED | OUTPATIENT
Start: 2018-12-19 | End: 2019-01-03

## 2018-12-19 RX ORDER — AMIODARONE HYDROCHLORIDE 400 MG/1
200 TABLET ORAL DAILY
Qty: 0 | Refills: 0 | Status: DISCONTINUED | OUTPATIENT
Start: 2018-12-19 | End: 2019-01-03

## 2018-12-19 RX ORDER — CHOLESTYRAMINE 4 G/9G
4 POWDER, FOR SUSPENSION ORAL DAILY
Qty: 0 | Refills: 0 | Status: DISCONTINUED | OUTPATIENT
Start: 2018-12-19 | End: 2018-12-21

## 2018-12-19 RX ORDER — IPRATROPIUM/ALBUTEROL SULFATE 18-103MCG
3 AEROSOL WITH ADAPTER (GRAM) INHALATION EVERY 6 HOURS
Qty: 0 | Refills: 0 | Status: DISCONTINUED | OUTPATIENT
Start: 2018-12-19 | End: 2018-12-21

## 2018-12-19 RX ORDER — INSULIN LISPRO 100/ML
VIAL (ML) SUBCUTANEOUS AT BEDTIME
Qty: 0 | Refills: 0 | Status: DISCONTINUED | OUTPATIENT
Start: 2018-12-19 | End: 2019-01-03

## 2018-12-19 RX ORDER — INSULIN GLARGINE 100 [IU]/ML
15 INJECTION, SOLUTION SUBCUTANEOUS
Qty: 0 | Refills: 0 | DISCHARGE
Start: 2018-12-19

## 2018-12-19 RX ORDER — GABAPENTIN 400 MG/1
100 CAPSULE ORAL DAILY
Qty: 0 | Refills: 0 | Status: DISCONTINUED | OUTPATIENT
Start: 2018-12-19 | End: 2018-12-26

## 2018-12-19 RX ORDER — INSULIN GLARGINE 100 [IU]/ML
15 INJECTION, SOLUTION SUBCUTANEOUS
Qty: 0 | Refills: 0 | COMMUNITY

## 2018-12-19 RX ORDER — ASPIRIN/CALCIUM CARB/MAGNESIUM 324 MG
1 TABLET ORAL
Qty: 0 | Refills: 0 | COMMUNITY
Start: 2018-12-19

## 2018-12-19 RX ORDER — INSULIN LISPRO 100/ML
VIAL (ML) SUBCUTANEOUS
Qty: 0 | Refills: 0 | Status: DISCONTINUED | OUTPATIENT
Start: 2018-12-19 | End: 2019-01-03

## 2018-12-19 RX ORDER — SENNA PLUS 8.6 MG/1
2 TABLET ORAL AT BEDTIME
Qty: 0 | Refills: 0 | Status: DISCONTINUED | OUTPATIENT
Start: 2018-12-19 | End: 2019-01-03

## 2018-12-19 RX ORDER — TIOTROPIUM BROMIDE 18 UG/1
1 CAPSULE ORAL; RESPIRATORY (INHALATION)
Qty: 0 | Refills: 0 | COMMUNITY
Start: 2018-12-19

## 2018-12-19 RX ORDER — GLUCAGON INJECTION, SOLUTION 0.5 MG/.1ML
1 INJECTION, SOLUTION SUBCUTANEOUS ONCE
Qty: 0 | Refills: 0 | Status: DISCONTINUED | OUTPATIENT
Start: 2018-12-19 | End: 2019-01-03

## 2018-12-19 RX ORDER — FLUTICASONE PROPIONATE AND SALMETEROL 50; 250 UG/1; UG/1
1 POWDER ORAL; RESPIRATORY (INHALATION)
Qty: 0 | Refills: 0 | Status: DISCONTINUED | OUTPATIENT
Start: 2018-12-19 | End: 2019-01-03

## 2018-12-19 RX ORDER — CHOLESTYRAMINE 4 G/9G
4 POWDER, FOR SUSPENSION ORAL
Qty: 0 | Refills: 0 | COMMUNITY
Start: 2018-12-19

## 2018-12-19 RX ORDER — INSULIN GLARGINE 100 [IU]/ML
30 INJECTION, SOLUTION SUBCUTANEOUS AT BEDTIME
Qty: 0 | Refills: 0 | Status: DISCONTINUED | OUTPATIENT
Start: 2018-12-19 | End: 2018-12-24

## 2018-12-19 RX ORDER — ASPIRIN/CALCIUM CARB/MAGNESIUM 324 MG
81 TABLET ORAL DAILY
Qty: 0 | Refills: 0 | Status: DISCONTINUED | OUTPATIENT
Start: 2018-12-19 | End: 2019-01-03

## 2018-12-19 RX ORDER — DEXTROSE 50 % IN WATER 50 %
12.5 SYRINGE (ML) INTRAVENOUS ONCE
Qty: 0 | Refills: 0 | Status: DISCONTINUED | OUTPATIENT
Start: 2018-12-19 | End: 2019-01-03

## 2018-12-19 RX ORDER — DOCUSATE SODIUM 100 MG
100 CAPSULE ORAL
Qty: 0 | Refills: 0 | Status: DISCONTINUED | OUTPATIENT
Start: 2018-12-19 | End: 2018-12-23

## 2018-12-19 RX ORDER — FLUTICASONE PROPIONATE AND SALMETEROL 50; 250 UG/1; UG/1
1 POWDER ORAL; RESPIRATORY (INHALATION)
Qty: 0 | Refills: 0 | COMMUNITY

## 2018-12-19 RX ORDER — LEVOTHYROXINE SODIUM 125 MCG
137 TABLET ORAL DAILY
Qty: 0 | Refills: 0 | Status: DISCONTINUED | OUTPATIENT
Start: 2018-12-19 | End: 2019-01-03

## 2018-12-19 RX ORDER — POLYETHYLENE GLYCOL 3350 17 G/17G
17 POWDER, FOR SOLUTION ORAL
Qty: 0 | Refills: 0 | COMMUNITY
Start: 2018-12-19

## 2018-12-19 RX ORDER — FLUTICASONE PROPIONATE AND SALMETEROL 50; 250 UG/1; UG/1
1 POWDER ORAL; RESPIRATORY (INHALATION)
Qty: 0 | Refills: 0 | DISCHARGE
Start: 2018-12-19

## 2018-12-19 RX ORDER — INSULIN LISPRO 100/ML
16 VIAL (ML) SUBCUTANEOUS
Qty: 0 | Refills: 0 | Status: DISCONTINUED | OUTPATIENT
Start: 2018-12-19 | End: 2018-12-27

## 2018-12-19 RX ORDER — ACETAMINOPHEN 500 MG
650 TABLET ORAL EVERY 6 HOURS
Qty: 0 | Refills: 0 | Status: DISCONTINUED | OUTPATIENT
Start: 2018-12-19 | End: 2019-01-03

## 2018-12-19 RX ORDER — INSULIN GLARGINE 100 [IU]/ML
30 INJECTION, SOLUTION SUBCUTANEOUS
Qty: 0 | Refills: 0 | COMMUNITY
Start: 2018-12-19

## 2018-12-19 RX ORDER — PANTOPRAZOLE SODIUM 20 MG/1
1 TABLET, DELAYED RELEASE ORAL
Qty: 0 | Refills: 0 | COMMUNITY
Start: 2018-12-19

## 2018-12-19 RX ORDER — SODIUM CHLORIDE 9 MG/ML
1000 INJECTION, SOLUTION INTRAVENOUS
Qty: 0 | Refills: 0 | Status: DISCONTINUED | OUTPATIENT
Start: 2018-12-19 | End: 2019-01-03

## 2018-12-19 RX ORDER — TIOTROPIUM BROMIDE 18 UG/1
1 CAPSULE ORAL; RESPIRATORY (INHALATION) DAILY
Qty: 0 | Refills: 0 | Status: DISCONTINUED | OUTPATIENT
Start: 2018-12-19 | End: 2018-12-27

## 2018-12-19 RX ORDER — ACETAMINOPHEN 500 MG
2 TABLET ORAL
Qty: 0 | Refills: 0 | COMMUNITY
Start: 2018-12-19

## 2018-12-19 RX ORDER — ALBUTEROL 90 UG/1
1 AEROSOL, METERED ORAL
Qty: 0 | Refills: 0 | COMMUNITY
Start: 2018-12-19

## 2018-12-19 RX ORDER — GABAPENTIN 400 MG/1
300 CAPSULE ORAL
Qty: 0 | Refills: 0 | COMMUNITY

## 2018-12-19 RX ORDER — MONTELUKAST 4 MG/1
1 TABLET, CHEWABLE ORAL
Qty: 0 | Refills: 0 | DISCHARGE
Start: 2018-12-19

## 2018-12-19 RX ORDER — IPRATROPIUM/ALBUTEROL SULFATE 18-103MCG
3 AEROSOL WITH ADAPTER (GRAM) INHALATION
Qty: 0 | Refills: 0 | COMMUNITY
Start: 2018-12-19

## 2018-12-19 RX ORDER — DEXTROSE 50 % IN WATER 50 %
15 SYRINGE (ML) INTRAVENOUS ONCE
Qty: 0 | Refills: 0 | Status: DISCONTINUED | OUTPATIENT
Start: 2018-12-19 | End: 2019-01-03

## 2018-12-19 RX ORDER — INSULIN GLARGINE 100 [IU]/ML
33 INJECTION, SOLUTION SUBCUTANEOUS
Qty: 0 | Refills: 0 | DISCHARGE
Start: 2018-12-19

## 2018-12-19 RX ADMIN — Medication 6: at 12:34

## 2018-12-19 RX ADMIN — OXYCODONE HYDROCHLORIDE 5 MILLIGRAM(S): 5 TABLET ORAL at 21:00

## 2018-12-19 RX ADMIN — HEPARIN SODIUM 5000 UNIT(S): 5000 INJECTION INTRAVENOUS; SUBCUTANEOUS at 14:00

## 2018-12-19 RX ADMIN — Medication 3 MILLILITER(S): at 12:34

## 2018-12-19 RX ADMIN — CHOLESTYRAMINE 4 GRAM(S): 4 POWDER, FOR SUSPENSION ORAL at 08:10

## 2018-12-19 RX ADMIN — Medication 3 MILLILITER(S): at 06:50

## 2018-12-19 RX ADMIN — HEPARIN SODIUM 5000 UNIT(S): 5000 INJECTION INTRAVENOUS; SUBCUTANEOUS at 06:51

## 2018-12-19 RX ADMIN — Medication 16 UNIT(S): at 12:34

## 2018-12-19 RX ADMIN — Medication 137 MICROGRAM(S): at 06:50

## 2018-12-19 RX ADMIN — Medication 7.5 MILLIGRAM(S): at 06:50

## 2018-12-19 RX ADMIN — MONTELUKAST 10 MILLIGRAM(S): 4 TABLET, CHEWABLE ORAL at 12:35

## 2018-12-19 RX ADMIN — POLYETHYLENE GLYCOL 3350 17 GRAM(S): 17 POWDER, FOR SOLUTION ORAL at 12:35

## 2018-12-19 RX ADMIN — FLUTICASONE PROPIONATE AND SALMETEROL 1 DOSE(S): 50; 250 POWDER ORAL; RESPIRATORY (INHALATION) at 22:20

## 2018-12-19 RX ADMIN — Medication 3 MILLILITER(S): at 21:30

## 2018-12-19 RX ADMIN — Medication 81 MILLIGRAM(S): at 12:35

## 2018-12-19 RX ADMIN — OXYCODONE HYDROCHLORIDE 5 MILLIGRAM(S): 5 TABLET ORAL at 20:31

## 2018-12-19 RX ADMIN — Medication 3 MILLILITER(S): at 00:54

## 2018-12-19 RX ADMIN — INSULIN GLARGINE 30 UNIT(S): 100 INJECTION, SOLUTION SUBCUTANEOUS at 22:17

## 2018-12-19 RX ADMIN — PANTOPRAZOLE SODIUM 40 MILLIGRAM(S): 20 TABLET, DELAYED RELEASE ORAL at 07:41

## 2018-12-19 RX ADMIN — HEPARIN SODIUM 5000 UNIT(S): 5000 INJECTION INTRAVENOUS; SUBCUTANEOUS at 22:08

## 2018-12-19 RX ADMIN — FLUTICASONE PROPIONATE AND SALMETEROL 1 DOSE(S): 50; 250 POWDER ORAL; RESPIRATORY (INHALATION) at 06:50

## 2018-12-19 RX ADMIN — AMIODARONE HYDROCHLORIDE 200 MILLIGRAM(S): 400 TABLET ORAL at 06:50

## 2018-12-19 RX ADMIN — GABAPENTIN 100 MILLIGRAM(S): 400 CAPSULE ORAL at 12:35

## 2018-12-19 RX ADMIN — GABAPENTIN 300 MILLIGRAM(S): 400 CAPSULE ORAL at 22:08

## 2018-12-19 RX ADMIN — Medication 16 UNIT(S): at 08:09

## 2018-12-19 NOTE — DISCHARGE NOTE ADULT - CARE PLAN
Principal Discharge DX:	Dissection of thoracic aorta  Goal:	Medical management  Assessment and plan of treatment:	1. Daily Shower  2. Weight yourself daily and notify any weight gain greater than 2-3 pounds in 24 hours.  3. Regular DASH / Diabetic diet - low fat, low cholesterol, no added salt.  4. Blood draws CBC and CMP twice a week every Monday's and Thursdays and PRN   5. Maintain epps to SD   6. Increase Activity as tolerated.   7. Call / Notify MD any fever greater than 101.0  8. Daily PT/ OT  Goal:	Glycemic  Control  Assessment and plan of treatment:	Accuchecks AC and HS with RISS coverage   Continue with DASH / Diabetic Diet   Continue with current glycemic regimen   Follow up with Endocrinologist after discharged from rehab. Principal Discharge DX:	Dissection of thoracic aorta  Goal:	Medical management  Assessment and plan of treatment:	1. Daily Shower  2. Weight yourself daily and notify any weight gain greater than 2-3 pounds in 24 hours.  3. Regular DASH / Diabetic diet - low fat, low cholesterol, no added salt.  4. Blood draws CBC and CMP twice a week every Monday's and Thursdays and PRN   5. Maintain epps to SD   6. Increase Activity as tolerated.   7. Call / Notify MD any fever greater than 101.0  8. Daily PT/ OT  Secondary Diagnosis:	Spinal cord infarction  Goal:	Medical Management  Assessment and plan of treatment:	Continue with ASA 81 mg PO daily   Daily PT/OT assessment and intense rehabilitation   strict BP control, avoid hypotension  Maintain epps to SD for Neurogenic bladder  Secondary Diagnosis:	Diabetes mellitus  Goal:	Glycemic  Control  Assessment and plan of treatment:	Accuchecks AC and HS with RISS coverage   Continue with DASH / Diabetic Diet  Follow up with Endocrinologist after discharged from rehab.

## 2018-12-19 NOTE — PROGRESS NOTE ADULT - PROVIDER SPECIALTY LIST ADULT
CT Surgery
Critical Care
Endocrinology
Neurology
Physiatry
Critical Care
Endocrinology
Endocrinology

## 2018-12-19 NOTE — PROGRESS NOTE ADULT - ASSESSMENT
Assessment  DMT2: 63y Female with DM T2 with hyperglycemia on insulin, dose adjusted,  blood sugars improving, eating meals,  non compliant with low carb diet.  Aortic Dissection S/P Sx, on medications,  no chest pain, stable, monitored.  Hypercalcemia: Due to primary hyperparathyroidism, calcium improving.  Hypothyroidism:  On synthroid 137 mcg po daily, asymptomatic.  HTN: Controlled, no headaches, on medications.        Kaleb Andrews MD  Cell: [phone]  Office: [phone]

## 2018-12-19 NOTE — DISCHARGE NOTE ADULT - PROVIDER TOKENS
TOKEN:'[id number]:'[id number]' TOKEN:'[id number]:'[id number]:'[id number]'

## 2018-12-19 NOTE — PROGRESS NOTE ADULT - PROBLEM SELECTOR PLAN 3
On meds CTS team following up
+orthostasis while working with PT today - diuretics d/c and ivf given; bp recovered sbp 120; continue to monitor and repeat orthostatics later today
On meds CTS team following up
+orthostasis while working with PT today - diuretics d/c and ivf given; bp recovered sbp 120; continue to monitor and repeat orthostatics later today

## 2018-12-19 NOTE — H&P ADULT - ASSESSMENT
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 Kumar CT surgery     asthma   - prednisone, albuterol, Duonebs    atrial fibrillation   - amiodarone for rate control    DM2 on insulin  - lantus, humalog, SSI, CC diet     hypothyroid  - synthroid     neurogenic bowel and bladder  - epps catheter, future TOV  - senna, colace, miralax, PRN suppository               Weight bearing status: WBAT     Precautions / PROPHYLAXIS:   - Falls, Cardiac, Sternal, Spinal   - Lungs: Aspiration, Incentive Spirometer [patient instructed at the bedside]   - Pressure injury/Skin: Turn Q2hrs while in bed   - DVT: SQ Heparin, SCDs, TEDs     Diet:  Regular + Thins CCHO     MEDICAL PROGNOSIS: GOOD                                   REHAB POTENTIAL: GOOD  ESTIMATED DISPOSITION: HOME                             ELOS: 10-14 Days   EXPECTED THERAPY:     P.T. 1.5 hr/day       O.T. 1.5hr/day         P&O Unnecessary     EXP FREQUENCY: 5 days per 7 day period     PRESCREEN COMPARISION: I have reviewed the prescreen information and I have found no relevent changes between the preadmission screening and my post admission evaulation     RATIONALE FOR INPATIENT ADMISSION - Patient demonstrates the following: (check all that apply)  [X] Medically appropriate for rehabilitation admission  [X] Has attainable rehab goals with an appropriate initial discharge plan  [X] Has rehabilitation potential (expected to make a significant improvement within a reasonable period of time)   [X] Requires close medical managment by a rehab physician, rehab nursing care, Hospitalist and comprehensive interdisciplinary team (including PT, OT, & or SLP, Prosthetics and Orthotics) 62 yo Female with functional deficits after Type B aortic dissection and distal thoracic/conus spinal infarction      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 Kumar CT surgery     asthma   - prednisone, albuterol, advair, Duonebs    atrial fibrillation   - amiodarone for rate control    DM2 on insulin  - lantus, humalog, SSI, CC diet     hypothyroid  - synthroid     neurogenic bowel and bladder  - epps catheter, future TOV  - senna, colace, miralax, PRN suppository       Weight bearing status: WBAT     Precautions / PROPHYLAXIS:   - Falls, Cardiac, Sternal, Spinal   - Lungs: Aspiration, Incentive Spirometer   - Pressure injury/Skin: Turn Q2hrs while in bed   - DVT: SQ Heparin, SCDs, TEDs     Diet:  Regular + Thins CCHO     MEDICAL PROGNOSIS: GOOD                                   REHAB POTENTIAL: GOOD  ESTIMATED DISPOSITION: HOME                             ELOS: 10-14 Days   EXPECTED THERAPY:     P.T. 1.5 hr/day       O.T. 1.5hr/day         P&O Unnecessary     EXP FREQUENCY: 5 days per 7 day period     PRESCREEN COMPARISION: I have reviewed the prescreen information and I have found no relevent changes between the preadmission screening and my post admission evaulation     RATIONALE FOR INPATIENT ADMISSION - Patient demonstrates the following: (check all that apply)  [X] Medically appropriate for rehabilitation admission  [X] Has attainable rehab goals with an appropriate initial discharge plan  [X] Has rehabilitation potential (expected to make a significant improvement within a reasonable period of time)   [X] Requires close medical managment by a rehab physician, rehab nursing care, Hospitalist and comprehensive interdisciplinary team (including PT, OT, & or SLP, Prosthetics and Orthotics)      Dr. Heard made aware. 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 Kumar CT surgery     asthma   - prednisone, albuterol, advair, Duonebs    atrial fibrillation   - amiodarone for rate control    DM2 on insulin-Uncontrolled  - likely due to prednisone and immobility  - lantus, humalog, SSI, CC diet     hypothyroid  - synthroid     neurogenic bowel and bladder  - epps catheter, future TOV-D/C epps in am.  Will obtain PVRs and SC prn  - senna, colace, miralax, PRN suppository.  Will initiate bowel regimen qam    acute blood loss anemia  -monitor cbc    leukocytosis-likely due to prednisone  -monitor cbc  -check ua  -cxr with reduced size of pleural effusions      Weight bearing status: WBAT     Precautions / PROPHYLAXIS:   - Falls, Cardiac, Sternal, Spinal   - Lungs: Aspiration, Incentive Spirometer   - Pressure injury/Skin: Turn Q2hrs while in bed   - DVT: SQ Heparin, SCDs, TEDs     Diet:  Regular + Thins CCHO     MEDICAL PROGNOSIS: GOOD                                   REHAB POTENTIAL: GOOD  ESTIMATED DISPOSITION: HOME                             ELOS: 18-21 Days   EXPECTED THERAPY:     P.T. 1.5 hr/day       O.T. 1.5hr/day         P&O Unnecessary     EXP FREQUENCY: 5 days per 7 day period     PRESCREEN COMPARISION: I have reviewed the prescreen information and I have found no relevent changes between the preadmission screening and my post admission evaluation     RATIONALE FOR INPATIENT ADMISSION - Patient demonstrates the following: (check all that apply)  [X] Medically appropriate for rehabilitation admission  [X] Has attainable rehab goals with an appropriate initial discharge plan  [X] Has rehabilitation potential (expected to make a significant improvement within a reasonable period of time)   [X] Requires close medical managment by a rehab physician, rehab nursing care, Hospitalist and comprehensive interdisciplinary team (including PT, OT, & or SLP, Prosthetics and Orthotics)      Dr. Heard made aware.

## 2018-12-19 NOTE — DISCHARGE NOTE ADULT - CARE PROVIDERS DIRECT ADDRESSES
,[email or web address],[email or web address] ,[email or web address],DirectAddress_Unknown,[email or web address]

## 2018-12-19 NOTE — PROGRESS NOTE ADULT - SUBJECTIVE AND OBJECTIVE BOX
Chief complaint  Patient is a 63y old  Female who presents with a chief complaint of Back Pain transfer for Type B dissection (18 Dec 2018 15:49)   Review of systems  Patient in bed, looks comfortable, no fever, no hypoglycemia.    Labs and Fingersticks  CAPILLARY BLOOD GLUCOSE      POCT Blood Glucose.: 257 mg/dL (19 Dec 2018 12:08)  POCT Blood Glucose.: 115 mg/dL (19 Dec 2018 07:56)  POCT Blood Glucose.: 119 mg/dL (18 Dec 2018 21:39)  POCT Blood Glucose.: 102 mg/dL (18 Dec 2018 15:55)      Anion Gap, Serum: 15 (12-19 @ 06:39)  Anion Gap, Serum: 18 <H> (12-18 @ 06:03)      Calcium, Total Serum: 10.5 (12-19 @ 06:39)  Calcium, Total Serum: 11.2 <H> (12-18 @ 06:03)          12-19    135  |  96  |  50<H>  ----------------------------<  117<H>  4.3   |  24  |  1.01    Ca    10.5      19 Dec 2018 06:39                          10.7   13.6  )-----------( 201      ( 19 Dec 2018 06:39 )             35.6     Medications  MEDICATIONS  (STANDING):  ALBUTerol    90 MICROgram(s) HFA Inhaler 1 Puff(s) Inhalation every 4 hours  ALBUTerol/ipratropium for Nebulization 3 milliLiter(s) Nebulizer every 6 hours  amiodarone    Tablet 200 milliGRAM(s) Oral daily  aspirin enteric coated 81 milliGRAM(s) Oral daily  cholestyramine Powder (Sugar-Free) 4 Gram(s) Oral daily  dextrose 5%. 1000 milliLiter(s) (50 mL/Hr) IV Continuous <Continuous>  dextrose 50% Injectable 50 milliLiter(s) IV Push every 15 minutes  fluticasone propionate/ salmeterol 500-50 MICROgram(s) Diskus 1 Dose(s) Inhalation two times a day  gabapentin 300 milliGRAM(s) Oral at bedtime  gabapentin 100 milliGRAM(s) Oral daily  heparin  Injectable 5000 Unit(s) SubCutaneous every 8 hours  insulin glargine Injectable (LANTUS) 30 Unit(s) SubCutaneous at bedtime  insulin lispro (HumaLOG) corrective regimen sliding scale   SubCutaneous three times a day before meals  insulin lispro (HumaLOG) corrective regimen sliding scale   SubCutaneous at bedtime  insulin lispro Injectable (HumaLOG) 16 Unit(s) SubCutaneous three times a day before meals  levothyroxine 137 MICROGram(s) Oral daily  montelukast 10 milliGRAM(s) Oral daily  pantoprazole    Tablet 40 milliGRAM(s) Oral before breakfast  polyethylene glycol 3350 17 Gram(s) Oral daily  predniSONE   Tablet 7.5 milliGRAM(s) Oral every 12 hours  senna 2 Tablet(s) Oral at bedtime  tiotropium 18 MICROgram(s) Capsule 1 Capsule(s) Inhalation daily      Physical Exam  General: Patient comfortable in bed  Vital Signs Last 12 Hrs  T(F): 98.1 (12-19-18 @ 05:24), Max: 98.1 (12-19-18 @ 05:24)  HR: 74 (12-19-18 @ 05:24) (74 - 74)  BP: 125/81 (12-19-18 @ 05:24) (125/81 - 125/81)  BP(mean): --  RR: 18 (12-19-18 @ 05:24) (18 - 18)  SpO2: 95% (12-19-18 @ 05:24) (95% - 95%)  Neck: No palpable thyroid nodules.  CVS: S1S2, No murmurs  Respiratory: No wheezing, no crepitations  GI: Abdomen soft, bowel sounds positive  Musculoskeletal:  edema lower extremities.   Skin: No skin rashes, no ecchymosis    Diagnostics    Parathyroid Hormone Intact, Serum: AM Sched. Collection: 17-Dec-2018 06:00 (12-16 @ 10:32)  PTH Related Peptide: AM Sched. Collection: 17-Dec-2018 06:00 (12-16 @ 10:32)  Vitamin D, 1, 25-Dihydroxy: AM Sched. Collection: 17-Dec-2018 06:00 (12-16 @ 10:32)  Vitamin D, 25-Hydroxy: AM Sched. Collection: 17-Dec-2018 06:00 (12-16 @ 10:32)

## 2018-12-19 NOTE — DISCHARGE NOTE ADULT - ADDITIONAL INSTRUCTIONS
1. Please schedule an appointment with Dr. Kumar once you have completed your chest CT in 3 months for a follow up visit and for then follow up in the Aortic Registry, please call the office to schedule an appointment at [phone].   2. Please schedule an appointment with your PCP in 1-2 weeks after discharged from rehab, call the office to schedule an appointment.  3. Please schedule an appointment with your Cardiologist in 1-2 weeks after discharged from rehab, please call the office to schedule an appointment.

## 2018-12-19 NOTE — PROGRESS NOTE ADULT - REASON FOR ADMISSION
Back Pain transfer for Type B dissection
Type B Dissection
Back Pain transfer for Type B dissection

## 2018-12-19 NOTE — H&P ADULT - NSHPREVIEWOFSYSTEMS_GEN_ALL_CORE
REVIEW OF SYSTEMS  Constitutional: No fever, No Chills, No fatigue  HEENT: No eye pain, No visual disturbances, No difficulty hearing, No Dysphagia   Pulm: No cough,  No shortness of breath  Cardio: No chest pain, No palpitations  GI:  No abdominal pain, No nausea, No vomiting, No diarrhea, No constipation, No incontinence, Last Bowel Movemement on   : No dysuria, No frequency, No hematuria, No incontinence  Neuro: No headaches, No memory loss, No loss of strength, No numbness, No tremors  Skin: No itching, No rashes, No lesions   Endo: No temperature intolerance  MSK: No joint pain, No joint swelling, No muscle pain, No Neck or back pain  Psych:  No depression, No anxiety    Any Major Surgery within past 100 days? No / Yes     Two or more Falls within past one year?  No / Yes                   One Fall with injury past one year?           No / Yes REVIEW OF SYSTEMS  Constitutional: No fever, No Chills, No fatigue  HEENT: No eye pain, No visual disturbances, No difficulty hearing  Pulm: No cough,  No shortness of breath  Cardio: No chest pain, No palpitations  GI:  No abdominal pain, No nausea, No vomiting, No diarrhea, No constipation, No incontinence, Last Bowel Movemement on   : No dysuria, No frequency, No hematuria, No incontinence  Neuro: No headaches, No memory loss, No loss of strength, No numbness, No tremors  Skin: No itching, No rashes, No lesions   Endo: No temperature intolerance  MSK: No joint pain, No joint swelling, No muscle pain, No Neck or back pain  Psych:  No depression, No anxiety    Any Major Surgery within past 100 days? No / Yes     Two or more Falls within past one year?  No / Yes                   One Fall with injury past one year?           No / Yes REVIEW OF SYSTEMS  Constitutional: No fever, No Chills, No fatigue  HEENT: No eye pain, No visual disturbances, No difficulty hearing  Pulm: No cough,  No shortness of breath  Cardio: No chest pain, No palpitations  GI:  No abdominal pain, No nausea, No vomiting, No diarrhea, +constipation, last BM two days ago but pt reports that it was a very small amount  : +urinary retention, has epps in place  Neuro: No headaches, +weakness in lower and upper extremities  Skin: +groin rash  Endo: No temperature intolerance  MSK: +BL shoulder and knee pain due to hx of arthritis   Psych:  No depression, No anxiety    Any Major Surgery within past 100 days? No     Two or more Falls within past one year?  No                  One Fall with injury past one year?           No REVIEW OF SYSTEMS  Constitutional: No fever, No Chills, No fatigue  HEENT: No eye pain, No visual disturbances, No difficulty hearing  Pulm: No cough,  No shortness of breath  Cardio: No chest pain, No palpitations  GI:  No abdominal pain, No nausea, No vomiting, No diarrhea, +constipation, last BM two days ago but pt reports that it was a very small amount  : +urinary retention, has epps in place  Neuro: No headaches, +weakness in lower and upper extremities  Skin: +groin rash  Endo: No temperature intolerance  MSK: +BL shoulder and knee pain due to hx of arthritis and rotator cuff injury (chronic)  Psych:  No depression, No anxiety    Any Major Surgery within past 100 days? No     Two or more Falls within past one year?  No                  One Fall with injury past one year?           No REVIEW OF SYSTEMS  Constitutional: No fever, No Chills, No fatigue  HEENT: No eye pain, No visual disturbances, No difficulty hearing  Pulm: Slight non-productive cough,  Mild chronic shortness of breath  Cardio: No chest pain, No palpitations  GI:  No abdominal pain, No nausea, No vomiting, No diarrhea, +constipation, last BM two days ago but pt reports that it was a very small amount  : +urinary retention, has epps in place  Neuro: No headaches, +weakness in lower extremities  Skin: +groin rash  Endo: No temperature intolerance  MSK: +BL shoulder and knee pain due to hx of arthritis and rotator cuff injury (chronic)  Psych:  No depression, No anxiety    Any Major Surgery within past 100 days? No     Two or more Falls within past one year?  No                  One Fall with injury past one year?           No

## 2018-12-19 NOTE — H&P ADULT - NSHPSOCIALHISTORY_GEN_ALL_CORE
SOCIAL HISTORY  Smoking - Denied  EtOH - Denied   Drugs - Denied    FUNCTIONAL HISTORY  Lives with  in , [address] and 1st floor set-up inside.  Independent with ADLs and functional mobility prior to admission. Used RW for ambulation, shower bench.     CURRENT FUNCTIONAL STATUS  - Bed Mobility: min/mod assist x1   - Transfers: sit to stand mod assist   - Gait: "Pt took 1 step from bed to chair with max assist x2, increased difficulty weight shifting." (12/17 PT note)  - ADLs: LB dressing max assist SOCIAL HISTORY  Smoking - Denied  EtOH - Denied   Drugs - Denied    FUNCTIONAL HISTORY  Lives with  in , [address] and 1st floor set-up inside.  Independent with ADLs and functional mobility prior to admission. Used RW and cane for ambulation, shower bench.     CURRENT FUNCTIONAL STATUS  - Bed Mobility: min/mod assist x1   - Transfers: sit to stand mod assist   - Gait: "Pt took 1 step from bed to chair with max assist x2, increased difficulty weight shifting." (12/17 PT note)  - ADLs: LB dressing max assist

## 2018-12-19 NOTE — H&P ADULT - ATTENDING COMMENTS
Agree with above.  Pt is a 64 yo Female with functional deficits after Type B aortic dissection and distal thoracic/conus spinal infarction   Pt requires comprehensive rehab with physician management of comorbidities.  Will initiate bowel and bladder program in a.m

## 2018-12-19 NOTE — PROGRESS NOTE ADULT - PROBLEM SELECTOR PROBLEM 2
Hypothyroidism
Spinal cord infarction
Hypothyroidism

## 2018-12-19 NOTE — PROGRESS NOTE ADULT - PROBLEM SELECTOR PLAN 4
Will continue statin, primary team FU

## 2018-12-19 NOTE — DISCHARGE NOTE ADULT - PATIENT PORTAL LINK FT
You can access the [email or web address] Patient Portal, offered by [facility], by registering with the following website: [email or web address]

## 2018-12-19 NOTE — H&P ADULT - HISTORY OF PRESENT ILLNESS
63 year old F with PMH of asthma (on chronic steroids), DM2, HLD, obesity, hypothyroid, PE (on Coumadin, with IVC filter) who presented initially to [facility] and then transferred to [facility] on 12/7/18 with sudden-onset upper back pain between her shoulder blades for one hour associated with shortness of breath. Patient also noted LE weakness that started around same time as back pain. At [facility], initial CTA of the chest revealed probable type A dissection with intramural hematoma and active hemorrhage, so she was transferred urgently to [facility] for possible CT surgery. Upon arrival to [facility], patient was started on Labetalol gtt for blood pressure control; repeat CTA chest revealed Type B dissection with hemoperitoneum, so patient did not undergo surgical intervention. TTE with pericaridal effusion and tamponade physiology.  INR was reversed with FFP and vitamin K.  MRI L-spine revealed abnormal signal within the conus medullaris and distal thoracic cord, with expansion of the conus, suspicious for spinal infarction. Neurosurgery consulted, no surgical intervention recommended. Hospital course significant for Afib on telemetry monitoring; s/p Labetalol and amiodarone load. 63 year old F with PMH of asthma (on chronic steroids), DM2, HLD, obesity, hypothyroid, PE (on Coumadin, with IVC filter) who presented initially to [facility] and then transferred to [facility] on 12/7/18 with sudden-onset upper back pain between her shoulder blades for one hour associated with shortness of breath. Patient also noted LE weakness that started around same time as back pain. At [facility], initial CTA of the chest revealed probable type A dissection with intramural hematoma and active hemorrhage, so she was transferred urgently to [facility] for possible CT surgery. Upon arrival to [facility], patient was started on Labetalol gtt for blood pressure control; repeat CTA chest revealed Type B dissection with hemoperitoneum, so patient did not undergo surgical intervention. TTE with pericaridal effusion and tamponade physiology.  INR was reversed with FFP and vitamin K.  MRI L-spine revealed abnormal signal within the conus medullaris and distal thoracic cord, with expansion of the conus, suspicious for spinal infarction. Neurosurgery consulted, no surgical intervention recommended. Recommended by neurology to continue ASA 81 mg daily.  Hospital course significant for Afib on telemetry monitoring; s/p Labetalol and amiodarone load. Course notable for orthostasis on 12/17 while working with PT which improved after diuretics were discontinued and IV fluids.  Also with urinary retention due to neurogenic bladder with epps catheter placed 12/18. Deemed medically stable for acute rehab on 12/19/18.

## 2018-12-19 NOTE — DISCHARGE NOTE ADULT - MEDICATION SUMMARY - MEDICATIONS TO STOP TAKING
I will STOP taking the medications listed below when I get home from the hospital:    Coumadin 2 mg oral tablet  -- 1 tab(s) by mouth once a day    Lasix 20 mg oral tablet  --  by mouth , 2x/week    Zocor 40 mg oral tablet  -- 1 tab(s) by mouth once a day (at bedtime)    metFORMIN 1000 mg oral tablet  -- 1 tab(s) by mouth 2 times a day    Starlix 120 mg oral tablet  -- 1 tab(s) by mouth 3 times a day (before meals)    Januvia 100 mg oral tablet  -- 1 tab(s) by mouth once a day    Robaxin 500 mg oral tablet  --  by mouth , As Needed    traMADol 50 mg oral tablet  --  by mouth , As Needed    Flonase 50 mcg/inh nasal spray  -- 1 application into nose 2 times a day    metFORMIN 500 mg oral tablet  -- 1 tab(s) by mouth 2 times a day    Lantus 100 units/mL subcutaneous solution  -- 15 unit(s) subcutaneous

## 2018-12-19 NOTE — H&P ADULT - NSHPPHYSICALEXAM_GEN_ALL_CORE
Vital Signs  T(C): 36.9 (12-19-18 @ 13:49), Max: 36.9 (12-19-18 @ 13:49)  HR: 82 (12-19-18 @ 13:49) (74 - 82)  BP: 122/66 (12-19-18 @ 13:49) (122/66 - 128/81)  RR: 18 (12-19-18 @ 13:49) (18 - 18)  SpO2: 95% (12-19-18 @ 13:49) (94% - 95%)    Gen - NAD, Comfortable  HEENT - NCAT, EOMI, MMM  Neck - Supple, No limited ROM  Pulm - CTAB, No wheeze, No rhonchi, No crackles  Cardiovascular - RRR, S1S2, No murmurs  Abdomen - Soft, NT/ND, +BS  Extremities - No C/C/E, No calf tenderness  Neuro-     Cognitive - AAOx3     Communication - Fluent, No dysarthria     Attention: Intact      Judgement: Good evidence of judgement     Memory: Recall 3 objects immediate and 3 min later         Cranial Nerves - CN 2-12 intact     Motor - No focal deficits                    LEFT    UE - ShAB 5/5, EF 5/5, EE 5/5, WE 5/5,  5/5                    RIGHT UE - ShAB 5/5, EF 5/5, EE 5/5, WE 5/5,  5/5                    LEFT    LE - HF 5/5, KE 5/5, DF 5/5, PF 5/5                    RIGHT LE - HF 5/5, KE 5/5, DF 5/5, PF 5/5        Sensory - Intact to LT     Reflexes - DTR Intact, No primitive reflexive     Coordination - FTN intact     Tone - normal  Psychiatric - Mood stable, Affect WNL  Skin:  all skin intact    Wounds: None Present Vital Signs  T(C): 36.9 (12-19-18 @ 13:49), Max: 36.9 (12-19-18 @ 13:49)  HR: 82 (12-19-18 @ 13:49) (74 - 82)  BP: 122/66 (12-19-18 @ 13:49) (122/66 - 128/81)  RR: 18 (12-19-18 @ 13:49) (18 - 18)  SpO2: 95% (12-19-18 @ 13:49) (94% - 95%)    Gen - NAD, Comfortable  HEENT - NCAT, EOMI, MMM  Neck - Supple, No limited ROM  Pulm - CTAB, No wheeze, No rhonchi, No crackles  Cardiovascular - RRR, S1S2, No murmurs  Abdomen - Soft, Obese, NT/ND, +BS  Extremities - No C/C/E, No calf tenderness  Neuro-     Cognitive - AAOx3     Communication - Fluent, No dysarthria     Attention: Intact      Judgement: Good evidence of judgement     Cranial Nerves - CN 2-12 grossly intact     Motor -                     LEFT    UE - ShAB 4/5 (limited due to chronic RTC injury), EF 5/5, EE 5/5, WE 5/5,  5/5                    RIGHT UE - ShAB 4/5, EF 5/5, EE 5/5, WE 5/5,  5/5                    LEFT    LE - HF 3/5, KE 3/5, DF 4/5, PF 4/5                    RIGHT LE - HF 2/5, KE 2/5, DF 2/5, PF 2/5        Sensory - Intact to LT     Reflexes - DTR Intact, No primitive reflexive     Coordination - FTN intact  Psychiatric - Mood stable, Affect WNL  Skin:  left inner groin papules, non-pruritic, non-TTP Vital Signs  T(C): 36.9 (12-19-18 @ 13:49), Max: 36.9 (12-19-18 @ 13:49)  HR: 82 (12-19-18 @ 13:49) (74 - 82)  BP: 122/66 (12-19-18 @ 13:49) (122/66 - 128/81)  RR: 18 (12-19-18 @ 13:49) (18 - 18)  SpO2: 95% (12-19-18 @ 13:49) (94% - 95%)    Gen - NAD, Comfortable  HEENT - NCAT, EOMI, MMM  Neck - Supple, No limited ROM  Pulm - CTAB, No wheeze, No rhonchi, No crackles  Cardiovascular - RRR, S1S2, No murmurs  Abdomen - Soft, Obese, NT/ND, +BS   - epps in place, draining yellow urine  Extremities - No C/C/E, No calf tenderness  Neuro-     Cognitive - AAOx3     Communication - Fluent, No dysarthria     Attention: Intact      Judgement: Good evidence of judgement     Cranial Nerves - CN 2-12 grossly intact     Motor -                     LEFT    UE - ShAB 4/5 (limited due to chronic RTC injury), EF 5/5, EE 5/5, WE 5/5,  5/5                    RIGHT UE - ShAB 4/5, EF 5/5, EE 5/5, WE 5/5,  5/5                    LEFT    LE - HF 3/5, KE 3/5, DF 4/5, PF 4/5                    RIGHT LE - HF 2/5, KE 2/5, DF 2/5, PF 2/5        Sensory - Intact to LT     Reflexes - DTR Intact, No primitive reflexive     Coordination - FTN intact  Psychiatric - Mood stable, Affect WNL  Skin:  left inner groin papules, non-pruritic, non-TTP Vital Signs  T(C): 36.9 (12-19-18 @ 13:49), Max: 36.9 (12-19-18 @ 13:49)  HR: 82 (12-19-18 @ 13:49) (74 - 82)  BP: 122/66 (12-19-18 @ 13:49) (122/66 - 128/81)  RR: 18 (12-19-18 @ 13:49) (18 - 18)  SpO2: 95% (12-19-18 @ 13:49) (94% - 95%)    Gen - NAD, Comfortable  HEENT - NCAT, EOMI, MMM  Neck - Supple, No limited ROM  Pulm - CTAB, No wheeze, No rhonchi, No crackles  Cardiovascular - RRR, S1S2, No murmurs  Abdomen - Soft, Obese, NT/ND, +BS   - epps in place, draining yellow urine  Extremities - No C/C/E, No calf tenderness  Neuro-     Cognitive - AAOx3     Communication - Fluent, No dysarthria     Attention: Intact      Judgement: Good evidence of judgement     Cranial Nerves - CN 2-12 grossly intact     Motor -                     LEFT    UE - ShAB 4/5 (limited due to chronic RTC injury), EF 5/5, EE 5/5, WE 5/5,  5/5                    RIGHT UE - ShAB 4/5, EF 5/5, EE 5/5, WE 5/5,  5/5                    LEFT    LE - HF 3/5, KE 3/5, DF 4/5, PF 4/5                    RIGHT LE - HF 2/5, KE 2/5, DF 2/5, PF 2/5        Sensory - Intact to LT     Reflexes - DTR Intact, No primitive reflexive     Coordination - FTN intact  Psychiatric - Mood stable, Affect WNL  Skin:  left inner thigh papules, non-pruritic, non-TTP Vital Signs  T(C): 36.9 (12-19-18 @ 13:49), Max: 36.9 (12-19-18 @ 13:49)  HR: 82 (12-19-18 @ 13:49) (74 - 82)  BP: 122/66 (12-19-18 @ 13:49) (122/66 - 128/81)  RR: 18 (12-19-18 @ 13:49) (18 - 18)  SpO2: 95% (12-19-18 @ 13:49) (94% - 95%)    Gen - NAD, Comfortable  HEENT - NCAT, EOMI, MMM  Neck - Supple, No limited ROM  Pulm - CTAB, No wheeze, No rhonchi, No crackles  Cardiovascular - RRR, S1S2, No murmurs  Abdomen - Soft, Obese, NT/ND, +BS   - epps in place, draining yellow urine  Extremities - No C/C/E, No calf tenderness  Neuro-     Cognitive - AAOx3     Communication - Fluent, No dysarthria     Attention: Intact      Judgement: Good evidence of judgement     Cranial Nerves - CN 2-12 grossly intact     Motor -                     LEFT    UE - ShAB 4/5 (limited due to chronic RTC injury), EF 5/5, EE 5/5, WE 5/5,  5/5                    RIGHT UE - ShAB 4/5, EF 5/5, EE 5/5, WE 5/5,  5/5                    LEFT    LE - HF 3/5, KE 3/5, DF 4/5, PF 4/5                    RIGHT LE - HF 2/5, KE 2/5, DF 2/5, PF 2/5        Sensory - Intact to LT     Reflexes - DTR Intact, No primitive reflexive     Coordination - FTN intact  Psychiatric - Mood stable, Affect WNL  Skin:  left inner thigh papules, non-pruritic, non-TTP. L knee scar Vital Signs  T(C): 36.9 (12-19-18 @ 13:49), Max: 36.9 (12-19-18 @ 13:49)  HR: 82 (12-19-18 @ 13:49) (74 - 82)  BP: 122/66 (12-19-18 @ 13:49) (122/66 - 128/81)  RR: 18 (12-19-18 @ 13:49) (18 - 18)  SpO2: 95% (12-19-18 @ 13:49) (94% - 95%)    Gen - NAD, Comfortable  HEENT - NCAT, EOMI, MMM  Neck - Supple, No limited ROM  Pulm - CTAB, No wheeze, No rhonchi, No crackles  Cardiovascular - RRR, S1S2, No murmurs  Abdomen - Soft, NT/ND, +BS   - epps in place, draining yellow urine  Extremities - No C/C/E, No calf tenderness  Neuro-     Cognitive - AAOx3     Communication - Fluent, No dysarthria     Attention: Intact      Judgement: Good evidence of judgement     Cranial Nerves - CN 2-12 grossly intact     Motor -                     LEFT    UE - ShAB 4/5 (limited due to chronic RTC injury), EF 5/5, EE 5/5, WE 5/5,  5/5                    RIGHT UE - ShAB 4/5, EF 5/5, EE 5/5, WE 5/5,  5/5                    LEFT    LE - HF 3/5, KE 3/5, DF 4/5, PF 4/5                    RIGHT LE - HF 2/5, KE 2/5, DF 2/5, PF 2/5        Sensory - Intact to LT     Reflexes - DTR Intact, No primitive reflexive  Tone-Mild rectal tone     Coordination - FTN intact  Psychiatric - Mood stable, Affect WNL  Skin:  left inner thigh papules, non-pruritic, non-TTP. L knee scar Vital Signs  T(C): 36.9 (12-19-18 @ 13:49), Max: 36.9 (12-19-18 @ 13:49)  HR: 82 (12-19-18 @ 13:49) (74 - 82)  BP: 122/66 (12-19-18 @ 13:49) (122/66 - 128/81)  RR: 18 (12-19-18 @ 13:49) (18 - 18)  SpO2: 95% (12-19-18 @ 13:49) (94% - 95%)    Gen - NAD, Comfortable  HEENT - NCAT, EOMI, MMM  Neck - Supple, No limited ROM  Pulm - CTAB, No wheeze, No rhonchi, No crackles  Cardiovascular - RRR, S1S2, No murmurs  Abdomen - Soft, NT/ND, +BS   - epps in place, draining yellow urine  Extremities - No C/C/E, No calf tenderness  Neuro-     Cognitive - AAOx3     Communication - Fluent, No dysarthria     Attention: Intact      Judgement: Good evidence of judgement     Cranial Nerves - CN 2-12 grossly intact     Motor -                     LEFT    UE - ShAB 4/5 (limited due to chronic RTC injury), EF 5/5, EE 5/5, WE 5/5,  5/5                    RIGHT UE - ShAB 4/5, EF 5/5, EE 5/5, WE 5/5,  5/5                    LEFT    LE - HF 3/5, KE 3/5, DF 4/5, PF 4/5                    RIGHT LE - HF 2/5, KE 2/5, DF 2/5, PF 2/5        Sensory - Impaired to LT and PP BLEs     Reflexes - DTR Intact, No primitive reflexive  Tone-Mild rectal tone     Coordination - FTN intact  Psychiatric - Mood stable, Affect WNL  Skin:  left inner thigh papules, non-pruritic, non-TTP. L knee scar

## 2018-12-19 NOTE — DISCHARGE NOTE ADULT - CONDITIONS AT DISCHARGE
v/s stable, epps in place from 12/18  as pt has urinary retention. incontinent of  Bowel, last BM on 12/17.B/l groin moisturized dermatitis.skin intact

## 2018-12-19 NOTE — PROGRESS NOTE ADULT - PROBLEM SELECTOR PROBLEM 1
Acute thoracic aortic dissection
Diabetes mellitus

## 2018-12-19 NOTE — DISCHARGE NOTE ADULT - HOSPITAL COURSE
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 Pt states pain was 8/10, nonradiating. Pt denies LOC, fevers/chills, dizziness, numbness/tingling, chest pain, N/V/D.   At [place], CTA of the chest revealed probable type A dissection w/ intramural hematoma w/ active hemorrhage. Pt transferred urgently to [facility] for possible CT surgery.  Upon arrival to [facility], pt placed on Labetalol drip for blood pressure control and patient taken for a CT of the Chest. Dr. Kumar reviewed images which revealed Type B dissection w/ hemoperiteneum. INR at outside hospital 1.8.   Hopital Course:   TTE with pericardial effusion, tamponade physiology. Anticoagulation reversed.  Labetalol and cardene for BP control  Hyperglycemia, chronic steroid use for asthma, endo consulted  + Ua , on bactrim.  Changed to oral labetalol and norvasc for HTN.  Bilateral lower extremity weakness, 2/4 strength on exam; start of symptoms coincided with back pain/dissection;   Neuro is following, pending MRI/MRA, due to the occurrence of the LE weakness concurrently with the presentation of the type B dissection, need to r/o dissection, ischemia or occlusion of the ASA.  12/11 BP stable.  MRI complete, pending official reading and neuro f/u.  12/12 VSS. OOB. MRI indicative of spinal cord infarct. Neurology-no intervention required at this time. Strength improving LE b/l. Labetalol d/c; lasix increased to 40mg BID. OT recommending ARMANDO as well.  12/13 VSS; CTA of chest/abd/pelvis today: unchanged; F/U results. + Constipation:  Received sorbitol/suppositories: + bm noted  12/14 VSS; Pt denies cp/sob; increase activity as tolerated; continue PT/OT and diuresis   12/15 VSS; await discharge to acute rehab; repeat CT scan in 3-4 months as per Dr. Kumar   12/17 +orthostatic while working with PT today - diuretics d/c and ivf given; bp recovered sbp 120; continue to monitor and repeat orthostatic later today   12/18 VSS pt is not orthostatic today diuretics have been discontinued.  WBC 15 - afebrile.  will monitor CBC in am. Panda reinserted for urinary retention / Neurogenic Bladder.   12/19 Patient discharged to [facility] today - repeat CT scan in 3-4 months as per Dr. Kumar. 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 complaining of sudden onset upper back pain between her shoulder blades for one hour associated with shortness of breath. Pt states pain was 8/10, nonradiating. Pt denies LOC, fevers/chills, dizziness, numbness/tingling, chest pain, N/V/D.   At [place], CTA of the chest revealed probable type A dissection w/ intramural hematoma w/ active hemorrhage. Pt transferred urgently to [facility] for possible CT surgery.  Upon arrival to [facility], pt placed on Labetalol drip for blood pressure control and patient taken for a CT of the Chest. Dr. Kumar reviewed images which revealed Type B dissection w/ hemoperiteneum. INR at outside hospital 1.8.   Hopital Course:   TTE with pericardial effusion, tamponade physiology. Anticoagulation reversed.  Labetalol and cardene for BP control  Hyperglycemia, chronic steroid use for asthma, endo consulted  + Ua , on bactrim.  Changed to oral labetalol and norvasc for HTN.  Bilateral lower extremity weakness, 2/4 strength on exam; start of symptoms coincided with back pain/dissection;   Neuro is following, pending MRI/MRA, due to the occurrence of the LE weakness concurrently with the presentation of the type B dissection, need to r/o dissection, ischemia or occlusion of the ASA.  12/11 BP stable.  MRI complete, pending official reading and neuro f/u.  12/12 VSS. OOB. MRI indicative of spinal cord infarct. Neurology-no intervention required at this time. Strength improving LE b/l. Labetalol d/c; lasix increased to 40mg BID. OT recommending ARMANDO as well.  12/13 VSS; CTA of chest/abd/pelvis today: unchanged; F/U results. + Constipation:  Received sorbitol/suppositories: + bm noted  12/14 VSS; Pt denies cp/sob; increase activity as tolerated; continue PT/OT and diuresis   12/15 VSS; await discharge to acute rehab; repeat CT scan in 3-4 months as per Dr. Kumar   12/17 +orthostatic while working with PT today - diuretics d/c and ivf given; bp recovered sbp 120; continue to monitor and repeat orthostatic later today   12/18 VSS pt is not orthostatic today diuretics have been discontinued.  WBC 15 - afebrile.  will monitor CBC in am. Panda reinserted for urinary retention / Neurogenic Bladder.   12/19 [facility] repeat CT scan in 3-4 months as per Dr. Kumar. No AC therapy at this time 2/2 Type B dissection w/ hemoperiteneum. Patient with h/o PE w/ IVC filter. Continue with ASA 81 mg PO daily.

## 2018-12-19 NOTE — DISCHARGE NOTE ADULT - MEDICATION SUMMARY - MEDICATIONS TO TAKE
I will START or STAY ON the medications listed below when I get home from the hospital:    predniSONE 2.5 mg oral tablet  -- 3 tab(s) by mouth every 12 hours  -- Indication: For Asthma     acetaminophen 325 mg oral tablet  -- 2 tab(s) by mouth every 6 hours, As needed, Mild Pain (1 - 3)  -- Indication: For As needed for mild pain     oxycodone-acetaminophen 5 mg-325 mg oral tablet  -- 1 tab(s) by mouth every 4 hours, As needed, Moderate Pain (4 - 6)  -- Indication: For As needed for severe pain     aspirin 81 mg oral delayed release tablet  -- 1 tab(s) by mouth once a day  -- Indication: For Blood thinner     amiodarone 200 mg oral tablet  -- 1 tab(s) by mouth once a day  -- Indication: For Antiarrhythmic     gabapentin 100 mg oral capsule  -- 1 cap(s) by mouth once a day  -- Indication: For Neuropathy    gabapentin 300 mg oral capsule  -- 1 cap(s) by mouth once a day (at bedtime)  -- Indication: For Neuropathy     insulin glargine  -- 30 unit(s) subcutaneous once a day (at bedtime)  -- Indication: For Diabetes mellitus    HumaLOG 100 units/mL subcutaneous solution  -- subcutaneous 4 times a day (before meals and at bedtime)  2 Unit(s) if Glucose 251 - 300  4 Unit(s) if Glucose 301 - 350  6 Unit(s) if Glucose 351 - 400  8 Unit(s) if Glucose GRAETER THAN 400  -- Indication: For Diabetes mellitus    HumaLOG 100 units/mL subcutaneous solution  -- 16 unit(s) subcutaneous 3 times a day (before meals)  -- Indication: For Diabetes mellitus    cholestyramine 4 g/9 g oral powder for reconstitution  -- 4 gram(s) by mouth once a day  -- Indication: For Cholesterol     albuterol 90 mcg/inh inhalation aerosol  -- 1 puff(s) inhaled every 4 hours  -- Indication: For Asthma     ipratropium-albuterol 0.5 mg-2.5 mg/3 mLinhalation solution  -- 3 milliliter(s) inhaled every 6 hours  -- Indication: For Asthma     fluticasone-salmeterol  -- 1 inhaler(s) by mouth 2 times a day  -- Indication: For Asthma     tiotropium 18 mcg inhalation capsule  -- 1 cap(s) inhaled once a day  -- Indication: For Asthma     senna oral tablet  -- 2 tab(s) by mouth once a day (at bedtime)  -- Indication: For Laxative as needed for constipation     polyethylene glycol 3350 oral powder for reconstitution  -- 17 gram(s) by mouth once a day  -- Indication: For Laxative as needed for constipation     montelukast 10 mg oral tablet  -- 1 tab(s) by mouth once a day  -- Indication: For Asthma     pantoprazole 40 mg oral delayed release tablet  -- 1 tab(s) by mouth once a day (before a meal)  -- Indication: For Acid Reflux     levothyroxine 137 mcg (0.137 mg) oral tablet  -- 1 tab(s) by mouth once a day  -- Indication: For Synthroid I will START or STAY ON the medications listed below when I get home from the hospital:    CTA of Chest / Abdomen and Pelvic with Contrast  -- Please repeat CTA of Chest / ABD / Pelvis in 3 months and Follow up with Dr. Dexter Kumar for results   -- Indication: For Type B dissection     predniSONE 2.5 mg oral tablet  -- 3 tab(s) by mouth every 12 hours  -- Indication: For Asthma     acetaminophen 325 mg oral tablet  -- 2 tab(s) by mouth every 6 hours, As needed, Mild Pain (1 - 3)  -- Indication: For As needed for mild pain     oxycodone-acetaminophen 5 mg-325 mg oral tablet  -- 1 tab(s) by mouth every 4 hours, As needed, Moderate Pain (4 - 6)  -- Indication: For As needed for severe pain     aspirin 81 mg oral delayed release tablet  -- 1 tab(s) by mouth once a day  -- Indication: For Blood thinner     amiodarone 200 mg oral tablet  -- 1 tab(s) by mouth once a day  -- Indication: For Antiarrhythmic     gabapentin 100 mg oral capsule  -- 1 cap(s) by mouth once a day  -- Indication: For Neuropathy    gabapentin 300 mg oral capsule  -- 1 cap(s) by mouth once a day (at bedtime)  -- Indication: For Neuropathy     insulin glargine  -- 30 unit(s) subcutaneous once a day (at bedtime)  -- Indication: For Diabetes mellitus    HumaLOG 100 units/mL subcutaneous solution  -- subcutaneous 4 times a day (before meals and at bedtime)  2 Unit(s) if Glucose 251 - 300  4 Unit(s) if Glucose 301 - 350  6 Unit(s) if Glucose 351 - 400  8 Unit(s) if Glucose GRAETER THAN 400  -- Indication: For Diabetes mellitus    HumaLOG 100 units/mL subcutaneous solution  -- 16 unit(s) subcutaneous 3 times a day (before meals)  -- Indication: For Diabetes mellitus    cholestyramine 4 g/9 g oral powder for reconstitution  -- 4 gram(s) by mouth once a day  -- Indication: For Cholesterol     albuterol 90 mcg/inh inhalation aerosol  -- 1 puff(s) inhaled every 4 hours  -- Indication: For Asthma     ipratropium-albuterol 0.5 mg-2.5 mg/3 mLinhalation solution  -- 3 milliliter(s) inhaled every 6 hours  -- Indication: For Asthma     fluticasone-salmeterol  -- 1 inhaler(s) by mouth 2 times a day  -- Indication: For Asthma     tiotropium 18 mcg inhalation capsule  -- 1 cap(s) inhaled once a day  -- Indication: For Asthma     senna oral tablet  -- 2 tab(s) by mouth once a day (at bedtime)  -- Indication: For Laxative as needed for constipation     polyethylene glycol 3350 oral powder for reconstitution  -- 17 gram(s) by mouth once a day  -- Indication: For Laxative as needed for constipation     montelukast 10 mg oral tablet  -- 1 tab(s) by mouth once a day  -- Indication: For Asthma     pantoprazole 40 mg oral delayed release tablet  -- 1 tab(s) by mouth once a day (before a meal)  -- Indication: For Acid Reflux     levothyroxine 137 mcg (0.137 mg) oral tablet  -- 1 tab(s) by mouth once a day  -- Indication: For Synthroid

## 2018-12-19 NOTE — PROGRESS NOTE ADULT - PROBLEM SELECTOR PROBLEM 3
Dissection of thoracic aorta
Orthostasis
Dissection of thoracic aorta
Orthostasis

## 2018-12-19 NOTE — DISCHARGE NOTE ADULT - CARE PROVIDER_API CALL
Dexter Kumar), Surgery; Thoracic and Cardiac Surgery  [address] [place] [address]  Phone: [phone]  Fax: [phone]    Kaleb Andrews), EndocrinologyMetabDiabetes; Internal Medicine  [address] [place] [address]  Phone: [phone]  Fax: [phone] Dexter Kumar), Surgery; Thoracic and Cardiac Surgery  [address] [place] [address]  Phone: [phone]  Fax: [phone]    Kaleb Andrews), EndocrinologyMetabDiabetes; Internal Medicine  [address] [place] [address]  Phone: [phone]  Fax: [phone] Dexter Kumar), Surgery; Thoracic and Cardiac Surgery  [address] [place] [address]  Phone: [phone]  Fax: [phone]    Kaleb Andrews), EndocrinologyMetabDiabetes; Internal Medicine  [address] [place] [address]  Phone: [phone]  Fax: [phone]    Ovidio Hua), Neurology; Vascular Neurology  [address] [place] [address]  Phone: [phone]  Fax: [phone]

## 2018-12-19 NOTE — DISCHARGE NOTE ADULT - OTHER SIGNIFICANT FINDINGS
VITAL SIGNS    Subjective: "I'm feeling ok." Denies CP, palpitation, SOB, HEATON, HA, dizziness, N/V/D, fever or chills.  No acute event noted overnight.     Telemetry:  NSR 70-90      Vital Signs Last 24 Hrs  T(C): 36.9 (12-19-18 @ 13:49), Max: 36.9 (12-19-18 @ 13:49)  T(F): 98.4 (12-19-18 @ 13:49), Max: 98.4 (12-19-18 @ 13:49)  HR: 82 (12-19-18 @ 13:49) (74 - 82)  BP: 122/66 (12-19-18 @ 13:49) (122/66 - 128/81)  RR: 18 (12-19-18 @ 13:49) (18 - 18)  SpO2: 95% (12-19-18 @ 13:49) (94% - 95%)           12-18 @ 07:01  -  12-19 @ 07:00  --------------------------------------------------------  IN: 840 mL / OUT: 2500 mL / NET: -1660 mL    12-19 @ 07:01  -  12-19 @ 14:25  --------------------------------------------------------  IN: 600 mL / OUT: 0 mL / NET: 600 mL    CAPILLARY BLOOD GLUCOSE    POCT Blood Glucose.: 257 mg/dL (19 Dec 2018 12:08)  POCT Blood Glucose.: 115 mg/dL (19 Dec 2018 07:56)  POCT Blood Glucose.: 119 mg/dL (18 Dec 2018 21:39)  POCT Blood Glucose.: 102 mg/dL (18 Dec 2018 15:55)         PHYSICAL EXAM    Neurology: alert and oriented x 3, nonfocal, no gross deficits    CV: (+) S1 and S2, No murmurs, rubs, gallops or clicks     Lungs: CTA B/L     Abdomen: soft, nontender, nondistended, positive bowel sounds, (+) Flatus; (+) BM     :  Indwelling epps cath --> SD         Extremities:  B/L LE (-) edema; negative calf tenderness; (+) 2 DP palpable        acetaminophen   Tablet .. 650 milliGRAM(s) Oral every 6 hours PRN  ALBUTerol 90 MICROgram(s) HFA Inhaler 1 Puff(s) Inhalation every 4 hours  ALBUTerol/ipratropium for Nebulization 3 milliLiter(s) Nebulizer every 6 hours  amiodarone Tablet 200 milliGRAM(s) Oral daily  aspirin enteric coated 81 milliGRAM(s) Oral daily  cholestyramine Powder (Sugar-Free) 4 Gram(s) Oral daily  dextrose 40% Gel 15 Gram(s) Oral once PRN  dextrose 5%. 1000 milliLiter(s) IV Continuous <Continuous>  dextrose 50% Injectable 50 milliLiter(s) IV Push every 15 minutes  fluticasone propionate/ salmeterol 500-50 MICROgram(s) Diskus 1 Dose(s) Inhalation two times a day  gabapentin 300 milliGRAM(s) Oral at bedtime  gabapentin 100 milliGRAM(s) Oral daily  glucagon  Injectable 1 milliGRAM(s) IntraMuscular once PRN  heparin Injectable 5000 Unit(s) SubCutaneous every 8 hours  insulin glargine Injectable (LANTUS) 30 Unit(s) SubCutaneous at bedtime  insulin lispro (HumaLOG) corrective regimen sliding scale   SubCutaneous three times a day before meals  insulin lispro (HumaLOG) corrective regimen sliding scale   SubCutaneous at bedtime  insulin lispro Injectable (HumaLOG) 16 Unit(s) SubCutaneous three times a day before meals  levothyroxine 137 MICROGram(s) Oral daily  montelukast 10 milliGRAM(s) Oral daily  oxyCODONE  5 mG/acetaminophen 325 mG 1 Tablet(s) Oral every 4 hours PRN  pantoprazole Tablet 40 milliGRAM(s) Oral before breakfast  polyethylene glycol 3350 17 Gram(s) Oral daily  predniSONE   Tablet 7.5 milliGRAM(s) Oral every 12 hours  senna 2 Tablet(s) Oral at bedtime  tiotropium 18 MICROgram(s) Capsule 1 Capsule(s) Inhalation daily    Spent more than 30 min with patient.

## 2018-12-19 NOTE — PROGRESS NOTE ADULT - PROBLEM SELECTOR PLAN 5
Would monitor calcium, if remains high will start her on Sensipar, would need workup including parathyroid scan, can be done as out patient.

## 2018-12-19 NOTE — DISCHARGE NOTE ADULT - PLAN OF CARE
Medical management 1. Daily Shower  2. Weight yourself daily and notify any weight gain greater than 2-3 pounds in 24 hours.  3. Regular DASH / Diabetic diet - low fat, low cholesterol, no added salt.  4. Blood draws CBC and CMP twice a week every Monday's and Thursdays and PRN   5. Maintain epps to SD   6. Increase Activity as tolerated.   7. Call / Notify MD any fever greater than 101.0  8. Daily PT/ OT Glycemic  Control Accuchecks AC and HS with RISS coverage   Continue with DASH / Diabetic Diet   Continue with current glycemic regimen   Follow up with Endocrinologist after discharged from rehab. Medical Management Continue with ASA 81 mg PO daily   Daily PT/OT assessment and intense rehabilitation   strict BP control, avoid hypotension  Maintain epps to SD for Neurogenic bladder Accuchecks AC and HS with RISS coverage   Continue with DASH / Diabetic Diet  Follow up with Endocrinologist after discharged from rehab.

## 2018-12-19 NOTE — H&P ADULT - NSHPLABSRESULTS_GEN_ALL_CORE
LABS:                         10.7   13.6  )-----------( 201      ( 19 Dec 2018 06:39 )             35.6     12-19    135  |  96  |  50<H>  ----------------------------<  117<H>  4.3   |  24  |  1.01    Ca    10.5      19 Dec 2018 06:39      RADIOLOGY, EKG & ADDITIONAL TESTS: Reviewed.  < from: CT Angio Chest w/ IV Cont (12.13.18 @ 17:04) >    IMPRESSION:   No significant interval change in previously seen long segment    hyperintense focus extending from the proximal aortic arch to the   descending aorta, this likely represents thrombosed type B dissection   however a long segment intramural hematoma is also a possibility.    Interval decrease in previously seen small hemopericardium and   mediastinal hemorrhage.     Trace right and small left pleural effusion.    < end of copied text >    < from: MR Lumbar Spine No Cont (12.10.18 @ 18:55) >    IMPRESSION:    Abnormal signal within the conus medullaris and distal thoracic cord,   with expansion of the conus. In the setting of aortic dissection, acute   cortical infarction is strongly suspected. Other etiologies such as   transverse myelitis are considered much less likely.    Multilevel degenerative changes as detailed in the body the report    < end of copied text > LABS:                         10.5   12.7  )-----------( 174      ( 20 Dec 2018 05:30 )             33.9   12-20    136  |  100  |  46<H>  ----------------------------<  162<H>  3.8   |  26  |  1.03    Ca    10.5      20 Dec 2018 05:30    TPro  6.5  /  Alb  2.5<L>  /  TBili  0.6  /  DBili  x   /  AST  17  /  ALT  22  /  AlkPhos  80  12-20    CXR 12/20/18 Pleural effusions reduced    CAPILLARY BLOOD GLUCOSE      POCT Blood Glucose.: 282 mg/dL (20 Dec 2018 11:18)  POCT Blood Glucose.: 191 mg/dL (20 Dec 2018 07:10)  POCT Blood Glucose.: 215 mg/dL (19 Dec 2018 22:05)               10.7   13.6  )-----------( 201      ( 19 Dec 2018 06:39 )             35.6     12-19    135  |  96  |  50<H>  ----------------------------<  117<H>  4.3   |  24  |  1.01    Ca    10.5      19 Dec 2018 06:39      RADIOLOGY, EKG & ADDITIONAL TESTS: Reviewed.  < from: CT Angio Chest w/ IV Cont (12.13.18 @ 17:04) >    IMPRESSION:   No significant interval change in previously seen long segment    hyperintense focus extending from the proximal aortic arch to the   descending aorta, this likely represents thrombosed type B dissection   however a long segment intramural hematoma is also a possibility.    Interval decrease in previously seen small hemopericardium and   mediastinal hemorrhage.     Trace right and small left pleural effusion.    < end of copied text >    < from: MR Lumbar Spine No Cont (12.10.18 @ 18:55) >    IMPRESSION:    Abnormal signal within the conus medullaris and distal thoracic cord,   with expansion of the conus. In the setting of aortic dissection, acute   cortical infarction is strongly suspected. Other etiologies such as   transverse myelitis are considered much less likely.    Multilevel degenerative changes as detailed in the body the report    < end of copied text >

## 2018-12-20 LAB
ALBUMIN SERPL ELPH-MCNC: 2.5 G/DL — LOW (ref 3.3–5)
ALP SERPL-CCNC: 80 U/L — SIGNIFICANT CHANGE UP (ref 40–120)
ALT FLD-CCNC: 22 U/L DA — SIGNIFICANT CHANGE UP (ref 10–45)
ANION GAP SERPL CALC-SCNC: 10 MMOL/L — SIGNIFICANT CHANGE UP (ref 5–17)
AST SERPL-CCNC: 17 U/L — SIGNIFICANT CHANGE UP (ref 10–40)
BILIRUB SERPL-MCNC: 0.6 MG/DL — SIGNIFICANT CHANGE UP (ref 0.2–1.2)
BUN SERPL-MCNC: 46 MG/DL — HIGH (ref 7–23)
CALCIUM SERPL-MCNC: 10.5 MG/DL — SIGNIFICANT CHANGE UP (ref 8.4–10.5)
CHLORIDE SERPL-SCNC: 100 MMOL/L — SIGNIFICANT CHANGE UP (ref 96–108)
CO2 SERPL-SCNC: 26 MMOL/L — SIGNIFICANT CHANGE UP (ref 22–31)
CREAT SERPL-MCNC: 1.03 MG/DL — SIGNIFICANT CHANGE UP (ref 0.5–1.3)
GLUCOSE SERPL-MCNC: 162 MG/DL — HIGH (ref 70–99)
HCT VFR BLD CALC: 33.9 % — LOW (ref 34.5–45)
HGB BLD-MCNC: 10.5 G/DL — LOW (ref 11.5–15.5)
LYMPHOCYTES # BLD AUTO: 12 % — LOW (ref 13–44)
MCHC RBC-ENTMCNC: 28.2 PG — SIGNIFICANT CHANGE UP (ref 27–34)
MCHC RBC-ENTMCNC: 31 GM/DL — LOW (ref 32–36)
MCV RBC AUTO: 90.7 FL — SIGNIFICANT CHANGE UP (ref 80–100)
MONOCYTES NFR BLD AUTO: 8 % — SIGNIFICANT CHANGE UP (ref 2–14)
NEUTROPHILS NFR BLD AUTO: 74 % — SIGNIFICANT CHANGE UP (ref 43–77)
PLATELET # BLD AUTO: 174 K/UL — SIGNIFICANT CHANGE UP (ref 150–400)
POTASSIUM SERPL-MCNC: 3.8 MMOL/L — SIGNIFICANT CHANGE UP (ref 3.5–5.3)
POTASSIUM SERPL-SCNC: 3.8 MMOL/L — SIGNIFICANT CHANGE UP (ref 3.5–5.3)
PREALB SERPL-MCNC: 11 MG/DL — LOW (ref 20–40)
PROT SERPL-MCNC: 6.5 G/DL — SIGNIFICANT CHANGE UP (ref 6–8.3)
RBC # BLD: 3.73 M/UL — LOW (ref 3.8–5.2)
RBC # FLD: 14.6 % — HIGH (ref 10.3–14.5)
SODIUM SERPL-SCNC: 136 MMOL/L — SIGNIFICANT CHANGE UP (ref 135–145)
WBC # BLD: 12.7 K/UL — HIGH (ref 3.8–10.5)
WBC # FLD AUTO: 12.7 K/UL — HIGH (ref 3.8–10.5)

## 2018-12-20 PROCEDURE — 71046 X-RAY EXAM CHEST 2 VIEWS: CPT | Mod: 26

## 2018-12-20 PROCEDURE — 99223 1ST HOSP IP/OBS HIGH 75: CPT

## 2018-12-20 PROCEDURE — 99222 1ST HOSP IP/OBS MODERATE 55: CPT | Mod: AI,GC

## 2018-12-20 RX ORDER — ERGOCALCIFEROL 1.25 MG/1
50000 CAPSULE ORAL
Qty: 0 | Refills: 0 | Status: DISCONTINUED | OUTPATIENT
Start: 2018-12-20 | End: 2019-01-03

## 2018-12-20 RX ORDER — POLYETHYLENE GLYCOL 3350 17 G/17G
17 POWDER, FOR SOLUTION ORAL DAILY
Qty: 0 | Refills: 0 | Status: DISCONTINUED | OUTPATIENT
Start: 2018-12-20 | End: 2018-12-23

## 2018-12-20 RX ORDER — BENZOCAINE AND MENTHOL 5; 1 G/100ML; G/100ML
1 LIQUID ORAL EVERY 4 HOURS
Qty: 0 | Refills: 0 | Status: DISCONTINUED | OUTPATIENT
Start: 2018-12-20 | End: 2018-12-26

## 2018-12-20 RX ADMIN — ERGOCALCIFEROL 50000 UNIT(S): 1.25 CAPSULE ORAL at 12:54

## 2018-12-20 RX ADMIN — Medication 16 UNIT(S): at 11:19

## 2018-12-20 RX ADMIN — FLUTICASONE PROPIONATE AND SALMETEROL 1 DOSE(S): 50; 250 POWDER ORAL; RESPIRATORY (INHALATION) at 21:10

## 2018-12-20 RX ADMIN — OXYCODONE HYDROCHLORIDE 5 MILLIGRAM(S): 5 TABLET ORAL at 13:15

## 2018-12-20 RX ADMIN — FLUTICASONE PROPIONATE AND SALMETEROL 1 DOSE(S): 50; 250 POWDER ORAL; RESPIRATORY (INHALATION) at 09:00

## 2018-12-20 RX ADMIN — OXYCODONE HYDROCHLORIDE 5 MILLIGRAM(S): 5 TABLET ORAL at 09:30

## 2018-12-20 RX ADMIN — Medication 7.5 MILLIGRAM(S): at 17:03

## 2018-12-20 RX ADMIN — GABAPENTIN 100 MILLIGRAM(S): 400 CAPSULE ORAL at 11:44

## 2018-12-20 RX ADMIN — AMIODARONE HYDROCHLORIDE 200 MILLIGRAM(S): 400 TABLET ORAL at 06:23

## 2018-12-20 RX ADMIN — OXYCODONE HYDROCHLORIDE 5 MILLIGRAM(S): 5 TABLET ORAL at 08:54

## 2018-12-20 RX ADMIN — Medication 81 MILLIGRAM(S): at 11:44

## 2018-12-20 RX ADMIN — Medication 137 MICROGRAM(S): at 06:24

## 2018-12-20 RX ADMIN — POLYETHYLENE GLYCOL 3350 17 GRAM(S): 17 POWDER, FOR SOLUTION ORAL at 11:43

## 2018-12-20 RX ADMIN — Medication 6: at 11:19

## 2018-12-20 RX ADMIN — OXYCODONE HYDROCHLORIDE 5 MILLIGRAM(S): 5 TABLET ORAL at 01:30

## 2018-12-20 RX ADMIN — Medication 16 UNIT(S): at 17:00

## 2018-12-20 RX ADMIN — PANTOPRAZOLE SODIUM 40 MILLIGRAM(S): 20 TABLET, DELAYED RELEASE ORAL at 06:24

## 2018-12-20 RX ADMIN — Medication 7.5 MILLIGRAM(S): at 06:25

## 2018-12-20 RX ADMIN — Medication 3 MILLILITER(S): at 15:42

## 2018-12-20 RX ADMIN — Medication 3 MILLILITER(S): at 09:00

## 2018-12-20 RX ADMIN — Medication 2: at 08:00

## 2018-12-20 RX ADMIN — HEPARIN SODIUM 5000 UNIT(S): 5000 INJECTION INTRAVENOUS; SUBCUTANEOUS at 06:23

## 2018-12-20 RX ADMIN — INSULIN GLARGINE 30 UNIT(S): 100 INJECTION, SOLUTION SUBCUTANEOUS at 21:31

## 2018-12-20 RX ADMIN — GABAPENTIN 300 MILLIGRAM(S): 400 CAPSULE ORAL at 21:31

## 2018-12-20 RX ADMIN — OXYCODONE HYDROCHLORIDE 5 MILLIGRAM(S): 5 TABLET ORAL at 12:54

## 2018-12-20 RX ADMIN — Medication 100 MILLIGRAM(S): at 17:04

## 2018-12-20 RX ADMIN — HEPARIN SODIUM 5000 UNIT(S): 5000 INJECTION INTRAVENOUS; SUBCUTANEOUS at 13:04

## 2018-12-20 RX ADMIN — Medication 16 UNIT(S): at 07:59

## 2018-12-20 RX ADMIN — Medication 100 MILLIGRAM(S): at 06:23

## 2018-12-20 RX ADMIN — Medication 3 MILLILITER(S): at 21:10

## 2018-12-20 RX ADMIN — Medication 1 TABLET(S): at 11:43

## 2018-12-20 RX ADMIN — HEPARIN SODIUM 5000 UNIT(S): 5000 INJECTION INTRAVENOUS; SUBCUTANEOUS at 21:31

## 2018-12-20 RX ADMIN — OXYCODONE HYDROCHLORIDE 5 MILLIGRAM(S): 5 TABLET ORAL at 00:54

## 2018-12-20 NOTE — DIETITIAN INITIAL EVALUATION ADULT. - PERTINENT LABORATORY DATA
12/20 Na-136 , K-3.8 , BUN-46H ,Creat-1.03 , Gluc-162H 10/7 V9x-72L PCOT (over Last 2 Days) - Ranging from 102-257

## 2018-12-20 NOTE — CONSULT NOTE ADULT - ASSESSMENT
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     # Atrial fibrillation - Rate controlled.   c/w Amiodarone.   Not on AC due to recent aortic dissection with Bleed     # Asthma - stable.   c/w prednisone,   c/w Duoneb/Advair, Montelukast     # DM type 2 -   Monitor Accu-Cheks   c/w Lantus 30 U QHS , Humalog 16 U TID-AC ,   Sliding Scale Insulin  Hypoglycemia Protocol.     # Hypothyroidism - stable.   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     # Atrial fibrillation - Rate controlled.   c/w Amiodarone.   Not on AC due to recent aortic dissection with Bleed     # Asthma - stable.   c/w prednisone,   c/w Duoneb/Advair, Montelukast     # DM type 2 -   Monitor Accu-Cheks   c/w Lantus 30 U QHS , Humalog 16 U TID-AC ,   Sliding Scale Insulin  Hypoglycemia Protocol.     # Hypothyroidism - stable.   c/w Synthroid     # Neurogenic Bladder   - epps cath     DVT/Gi Px : Heparin/Protonix

## 2018-12-20 NOTE — DIETITIAN INITIAL EVALUATION ADULT. - OTHER INFO
67yr Old Female - Dx: Infarct of Spinal Cord - Initial Assessment - Consistent Carbohydrate Diet w/ Thin Liquids (No Fish), Denies Food Allergy, Tolerates Diet, No Chewing/Swallowing Complications (Per Pt), No Pressure Ulcers, No Edema, No Recent N/V/D

## 2018-12-20 NOTE — DIETITIAN INITIAL EVALUATION ADULT. - NUTRITION INTERVENTION
Meals and Snack/Nutrition Education/Collaboration and Referral of Nutrition Care Medical Food Supplements/Meals and Snack/Collaboration and Referral of Nutrition Care

## 2018-12-20 NOTE — CONSULT NOTE ADULT - SUBJECTIVE AND OBJECTIVE BOX
Patient is a 63y old  Female who presents with a chief complaint of functional deficits after spinal cord infarction (19 Dec 2018 15:29)    HPI:  63 year old F with PMH of asthma (on chronic steroids), DM2, HLD, obesity, hypothyroid, PE (on Coumadin, with IVC filter) who presented initially to [facility] and then transferred to [facility] on 12/7/18 with sudden-onset upper back pain between her shoulder blades for one hour associated with shortness of breath. Patient also noted LE weakness that started around same time as back pain. At [facility], initial CTA of the chest revealed probable type A dissection with intramural hematoma and active hemorrhage, so she was transferred urgently to [facility] for possible CT surgery. Upon arrival to [facility], patient was started on Labetalol gtt for blood pressure control; repeat CTA chest revealed Type B dissection with hemoperitoneum, so patient did not undergo surgical intervention. TTE with pericaridal effusion and tamponade physiology.  INR was reversed with FFP and vitamin K.  MRI L-spine revealed abnormal signal within the conus medullaris and distal thoracic cord, with expansion of the conus, suspicious for spinal infarction. Neurosurgery consulted, no surgical intervention recommended. Recommended by neurology to continue ASA 81 mg daily.  Hospital course significant for Afib on telemetry monitoring; s/p Labetalol and amiodarone load. Course notable for orthostasis on 12/17 while working with PT which improved after diuretics were discontinued and IV fluids.  Also with urinary retention due to neurogenic bladder with epps catheter placed 12/18. Deemed medically stable for acute rehab on 12/19/18. (19 Dec 2018 15:29)    PAST MEDICAL & SURGICAL HISTORY:  Arthritis  Shingles  PE (pulmonary embolism)  Hypothyroidism  Hyperlipidemia  Diabetes mellitus  Asthma  History of cataract surgery  S/P cholecystectomy    SOCIAL HISTORY:  Tobacco Usage:  (   ) never smoked   (   ) former smoker   (   ) current smoker  (     ) pack years    Tobacco Quit Date:  Substance Use (Street drugs): (  ) never used  (  ) other:  Alcohol Usage:    Family history reviewed and otherwise non-contributory  ALLERGIES:  adhesives (Unknown)  Ceftin (Rash)  erythromycin (Rash)  fish (Unknown)  Levaquin (Rash)    MEDICATIONS:  fluticasone propionate/ salmeterol 500-50 MICROgram(s) Diskus 1 Dose(s) Inhalation two times a day  polyethylene glycol 3350 17 Gram(s) Oral daily    REVIEW OF SYSTEMS:  CONSTITUTIONAL: No fever, weight loss, or fatigue  EYES: No eye pain, visual disturbances, or discharge  ENMT:  No difficulty hearing, tinnitus, vertigo; No sinus or throat pain  NECK: No neck pain or neck stiffness  BREASTS: No pain, masses, or nipple discharge  RESPIRATORY: No cough, wheezing, chills or hemoptysis; No shortness of breath  CARDIOVASCULAR: No chest pain, palpitations, dizziness, or leg swelling  GASTROINTESTINAL: No abdominal pain, No nausea, vomiting, or hematemesis; No diarrhea or constipation  GENITOURINARY: No dysuria, frequency, hematuria, or incontinence  NEUROLOGICAL: No headaches, memory loss, loss of strength, numbness, or tremors  SKIN: No itching, burning, rashes, or lesions   LYMPH NODES: No enlarged glands  ENDOCRINE: No heat or cold intolerance; No hair loss  MUSCULOSKELETAL: No joint pain or swelling; No muscle, back, or extremity pain  PSYCHIATRIC: No depression, anxiety, mood swings, or difficulty sleeping  HEME/LYMPH: No easy bruising or bleeding  ALLERY AND IMMUNOLOGIC: No hives or eczema    All other ROS reviewed and negative except as otherwise stated    Vital Signs Last 24 Hrs  T(F): 98.4 (20 Dec 2018 00:22), Max: 98.4 (19 Dec 2018 13:49)  HR: 71 (20 Dec 2018 06:31) (71 - 83)  BP: 113/72 (20 Dec 2018 06:31) (111/73 - 132/72)  RR: 14 (20 Dec 2018 00:22) (14 - 18)  SpO2: 94% (20 Dec 2018 00:22) (94% - 98%)  I&O's Summary    19 Dec 2018 07:01  -  20 Dec 2018 07:00  --------------------------------------------------------  IN: 0 mL / OUT: 275 mL / NET: -275 mL      PHYSICAL EXAM:  GENERAL: NAD, well-groomed, well-developed, Obese   HEAD:  Atraumatic, Normocephalic  EYES: EOMI, PERRLA, conjunctiva and sclera clear  ENMT: No tonsillar erythema, exudates, or enlargement; Moist mucous membranes  NECK: Supple, No JVD  CHEST/LUNG: CTA, BLAE, NO added sounds.   HEART: Regular rate and rhythm; S1/S2, No murmurs, rubs, or gallops  ABDOMEN: Soft, Nontender, Nondistended; Bowel sounds present, Epps cath +   VASCULAR: Normal pulses, Normal capillary refill  EXTREMITIES:  2+ Peripheral Pulses, No cyanosis, No edema  LYMPH: No lymphadenopathy noted  SKIN: Warm, Intact  PSYCH: Normal mood and affect  NERVOUS SYSTEM:  A/O x3, Good concentration;   Left LE 3/5,  right LE 2/5     LABS:                        10.5   12.7  )-----------( 174      ( 20 Dec 2018 05:30 )             33.9     12-20    136  |  100  |  46  ----------------------------<  162  3.8   |  26  |  1.03    Ca    10.5      20 Dec 2018 05:30    TPro  6.5  /  Alb  2.5  /  TBili  0.6  /  DBili  x   /  AST  17  /  ALT  22  /  AlkPhos  80  12-20    eGFR if : 66 mL/min/1.73M2 (12-20-18 @ 05:30)  eGFR if Non African American: 57 mL/min/1.73M2 (12-20-18 @ 05:30)                  CAPILLARY BLOOD GLUCOSE      POCT Blood Glucose.: 191 mg/dL (20 Dec 2018 07:10)  POCT Blood Glucose.: 215 mg/dL (19 Dec 2018 22:05)  POCT Blood Glucose.: 257 mg/dL (19 Dec 2018 12:08)    12-07 [id number] 6.4          RADIOLOGY & ADDITIONAL TESTS:    Care Discussed with Consultants/Other Providers:

## 2018-12-21 LAB
APPEARANCE UR: CLEAR — SIGNIFICANT CHANGE UP
BACTERIA # UR AUTO: ABNORMAL /HPF
BILIRUB UR-MCNC: ABNORMAL
COLOR SPEC: YELLOW — SIGNIFICANT CHANGE UP
COMMENT - URINE: SIGNIFICANT CHANGE UP
DIFF PNL FLD: ABNORMAL
EPI CELLS # UR: SIGNIFICANT CHANGE UP
GLUCOSE UR QL: NEGATIVE — SIGNIFICANT CHANGE UP
HYALINE CASTS # UR AUTO: ABNORMAL (ref 0–7)
KETONES UR-MCNC: NEGATIVE — SIGNIFICANT CHANGE UP
LEUKOCYTE ESTERASE UR-ACNC: ABNORMAL
NITRITE UR-MCNC: NEGATIVE — SIGNIFICANT CHANGE UP
PH UR: 5 — SIGNIFICANT CHANGE UP (ref 5–8)
PROT UR-MCNC: 15
RBC CASTS # UR COMP ASSIST: SIGNIFICANT CHANGE UP /HPF (ref 0–4)
SP GR SPEC: 1.02 — SIGNIFICANT CHANGE UP (ref 1.01–1.02)
UROBILINOGEN FLD QL: NEGATIVE — SIGNIFICANT CHANGE UP
WBC UR QL: NEGATIVE /HPF — SIGNIFICANT CHANGE UP (ref 0–5)

## 2018-12-21 PROCEDURE — 99233 SBSQ HOSP IP/OBS HIGH 50: CPT

## 2018-12-21 PROCEDURE — 99232 SBSQ HOSP IP/OBS MODERATE 35: CPT

## 2018-12-21 RX ORDER — DIPHENHYDRAMINE HYDROCHLORIDE AND LIDOCAINE HYDROCHLORIDE AND ALUMINUM HYDROXIDE AND MAGNESIUM HYDRO
15 KIT THREE TIMES A DAY
Qty: 0 | Refills: 0 | Status: COMPLETED | OUTPATIENT
Start: 2018-12-21 | End: 2018-12-26

## 2018-12-21 RX ORDER — IPRATROPIUM/ALBUTEROL SULFATE 18-103MCG
3 AEROSOL WITH ADAPTER (GRAM) INHALATION EVERY 6 HOURS
Qty: 0 | Refills: 0 | Status: DISCONTINUED | OUTPATIENT
Start: 2018-12-21 | End: 2018-12-27

## 2018-12-21 RX ADMIN — GABAPENTIN 100 MILLIGRAM(S): 400 CAPSULE ORAL at 13:18

## 2018-12-21 RX ADMIN — OXYCODONE HYDROCHLORIDE 5 MILLIGRAM(S): 5 TABLET ORAL at 13:33

## 2018-12-21 RX ADMIN — OXYCODONE HYDROCHLORIDE 5 MILLIGRAM(S): 5 TABLET ORAL at 13:30

## 2018-12-21 RX ADMIN — GABAPENTIN 300 MILLIGRAM(S): 400 CAPSULE ORAL at 22:24

## 2018-12-21 RX ADMIN — Medication 1 TABLET(S): at 13:18

## 2018-12-21 RX ADMIN — POLYETHYLENE GLYCOL 3350 17 GRAM(S): 17 POWDER, FOR SOLUTION ORAL at 13:18

## 2018-12-21 RX ADMIN — Medication 7.5 MILLIGRAM(S): at 05:46

## 2018-12-21 RX ADMIN — AMIODARONE HYDROCHLORIDE 200 MILLIGRAM(S): 400 TABLET ORAL at 05:45

## 2018-12-21 RX ADMIN — Medication 16 UNIT(S): at 12:10

## 2018-12-21 RX ADMIN — HEPARIN SODIUM 5000 UNIT(S): 5000 INJECTION INTRAVENOUS; SUBCUTANEOUS at 05:46

## 2018-12-21 RX ADMIN — Medication 2: at 08:12

## 2018-12-21 RX ADMIN — OXYCODONE HYDROCHLORIDE 5 MILLIGRAM(S): 5 TABLET ORAL at 09:30

## 2018-12-21 RX ADMIN — Medication 16 UNIT(S): at 16:46

## 2018-12-21 RX ADMIN — Medication 4: at 16:46

## 2018-12-21 RX ADMIN — Medication 7.5 MILLIGRAM(S): at 17:24

## 2018-12-21 RX ADMIN — OXYCODONE HYDROCHLORIDE 5 MILLIGRAM(S): 5 TABLET ORAL at 22:24

## 2018-12-21 RX ADMIN — OXYCODONE HYDROCHLORIDE 5 MILLIGRAM(S): 5 TABLET ORAL at 08:17

## 2018-12-21 RX ADMIN — FLUTICASONE PROPIONATE AND SALMETEROL 1 DOSE(S): 50; 250 POWDER ORAL; RESPIRATORY (INHALATION) at 20:36

## 2018-12-21 RX ADMIN — Medication 100 MILLIGRAM(S): at 17:24

## 2018-12-21 RX ADMIN — Medication 137 MICROGRAM(S): at 05:46

## 2018-12-21 RX ADMIN — FLUTICASONE PROPIONATE AND SALMETEROL 1 DOSE(S): 50; 250 POWDER ORAL; RESPIRATORY (INHALATION) at 09:09

## 2018-12-21 RX ADMIN — OXYCODONE HYDROCHLORIDE 5 MILLIGRAM(S): 5 TABLET ORAL at 23:00

## 2018-12-21 RX ADMIN — PANTOPRAZOLE SODIUM 40 MILLIGRAM(S): 20 TABLET, DELAYED RELEASE ORAL at 05:45

## 2018-12-21 RX ADMIN — Medication 100 MILLIGRAM(S): at 05:45

## 2018-12-21 RX ADMIN — Medication 16 UNIT(S): at 08:12

## 2018-12-21 RX ADMIN — HEPARIN SODIUM 5000 UNIT(S): 5000 INJECTION INTRAVENOUS; SUBCUTANEOUS at 22:23

## 2018-12-21 RX ADMIN — Medication 3 MILLILITER(S): at 09:09

## 2018-12-21 RX ADMIN — HEPARIN SODIUM 5000 UNIT(S): 5000 INJECTION INTRAVENOUS; SUBCUTANEOUS at 13:18

## 2018-12-21 RX ADMIN — Medication 81 MILLIGRAM(S): at 13:18

## 2018-12-21 RX ADMIN — Medication 4: at 12:10

## 2018-12-21 RX ADMIN — INSULIN GLARGINE 30 UNIT(S): 100 INJECTION, SOLUTION SUBCUTANEOUS at 22:24

## 2018-12-21 NOTE — CHART NOTE - NSCHARTNOTEFT_GEN_A_CORE
[facility] Interdisciplinary Plan of Care    REHABILITATION DIAGNOSIS:  Acute infarction of spinal cord        COMORBIDITIES/COMPLICATING CONDITIONS IMPACTING REHABILITATION:  HEALTH ISSUES - PROBLEM Dx: Diabetes, PE, Hypothyroidism, Anemia, Leukocytosis, Neurogenic bowel and bladder, Afib, Asthma        PAST MEDICAL & SURGICAL HISTORY:  Arthritis  Shingles  PE (pulmonary embolism)  Hypothyroidism  Hyperlipidemia  Diabetes mellitus  Asthma  History of cataract surgery  S/P cholecystectomy      Based upon consideration of the patient's impairments, functional status, complicating conditions and any other contributing factors and after information garnered from the assessments of all therapy disciplines involved in treating the patient and other pertinent clinicians:    INTERDISCIPLINARY REHABILITATION INTERVENTIONS:    [ x  ] Transfer Training  [ x  ] Bed Mobility  [  x ] Therapeutic Exercise  [  x ] Balance/Coordination Exercises  [  x ] Locomotion retraining  [   ] Stairs  [  x ] Functional Transfer Training  [ x  ] Bowel/Bladder program  [ x  ] Pain Management  [ x  ] Skin/Wound Care  [   ] Visual/Perceptual Training  [ x  ] Therapeutic Recreation Activities  [x   ] Neuromuscular Re-education  [ x  ] Activities of Daily Living  [   ] Speech Exercise  [   ] Swallowing Exercises  [   ] Vital Stim  [ x  ] Dietary Supplements  [   ] Calorie Count  [   ] Cognitive Exercises  [   ] Cognitive/Linguistic Treatment  [   ] Behavior Program  [   ] Neuropsych Therapy  [ x  ] Patient/Family Counseling  [  x ] Family Training  [ x  ] Community Re-entry  [ x  ] Orthotic Evaluation  [   ] Prosthetic Eval/Training    MEDICAL PROGNOSIS:  Good    REHAB POTENTIAL:  Good  EXPECTED DAILY THERAPY:         PT:1.5hrs       OT:1.5hrs       ST:0       P&O:Eval    EXPECTED INTENSITY OF PROGRAM:  3 hrs / Day    EXPECTED FREQUENCY OF PROGRAM: 5 Days/ Week    ESTIMATED LOS:  [  ] 5-7 Days  [  ] 7-10 Days  [  ] 10- 14 Days  [  ] 14- 18 Days  [ x ] 18- 21 Days    ESTIMATED DISPOSITION:  [  ] Home   [  ] Home with Outpatient Therapies  [ x ] Home with Home Therapies  [  ] Assisted Living  [  ] Nursing Home  [  ] Long Term Acute Care    INTERDISCIPLINARY FUNCTIONAL OUTCOMES/GOALS:         Gait/Mobility:4       Transfers:4       ADLs:4       Functional Transfers:4       Medication Management:7       Communication:na       Cognitive:na       Dysphagia:na       Bladder:6       Bowel:6     Functional Independent Measures:   7 = Independent  6 = Modified Independent  5 = Supervision  4 = Minimal Assist/ Contact Guard  3 = Moderate Assistance  2 = Maximum Assistance  1 = Total Assistance  0 = Unable to assess

## 2018-12-21 NOTE — PROGRESS NOTE ADULT - SUBJECTIVE AND OBJECTIVE BOX
Patient is a 63y old  Female who presents with a chief complaint of functional deficits after spinal cord infarction (20 Dec 2018 08:26)      Patient seen and examined at bedside.  Patient denies any chest pain or shortness of breath.  Reports no issue with moving her bowels    ALLERGIES:  adhesives (Unknown)  Ceftin (Rash)  erythromycin (Rash)  fish (Unknown)  Levaquin (Rash)    MEDICATIONS:  ALBUTerol/ipratropium for Nebulization 3 milliLiter(s) Nebulizer every 6 hours PRN  benzocaine 15 mG/menthol 3.6 mG Lozenge 1 Lozenge Oral every 4 hours PRN  ergocalciferol 99546 Unit(s) Oral every week  fluticasone propionate/ salmeterol 500-50 MICROgram(s) Diskus 1 Dose(s) Inhalation two times a day  polyethylene glycol 3350 17 Gram(s) Oral daily    Vital Signs Last 24 Hrs  T(F): 97.8 (21 Dec 2018 09:03), Max: 97.8 (21 Dec 2018 09:03)  HR: 74 (21 Dec 2018 09:11) (67 - 74)  BP: 115/77 (21 Dec 2018 09:03) (111/72 - 117/78)  RR: 12 (21 Dec 2018 09:03) (12 - 14)  SpO2: 98% (21 Dec 2018 09:11) (92% - 98%)  I&O's Summary    20 Dec 2018 07:01  -  21 Dec 2018 07:00  --------------------------------------------------------  IN: 0 mL / OUT: 1450 mL / NET: -1450 mL        PHYSICAL EXAM:  General: NAD, A/O x 3  ENT: MMM  Neck: Supple, No JVD  Lungs: Clear to auscultation bilaterally  Cardio: RRR, S1/S2, No murmurs  Abdomen: Soft, Nontender, Nondistended; obese  Extremities: No cyanosis, No edema    LABS:                        10.5   12.7  )-----------( 174      ( 20 Dec 2018 05:30 )             33.9     12-20    136  |  100  |  46  ----------------------------<  162  3.8   |  26  |  1.03    Ca    10.5      20 Dec 2018 05:30    TPro  6.5  /  Alb  2.5  /  TBili  0.6  /  DBili  x   /  AST  17  /  ALT  22  /  AlkPhos  80  12-20    eGFR if : 66 mL/min/1.73M2 (12-20-18 @ 05:30)  eGFR if Non African American: 57 mL/min/1.73M2 (12-20-18 @ 05:30)                    CAPILLARY BLOOD GLUCOSE      POCT Blood Glucose.: 227 mg/dL (21 Dec 2018 12:07)  POCT Blood Glucose.: 193 mg/dL (21 Dec 2018 07:13)  POCT Blood Glucose.: 169 mg/dL (20 Dec 2018 21:25)  POCT Blood Glucose.: 113 mg/dL (20 Dec 2018 16:57)    12-07 [id number] 6.4          RADIOLOGY & ADDITIONAL TESTS:    Care Discussed with Consultants/Other Providers:

## 2018-12-21 NOTE — PROGRESS NOTE ADULT - SUBJECTIVE AND OBJECTIVE BOX
HISTORY OF PRESENT ILLNESS:  63 year old F with PMH of asthma (on chronic steroids), DM2, HLD, obesity, hypothyroid, PE (on Coumadin, with IVC filter) who presented initially to [facility] and then transferred to [facility] on 12/7/18 with sudden-onset upper back pain between her shoulder blades for one hour associated with shortness of breath. Patient also noted LE weakness that started around same time as back pain. At \[facility]\"", initial CTA of the chest revealed probable type A dissection with intramural hematoma and active hemorrhage, so she was transferred urgently to [facility] for possible CT surgery. Upon arrival to [facility], patient was started on Labetalol gtt for blood pressure control; repeat CTA chest revealed Type B dissection with hemoperitoneum, so patient did not undergo surgical intervention. TTE with pericaridal effusion and tamponade physiology.  INR was reversed with FFP and vitamin K.  MRI L-spine revealed abnormal signal within the conus medullaris and distal thoracic cord, with expansion of the conus, suspicious for spinal infarction. Neurosurgery consulted, no surgical intervention recommended. Recommended by neurology to continue ASA 81 mg daily.  Hospital course significant for Afib on telemetry monitoring; s/p Labetalol and amiodarone load. Course notable for orthostasis on 12/17 while working with PT which improved after diuretics were discontinued and IV fluids.  Also with urinary retention due to neurogenic bladder with epps catheter placed 12/18. Deemed medically stable for acute rehab on 12/19/18.        TODAY'S SUBJECTIVE & REVIEW OF SYMPTOMS:  Pt seen and examined.  Pt c/o feeling tired.  Was able to  rehab for approximately 30 seconds without c/o dizziness or light-headedness.  Pt c/o persistent LBP and bilateral shoulder pain, left>right relieved with heat or ice application.  BLE numbness has improved.  Pt c/o persistent BLE weakness.  Denies SOB, dyspnea or cough.  Epps removed this am.  Pt had not been able to void since.  +BM 12/20  Lastly pt c/o hoarse throat since initially admitted.  Denies dysphagia    [ x  ] Constitutional WNL  [   ] Cardio WNL  [   ] Resp WNL  [   ] GI WNL  [   ] Heme WNL  [   ] Endo WNL  [ x  ] Skin WNL  [   ] MSK WNL  [   ] Neuro WNL  [ x  ] Cognitive WNL  [ x  ] Psych WNL        PHYSICAL EXAM  Vital Signs Last 24 Hrs  T(C): 36.6 (21 Dec 2018 09:03), Max: 36.6 (21 Dec 2018 09:03)  T(F): 97.8 (21 Dec 2018 09:03), Max: 97.8 (21 Dec 2018 09:03)  HR: 74 (21 Dec 2018 09:11) (67 - 74)  BP: 115/77 (21 Dec 2018 09:03) (111/72 - 117/78)  BP(mean): --  RR: 12 (21 Dec 2018 09:03) (12 - 14)  SpO2: 98% (21 Dec 2018 09:11) (92% - 98%)    Gen - NAD, Comfortable  	HEENT - NCAT, EOMI, MMM  	Neck - Supple, No limited ROM  	Pulm - CTAB, No wheeze, No rhonchi, No crackles  	Cardiovascular - RRR, S1S2, No murmurs  	Abdomen - Soft, NT/ND, +BS  	 - epps in place, draining yellow urine  	Extremities - No C/C/E, No calf tenderness  	Neuro-  	   Cognitive - AAOx3  	   Communication - Fluent, No dysarthria  	   Attention: Intact   	   Judgement: Good evidence of judgement  	   Cranial Nerves - CN 2-12 grossly intact  	   Motor -   	                  LEFT    UE - ShAB 4/5 (limited due to chronic RTC injury), EF 5/5, EE 5/5, WE 5/5,  5/5  	                  RIGHT UE - ShAB 4/5, EF 5/5, EE 5/5, WE 5/5,  5/5  	                  LEFT    LE - HF 3/5, KE 3/5, DF 4/5, PF 4/5  	                  RIGHT LE - HF 2/5, KE 2/5, DF 2/5, PF 2/5     	   Sensory - Impaired to LT BLEs  	   Reflexes -No primitive reflexive  	Tone-Mild rectal tone  	   Coordination - FTN intact  	Psychiatric - Mood stable, Affect WNL  Skin:  left inner thigh papules, non-pruritic, non-TTP. L knee scar    FUNCTIONAL PROGRESS:  Bed mobility: Mod A rolling.  Pt unable to perform sit to supine  Transfers: Unable: Pt requires patrick lift with assist of 2 persons  Gait: Unable  ADLs: UE dress max A, LE dress total A      RECENT LABS:                          10.5   12.7  )-----------( 174      ( 20 Dec 2018 05:30 )             33.9     12-20    136  |  100  |  46<H>  ----------------------------<  162<H>  3.8   |  26  |  1.03    Ca    10.5      20 Dec 2018 05:30    TPro  6.5  /  Alb  2.5<L>  /  TBili  0.6  /  DBili  x   /  AST  17  /  ALT  22  /  AlkPhos  80  12-20    CAPILLARY BLOOD GLUCOSE      POCT Blood Glucose.: 227 mg/dL (21 Dec 2018 12:07)  POCT Blood Glucose.: 193 mg/dL (21 Dec 2018 07:13)  POCT Blood Glucose.: 169 mg/dL (20 Dec 2018 21:25)  POCT Blood Glucose.: 113 mg/dL (20 Dec 2018 16:57)        Assessment and Plan:   64 yo Female with functional deficits after Type B aortic dissection and distal thoracic/conus spinal infarction      spinal cord infarction after Type B aortic dissection   - Continue Comprehensive Rehab Program of PT/OT   - ASA 81 daily  - BP control, avoid hypotension - TEDs, binder when OOB for therapy   - pain control: Tylenol, Oxycodone, Gabapentin   - repeat CT scan in 3-4 months as per Dr. Kumar CT surgery     asthma   - prednisone, albuterol, advair, Duonebs-change from q6hrs to prn    atrial fibrillation   - amiodarone for rate control    DM2 on insulin-Uncontrolled  - likely due to prednisone and immobility  - lantus, humalog, SSI, CC diet     hypothyroid  - synthroid     neurogenic bowel and bladder  - epps catheter, future TOV-D/Cd epps this am.  F/U PVRs and SC prn  - senna, colace, miralax, PRN suppository.  Initiated bowel regimen qam    acute blood loss anemia  -monitor cbc    leukocytosis-likely due to prednisone  -monitor cbc  -check ua  -cxr with reduced size of pleural effusions    Hoarse voice  -Add magic mouth wash x 5days.    -Cont cepacol prn  -Consider ENT consult      Weight bearing status: WBAT     Precautions / PROPHYLAXIS:   - Falls, Cardiac, Sternal, Spinal   - Lungs: Aspiration, Incentive Spirometer   - Pressure injury/Skin: Turn Q2hrs while in bed   - DVT: SQ Heparin, SCDs, TEDs     D[facility]:  [facility]

## 2018-12-21 NOTE — PROGRESS NOTE ADULT - ASSESSMENT
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     # Atrial fibrillation - Rate controlled.   c/w Amiodarone.   Not on AC due to recent aortic dissection with Bleed     # Asthma - stable.   c/w prednisone,   c/w Duoneb/Advair, Montelukast     # DM type 2 -   Monitor Accu-Cheks last 24 hours 282-113  c/w Lantus 30 U QHS , Humalog 16 U TID-AC ,   Sliding Scale Insulin  Hypoglycemia Protocol.     # Hypothyroidism - stable.   c/w Synthroid     # Neurogenic Bladder   - trial without epps currently     DVT/Gi Px : Heparin/Protonix

## 2018-12-22 LAB — PTH RELATED PROT SERPL-MCNC: <2 PMOL/L — SIGNIFICANT CHANGE UP

## 2018-12-22 PROCEDURE — 99232 SBSQ HOSP IP/OBS MODERATE 35: CPT | Mod: GC

## 2018-12-22 RX ADMIN — Medication 2: at 21:21

## 2018-12-22 RX ADMIN — OXYCODONE HYDROCHLORIDE 5 MILLIGRAM(S): 5 TABLET ORAL at 22:15

## 2018-12-22 RX ADMIN — Medication 650 MILLIGRAM(S): at 11:38

## 2018-12-22 RX ADMIN — OXYCODONE HYDROCHLORIDE 5 MILLIGRAM(S): 5 TABLET ORAL at 21:43

## 2018-12-22 RX ADMIN — Medication 16 UNIT(S): at 12:06

## 2018-12-22 RX ADMIN — INSULIN GLARGINE 30 UNIT(S): 100 INJECTION, SOLUTION SUBCUTANEOUS at 21:22

## 2018-12-22 RX ADMIN — Medication 100 MILLIGRAM(S): at 18:03

## 2018-12-22 RX ADMIN — AMIODARONE HYDROCHLORIDE 200 MILLIGRAM(S): 400 TABLET ORAL at 05:20

## 2018-12-22 RX ADMIN — PANTOPRAZOLE SODIUM 40 MILLIGRAM(S): 20 TABLET, DELAYED RELEASE ORAL at 05:21

## 2018-12-22 RX ADMIN — Medication 16 UNIT(S): at 08:16

## 2018-12-22 RX ADMIN — Medication 137 MICROGRAM(S): at 05:21

## 2018-12-22 RX ADMIN — FLUTICASONE PROPIONATE AND SALMETEROL 1 DOSE(S): 50; 250 POWDER ORAL; RESPIRATORY (INHALATION) at 15:54

## 2018-12-22 RX ADMIN — OXYCODONE HYDROCHLORIDE 5 MILLIGRAM(S): 5 TABLET ORAL at 15:13

## 2018-12-22 RX ADMIN — GABAPENTIN 100 MILLIGRAM(S): 400 CAPSULE ORAL at 11:40

## 2018-12-22 RX ADMIN — GABAPENTIN 300 MILLIGRAM(S): 400 CAPSULE ORAL at 21:23

## 2018-12-22 RX ADMIN — DIPHENHYDRAMINE HYDROCHLORIDE AND LIDOCAINE HYDROCHLORIDE AND ALUMINUM HYDROXIDE AND MAGNESIUM HYDRO 15 MILLILITER(S): KIT at 05:20

## 2018-12-22 RX ADMIN — POLYETHYLENE GLYCOL 3350 17 GRAM(S): 17 POWDER, FOR SOLUTION ORAL at 11:39

## 2018-12-22 RX ADMIN — Medication 100 MILLIGRAM(S): at 05:21

## 2018-12-22 RX ADMIN — HEPARIN SODIUM 5000 UNIT(S): 5000 INJECTION INTRAVENOUS; SUBCUTANEOUS at 21:23

## 2018-12-22 RX ADMIN — Medication 4: at 08:16

## 2018-12-22 RX ADMIN — Medication 650 MILLIGRAM(S): at 15:12

## 2018-12-22 RX ADMIN — Medication 10 MILLIGRAM(S): at 05:21

## 2018-12-22 RX ADMIN — OXYCODONE HYDROCHLORIDE 5 MILLIGRAM(S): 5 TABLET ORAL at 11:34

## 2018-12-22 RX ADMIN — OXYCODONE HYDROCHLORIDE 5 MILLIGRAM(S): 5 TABLET ORAL at 08:20

## 2018-12-22 RX ADMIN — DIPHENHYDRAMINE HYDROCHLORIDE AND LIDOCAINE HYDROCHLORIDE AND ALUMINUM HYDROXIDE AND MAGNESIUM HYDRO 15 MILLILITER(S): KIT at 15:21

## 2018-12-22 RX ADMIN — Medication 7.5 MILLIGRAM(S): at 05:20

## 2018-12-22 RX ADMIN — Medication 7.5 MILLIGRAM(S): at 18:04

## 2018-12-22 RX ADMIN — Medication 81 MILLIGRAM(S): at 11:40

## 2018-12-22 RX ADMIN — HEPARIN SODIUM 5000 UNIT(S): 5000 INJECTION INTRAVENOUS; SUBCUTANEOUS at 15:21

## 2018-12-22 RX ADMIN — Medication 16 UNIT(S): at 18:02

## 2018-12-22 RX ADMIN — HEPARIN SODIUM 5000 UNIT(S): 5000 INJECTION INTRAVENOUS; SUBCUTANEOUS at 05:20

## 2018-12-22 RX ADMIN — OXYCODONE HYDROCHLORIDE 5 MILLIGRAM(S): 5 TABLET ORAL at 14:18

## 2018-12-22 RX ADMIN — FLUTICASONE PROPIONATE AND SALMETEROL 1 DOSE(S): 50; 250 POWDER ORAL; RESPIRATORY (INHALATION) at 22:09

## 2018-12-22 RX ADMIN — Medication 1 TABLET(S): at 11:40

## 2018-12-22 NOTE — PROGRESS NOTE ADULT - SUBJECTIVE AND OBJECTIVE BOX
Cc: Gait dysfunction, spinal cord infarction    HPI: Patient with no new medical issues today.  Pain controlled with percocet but makes her sleepy, no chest pain, no N/V, no Fevers/Chills. No other new ROS  Has been tolerating rehabilitation program.    acetaminophen   Tablet .. 650 milliGRAM(s) Oral every 6 hours PRN  ALBUTerol    90 MICROgram(s) HFA Inhaler 1 Puff(s) Inhalation every 4 hours  ALBUTerol    90 MICROgram(s) HFA Inhaler 1 Puff(s) Inhalation every 4 hours  ALBUTerol/ipratropium for Nebulization 3 milliLiter(s) Nebulizer every 6 hours PRN  amiodarone    Tablet 200 milliGRAM(s) Oral daily  aspirin enteric coated 81 milliGRAM(s) Oral daily  benzocaine 15 mG/menthol 3.6 mG Lozenge 1 Lozenge Oral every 4 hours PRN  bisacodyl Suppository 10 milliGRAM(s) Rectal <User Schedule>  dextrose 40% Gel 15 Gram(s) Oral once PRN  dextrose 5%. 1000 milliLiter(s) IV Continuous <Continuous>  dextrose 50% Injectable 12.5 Gram(s) IV Push once  dextrose 50% Injectable 25 Gram(s) IV Push once  dextrose 50% Injectable 25 Gram(s) IV Push once  docusate sodium 100 milliGRAM(s) Oral two times a day  ergocalciferol 22932 Unit(s) Oral every week  FIRST- Mouthwash  BLM 15 milliLiter(s) Swish and Swallow three times a day  fluticasone propionate/ salmeterol 500-50 MICROgram(s) Diskus 1 Dose(s) Inhalation two times a day  gabapentin 300 milliGRAM(s) Oral at bedtime  gabapentin 100 milliGRAM(s) Oral daily  glucagon  Injectable 1 milliGRAM(s) IntraMuscular once PRN  heparin  Injectable 5000 Unit(s) SubCutaneous every 8 hours  insulin glargine Injectable (LANTUS) 30 Unit(s) SubCutaneous at bedtime  insulin lispro (HumaLOG) corrective regimen sliding scale   SubCutaneous three times a day before meals  insulin lispro (HumaLOG) corrective regimen sliding scale   SubCutaneous at bedtime  insulin lispro Injectable (HumaLOG) 16 Unit(s) SubCutaneous three times a day before meals  levothyroxine 137 MICROGram(s) Oral daily  multivitamin 1 Tablet(s) Oral daily  oxyCODONE    IR 5 milliGRAM(s) Oral every 4 hours PRN  oxyCODONE    IR 10 milliGRAM(s) Oral every 4 hours PRN  pantoprazole    Tablet 40 milliGRAM(s) Oral before breakfast  polyethylene glycol 3350 17 Gram(s) Oral daily  predniSONE   Tablet 7.5 milliGRAM(s) Oral every 12 hours  senna 2 Tablet(s) Oral at bedtime PRN  tiotropium 18 MICROgram(s) Capsule 1 Capsule(s) Inhalation daily      T(C): 36.3 (12-22-18 @ 08:46), Max: 36.7 (12-21-18 @ 22:00)  HR: 73 (12-22-18 @ 08:46) (73 - 76)  BP: 131/84 (12-22-18 @ 08:46) (131/84 - 141/80)  RR: 14 (12-22-18 @ 08:46) (14 - 14)  SpO2: 98% (12-22-18 @ 08:46) (96% - 98%)    In NAD  HEENT- EOMI  Heart- RRR, S1S2  Lungs- CTA bl.  Abd- + BS, NT  Ext- No calf pain  Neuro- Exam unchanged          Imp: Patient with diagnosis of  spinal cord infarction  admitted for comprehensive acute rehabilitation.    Plan:  - Continue PT/OT/SLP  - DVT prophylaxis  - Skin- Turn q2h, check skin daily  - Continue current medications; patient medically stable.   -Active issues- pain- continue oxycodone, gabapentin, consider switching to tramadol if drowsiness persists.   - Patient is stable to continue current rehabilitation program.

## 2018-12-23 PROCEDURE — 99232 SBSQ HOSP IP/OBS MODERATE 35: CPT | Mod: GC

## 2018-12-23 PROCEDURE — 99233 SBSQ HOSP IP/OBS HIGH 50: CPT

## 2018-12-23 RX ADMIN — HEPARIN SODIUM 5000 UNIT(S): 5000 INJECTION INTRAVENOUS; SUBCUTANEOUS at 05:31

## 2018-12-23 RX ADMIN — Medication 4: at 08:25

## 2018-12-23 RX ADMIN — GABAPENTIN 300 MILLIGRAM(S): 400 CAPSULE ORAL at 21:27

## 2018-12-23 RX ADMIN — Medication 16 UNIT(S): at 08:25

## 2018-12-23 RX ADMIN — Medication 100 MILLIGRAM(S): at 05:31

## 2018-12-23 RX ADMIN — OXYCODONE HYDROCHLORIDE 10 MILLIGRAM(S): 5 TABLET ORAL at 20:32

## 2018-12-23 RX ADMIN — Medication 7.5 MILLIGRAM(S): at 05:31

## 2018-12-23 RX ADMIN — Medication 81 MILLIGRAM(S): at 12:00

## 2018-12-23 RX ADMIN — PANTOPRAZOLE SODIUM 40 MILLIGRAM(S): 20 TABLET, DELAYED RELEASE ORAL at 06:38

## 2018-12-23 RX ADMIN — Medication 16 UNIT(S): at 16:56

## 2018-12-23 RX ADMIN — OXYCODONE HYDROCHLORIDE 5 MILLIGRAM(S): 5 TABLET ORAL at 13:02

## 2018-12-23 RX ADMIN — OXYCODONE HYDROCHLORIDE 5 MILLIGRAM(S): 5 TABLET ORAL at 11:58

## 2018-12-23 RX ADMIN — FLUTICASONE PROPIONATE AND SALMETEROL 1 DOSE(S): 50; 250 POWDER ORAL; RESPIRATORY (INHALATION) at 21:41

## 2018-12-23 RX ADMIN — HEPARIN SODIUM 5000 UNIT(S): 5000 INJECTION INTRAVENOUS; SUBCUTANEOUS at 21:27

## 2018-12-23 RX ADMIN — OXYCODONE HYDROCHLORIDE 10 MILLIGRAM(S): 5 TABLET ORAL at 21:30

## 2018-12-23 RX ADMIN — AMIODARONE HYDROCHLORIDE 200 MILLIGRAM(S): 400 TABLET ORAL at 05:30

## 2018-12-23 RX ADMIN — FLUTICASONE PROPIONATE AND SALMETEROL 1 DOSE(S): 50; 250 POWDER ORAL; RESPIRATORY (INHALATION) at 09:19

## 2018-12-23 RX ADMIN — INSULIN GLARGINE 30 UNIT(S): 100 INJECTION, SOLUTION SUBCUTANEOUS at 21:51

## 2018-12-23 RX ADMIN — Medication 137 MICROGRAM(S): at 06:38

## 2018-12-23 RX ADMIN — Medication 5 MILLIGRAM(S): at 18:31

## 2018-12-23 RX ADMIN — DIPHENHYDRAMINE HYDROCHLORIDE AND LIDOCAINE HYDROCHLORIDE AND ALUMINUM HYDROXIDE AND MAGNESIUM HYDRO 15 MILLILITER(S): KIT at 21:26

## 2018-12-23 RX ADMIN — Medication 16 UNIT(S): at 11:59

## 2018-12-23 RX ADMIN — GABAPENTIN 100 MILLIGRAM(S): 400 CAPSULE ORAL at 12:00

## 2018-12-23 RX ADMIN — HEPARIN SODIUM 5000 UNIT(S): 5000 INJECTION INTRAVENOUS; SUBCUTANEOUS at 13:05

## 2018-12-23 RX ADMIN — Medication 10 MILLIGRAM(S): at 05:30

## 2018-12-23 NOTE — PROGRESS NOTE ADULT - ASSESSMENT
62 yo Female with functional deficits after Type B aortic dissection and distal thoracic/conus spinal infarction      # Acute cortical Spinal cord infarction s/p  Type B Thoracic aortic dissection   - c/w ASA   - BP control SBP last 24 hours 120-111  - c/w PT/OT as tolerated.     # Atrial fibrillation - Rate controlled.   c/w Amiodarone.   Not on AC due to recent aortic dissection with Bleed     # Asthma - stable.   c/w prednisone,   c/w Duoneb/Advair, Montelukast     # DM type 2 -   Monitor Accu-Cheks   Am FS not at goal, consider increasing Lantus to 33 units QHS   c/w  Humalog 16 U TID-AC ,   Sliding Scale Insulin  Hypoglycemia Protocol.     # Hypothyroidism - stable.   c/w Synthroid     # Neurogenic Bladder   - trial without epps currently   - intermittent cath prn     #Bowel mngt :   Reduce Docusate to once daily     DVT/Gi Px : Heparin/Protonix

## 2018-12-23 NOTE — PROGRESS NOTE ADULT - SUBJECTIVE AND OBJECTIVE BOX
Patient is a 63y old  Female who presents with a chief complaint of functional deficits after spinal cord infarction (22 Dec 2018 10:39)      Patient seen and examined at bedside. Doing well. C/o increased frequency of stools yesterday, 3-4 stools, well formed, not watery. Also unable to pee by herself.     ALLERGIES:  adhesives (Unknown)  Ceftin (Rash)  erythromycin (Rash)  fish (Unknown)  Levaquin (Rash)    MEDICATIONS:  ALBUTerol/ipratropium for Nebulization 3 milliLiter(s) Nebulizer every 6 hours PRN  benzocaine 15 mG/menthol 3.6 mG Lozenge 1 Lozenge Oral every 4 hours PRN  bisacodyl Suppository 10 milliGRAM(s) Rectal <User Schedule>  ergocalciferol 75330 Unit(s) Oral every week  FIRST- Mouthwash  BLM 15 milliLiter(s) Swish and Swallow three times a day  fluticasone propionate/ salmeterol 500-50 MICROgram(s) Diskus 1 Dose(s) Inhalation two times a day  polyethylene glycol 3350 17 Gram(s) Oral daily    Vital Signs Last 24 Hrs  T(F): 97.6 (23 Dec 2018 07:32), Max: 98 (22 Dec 2018 22:32)  HR: 72 (23 Dec 2018 07:32) (72 - 77)  BP: 128/73 (23 Dec 2018 07:32) (125/79 - 128/73)  RR: 16 (23 Dec 2018 07:32) (14 - 16)  SpO2: 98% (23 Dec 2018 09:19) (95% - 100%)  I&O's Summary      PHYSICAL EXAM:  General: NAD, comfortable   ENT: MMM  Neck: Supple, No JVD  Lungs: CTA, BLAE, No added sounds.   Cardio: RRR, S1/S2, No murmurs  Abdomen: Soft, NT/ND, BS+   Extremities: No clubbing, cyanosis, or edema, No calf tenderness   Psych: A/O x 3     LABS:                            CAPILLARY BLOOD GLUCOSE      POCT Blood Glucose.: 204 mg/dL (23 Dec 2018 08:06)  POCT Blood Glucose.: 268 mg/dL (22 Dec 2018 21:17)  POCT Blood Glucose.: 105 mg/dL (22 Dec 2018 17:16)  POCT Blood Glucose.: 143 mg/dL (22 Dec 2018 11:45)    12-07 [id number] 6.4    Urinalysis Basic - ( 21 Dec 2018 19:00 )    Color: Yellow / Appearance: Clear / S.020 / pH: x  Gluc: x / Ketone: Negative  / Bili: Small / Urobili: Negative   Blood: x / Protein: 15 / Nitrite: Negative   Leuk Esterase: Trace / RBC: 0-4 /HPF / WBC Negative /HPF   Sq Epi: x / Non Sq Epi: Neg.-Few / Bacteria: Trace /HPF        Culture - Urine (collected 21 Dec 2018 19:00)  Source: .Urine Catheterized  Preliminary Report (22 Dec 2018 17:31):    >100,000 CFU/ml Escherichia coli        RADIOLOGY & ADDITIONAL TESTS:    Care Discussed with Consultants/Other Providers:

## 2018-12-23 NOTE — PROGRESS NOTE ADULT - SUBJECTIVE AND OBJECTIVE BOX
HISTORY OF PRESENT ILLNESS:  63 year old F with PMH of asthma (on chronic steroids), DM2, HLD, obesity, hypothyroid, PE (on Coumadin, with IVC filter) who presented initially to [facility] and then transferred to [facility] on 12/7/18 with sudden-onset upper back pain between her shoulder blades for one hour associated with shortness of breath. Patient also noted LE weakness that started around same time as back pain. At [facility], initial CTA of the chest revealed probable type A dissection with intramural hematoma and active hemorrhage, so she was transferred urgently to [facility] for possible CT surgery. Upon arrival to [facility], patient was started on Labetalol gtt for blood pressure control; repeat CTA chest revealed Type B dissection with hemoperitoneum, so patient did not undergo surgical intervention. TTE with pericaridal effusion and tamponade physiology.  INR was reversed with FFP and vitamin K.  MRI L-spine revealed abnormal signal within the conus medullaris and distal thoracic cord, with expansion of the conus, suspicious for spinal infarction. Neurosurgery consulted, no surgical intervention recommended. Recommended by neurology to continue ASA 81 mg daily.  Hospital course significant for Afib on telemetry monitoring; s/p Labetalol and amiodarone load. Course notable for orthostasis on 12/17 while working with PT which improved after diuretics were discontinued and IV fluids.  Also with urinary retention due to neurogenic bladder with epps catheter placed 12/18. Deemed medically stable for acute rehab on 12/19/18.        TODAY'S SUBJECTIVE & REVIEW OF SYMPTOMS:   Patient seen and examined in .  Still c/o episodes of urinary and bowel incontinence which is frustrating at times.  She has minimal sensation of full bladder or bowel.  She notes improved sensation in her feet while upright during therapy yesterday.   Pt c/o persistent BLE weakness.  Denies SOB, dyspnea or cough.       [ x  ] Constitutional WNL  [   ] Cardio WNL  [   ] Resp WNL  [   ] GI WNL  [   ] Heme WNL  [   ] Endo WNL  [ x  ] Skin WNL  [   ] MSK WNL  [   ] Neuro WNL  [ x  ] Cognitive WNL  [ x  ] Psych WNL        PHYSICAL EXAM  Vital Signs Last 24 Hrs  T(C): 36.4 (23 Dec 2018 07:32), Max: 36.7 (22 Dec 2018 22:32)  T(F): 97.6 (23 Dec 2018 07:32), Max: 98 (22 Dec 2018 22:32)  HR: 72 (23 Dec 2018 07:32) (72 - 77)  BP: 128/73 (23 Dec 2018 07:32) (125/79 - 128/73)  BP(mean): --  RR: 16 (23 Dec 2018 07:32) (14 - 16)  SpO2: 98% (23 Dec 2018 09:19) (95% - 100%)    Gen - NAD, Comfortable  	HEENT - NCAT, EOMI, MMM  	Neck - Supple, No limited ROM  	Pulm - CTAB, No wheeze, No rhonchi, No crackles  	Cardiovascular - RRR, S1S2, No murmurs  	Abdomen - Soft, NT/ND, +BS  	 - epps in place, draining yellow urine  	Extremities - No C/C/E, No calf tenderness  	Neuro-  	   Cognitive - AAOx3  	   Communication - Fluent, No dysarthria  	   Attention: Intact   	   Judgement: Good evidence of judgement  	   Cranial Nerves - CN 2-12 grossly intact  	   Motor -   	                  LEFT    UE - ShAB 4/5 (limited due to chronic RTC injury), EF 5/5, EE 5/5, WE 5/5,  5/5  	                  RIGHT UE - ShAB 4/5, EF 5/5, EE 5/5, WE 5/5,  5/5  	                  LEFT    LE - HF 3/5, KE 3/5, DF 4/5, PF 4/5  	                  RIGHT LE - HF 2/5, KE 2/5, DF 2/5, PF 2/5     	   Sensory - Impaired to LT BLEs  	   Reflexes -No primitive reflexive  	   Tone-  	   Coordination - FTN intact  	Psychiatric - Mood stable, Affect WNL  Skin:  left inner thigh papules, non-pruritic, non-TTP. L knee scar    FUNCTIONAL PROGRESS:  Bed mobility: Mod A rolling.  Pt unable to perform sit to supine  Transfers: Unable: Pt requires patrick lift with assist of 2 persons  Gait: Unable  ADLs: UE dress max A, LE dress total A      RECENT LABS:                          10.5   12.7  )-----------( 174      ( 20 Dec 2018 05:30 )             33.9     12-20    136  |  100  |  46<H>  ----------------------------<  162<H>  3.8   |  26  |  1.03    Ca    10.5      20 Dec 2018 05:30    TPro  6.5  /  Alb  2.5<L>  /  TBili  0.6  /  DBili  x   /  AST  17  /  ALT  22  /  AlkPhos  80  12-20    CAPILLARY BLOOD GLUCOSE  POCT Blood Glucose.: 136 mg/dL (23 Dec 2018 11:52)  POCT Blood Glucose.: 204 mg/dL (23 Dec 2018 08:06)  POCT Blood Glucose.: 268 mg/dL (22 Dec 2018 21:17)  POCT Blood Glucose.: 105 mg/dL (22 Dec 2018 17:16)          Assessment and Plan:   64 yo Female with functional deficits after Type B aortic dissection and distal thoracic/conus spinal infarction      spinal cord infarction after Type B aortic dissection   - Continue Comprehensive Rehab Program of PT/OT   - ASA 81 daily  - BP control, avoid hypotension - TEDs, binder when OOB for therapy   - pain control: Tylenol, Oxycodone, Gabapentin   - repeat CT scan in 3-4 months as per Dr. Kumar CT surgery     asthma   - prednisone, albuterol, advair, Duonebs-change from q6hrs to prn    atrial fibrillation   - amiodarone for rate control    DM2 on insulin-Uncontrolled  - likely due to prednisone and immobility  - lantus, humalog, SSI, CC diet     hypothyroid  - synthroid     neurogenic bowel and bladder  - epps catheter, future TOV-D/Cd 12/21.  PVRs 351 - 650. Continue bladder scans and SC PRN  - Bowel/bladder program: Mini fleet enema q AM then transfer onto bedside toilet/commode for voiding     acute blood loss anemia  -monitor cbc    leukocytosis-likely due to prednisone  -monitor cbc  -check ua  -cxr with reduced size of pleural effusions    Hoarse voice  -Add magic mouth wash x 5days.    -Cont cepacol prn  -Consider ENT consult      Weight bearing status: WBAT     Precautions / PROPHYLAXIS:   - Falls, Cardiac, Sternal, Spinal   - Lungs: Aspiration, Incentive Spirometer   - Pressure injury/Skin: Turn Q2hrs while in bed   - DVT: SQ Heparin, SCDs, TEDs     Diet:  Regular + Thins [facility]

## 2018-12-24 LAB
ANION GAP SERPL CALC-SCNC: 7 MMOL/L — SIGNIFICANT CHANGE UP (ref 5–17)
BUN SERPL-MCNC: 28 MG/DL — HIGH (ref 7–23)
CALCIUM SERPL-MCNC: 10.3 MG/DL — SIGNIFICANT CHANGE UP (ref 8.4–10.5)
CHLORIDE SERPL-SCNC: 102 MMOL/L — SIGNIFICANT CHANGE UP (ref 96–108)
CO2 SERPL-SCNC: 28 MMOL/L — SIGNIFICANT CHANGE UP (ref 22–31)
CREAT SERPL-MCNC: 0.79 MG/DL — SIGNIFICANT CHANGE UP (ref 0.5–1.3)
GLUCOSE SERPL-MCNC: 200 MG/DL — HIGH (ref 70–99)
HBA1C BLD-MCNC: 7 % — HIGH (ref 4–5.6)
HCT VFR BLD CALC: 33.9 % — LOW (ref 34.5–45)
HGB BLD-MCNC: 10.6 G/DL — LOW (ref 11.5–15.5)
MCHC RBC-ENTMCNC: 28.9 PG — SIGNIFICANT CHANGE UP (ref 27–34)
MCHC RBC-ENTMCNC: 31.3 GM/DL — LOW (ref 32–36)
MCV RBC AUTO: 92.2 FL — SIGNIFICANT CHANGE UP (ref 80–100)
PLATELET # BLD AUTO: 230 K/UL — SIGNIFICANT CHANGE UP (ref 150–400)
POTASSIUM SERPL-MCNC: 5.5 MMOL/L — HIGH (ref 3.5–5.3)
POTASSIUM SERPL-SCNC: 5.5 MMOL/L — HIGH (ref 3.5–5.3)
RBC # BLD: 3.68 M/UL — LOW (ref 3.8–5.2)
RBC # FLD: 15.6 % — HIGH (ref 10.3–14.5)
SODIUM SERPL-SCNC: 137 MMOL/L — SIGNIFICANT CHANGE UP (ref 135–145)
WBC # BLD: 11.5 K/UL — HIGH (ref 3.8–10.5)
WBC # FLD AUTO: 11.5 K/UL — HIGH (ref 3.8–10.5)

## 2018-12-24 PROCEDURE — 99233 SBSQ HOSP IP/OBS HIGH 50: CPT

## 2018-12-24 PROCEDURE — 99232 SBSQ HOSP IP/OBS MODERATE 35: CPT

## 2018-12-24 RX ORDER — INSULIN GLARGINE 100 [IU]/ML
25 INJECTION, SOLUTION SUBCUTANEOUS ONCE
Qty: 0 | Refills: 0 | Status: COMPLETED | OUTPATIENT
Start: 2018-12-24 | End: 2018-12-24

## 2018-12-24 RX ORDER — INSULIN GLARGINE 100 [IU]/ML
33 INJECTION, SOLUTION SUBCUTANEOUS AT BEDTIME
Qty: 0 | Refills: 0 | Status: DISCONTINUED | OUTPATIENT
Start: 2018-12-24 | End: 2019-01-03

## 2018-12-24 RX ORDER — LIDOCAINE 4 G/100G
2 CREAM TOPICAL
Qty: 0 | Refills: 0 | Status: DISCONTINUED | OUTPATIENT
Start: 2018-12-24 | End: 2018-12-27

## 2018-12-24 RX ORDER — SACCHAROMYCES BOULARDII 250 MG
250 POWDER IN PACKET (EA) ORAL
Qty: 0 | Refills: 0 | Status: DISCONTINUED | OUTPATIENT
Start: 2018-12-24 | End: 2019-01-03

## 2018-12-24 RX ORDER — NITROFURANTOIN MACROCRYSTAL 50 MG
100 CAPSULE ORAL
Qty: 0 | Refills: 0 | Status: COMPLETED | OUTPATIENT
Start: 2018-12-24 | End: 2018-12-29

## 2018-12-24 RX ADMIN — OXYCODONE HYDROCHLORIDE 5 MILLIGRAM(S): 5 TABLET ORAL at 08:35

## 2018-12-24 RX ADMIN — HEPARIN SODIUM 5000 UNIT(S): 5000 INJECTION INTRAVENOUS; SUBCUTANEOUS at 22:01

## 2018-12-24 RX ADMIN — FLUTICASONE PROPIONATE AND SALMETEROL 1 DOSE(S): 50; 250 POWDER ORAL; RESPIRATORY (INHALATION) at 08:37

## 2018-12-24 RX ADMIN — INSULIN GLARGINE 25 UNIT(S): 100 INJECTION, SOLUTION SUBCUTANEOUS at 21:59

## 2018-12-24 RX ADMIN — Medication 16 UNIT(S): at 17:35

## 2018-12-24 RX ADMIN — Medication 7.5 MILLIGRAM(S): at 05:30

## 2018-12-24 RX ADMIN — Medication 4: at 12:17

## 2018-12-24 RX ADMIN — OXYCODONE HYDROCHLORIDE 5 MILLIGRAM(S): 5 TABLET ORAL at 13:56

## 2018-12-24 RX ADMIN — Medication 7.5 MILLIGRAM(S): at 17:26

## 2018-12-24 RX ADMIN — HEPARIN SODIUM 5000 UNIT(S): 5000 INJECTION INTRAVENOUS; SUBCUTANEOUS at 13:58

## 2018-12-24 RX ADMIN — Medication 16 UNIT(S): at 08:20

## 2018-12-24 RX ADMIN — OXYCODONE HYDROCHLORIDE 5 MILLIGRAM(S): 5 TABLET ORAL at 16:15

## 2018-12-24 RX ADMIN — OXYCODONE HYDROCHLORIDE 5 MILLIGRAM(S): 5 TABLET ORAL at 23:02

## 2018-12-24 RX ADMIN — GABAPENTIN 300 MILLIGRAM(S): 400 CAPSULE ORAL at 21:59

## 2018-12-24 RX ADMIN — Medication 0.5 ENEMA: at 05:31

## 2018-12-24 RX ADMIN — HEPARIN SODIUM 5000 UNIT(S): 5000 INJECTION INTRAVENOUS; SUBCUTANEOUS at 05:30

## 2018-12-24 RX ADMIN — OXYCODONE HYDROCHLORIDE 5 MILLIGRAM(S): 5 TABLET ORAL at 21:58

## 2018-12-24 RX ADMIN — GABAPENTIN 100 MILLIGRAM(S): 400 CAPSULE ORAL at 12:20

## 2018-12-24 RX ADMIN — Medication 250 MILLIGRAM(S): at 17:26

## 2018-12-24 RX ADMIN — Medication 137 MICROGRAM(S): at 05:31

## 2018-12-24 RX ADMIN — Medication 1 TABLET(S): at 12:20

## 2018-12-24 RX ADMIN — Medication 100 MILLIGRAM(S): at 17:26

## 2018-12-24 RX ADMIN — Medication 2: at 08:20

## 2018-12-24 RX ADMIN — Medication 16 UNIT(S): at 12:17

## 2018-12-24 RX ADMIN — Medication 81 MILLIGRAM(S): at 12:20

## 2018-12-24 RX ADMIN — DIPHENHYDRAMINE HYDROCHLORIDE AND LIDOCAINE HYDROCHLORIDE AND ALUMINUM HYDROXIDE AND MAGNESIUM HYDRO 15 MILLILITER(S): KIT at 13:58

## 2018-12-24 RX ADMIN — DIPHENHYDRAMINE HYDROCHLORIDE AND LIDOCAINE HYDROCHLORIDE AND ALUMINUM HYDROXIDE AND MAGNESIUM HYDRO 15 MILLILITER(S): KIT at 05:28

## 2018-12-24 RX ADMIN — AMIODARONE HYDROCHLORIDE 200 MILLIGRAM(S): 400 TABLET ORAL at 05:28

## 2018-12-24 RX ADMIN — PANTOPRAZOLE SODIUM 40 MILLIGRAM(S): 20 TABLET, DELAYED RELEASE ORAL at 06:09

## 2018-12-24 NOTE — PROGRESS NOTE ADULT - ASSESSMENT
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   - c/w PT/OT as tolerated.     # Atrial fibrillation - Rate controlled.   c/w Amiodarone.   Not on AC due to recent aortic dissection with Bleed     # Asthma - stable.   c/w prednisone 7.5 mg Q12h    c/w Duoneb/Advair, Montelukast     # DM type 2 - not at goal, likely due to prednisone.   Monitor Accu-Cheks   consider increasing Lantus to 33 units QHS   c/w  Humalog 16 U TID-AC ,   Sliding Scale Insulin  Hypoglycemia Protocol.     # Hypothyroidism - stable.   c/w Synthroid     # Neurogenic Bladder   - trial without Panda currently   - intermittent cath prn     #Bowel mngt :   Reduce Docusate to once daily     DVT/Gi Px : Heparin/Protonix 62 yo Female with functional deficits after Type B aortic dissection and distal thoracic/conus spinal infarction      # Acute cortical Spinal cord infarction s/p  Type B Thoracic aortic dissection   - c/w ASA   - BP controlled.   - c/w PT/OT as tolerated.       # UTI (E.coli)- afebrile, stable   Ux noted.   Start Ciprofloxacin 500 Q12H x 5 days      # Atrial fibrillation - Rate controlled.   c/w Amiodarone.   Not on AC due to recent aortic dissection with bleed.    # Asthma - stable.   c/w prednisone 7.5 mg Q12h    c/w Duoneb/Advair, Montelukast     # DM type 2 - not at goal, likely due to prednisone.   Monitor Accu-Cheks 136-204  increase Lantus to  33 units QHS , c/w  Humalog 16 U TID-AC ,   Sliding Scale Insulin  Hypoglycemia Protocol.     # Hypothyroidism - stable.   c/w Synthroid     # Neurogenic Bladder   - trial without Panda currently   - intermittent cath prn       DVT/Gi Px : Heparin/Protonix

## 2018-12-24 NOTE — PROGRESS NOTE ADULT - SUBJECTIVE AND OBJECTIVE BOX
HISTORY OF PRESENT ILLNESS:  63 year old F with PMH of asthma (on chronic steroids), DM2, HLD, obesity, hypothyroid, PE (on Coumadin, with IVC filter) who presented initially to [facility] and then transferred to [facility] on 12/7/18 with sudden-onset upper back pain between her shoulder blades for one hour associated with shortness of breath. Patient also noted LE weakness that started around same time as back pain. At [facility], initial CTA of the chest revealed probable type A dissection with intramural hematoma and active hemorrhage, so she was transferred urgently to [facility] for possible CT surgery. Upon arrival to [facility], patient was started on Labetalol gtt for blood pressure control; repeat CTA chest revealed Type B dissection with hemoperitoneum, so patient did not undergo surgical intervention. TTE with pericaridal effusion and tamponade physiology.  INR was reversed with FFP and vitamin K.  MRI L-spine revealed abnormal signal within the conus medullaris and distal thoracic cord, with expansion of the conus, suspicious for spinal infarction. Neurosurgery consulted, no surgical intervention recommended. Recommended by neurology to continue ASA 81 mg daily.  Hospital course significant for Afib on telemetry monitoring; s/p Labetalol and amiodarone load. Course notable for orthostasis on 12/17 while working with PT which improved after diuretics were discontinued and IV fluids.  Also with urinary retention due to neurogenic bladder with epps catheter placed 12/18. Deemed medically stable for acute rehab on 12/19/18.        TODAY'S SUBJECTIVE & REVIEW OF SYMPTOMS:   Patient seen and examined.  Pt c/o exacerbation of right knee pain since the Aortic dissection occurred in addition to persistent left shoulder pain.  Pt is voiding without dysuria although still requiring straight cath.  +BM today following an enema.  Pt notes increased BLE sensation and strength.  Denies SOB, dyspnea or cough.       [ x  ] Constitutional WNL  [   ] Cardio WNL  [   ] Resp WNL  [   ] GI WNL  [   ] Heme WNL  [   ] Endo WNL  [ x  ] Skin WNL  [   ] MSK WNL  [   ] Neuro WNL  [ x  ] Cognitive WNL  [ x  ] Psych WNL        PHYSICAL EXAM  Vital Signs Last 24 Hrs  T(C): 36.5 (24 Dec 2018 07:30), Max: 36.5 (24 Dec 2018 07:30)  T(F): 97.7 (24 Dec 2018 07:30), Max: 97.7 (24 Dec 2018 07:30)  HR: 73 (24 Dec 2018 08:38) (73 - 75)  BP: 142/93 (24 Dec 2018 07:30) (122/77 - 142/93)  BP(mean): --  RR: 14 (24 Dec 2018 07:30) (14 - 14)  SpO2: 98% (24 Dec 2018 08:38) (97% - 98%)    Gen - NAD, Comfortable  	HEENT - NCAT, EOMI, MMM  	Neck - Supple, No limited ROM  	Pulm - CTAB, No wheeze, No rhonchi, No crackles  	Cardiovascular - RRR, S1S2, No murmurs  	Abdomen - Soft, NT/ND, +BS  	Extremities - No C/C/E, No calf tenderness  	Neuro-  	   Cognitive - AAOx3  	   Communication - Fluent, No dysarthria  	   Attention: Intact   	   Judgement: Good evidence of judgement  	   Cranial Nerves - CN 2-12 grossly intact  	   Motor -   	                  LEFT    UE - ShAB 4/5 (limited due to chronic RTC injury), EF 5/5, EE 5/5, WE 5/5,  5/5  	                  RIGHT UE - ShAB 4/5, EF 5/5, EE 5/5, WE 5/5,  5/5  	                  LEFT    LE - HF 4-/5, KE 4/5, DF 4/5, PF 4/5  	                  RIGHT LE - HF 3/5, KE 3/5, DF 3-/5, PF 2/5     	   Sensory - Impaired to LT BLEs  	   Reflexes -No primitive reflexive  	   Coordination - FTN intact  	Psychiatric - Mood stable, Affect WNL  Skin:  left inner thigh papules, non-pruritic, non-TTP. L knee scar    FUNCTIONAL PROGRESS:  Bed mobility: Mod A rolling.  Pt unable to perform sit to supine  Transfers: Max A in parallel bars: Pt requires patrick lift with assist of 2 persons  Gait: Unable  ADLs: UE dress max A, LE dress mod A      RECENT LABS:                        10.6   11.5  )-----------( 230      ( 24 Dec 2018 07:10 )             33.9   12-24    137  |  102  |  28<H>  ----------------------------<  200<H>  5.5<H>   |  28  |  0.79    Ca    10.3      24 Dec 2018 07:10                              10.5   12.7  )-----------( 174      ( 20 Dec 2018 05:30 )             33.9     12-20    136  |  100  |  46<H>  ----------------------------<  162<H>  3.8   |  26  |  1.03    Ca    10.5      20 Dec 2018 05:30    TPro  6.5  /  Alb  2.5<L>  /  TBili  0.6  /  DBili  x   /  AST  17  /  ALT  22  /  AlkPhos  80  12-20    CAPILLARY BLOOD GLUCOSE      POCT Blood Glucose.: 209 mg/dL (24 Dec 2018 12:15)  POCT Blood Glucose.: 193 mg/dL (24 Dec 2018 08:19)  POCT Blood Glucose.: 165 mg/dL (23 Dec 2018 20:48)  POCT Blood Glucose.: 144 mg/dL (23 Dec 2018 16:51)            Assessment and Plan:   62 yo Female with functional deficits after Type B aortic dissection and distal thoracic/conus spinal infarction      spinal cord infarction after Type B aortic dissection   - Continue Comprehensive Rehab Program of PT/OT   - ASA 81 daily  - BP control, avoid hypotension - TEDs, binder when OOB for therapy   - pain control: Tylenol, Oxycodone, Gabapentin  Add Lidoderm patches right knee  - repeat CT scan in 3-4 months as per Dr. Kumar CT surgery     asthma   - prednisone, albuterol, advair, Duonebs-change from q6hrs to prn    atrial fibrillation   - amiodarone for rate control    DM2 on insulin-Uncontrolled  - likely due to prednisone and immobility  - lantus-increase to 33U, Cont humalog 16u TID AC, SSI, CC diet     hypothyroid  - synthroid     neurogenic bowel and bladder  - epps catheter, future TOV-D/Cd 12/21.  PVRs 287m 114m 351. Continue bladder scans and SC PRN  - Bowel/bladder program: Mini fleet enema q AM then transfer onto bedside toilet/commode for voiding     acute blood loss anemia  -monitor cbc    leukocytosis-likely due to prednisone  -monitor cbc  -check ua-+  Urine culture >100,000cfu/ml Ecoli-sensitive to Macrobid-Rx 5 day supply + Florastor  -cxr with reduced size of pleural effusions    Hoarse voice  -Added magic mouth wash x 5days.    -Cont cepacol prn  -Consider ENT consult      Weight bearing status: WBAT     Precautions / PROPHYLAXIS:   - Falls, Cardiac, Sternal, Spinal   - Lungs: Aspiration, Incentive Spirometer   - Pressure injury/Skin: Turn Q2hrs while in bed   - DVT: SQ Heparin, SCDs, TEDs     Diet:  Regular + Thins CCHO

## 2018-12-24 NOTE — PROVIDER CONTACT NOTE (MEDICATION) - NAME OF MD/NP/PA/DO NOTIFIED:
Dr. Slaughter Patient with benign-sounding headache, but was unable to tolerate PO meds.  Normal neuro exam and CT.  Discharge home with NSAIDs for pain.  Refer to primary care for follow up.

## 2018-12-24 NOTE — CHART NOTE - NSCHARTNOTEFT_GEN_A_CORE
Nutrition Follow Up Note  Hospital Course (Per Electronic Medical Record):   Source: Medical Record [X] Patient [X] Family [ ]         Diet: Consistent Carbohydrate Diet w/ Thin Liquids   Tolerates Diet Well   No Chewing/Swallowing Difficulties   on Glucerna 8oz PO Daily - Intake Varies   Educated on Need for Supplementation  Consumes % of Meals (as Per Documentation)     Enteral/Parenteral Nutrition: N/A    Current Weight: 222.4lb on 12/19  Obtain New Weight   Obtain Weights Weekly     Pertinent Medications: MEDICATIONS  (STANDING):  ALBUTerol    90 MICROgram(s) HFA Inhaler 1 Puff(s) Inhalation every 4 hours  ALBUTerol    90 MICROgram(s) HFA Inhaler 1 Puff(s) Inhalation every 4 hours  amiodarone    Tablet 200 milliGRAM(s) Oral daily  aspirin enteric coated 81 milliGRAM(s) Oral daily  dextrose 5%. 1000 milliLiter(s) (50 mL/Hr) IV Continuous <Continuous>  dextrose 50% Injectable 12.5 Gram(s) IV Push once  dextrose 50% Injectable 25 Gram(s) IV Push once  dextrose 50% Injectable 25 Gram(s) IV Push once  ergocalciferol 22724 Unit(s) Oral every week  FIRST- Mouthwash  BLM 15 milliLiter(s) Swish and Swallow three times a day  fluticasone propionate/ salmeterol 500-50 MICROgram(s) Diskus 1 Dose(s) Inhalation two times a day  gabapentin 300 milliGRAM(s) Oral at bedtime  gabapentin 100 milliGRAM(s) Oral daily  heparin  Injectable 5000 Unit(s) SubCutaneous every 8 hours  insulin glargine Injectable (LANTUS) 30 Unit(s) SubCutaneous at bedtime  insulin lispro (HumaLOG) corrective regimen sliding scale   SubCutaneous three times a day before meals  insulin lispro (HumaLOG) corrective regimen sliding scale   SubCutaneous at bedtime  insulin lispro Injectable (HumaLOG) 16 Unit(s) SubCutaneous three times a day before meals  levothyroxine 137 MICROGram(s) Oral daily  multivitamin 1 Tablet(s) Oral daily  pantoprazole    Tablet 40 milliGRAM(s) Oral before breakfast  predniSONE   Tablet 7.5 milliGRAM(s) Oral every 12 hours  saline laxative (FLEET) Rectal Enema 0.5 Enema Rectal <User Schedule>  tiotropium 18 MICROgram(s) Capsule 1 Capsule(s) Inhalation daily    MEDICATIONS  (PRN):  acetaminophen   Tablet .. 650 milliGRAM(s) Oral every 6 hours PRN Temp greater or equal to 38C (100.4F), Mild Pain (1 - 3)  ALBUTerol/ipratropium for Nebulization 3 milliLiter(s) Nebulizer every 6 hours PRN Shortness of Breath and/or Wheezing  benzocaine 15 mG/menthol 3.6 mG Lozenge 1 Lozenge Oral every 4 hours PRN Sore Throat  dextrose 40% Gel 15 Gram(s) Oral once PRN Blood Glucose LESS THAN 70 milliGRAM(s)/deciliter  glucagon  Injectable 1 milliGRAM(s) IntraMuscular once PRN Glucose LESS THAN 70 milligrams/deciliter  oxyCODONE    IR 5 milliGRAM(s) Oral every 4 hours PRN Moderate Pain (4 - 6)  oxyCODONE    IR 10 milliGRAM(s) Oral every 4 hours PRN Severe Pain (7 - 10)  senna 2 Tablet(s) Oral at bedtime PRN Constipation    Pertinent Labs:  12-24 Na137 mmol/L Glu 200 mg/dL<H> K+ 5.5 mmol/L<H> Cr  0.79 mg/dL BUN 28 mg/dL<H> 12-20 Alb 2.5 g/dL<L> 12-20 PAB 11 mg/dL<L> 12-07 [id number] 6.4 %<H>    PCOT (over Last 2 Days) - Ranging from - 105-268    Skin:  Stage 2 Pressure Ulcer on Sacrum     Edema: None Noted     Last BM: on 12/24    Estimated Needs:   [X] No Change since Previous Assessment  [ ] Recalculated:     Previous Nutrition Diagnosis:   Predicted suboptimal energy intake  Obese    Nutrition Diagnosis is [X] Ongoing - Obese (Diet Education Provided)    [X] Resolved - Predicted suboptimal energy intake (Intake Improved)     New Nutrition Diagnosis: [X] Increased Nutrient Needs - For Wound Healing    Interventions:   1. Recommend Continue Nutrition Plan of Care   2. Educated on Need for Supplementation    Monitoring & Evaluation:   [X] Weights   [X] PO Intake   [X] Follow Up (Per Protocol)  [X] Tolerance to Diet Prescription   [X] Other: Labs & PCOT    RD Remains Available.  Shant Rodriges RD

## 2018-12-24 NOTE — PROGRESS NOTE ADULT - SUBJECTIVE AND OBJECTIVE BOX
Patient is a 63y old  Female who presents with a chief complaint of functional deficits after spinal cord infarction (23 Dec 2018 12:20)      Patient seen and examined at bedside.     ALLERGIES:  adhesives (Unknown)  Ceftin (Rash)  erythromycin (Rash)  fish (Unknown)  Levaquin (Rash)    MEDICATIONS:  ALBUTerol/ipratropium for Nebulization 3 milliLiter(s) Nebulizer every 6 hours PRN  benzocaine 15 mG/menthol 3.6 mG Lozenge 1 Lozenge Oral every 4 hours PRN  ergocalciferol 43094 Unit(s) Oral every week  FIRST- Mouthwash  BLM 15 milliLiter(s) Swish and Swallow three times a day  fluticasone propionate/ salmeterol 500-50 MICROgram(s) Diskus 1 Dose(s) Inhalation two times a day  saline laxative (FLEET) Rectal Enema 0.5 Enema Rectal <User Schedule>    Vital Signs Last 24 Hrs  T(F): 97.7 (24 Dec 2018 07:30), Max: 97.7 (24 Dec 2018 07:30)  HR: 73 (24 Dec 2018 08:38) (73 - 75)  BP: 142/93 (24 Dec 2018 07:30) (122/77 - 142/93)  RR: 14 (24 Dec 2018 07:30) (14 - 14)  SpO2: 98% (24 Dec 2018 08:38) (97% - 98%)  I&O's Summary      PHYSICAL EXAM:  General: NAD, comfortable   ENT: MMM  Neck: Supple, No JVD  Lungs: CTA, BLAE, No added sounds.   Cardio: RRR, S1/S2, No murmurs  Abdomen: Soft, NT/ND, BS+   Extremities: No edema  Psych: A/O x 3   B/L LE weakness.   LABS:                        10.6   11.5  )-----------( 230      ( 24 Dec 2018 07:10 )             33.9         137  |  102  |  28  ----------------------------<  200  5.5   |  28  |  0.79    Ca    10.3      24 Dec 2018 07:10      eGFR if African American: 91 mL/min/1.73M2 (-18 @ 07:10)  eGFR if Non African American: 78 mL/min/1.73M2 (18 @ 07:10)                    CAPILLARY BLOOD GLUCOSE      POCT Blood Glucose.: 193 mg/dL (24 Dec 2018 08:19)  POCT Blood Glucose.: 165 mg/dL (23 Dec 2018 20:48)  POCT Blood Glucose.: 144 mg/dL (23 Dec 2018 16:51)  POCT Blood Glucose.: 136 mg/dL (23 Dec 2018 11:52)    12 [id number] 6.4    Urinalysis Basic - ( 21 Dec 2018 19:00 )    Color: Yellow / Appearance: Clear / S.020 / pH: x  Gluc: x / Ketone: Negative  / Bili: Small / Urobili: Negative   Blood: x / Protein: 15 / Nitrite: Negative   Leuk Esterase: Trace / RBC: 0-4 /HPF / WBC Negative /HPF   Sq Epi: x / Non Sq Epi: Neg.-Few / Bacteria: Trace /HPF        Culture - Urine (collected 21 Dec 2018 19:00)  Source: .Urine Catheterized  Final Report (23 Dec 2018 15:05):    >100,000 CFU/ml Escherichia coli  Organism: Escherichia coli (23 Dec 2018 15:05)  Organism: Escherichia coli (23 Dec 2018 15:05)      -  Amikacin: S <=8      -  Amoxicillin/Clavulanic Acid: S <=8/4      -  Ampicillin: R >16 These ampicillin results predict results for amoxicillin      -  Ampicillin/Sulbactam: R >16/8      -  Aztreonam: S <=4      -  Cefazolin: I 4 For uncomplicated UTI with K. pneumoniae, E. coli, or P. mirablis: MELCHOR <=16 is sensitive and MELCHOR >=32 is resistant. This also predicts results for oral agents cefaclor, cefdinir, cefpodoxime, cefprozil, cefuroxime axetil, cephalexin and locarbef for uncomplicated UTI. Note that some isolates may be susceptible to these agents while testing resistant to cefazolin.      -  Cefepime: S <=2      -  Cefoxitin: S <=4      -  Ceftriaxone: S <=1 Enterobacter, Citrobacter, and Serratia may develop resistance during prolonged therapy      -  Ciprofloxacin: S <=0.5      -  Ertapenem: S <=0.5      -  Gentamicin: S <=1      -  Imipenem: S <=1      -  Levofloxacin: S <=1      -  Meropenem: S <=1      -  Nitrofurantoin: S <=32 Should not be used to treat pyelonephritis      -  Piperacillin/Tazobactam: S <=8      -  Tigecycline: S <=1      -  Tobramycin: S <=2      -  Trimethoprim/Sulfamethoxazole: R >      Method Type: MELCHOR        RADIOLOGY & ADDITIONAL TESTS:    Care Discussed with Consultants/Other Providers: Patient is a 63y old  Female who presents with a chief complaint of functional deficits after spinal cord infarction (23 Dec 2018 12:20)      Patient seen and examined at bedside. Doing well, voided on her own twice yesterday.     ALLERGIES:  adhesives (Unknown)  Ceftin (Rash)  erythromycin (Rash)  fish (Unknown)  Levaquin (Rash)    MEDICATIONS:  ALBUTerol/ipratropium for Nebulization 3 milliLiter(s) Nebulizer every 6 hours PRN  benzocaine 15 mG/menthol 3.6 mG Lozenge 1 Lozenge Oral every 4 hours PRN  ergocalciferol 58039 Unit(s) Oral every week  FIRST- Mouthwash  BLM 15 milliLiter(s) Swish and Swallow three times a day  fluticasone propionate/ salmeterol 500-50 MICROgram(s) Diskus 1 Dose(s) Inhalation two times a day  saline laxative (FLEET) Rectal Enema 0.5 Enema Rectal <User Schedule>    Vital Signs Last 24 Hrs  T(F): 97.7 (24 Dec 2018 07:30), Max: 97.7 (24 Dec 2018 07:30)  HR: 73 (24 Dec 2018 08:38) (73 - 75)  BP: 142/93 (24 Dec 2018 07:30) (122/77 - 142/93)  RR: 14 (24 Dec 2018 07:30) (14 - 14)  SpO2: 98% (24 Dec 2018 08:38) (97% - 98%)  I&O's Summary      PHYSICAL EXAM:  General: NAD, comfortable   ENT: MMM  Neck: Supple, No JVD  Lungs: CTA, BLAE, No added sounds.   Cardio: RRR, S1/S2, No murmurs  Abdomen: Soft, NT/ND, BS+   Extremities: No edema  Psych: A/O x 3   B/L LE weakness.   LABS:                        10.6   11.5  )-----------( 230      ( 24 Dec 2018 07:10 )             33.9         137  |  102  |  28  ----------------------------<  200  5.5   |  28  |  0.79    Ca    10.3      24 Dec 2018 07:10      eGFR if African American: 91 mL/min/1.73M2 (18 @ 07:10)  eGFR if Non African American: 78 mL/min/1.73M2 (18 @ 07:10)                    CAPILLARY BLOOD GLUCOSE      POCT Blood Glucose.: 193 mg/dL (24 Dec 2018 08:19)  POCT Blood Glucose.: 165 mg/dL (23 Dec 2018 20:48)  POCT Blood Glucose.: 144 mg/dL (23 Dec 2018 16:51)  POCT Blood Glucose.: 136 mg/dL (23 Dec 2018 11:52)     [id number] 6.4    Urinalysis Basic - ( 21 Dec 2018 19:00 )    Color: Yellow / Appearance: Clear / S.020 / pH: x  Gluc: x / Ketone: Negative  / Bili: Small / Urobili: Negative   Blood: x / Protein: 15 / Nitrite: Negative   Leuk Esterase: Trace / RBC: 0-4 /HPF / WBC Negative /HPF   Sq Epi: x / Non Sq Epi: Neg.-Few / Bacteria: Trace /HPF        Culture - Urine (collected 21 Dec 2018 19:00)  Source: .Urine Catheterized  Final Report (23 Dec 2018 15:05):    >100,000 CFU/ml Escherichia coli  Organism: Escherichia coli (23 Dec 2018 15:05)  Organism: Escherichia coli (23 Dec 2018 15:05)      -  Amikacin: S <=8      -  Amoxicillin/Clavulanic Acid: S <=8/4      -  Ampicillin: R >16 These ampicillin results predict results for amoxicillin      -  Ampicillin/Sulbactam: R >16/8      -  Aztreonam: S <=4      -  Cefazolin: I 4 For uncomplicated UTI with K. pneumoniae, E. coli, or P. mirablis: MELCHOR <=16 is sensitive and MELCHOR >=32 is resistant. This also predicts results for oral agents cefaclor, cefdinir, cefpodoxime, cefprozil, cefuroxime axetil, cephalexin and locarbef for uncomplicated UTI. Note that some isolates may be susceptible to these agents while testing resistant to cefazolin.      -  Cefepime: S <=2      -  Cefoxitin: S <=4      -  Ceftriaxone: S <=1 Enterobacter, Citrobacter, and Serratia may develop resistance during prolonged therapy      -  Ciprofloxacin: S <=0.5      -  Ertapenem: S <=0.5      -  Gentamicin: S <=1      -  Imipenem: S <=1      -  Levofloxacin: S <=1      -  Meropenem: S <=1      -  Nitrofurantoin: S <=32 Should not be used to treat pyelonephritis      -  Piperacillin/Tazobactam: S <=8      -  Tigecycline: S <=1      -  Tobramycin: S <=2      -  Trimethoprim/Sulfamethoxazole: R >      Method Type: MELCHOR        RADIOLOGY & ADDITIONAL TESTS:    Care Discussed with Consultants/Other Providers:

## 2018-12-24 NOTE — PROVIDER CONTACT NOTE (MEDICATION) - SITUATION
Pt. BS before dinner 97.  16 unit pre-meal humalog ordered. 8units given as per Dr. Slaughter request.

## 2018-12-25 PROCEDURE — 99233 SBSQ HOSP IP/OBS HIGH 50: CPT

## 2018-12-25 PROCEDURE — 99232 SBSQ HOSP IP/OBS MODERATE 35: CPT | Mod: GC

## 2018-12-25 RX ORDER — OXYCODONE HYDROCHLORIDE 5 MG/1
10 TABLET ORAL EVERY 4 HOURS
Qty: 0 | Refills: 0 | Status: DISCONTINUED | OUTPATIENT
Start: 2018-12-25 | End: 2018-12-31

## 2018-12-25 RX ORDER — OXYCODONE HYDROCHLORIDE 5 MG/1
5 TABLET ORAL EVERY 4 HOURS
Qty: 0 | Refills: 0 | Status: DISCONTINUED | OUTPATIENT
Start: 2018-12-25 | End: 2018-12-31

## 2018-12-25 RX ADMIN — HEPARIN SODIUM 5000 UNIT(S): 5000 INJECTION INTRAVENOUS; SUBCUTANEOUS at 13:44

## 2018-12-25 RX ADMIN — LIDOCAINE 2 PATCH: 4 CREAM TOPICAL at 05:42

## 2018-12-25 RX ADMIN — Medication 100 MILLIGRAM(S): at 08:18

## 2018-12-25 RX ADMIN — Medication 16 UNIT(S): at 08:18

## 2018-12-25 RX ADMIN — OXYCODONE HYDROCHLORIDE 5 MILLIGRAM(S): 5 TABLET ORAL at 13:41

## 2018-12-25 RX ADMIN — PANTOPRAZOLE SODIUM 40 MILLIGRAM(S): 20 TABLET, DELAYED RELEASE ORAL at 06:07

## 2018-12-25 RX ADMIN — Medication 137 MICROGRAM(S): at 05:40

## 2018-12-25 RX ADMIN — AMIODARONE HYDROCHLORIDE 200 MILLIGRAM(S): 400 TABLET ORAL at 05:39

## 2018-12-25 RX ADMIN — Medication 250 MILLIGRAM(S): at 17:32

## 2018-12-25 RX ADMIN — GABAPENTIN 100 MILLIGRAM(S): 400 CAPSULE ORAL at 12:04

## 2018-12-25 RX ADMIN — Medication 100 MILLIGRAM(S): at 17:32

## 2018-12-25 RX ADMIN — DIPHENHYDRAMINE HYDROCHLORIDE AND LIDOCAINE HYDROCHLORIDE AND ALUMINUM HYDROXIDE AND MAGNESIUM HYDRO 15 MILLILITER(S): KIT at 13:44

## 2018-12-25 RX ADMIN — FLUTICASONE PROPIONATE AND SALMETEROL 1 DOSE(S): 50; 250 POWDER ORAL; RESPIRATORY (INHALATION) at 21:01

## 2018-12-25 RX ADMIN — OXYCODONE HYDROCHLORIDE 5 MILLIGRAM(S): 5 TABLET ORAL at 14:14

## 2018-12-25 RX ADMIN — Medication 1 TABLET(S): at 12:03

## 2018-12-25 RX ADMIN — DIPHENHYDRAMINE HYDROCHLORIDE AND LIDOCAINE HYDROCHLORIDE AND ALUMINUM HYDROXIDE AND MAGNESIUM HYDRO 15 MILLILITER(S): KIT at 21:31

## 2018-12-25 RX ADMIN — Medication 7.5 MILLIGRAM(S): at 17:32

## 2018-12-25 RX ADMIN — OXYCODONE HYDROCHLORIDE 5 MILLIGRAM(S): 5 TABLET ORAL at 23:57

## 2018-12-25 RX ADMIN — HEPARIN SODIUM 5000 UNIT(S): 5000 INJECTION INTRAVENOUS; SUBCUTANEOUS at 05:39

## 2018-12-25 RX ADMIN — Medication 7.5 MILLIGRAM(S): at 05:40

## 2018-12-25 RX ADMIN — Medication 81 MILLIGRAM(S): at 12:04

## 2018-12-25 RX ADMIN — OXYCODONE HYDROCHLORIDE 5 MILLIGRAM(S): 5 TABLET ORAL at 23:14

## 2018-12-25 RX ADMIN — GABAPENTIN 300 MILLIGRAM(S): 400 CAPSULE ORAL at 21:29

## 2018-12-25 RX ADMIN — INSULIN GLARGINE 33 UNIT(S): 100 INJECTION, SOLUTION SUBCUTANEOUS at 21:30

## 2018-12-25 RX ADMIN — LIDOCAINE 2 PATCH: 4 CREAM TOPICAL at 06:30

## 2018-12-25 RX ADMIN — LIDOCAINE 2 PATCH: 4 CREAM TOPICAL at 17:30

## 2018-12-25 RX ADMIN — Medication 6: at 12:04

## 2018-12-25 RX ADMIN — Medication 16 UNIT(S): at 12:04

## 2018-12-25 RX ADMIN — Medication 16 UNIT(S): at 17:32

## 2018-12-25 RX ADMIN — Medication 250 MILLIGRAM(S): at 05:41

## 2018-12-25 RX ADMIN — HEPARIN SODIUM 5000 UNIT(S): 5000 INJECTION INTRAVENOUS; SUBCUTANEOUS at 21:29

## 2018-12-25 NOTE — PROGRESS NOTE ADULT - SUBJECTIVE AND OBJECTIVE BOX
Patient is a 63y old  Female who presents with a chief complaint of functional deficits after spinal cord infarction (24 Dec 2018 13:56)      Patient seen and examined at bedside. No new complaints\      ALLERGIES:  adhesives (Unknown)  Ceftin (Rash)  erythromycin (Rash)  fish (Unknown)  Levaquin (Rash)    MEDICATIONS:  ALBUTerol/ipratropium for Nebulization 3 milliLiter(s) Nebulizer every 6 hours PRN  benzocaine 15 mG/menthol 3.6 mG Lozenge 1 Lozenge Oral every 4 hours PRN  ergocalciferol 72317 Unit(s) Oral every week  FIRST- Mouthwash  BLM 15 milliLiter(s) Swish and Swallow three times a day  fluticasone propionate/ salmeterol 500-50 MICROgram(s) Diskus 1 Dose(s) Inhalation two times a day  insulin glargine Injectable (LANTUS) 33 Unit(s) SubCutaneous at bedtime  lidocaine   Patch 2 Patch Transdermal <User Schedule>  nitrofurantoin monohydrate/macrocrystals (MACROBID) 100 milliGRAM(s) Oral two times a day with meals  saccharomyces boulardii 250 milliGRAM(s) Oral two times a day  saline laxative (FLEET) Rectal Enema 0.5 Enema Rectal <User Schedule>    Vital Signs Last 24 Hrs  T(F): 97.7 (25 Dec 2018 08:49), Max: 97.7 (25 Dec 2018 08:49)  HR: 75 (25 Dec 2018 08:49) (75 - 79)  BP: 136/85 (25 Dec 2018 08:49) (136/85 - 147/83)  RR: 14 (25 Dec 2018 08:49) (14 - 14)  SpO2: 100% (25 Dec 2018 08:49) (99% - 100%)  I&O's Summary      PHYSICAL EXAM:  General: NAD, comfortable   ENT: MMM  Neck: Supple, No JVD  Lungs: CTA, BLAE, No added sounds.   Cardio: RRR, S1/S2, No murmurs  Abdomen: Soft, NT/ND, BS+   Extremities: No clubbing, cyanosis, or edema, No calf tenderness   Psych: A/O x 3   CNS: BL LE weakness    LABS:                        10.6   11.5  )-----------( 230      ( 24 Dec 2018 07:10 )             33.9     12-24    137  |  102  |  28  ----------------------------<  200  5.5   |  28  |  0.79    Ca    10.3      24 Dec 2018 07:10      eGFR if African American: 91 mL/min/1.73M2 (12-24-18 @ 07:10)  eGFR if Non African American: 78 mL/min/1.73M2 (12-24-18 @ 07:10)                    CAPILLARY BLOOD GLUCOSE      POCT Blood Glucose.: 144 mg/dL (25 Dec 2018 07:38)  POCT Blood Glucose.: 135 mg/dL (24 Dec 2018 20:29)  POCT Blood Glucose.: 97 mg/dL (24 Dec 2018 17:22)  POCT Blood Glucose.: 209 mg/dL (24 Dec 2018 12:15)    12-24 [id number] 7.0  12-07 VaoomkiqgiG6G 6.4        Culture - Urine (collected 21 Dec 2018 19:00)  Source: .Urine Catheterized  Final Report (23 Dec 2018 15:05):    >100,000 CFU/ml Escherichia coli  Organism: Escherichia coli (23 Dec 2018 15:05)  Organism: Escherichia coli (23 Dec 2018 15:05)      -  Amikacin: S <=8      -  Amoxicillin/Clavulanic Acid: S <=8/4      -  Ampicillin: R >16 These ampicillin results predict results for amoxicillin      -  Ampicillin/Sulbactam: R >16/8      -  Aztreonam: S <=4      -  Cefazolin: I 4 For uncomplicated UTI with K. pneumoniae, E. coli, or P. mirablis: MELCHOR <=16 is sensitive and MELCHOR >=32 is resistant. This also predicts results for oral agents cefaclor, cefdinir, cefpodoxime, cefprozil, cefuroxime axetil, cephalexin and locarbef for uncomplicated UTI. Note that some isolates may be susceptible to these agents while testing resistant to cefazolin.      -  Cefepime: S <=2      -  Cefoxitin: S <=4      -  Ceftriaxone: S <=1 Enterobacter, Citrobacter, and Serratia may develop resistance during prolonged therapy      -  Ciprofloxacin: S <=0.5      -  Ertapenem: S <=0.5      -  Gentamicin: S <=1      -  Imipenem: S <=1      -  Levofloxacin: S <=1      -  Meropenem: S <=1      -  Nitrofurantoin: S <=32 Should not be used to treat pyelonephritis      -  Piperacillin/Tazobactam: S <=8      -  Tigecycline: S <=1      -  Tobramycin: S <=2      -  Trimethoprim/Sulfamethoxazole: R >2/38      Method Type: MELCHOR        RADIOLOGY & ADDITIONAL TESTS:    Care Discussed with Consultants/Other Providers:

## 2018-12-25 NOTE — PROGRESS NOTE ADULT - ASSESSMENT
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   - c/w PT/OT as tolerated.       # UTI (E.coli)- afebrile, stable   Ux noted.   c/w Macrobid       # Atrial fibrillation - Rate controlled.   c/w Amiodarone.   Not on AC due to recent aortic dissection with bleed.    # Asthma - stable.   c/w prednisone 7.5 mg Q12h    c/w Duoneb/Advair, Montelukast     # DM type 2 - not at goal, likely due to prednisone.   Monitor Accu-Cheks  c/w  Lantus   33 units QHS , c/w  Humalog 16 U TID-AC ,   Sliding Scale Insulin  Hypoglycemia Protocol.     # Hypothyroidism - stable.   c/w Synthroid     # Neurogenic Bladder   - intermittent cath prn       DVT/Gi Px : Heparin/Protonix

## 2018-12-26 LAB
ANION GAP SERPL CALC-SCNC: 9 MMOL/L — SIGNIFICANT CHANGE UP (ref 5–17)
BUN SERPL-MCNC: 24 MG/DL — HIGH (ref 7–23)
CALCIUM SERPL-MCNC: 10.1 MG/DL — SIGNIFICANT CHANGE UP (ref 8.4–10.5)
CHLORIDE SERPL-SCNC: 104 MMOL/L — SIGNIFICANT CHANGE UP (ref 96–108)
CO2 SERPL-SCNC: 26 MMOL/L — SIGNIFICANT CHANGE UP (ref 22–31)
CREAT SERPL-MCNC: 0.75 MG/DL — SIGNIFICANT CHANGE UP (ref 0.5–1.3)
GLUCOSE SERPL-MCNC: 164 MG/DL — HIGH (ref 70–99)
HCT VFR BLD CALC: 33.6 % — LOW (ref 34.5–45)
HGB BLD-MCNC: 10.6 G/DL — LOW (ref 11.5–15.5)
MCHC RBC-ENTMCNC: 29.1 PG — SIGNIFICANT CHANGE UP (ref 27–34)
MCHC RBC-ENTMCNC: 31.7 GM/DL — LOW (ref 32–36)
MCV RBC AUTO: 91.9 FL — SIGNIFICANT CHANGE UP (ref 80–100)
PLATELET # BLD AUTO: 242 K/UL — SIGNIFICANT CHANGE UP (ref 150–400)
POTASSIUM SERPL-MCNC: 4.6 MMOL/L — SIGNIFICANT CHANGE UP (ref 3.5–5.3)
POTASSIUM SERPL-SCNC: 4.6 MMOL/L — SIGNIFICANT CHANGE UP (ref 3.5–5.3)
RBC # BLD: 3.65 M/UL — LOW (ref 3.8–5.2)
RBC # FLD: 15.9 % — HIGH (ref 10.3–14.5)
SODIUM SERPL-SCNC: 139 MMOL/L — SIGNIFICANT CHANGE UP (ref 135–145)
WBC # BLD: 11.9 K/UL — HIGH (ref 3.8–10.5)
WBC # FLD AUTO: 11.9 K/UL — HIGH (ref 3.8–10.5)

## 2018-12-26 PROCEDURE — 99232 SBSQ HOSP IP/OBS MODERATE 35: CPT

## 2018-12-26 PROCEDURE — 99233 SBSQ HOSP IP/OBS HIGH 50: CPT

## 2018-12-26 RX ORDER — DICLOFENAC SODIUM 30 MG/G
2 GEL TOPICAL
Qty: 1 | Refills: 0 | OUTPATIENT
Start: 2018-12-26 | End: 2019-01-24

## 2018-12-26 RX ORDER — GABAPENTIN 400 MG/1
100 CAPSULE ORAL
Qty: 0 | Refills: 0 | Status: DISCONTINUED | OUTPATIENT
Start: 2018-12-26 | End: 2019-01-03

## 2018-12-26 RX ADMIN — Medication 1 TABLET(S): at 12:29

## 2018-12-26 RX ADMIN — HEPARIN SODIUM 5000 UNIT(S): 5000 INJECTION INTRAVENOUS; SUBCUTANEOUS at 14:53

## 2018-12-26 RX ADMIN — HEPARIN SODIUM 5000 UNIT(S): 5000 INJECTION INTRAVENOUS; SUBCUTANEOUS at 05:01

## 2018-12-26 RX ADMIN — GABAPENTIN 100 MILLIGRAM(S): 400 CAPSULE ORAL at 12:29

## 2018-12-26 RX ADMIN — OXYCODONE HYDROCHLORIDE 5 MILLIGRAM(S): 5 TABLET ORAL at 22:45

## 2018-12-26 RX ADMIN — Medication 2: at 08:18

## 2018-12-26 RX ADMIN — PANTOPRAZOLE SODIUM 40 MILLIGRAM(S): 20 TABLET, DELAYED RELEASE ORAL at 05:07

## 2018-12-26 RX ADMIN — OXYCODONE HYDROCHLORIDE 5 MILLIGRAM(S): 5 TABLET ORAL at 16:15

## 2018-12-26 RX ADMIN — GABAPENTIN 100 MILLIGRAM(S): 400 CAPSULE ORAL at 17:08

## 2018-12-26 RX ADMIN — FLUTICASONE PROPIONATE AND SALMETEROL 1 DOSE(S): 50; 250 POWDER ORAL; RESPIRATORY (INHALATION) at 08:48

## 2018-12-26 RX ADMIN — OXYCODONE HYDROCHLORIDE 5 MILLIGRAM(S): 5 TABLET ORAL at 22:04

## 2018-12-26 RX ADMIN — Medication 7.5 MILLIGRAM(S): at 17:08

## 2018-12-26 RX ADMIN — LIDOCAINE 2 PATCH: 4 CREAM TOPICAL at 05:04

## 2018-12-26 RX ADMIN — Medication 250 MILLIGRAM(S): at 17:08

## 2018-12-26 RX ADMIN — AMIODARONE HYDROCHLORIDE 200 MILLIGRAM(S): 400 TABLET ORAL at 05:01

## 2018-12-26 RX ADMIN — Medication 250 MILLIGRAM(S): at 05:00

## 2018-12-26 RX ADMIN — GABAPENTIN 300 MILLIGRAM(S): 400 CAPSULE ORAL at 22:04

## 2018-12-26 RX ADMIN — Medication 100 MILLIGRAM(S): at 08:24

## 2018-12-26 RX ADMIN — OXYCODONE HYDROCHLORIDE 5 MILLIGRAM(S): 5 TABLET ORAL at 15:14

## 2018-12-26 RX ADMIN — Medication 100 MILLIGRAM(S): at 17:08

## 2018-12-26 RX ADMIN — HEPARIN SODIUM 5000 UNIT(S): 5000 INJECTION INTRAVENOUS; SUBCUTANEOUS at 22:04

## 2018-12-26 RX ADMIN — Medication 16 UNIT(S): at 16:36

## 2018-12-26 RX ADMIN — LIDOCAINE 2 PATCH: 4 CREAM TOPICAL at 18:21

## 2018-12-26 RX ADMIN — INSULIN GLARGINE 33 UNIT(S): 100 INJECTION, SOLUTION SUBCUTANEOUS at 22:04

## 2018-12-26 RX ADMIN — FLUTICASONE PROPIONATE AND SALMETEROL 1 DOSE(S): 50; 250 POWDER ORAL; RESPIRATORY (INHALATION) at 21:05

## 2018-12-26 RX ADMIN — OXYCODONE HYDROCHLORIDE 5 MILLIGRAM(S): 5 TABLET ORAL at 08:31

## 2018-12-26 RX ADMIN — Medication 2: at 16:37

## 2018-12-26 RX ADMIN — Medication 16 UNIT(S): at 08:20

## 2018-12-26 RX ADMIN — Medication 0.5 ENEMA: at 06:48

## 2018-12-26 RX ADMIN — Medication 137 MICROGRAM(S): at 05:01

## 2018-12-26 RX ADMIN — DIPHENHYDRAMINE HYDROCHLORIDE AND LIDOCAINE HYDROCHLORIDE AND ALUMINUM HYDROXIDE AND MAGNESIUM HYDRO 15 MILLILITER(S): KIT at 05:04

## 2018-12-26 RX ADMIN — LIDOCAINE 2 PATCH: 4 CREAM TOPICAL at 18:22

## 2018-12-26 RX ADMIN — Medication 16 UNIT(S): at 12:18

## 2018-12-26 RX ADMIN — OXYCODONE HYDROCHLORIDE 5 MILLIGRAM(S): 5 TABLET ORAL at 09:00

## 2018-12-26 RX ADMIN — Medication 81 MILLIGRAM(S): at 12:30

## 2018-12-26 RX ADMIN — Medication 7.5 MILLIGRAM(S): at 05:02

## 2018-12-26 NOTE — PROGRESS NOTE ADULT - ASSESSMENT
62 yo Female with functional deficits after Type B aortic dissection and distal thoracic/conus spinal infarction      # Acute cortical Spinal cord infarction s/p  Type B Thoracic aortic dissection   - c/w ASA   - BP control. - at goal   - c/w PT/OT as tolerated.       # UTI (E.coli)  - afebrile, stable   - Ux noted.   - c/w Macrobid     # Neurogenic Bladder   - intermittent cath prn     # Atrial fibrillation - Rate controlled.   c/w Amiodarone.   Not on AC due to recent aortic dissection with bleed.    # Asthma - asymptomatic   c/w prednisone 7.5 mg Q12h    c/w Duoneb/Advair, Montelukast     # DM type 2 - HBA1c 7.0 (dec 24)   Monitor Accu-Cheks 144-184   c/w  Lantus   33 units QHS , c/w  Humalog 16 U TID-AC ,   Sliding Scale Insulin  Hypoglycemia Protocol.     # Hypothyroidism - stable.   c/w Synthroid       DVT/Gi Px : Heparin/Protonix 64 yo Female with functional deficits after Type B aortic dissection and distal thoracic/conus spinal infarction      # Acute cortical Spinal cord infarction s/p  Type B Thoracic aortic dissection   - c/w ASA   - BP control. - at goal   - c/w PT/OT as tolerated.       # UTI (E.coli)  - afebrile, stable   - Ux noted.   - c/w Macrobid     # Neurogenic Bladder   - intermittent cath prn     # Atrial fibrillation - Rate controlled.   c/w Amiodarone.   Not on AC due to recent aortic dissection with bleed.    # Asthma - asymptomatic   c/w prednisone 7.5 mg Q12h    c/w Duoneb/Advair, Montelukast     # DM type 2 - HBA1c 7.0 (dec 24)   Monitor Accu-Cheks 144-184   c/w  Lantus   33 units QHS , c/w  Humalog 16 U TID-AC ,   Sliding Scale Insulin  Hypoglycemia Protocol.     # Hypothyroidism - stable.   c/w Synthroid     # Hyperkalemia   resolved.   DVT/Gi Px : Heparin/Protonix

## 2018-12-26 NOTE — PROGRESS NOTE ADULT - SUBJECTIVE AND OBJECTIVE BOX
Patient is a 63y old  Female who presents with a chief complaint of functional deficits after spinal cord infarction (25 Dec 2018 10:21)      Patient seen and examined at bedside. c/o Pain in back and all extremities 5/10     ALLERGIES:  adhesives (Unknown)  Ceftin (Rash)  erythromycin (Rash)  fish (Unknown)  Levaquin (Rash)    MEDICATIONS:  ALBUTerol/ipratropium for Nebulization 3 milliLiter(s) Nebulizer every 6 hours PRN  benzocaine 15 mG/menthol 3.6 mG Lozenge 1 Lozenge Oral every 4 hours PRN  ergocalciferol 65674 Unit(s) Oral every week  FIRST- Mouthwash  BLM 15 milliLiter(s) Swish and Swallow three times a day  fluticasone propionate/ salmeterol 500-50 MICROgram(s) Diskus 1 Dose(s) Inhalation two times a day  insulin glargine Injectable (LANTUS) 33 Unit(s) SubCutaneous at bedtime  lidocaine   Patch 2 Patch Transdermal <User Schedule>  nitrofurantoin monohydrate/macrocrystals (MACROBID) 100 milliGRAM(s) Oral two times a day with meals  oxyCODONE    IR 5 milliGRAM(s) Oral every 4 hours PRN  oxyCODONE    IR 10 milliGRAM(s) Oral every 4 hours PRN  saccharomyces boulardii 250 milliGRAM(s) Oral two times a day  saline laxative (FLEET) Rectal Enema 0.5 Enema Rectal <User Schedule>    Vital Signs Last 24 Hrs  T(F): 98.2 (26 Dec 2018 09:07), Max: 98.3 (25 Dec 2018 21:23)  HR: 77 (26 Dec 2018 09:07) (74 - 78)  BP: 137/74 (26 Dec 2018 09:07) (130/86 - 154/79)  RR: 14 (26 Dec 2018 09:07) (14 - 14)  SpO2: 97% (26 Dec 2018 09:07) (95% - 98%)  I&O's Summary      PHYSICAL EXAM:  General: NAD, comfortable   ENT: MMM  Neck: Supple, No JVD  Lungs: CTA, BLAE, No added sounds.   Cardio: RRR, S1/S2, No murmurs  Abdomen: Soft, NT/ND, BS+   Extremities: No edema, erythema.   Psych: A/O x 3     LABS:                        10.6   11.9  )-----------( 242      ( 26 Dec 2018 08:07 )             33.6     12-26    139  |  104  |  24  ----------------------------<  164  4.6   |  26  |  0.75    Ca    10.1      26 Dec 2018 08:07      eGFR if Non African American: 83 mL/min/1.73M2 (12-26-18 @ 08:07)  eGFR if : 97 mL/min/1.73M2 (12-26-18 @ 08:07)                    CAPILLARY BLOOD GLUCOSE      POCT Blood Glucose.: 160 mg/dL (26 Dec 2018 07:04)  POCT Blood Glucose.: 184 mg/dL (25 Dec 2018 21:27)  POCT Blood Glucose.: 109 mg/dL (25 Dec 2018 17:04)  POCT Blood Glucose.: 268 mg/dL (25 Dec 2018 11:34)    12-24 [id number] 7.0  12-07 [id number] 6.4        Culture - Urine (collected 21 Dec 2018 19:00)  Source: .Urine Catheterized  Final Report (23 Dec 2018 15:05):    >100,000 CFU/ml Escherichia coli  Organism: Escherichia coli (23 Dec 2018 15:05)  Organism: Escherichia coli (23 Dec 2018 15:05)      -  Amikacin: S <=8      -  Amoxicillin/Clavulanic Acid: S <=8/4      -  Ampicillin: R >16 These ampicillin results predict results for amoxicillin      -  Ampicillin/Sulbactam: R >16/8      -  Aztreonam: S <=4      -  Cefazolin: I 4 For uncomplicated UTI with K. pneumoniae, E. coli, or P. mirablis: MELCHOR <=16 is sensitive and MELCHOR >=32 is resistant. This also predicts results for oral agents cefaclor, cefdinir, cefpodoxime, cefprozil, cefuroxime axetil, cephalexin and locarbef for uncomplicated UTI. Note that some isolates may be susceptible to these agents while testing resistant to cefazolin.      -  Cefepime: S <=2      -  Cefoxitin: S <=4      -  Ceftriaxone: S <=1 Enterobacter, Citrobacter, and Serratia may develop resistance during prolonged therapy      -  Ciprofloxacin: S <=0.5      -  Ertapenem: S <=0.5      -  Gentamicin: S <=1      -  Imipenem: S <=1      -  Levofloxacin: S <=1      -  Meropenem: S <=1      -  Nitrofurantoin: S <=32 Should not be used to treat pyelonephritis      -  Piperacillin/Tazobactam: S <=8      -  Tigecycline: S <=1      -  Tobramycin: S <=2      -  Trimethoprim/Sulfamethoxazole: R >2/38      Method Type: MELCHOR        RADIOLOGY & ADDITIONAL TESTS:    Care Discussed with Consultants/Other Providers:

## 2018-12-26 NOTE — PROGRESS NOTE ADULT - SUBJECTIVE AND OBJECTIVE BOX
HISTORY OF PRESENT ILLNESS:  63 year old F with PMH of asthma (on chronic steroids), DM2, HLD, obesity, hypothyroid, PE (on Coumadin, with IVC filter) who presented initially to [facility] and then transferred to [facility] on 12/7/18 with sudden-onset upper back pain between her shoulder blades for one hour associated with shortness of breath. Patient also noted LE weakness that started around same time as back pain. At [facility], initial CTA of the chest revealed probable type A dissection with intramural hematoma and active hemorrhage, so she was transferred urgently to [facility] for possible CT surgery. Upon arrival to [facility], patient was started on Labetalol gtt for blood pressure control; repeat CTA chest revealed Type B dissection with hemoperitoneum, so patient did not undergo surgical intervention. TTE with pericaridal effusion and tamponade physiology.  INR was reversed with FFP and vitamin K.  MRI L-spine revealed abnormal signal within the conus medullaris and distal thoracic cord, with expansion of the conus, suspicious for spinal infarction. Neurosurgery consulted, no surgical intervention recommended. Recommended by neurology to continue ASA 81 mg daily.  Hospital course significant for Afib on telemetry monitoring; s/p Labetalol and amiodarone load. Course notable for orthostasis on 12/17 while working with PT which improved after diuretics were discontinued and IV fluids.  Also with urinary retention due to neurogenic bladder with epps catheter placed 12/18. Deemed medically stable for acute rehab on 12/19/18.        TODAY'S SUBJECTIVE & REVIEW OF SYMPTOMS:   Patient seen and examined.  Pt c/o right knee pain since the Aortic dissection occurred in addition to persistent left shoulder pain and is requesting Voltaren gel which has provided her with relief in the past.   Pt is voiding without dysuria although still requiring straight cath.  +BM this am following an enema and has had loose stools since.  Pt notes increased BLE sensation and strength.  Denies SOB, dyspnea or cough.       [ x  ] Constitutional WNL  [   ] Cardio WNL  [   ] Resp WNL  [   ] GI WNL  [   ] Heme WNL  [   ] Endo WNL  [ x  ] Skin WNL  [   ] MSK WNL  [   ] Neuro WNL  [ x  ] Cognitive WNL  [ x  ] Psych WNL        PHYSICAL EXAM  Vital Signs Last 24 Hrs  T(C): 36.8 (26 Dec 2018 09:07), Max: 36.8 (25 Dec 2018 21:23)  T(F): 98.2 (26 Dec 2018 09:07), Max: 98.3 (25 Dec 2018 21:23)  HR: 77 (26 Dec 2018 09:07) (74 - 78)  BP: 137/74 (26 Dec 2018 09:07) (130/86 - 154/79)  BP(mean): --  RR: 14 (26 Dec 2018 09:07) (14 - 14)  SpO2: 97% (26 Dec 2018 09:07) (95% - 98%)    Gen - NAD, Comfortable  	HEENT - NCAT, EOMI, MMM  	Neck - Supple, No limited ROM  	Pulm - CTAB, No wheeze, No rhonchi, No crackles  	Cardiovascular - RRR, S1S2, No murmurs  	Abdomen - Soft, NT/ND, +BS  	Extremities - No C/C/E, No calf tenderness  	Neuro-  	   Cognitive - AAOx3  	   Communication - Fluent, No dysarthria  	   Attention: Intact   	   Judgement: Good evidence of judgement  	   Cranial Nerves - CN 2-12 grossly intact  	   Motor -   	                  LEFT    UE - ShAB 4/5 (limited due to chronic RTC injury), EF 5/5, EE 5/5, WE 5/5,  5/5  	                  RIGHT UE - ShAB 4/5, EF 5/5, EE 5/5, WE 5/5,  5/5  	                  LEFT    LE - HF 4-/5, KE 4/5, DF 4/5, PF 4/5  	                  RIGHT LE - HF 3/5, KE 3/5, DF 3-/5, PF 2/5     	   Sensory - Impaired to LT BLEs  	   Reflexes -No primitive reflexive  	   Coordination - FTN intact  	Psychiatric - Mood stable, Affect WNL  Skin:  left inner thigh papules, non-pruritic, non-TTP. L knee scar    FUNCTIONAL PROGRESS:  Bed mobility: Max A  Transfers: Max A in parallel bars: Pt requires patrick lift with assist of 2 persons  Gait: Unable  ADLs: UE dress max A, LE dress mod A      RECENT LABS:                        10.6   11.9  )-----------( 242      ( 26 Dec 2018 08:07 )             33.6   12-26    139  |  104  |  24<H>  ----------------------------<  164<H>  4.6   |  26  |  0.75    Ca    10.1      26 Dec 2018 08:07      CAPILLARY BLOOD GLUCOSE      POCT Blood Glucose.: 128 mg/dL (26 Dec 2018 12:16)  POCT Blood Glucose.: 160 mg/dL (26 Dec 2018 07:04)  POCT Blood Glucose.: 184 mg/dL (25 Dec 2018 21:27)  POCT Blood Glucose.: 109 mg/dL (25 Dec 2018 17:04)            Assessment and Plan:   62 yo Female with functional deficits after Type B aortic dissection and distal thoracic/conus spinal infarction      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  Lidoderm patches right knee  Order Voltaren gel for prn use  - repeat CT scan in 3-4 months as per Dr. Kumar CT surgery     asthma-stable  - prednisone, albuterol, advair, Duonebs-changed from q6hrs to prn    atrial fibrillation   - amiodarone for rate control    DM2 on insulin-Uncontrolled  - likely due to prednisone and immobility  - lantus-increased to 33U, Cont humalog 16u TID AC, SSI, CC diet     hypothyroid  - synthroid     neurogenic bowel and bladder  - epps catheter, future TOV-D/Cd 12/21.  PVRs 212, 650, 259. Continue bladder scans and SC PRN  - Bowel/bladder program: Mini fleet enema q AM-change to prn.  Toileting schedule- bedside to toilet/commode for voiding     acute blood loss anemia-H/H stable  -monitor cbc    leukocytosis-likely due to prednisone-improved  -monitor cbc  -check ua-+  Urine culture >100,000cfu/ml Ecoli-sensitive to Macrobid-Rx 5 day supply + Florastor  -cxr with reduced size of pleural effusions    Hoarse voice-improved  -Added magic mouth wash x 5days.    -D/C cepacol prn  -Consider ENT consult      Weight bearing status: WBAT     Precautions / PROPHYLAXIS:   - Falls, Cardiac, Sternal, Spinal   - Lungs: Aspiration, Incentive Spirometer   - Pressure injury/Skin: Turn Q2hrs while in bed   - DVT: SQ Heparin, SCDs, TEDs     Diet:  Regular + Thins CCHO

## 2018-12-27 PROCEDURE — 99232 SBSQ HOSP IP/OBS MODERATE 35: CPT

## 2018-12-27 PROCEDURE — 99233 SBSQ HOSP IP/OBS HIGH 50: CPT

## 2018-12-27 RX ORDER — INSULIN LISPRO 100/ML
8 VIAL (ML) SUBCUTANEOUS
Qty: 0 | Refills: 0 | Status: DISCONTINUED | OUTPATIENT
Start: 2018-12-27 | End: 2019-01-03

## 2018-12-27 RX ORDER — DICLOFENAC SODIUM 30 MG/G
2 GEL TOPICAL THREE TIMES A DAY
Qty: 0 | Refills: 0 | Status: DISCONTINUED | OUTPATIENT
Start: 2018-12-27 | End: 2019-01-03

## 2018-12-27 RX ORDER — LIDOCAINE 4 G/100G
2 CREAM TOPICAL
Qty: 0 | Refills: 0 | Status: DISCONTINUED | OUTPATIENT
Start: 2018-12-27 | End: 2018-12-31

## 2018-12-27 RX ADMIN — PANTOPRAZOLE SODIUM 40 MILLIGRAM(S): 20 TABLET, DELAYED RELEASE ORAL at 06:17

## 2018-12-27 RX ADMIN — HEPARIN SODIUM 5000 UNIT(S): 5000 INJECTION INTRAVENOUS; SUBCUTANEOUS at 22:38

## 2018-12-27 RX ADMIN — GABAPENTIN 300 MILLIGRAM(S): 400 CAPSULE ORAL at 22:38

## 2018-12-27 RX ADMIN — Medication 250 MILLIGRAM(S): at 17:08

## 2018-12-27 RX ADMIN — OXYCODONE HYDROCHLORIDE 5 MILLIGRAM(S): 5 TABLET ORAL at 08:27

## 2018-12-27 RX ADMIN — Medication 81 MILLIGRAM(S): at 11:22

## 2018-12-27 RX ADMIN — OXYCODONE HYDROCHLORIDE 5 MILLIGRAM(S): 5 TABLET ORAL at 09:00

## 2018-12-27 RX ADMIN — LIDOCAINE 2 PATCH: 4 CREAM TOPICAL at 08:22

## 2018-12-27 RX ADMIN — Medication 2: at 22:38

## 2018-12-27 RX ADMIN — GABAPENTIN 100 MILLIGRAM(S): 400 CAPSULE ORAL at 17:09

## 2018-12-27 RX ADMIN — LIDOCAINE 2 PATCH: 4 CREAM TOPICAL at 20:23

## 2018-12-27 RX ADMIN — OXYCODONE HYDROCHLORIDE 5 MILLIGRAM(S): 5 TABLET ORAL at 12:37

## 2018-12-27 RX ADMIN — Medication 1 TABLET(S): at 11:22

## 2018-12-27 RX ADMIN — FLUTICASONE PROPIONATE AND SALMETEROL 1 DOSE(S): 50; 250 POWDER ORAL; RESPIRATORY (INHALATION) at 08:33

## 2018-12-27 RX ADMIN — OXYCODONE HYDROCHLORIDE 5 MILLIGRAM(S): 5 TABLET ORAL at 13:30

## 2018-12-27 RX ADMIN — AMIODARONE HYDROCHLORIDE 200 MILLIGRAM(S): 400 TABLET ORAL at 06:16

## 2018-12-27 RX ADMIN — Medication 7.5 MILLIGRAM(S): at 17:08

## 2018-12-27 RX ADMIN — HEPARIN SODIUM 5000 UNIT(S): 5000 INJECTION INTRAVENOUS; SUBCUTANEOUS at 06:19

## 2018-12-27 RX ADMIN — Medication 7.5 MILLIGRAM(S): at 06:17

## 2018-12-27 RX ADMIN — Medication 16 UNIT(S): at 11:17

## 2018-12-27 RX ADMIN — Medication 2: at 07:17

## 2018-12-27 RX ADMIN — Medication 16 UNIT(S): at 07:17

## 2018-12-27 RX ADMIN — Medication 250 MILLIGRAM(S): at 06:17

## 2018-12-27 RX ADMIN — Medication 137 MICROGRAM(S): at 06:17

## 2018-12-27 RX ADMIN — FLUTICASONE PROPIONATE AND SALMETEROL 1 DOSE(S): 50; 250 POWDER ORAL; RESPIRATORY (INHALATION) at 21:28

## 2018-12-27 RX ADMIN — INSULIN GLARGINE 33 UNIT(S): 100 INJECTION, SOLUTION SUBCUTANEOUS at 22:38

## 2018-12-27 RX ADMIN — Medication 100 MILLIGRAM(S): at 17:08

## 2018-12-27 RX ADMIN — GABAPENTIN 100 MILLIGRAM(S): 400 CAPSULE ORAL at 06:17

## 2018-12-27 RX ADMIN — OXYCODONE HYDROCHLORIDE 5 MILLIGRAM(S): 5 TABLET ORAL at 23:03

## 2018-12-27 RX ADMIN — ERGOCALCIFEROL 50000 UNIT(S): 1.25 CAPSULE ORAL at 11:22

## 2018-12-27 RX ADMIN — Medication 100 MILLIGRAM(S): at 08:21

## 2018-12-27 RX ADMIN — HEPARIN SODIUM 5000 UNIT(S): 5000 INJECTION INTRAVENOUS; SUBCUTANEOUS at 13:11

## 2018-12-27 NOTE — PROGRESS NOTE ADULT - SUBJECTIVE AND OBJECTIVE BOX
Patient is a 63y old  Female who presents with a chief complaint of functional deficits after spinal cord infarction (26 Dec 2018 13:47)      Patient seen and examined at bedside. Complains of bladder and bowel incontinence.     ALLERGIES:  adhesives (Unknown)  Ceftin (Rash)  erythromycin (Rash)  fish (Unknown)  Levaquin (Rash)    MEDICATIONS  (STANDING):  ALBUTerol    90 MICROgram(s) HFA Inhaler 1 Puff(s) Inhalation every 4 hours  ALBUTerol    90 MICROgram(s) HFA Inhaler 1 Puff(s) Inhalation every 4 hours  amiodarone    Tablet 200 milliGRAM(s) Oral daily  aspirin enteric coated 81 milliGRAM(s) Oral daily  dextrose 5%. 1000 milliLiter(s) (50 mL/Hr) IV Continuous <Continuous>  dextrose 50% Injectable 12.5 Gram(s) IV Push once  dextrose 50% Injectable 25 Gram(s) IV Push once  dextrose 50% Injectable 25 Gram(s) IV Push once  ergocalciferol 56899 Unit(s) Oral every week  fluticasone propionate/ salmeterol 500-50 MICROgram(s) Diskus 1 Dose(s) Inhalation two times a day  gabapentin 300 milliGRAM(s) Oral at bedtime  gabapentin 100 milliGRAM(s) Oral two times a day  heparin  Injectable 5000 Unit(s) SubCutaneous every 8 hours  insulin glargine Injectable (LANTUS) 33 Unit(s) SubCutaneous at bedtime  insulin lispro (HumaLOG) corrective regimen sliding scale   SubCutaneous three times a day before meals  insulin lispro (HumaLOG) corrective regimen sliding scale   SubCutaneous at bedtime  insulin lispro Injectable (HumaLOG) 16 Unit(s) SubCutaneous three times a day before meals  levothyroxine 137 MICROGram(s) Oral daily  lidocaine   Patch 2 Patch Transdermal <User Schedule>  multivitamin 1 Tablet(s) Oral daily  nitrofurantoin monohydrate/macrocrystals (MACROBID) 100 milliGRAM(s) Oral two times a day with meals  pantoprazole    Tablet 40 milliGRAM(s) Oral before breakfast  predniSONE   Tablet 7.5 milliGRAM(s) Oral every 12 hours  saccharomyces boulardii 250 milliGRAM(s) Oral two times a day  tiotropium 18 MICROgram(s) Capsule 1 Capsule(s) Inhalation daily    MEDICATIONS  (PRN):  acetaminophen   Tablet .. 650 milliGRAM(s) Oral every 6 hours PRN Temp greater or equal to 38C (100.4F), Mild Pain (1 - 3)  ALBUTerol/ipratropium for Nebulization 3 milliLiter(s) Nebulizer every 6 hours PRN Shortness of Breath and/or Wheezing  dextrose 40% Gel 15 Gram(s) Oral once PRN Blood Glucose LESS THAN 70 milliGRAM(s)/deciliter  glucagon  Injectable 1 milliGRAM(s) IntraMuscular once PRN Glucose LESS THAN 70 milligrams/deciliter  oxyCODONE    IR 5 milliGRAM(s) Oral every 4 hours PRN Moderate Pain (4 - 6)  oxyCODONE    IR 10 milliGRAM(s) Oral every 4 hours PRN Severe Pain (7 - 10)  saline laxative (FLEET) Rectal Enema 0.5 Enema Rectal <User Schedule> PRN constipation  senna 2 Tablet(s) Oral at bedtime PRN Constipation    Vital Signs Last 24 Hrs  T(F): 97.9 (27 Dec 2018 09:01), Max: 98.2 (26 Dec 2018 21:00)  HR: 75 (27 Dec 2018 09:01) (75 - 77)  BP: 128/70 (27 Dec 2018 09:01) (128/70 - 142/85)  RR: 14 (27 Dec 2018 09:01) (14 - 15)  SpO2: 100% (27 Dec 2018 09:01) (95% - 100%)  I&O's Summary    26 Dec 2018 07:01  -  27 Dec 2018 07:00  --------------------------------------------------------  IN: 0 mL / OUT: 1 mL / NET: -1 mL      PHYSICAL EXAM:  General: NAD, A/O x 3  ENT: MMM  Neck: Supple, No JVD  Lungs: Clear to auscultation bilaterally  Cardio: RRR, S1/S2  Abdomen: Soft, Nontender, Nondistended; Bowel sounds present; obese  Extremities: No calf tenderness    LABS:                        10.6   11.9  )-----------( 242      ( 26 Dec 2018 08:07 )             33.6     12-26    139  |  104  |  24  ----------------------------<  164  4.6   |  26  |  0.75    Ca    10.1      26 Dec 2018 08:07      eGFR if Non African American: 83 mL/min/1.73M2 (12-26-18 @ 08:07)  eGFR if : 97 mL/min/1.73M2 (12-26-18 @ 08:07)      CAPILLARY BLOOD GLUCOSE      POCT Blood Glucose.: 143 mg/dL (27 Dec 2018 11:16)  POCT Blood Glucose.: 160 mg/dL (27 Dec 2018 07:15)  POCT Blood Glucose.: 176 mg/dL (26 Dec 2018 21:48)  POCT Blood Glucose.: 156 mg/dL (26 Dec 2018 16:35)  POCT Blood Glucose.: 128 mg/dL (26 Dec 2018 12:16)    12-24 [id number] 7.0  12-07 [id number] 6.4        RADIOLOGY & ADDITIONAL TESTS:  < from: Xray Chest 2 Views PA/Lat (12.20.18 @ 15:00) >  IMPRESSION: As above.    < end of copied text >      Care Discussed with Consultants/Other Providers: Dr. Mendoza

## 2018-12-27 NOTE — PROGRESS NOTE ADULT - ASSESSMENT
64 yo Female admitted to acute rehab s/p Type B aortic dissection and distal thoracic/conus spinal cord infarction      # Acute thoracic / conus Spinal cord infarction s/p  Type B Thoracic aortic dissection   - c/w ASA   - BP control  - c/w PT/OT as tolerated.     #Neurogenic bowel/bladder secondary to #1 above  -ISC prn  -bladder/bowel training exercises    # UTI (E.coli)  - afebrile, stable   - Ux noted.   - day 3/5 of Macrobid       #Paroxysmal Atrial fibrillation  c/w Amiodarone.   Not on AC due to recent aortic dissection with bleed.    # Asthma - asymptomatic  c/w prednisone 7.5 mg Q12h    c/w Duoneb/Advair, Montelukast   *Since the patient has asthma (and not COPD), I will d/c the spiriva at this time. I will also change duoneb to albuterol prn. Spiriva can have an anticholingeric effect which can affect the bladder/bowels    # DM type 2 - HBA1c 7.0 (dec 24), acceptable fingersticks, on insulin  c/w  Lantus   33 units QHS , c/w  Humalog 16 U TID-AC ,   Sliding Scale Insulin  Hypoglycemia Protocol.     # Hypothyroidism - stable.   c/w Synthroid    #Obesity  -Outpatient exercise regimen as tolerated    DVT/Gi Px : Heparin/Protonix 62 yo Female admitted to acute rehab s/p Type B aortic dissection and distal thoracic/conus spinal cord infarction      # Acute thoracic / conus Spinal cord infarction s/p  Type B Thoracic aortic dissection   - c/w ASA   - BP control  - c/w PT/OT as tolerated.     #Neurogenic bowel/bladder secondary to #1 above  -ISC prn  -bladder/bowel training exercises  -Laxatives prn, especially while on narcotics    # UTI (E.coli)  - afebrile, stable   - Ux noted.   - day 3/5 of Macrobid       #Paroxysmal Atrial fibrillation  c/w Amiodarone.   Not on AC due to recent aortic dissection with bleed.    # Asthma - asymptomatic  c/w prednisone 7.5 mg Q12h    c/w Duoneb/Advair, Montelukast   *Since the patient has asthma (and not COPD), I will d/c the spiriva at this time. I will also change duoneb to albuterol prn. Spiriva can have an anticholingeric effect which can affect the bladder/bowels. It looks like the patient's last dose of this medication was on 12/27.    # DM type 2 - HBA1c 7.0 (dec 24), acceptable fingersticks, on insulin  c/w  Lantus   33 units QHS , c/w  Humalog 16 U TID-AC ,   Sliding Scale Insulin  Hypoglycemia Protocol.     # Hypothyroidism - stable.   c/w Synthroid    #Obesity  -Outpatient exercise regimen as tolerated    DVT/Gi Px : Heparin/Protonix

## 2018-12-27 NOTE — PROGRESS NOTE ADULT - SUBJECTIVE AND OBJECTIVE BOX
HISTORY OF PRESENT ILLNESS:  63 year old F with PMH of asthma (on chronic steroids), DM2, HLD, obesity, hypothyroid, PE (on Coumadin, with IVC filter) who presented initially to [facility] and then transferred to [facility] on 12/7/18 with sudden-onset upper back pain between her shoulder blades for one hour associated with shortness of breath. Patient also noted LE weakness that started around same time as back pain. At [facility], initial CTA of the chest revealed probable type A dissection with intramural hematoma and active hemorrhage, so she was transferred urgently to [facility] for possible CT surgery. Upon arrival to [facility], patient was started on Labetalol gtt for blood pressure control; repeat CTA chest revealed Type B dissection with hemoperitoneum, so patient did not undergo surgical intervention. TTE with pericaridal effusion and tamponade physiology.  INR was reversed with FFP and vitamin K.  MRI L-spine revealed abnormal signal within the conus medullaris and distal thoracic cord, with expansion of the conus, suspicious for spinal infarction. Neurosurgery consulted, no surgical intervention recommended. Recommended by neurology to continue ASA 81 mg daily.  Hospital course significant for Afib on telemetry monitoring; s/p Labetalol and amiodarone load. Course notable for orthostasis on 12/17 while working with PT which improved after diuretics were discontinued and IV fluids.  Also with urinary retention due to neurogenic bladder with epps catheter placed 12/18. Deemed medically stable for acute rehab on 12/19/18.        TODAY'S SUBJECTIVE & REVIEW OF SYMPTOMS:   Patient seen and examined.  Pt c/o right knee pain since the Aortic dissection occurred which is not relieved with Lidoderm and in addition to persistent left shoulder pain.   Pt is voiding better without dysuria although still requiring occasional straight cath.  +BM this am-soft stools.  Pt notes slight increased BLE sensation and strength.  Denies SOB, dyspnea or cough.       [ x  ] Constitutional WNL  [   ] Cardio WNL  [   ] Resp WNL  [   ] GI WNL  [   ] Heme WNL  [   ] Endo WNL  [ x  ] Skin WNL  [   ] MSK WNL  [   ] Neuro WNL  [ x  ] Cognitive WNL  [ x  ] Psych WNL        PHYSICAL EXAM  Vital Signs Last 24 Hrs  T(C): 36.6 (27 Dec 2018 09:01), Max: 36.8 (26 Dec 2018 21:00)  T(F): 97.9 (27 Dec 2018 09:01), Max: 98.2 (26 Dec 2018 21:00)  HR: 75 (27 Dec 2018 09:01) (75 - 77)  BP: 128/70 (27 Dec 2018 09:01) (128/70 - 142/85)  BP(mean): --  RR: 14 (27 Dec 2018 09:01) (14 - 15)  SpO2: 100% (27 Dec 2018 09:01) (95% - 100%)    Gen - NAD, Comfortable  	HEENT - NCAT, EOMI, MMM  	Neck - Supple, No limited ROM  	Pulm - CTAB, No wheeze, No rhonchi, No crackles  	Cardiovascular - RRR, S1S2, No murmurs  	Abdomen - Soft, NT/ND, +BS  	Extremities - No C/C/E, No calf tenderness  	Neuro-  	   Cognitive - AAOx3  	   Communication - Fluent, No dysarthria  	   Attention: Intact   	   Judgement: Good evidence of judgement  	   Cranial Nerves - CN 2-12 grossly intact  	   Motor -   	                  LEFT    UE - ShAB 4/5 (limited due to chronic RTC injury), EF 5/5, EE 5/5, WE 5/5,  5/5  	                  RIGHT UE - ShAB 4/5, EF 5/5, EE 5/5, WE 5/5,  5/5  	                  LEFT    LE - HF 4-/5, KE 4/5, DF 4/5, PF 4/5  	                  RIGHT LE - HF 3/5, KE 3/5, DF 3-/5, PF 2/5     	   Sensory - Impaired to LT BLEs  	   Reflexes -No primitive reflexive  	   Coordination - FTN intact  	Psychiatric - Mood stable, Affect WNL  Skin:  left inner thigh papules, non-pruritic, non-TTP. L knee scar    FUNCTIONAL PROGRESS:  Bed mobility: Max A  Transfers: Mod A x 2  Gait: Unable  ADLs: UE dress max A, LE dress mod A      RECENT LABS:                        10.6   11.9  )-----------( 242      ( 26 Dec 2018 08:07 )             33.6   12-26    139  |  104  |  24<H>  ----------------------------<  164<H>  4.6   |  26  |  0.75    Ca    10.1      26 Dec 2018 08:07      CAPILLARY BLOOD GLUCOSE      POCT Blood Glucose.: 143 mg/dL (27 Dec 2018 11:16)  POCT Blood Glucose.: 160 mg/dL (27 Dec 2018 07:15)  POCT Blood Glucose.: 176 mg/dL (26 Dec 2018 21:48)  POCT Blood Glucose.: 156 mg/dL (26 Dec 2018 16:35)              Assessment and Plan:   62 yo Female with functional deficits after Type B aortic dissection and distal thoracic/conus spinal infarction      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  Lidoderm patches left shoulder  Ordered Voltaren gel for prn use as per pt request  - repeat CT scan in 3-4 months as per Dr. Kumar CT surgery     asthma-stable  - prednisone, advair.  As per hospitalist as pt has asthma and not COPD, d/c duonebs and Spiriva which may have anticholinergic s/e and instead Rx albuterol inh      atrial fibrillation   - amiodarone for rate control    DM2 on insulin-Uncontrolled  - likely due to prednisone and immobility  - lantus-increased to 33U, Cont humalog 16u TID AC, SSI, CC diet     hypothyroid  - synthroid     neurogenic bowel and bladder  - epps catheter, future TOV-D/Cd 12/21.  PVRs improved at 400, 280, 128. Decrease bladder scans from q6 to q8hrs and SC PRN  - Bowel/bladder program: Mini fleet enema q AM-changed to prn.  Toileting schedule- bedside to toilet/commode for voiding     acute blood loss anemia-H/H stable  -monitor cbc    leukocytosis-likely due to prednisone-improved  -monitor cbc  -check ua-+  Urine culture >100,000cfu/ml Ecoli-sensitive to Macrobid-Rx 5 day supply + Florastor  -cxr with reduced size of pleural effusions    Hoarse voice-improved  -Added magic mouth wash x 5days.    -D/C cepacol prn    Weight bearing status: WBAT     Precautions / PROPHYLAXIS:   - Falls, Cardiac, Sternal, Spinal   - Lungs: Aspiration, Incentive Spirometer   - Pressure injury/Skin: Turn Q2hrs while in bed   - DVT: SQ Heparin, SCDs, TEDs     Diet:  Regular + Thins CCHO

## 2018-12-28 PROCEDURE — 99232 SBSQ HOSP IP/OBS MODERATE 35: CPT

## 2018-12-28 PROCEDURE — 99233 SBSQ HOSP IP/OBS HIGH 50: CPT

## 2018-12-28 RX ORDER — TAMSULOSIN HYDROCHLORIDE 0.4 MG/1
0.4 CAPSULE ORAL AT BEDTIME
Qty: 0 | Refills: 0 | Status: DISCONTINUED | OUTPATIENT
Start: 2018-12-28 | End: 2019-01-03

## 2018-12-28 RX ADMIN — OXYCODONE HYDROCHLORIDE 5 MILLIGRAM(S): 5 TABLET ORAL at 20:36

## 2018-12-28 RX ADMIN — Medication 250 MILLIGRAM(S): at 06:44

## 2018-12-28 RX ADMIN — Medication 100 MILLIGRAM(S): at 17:04

## 2018-12-28 RX ADMIN — PANTOPRAZOLE SODIUM 40 MILLIGRAM(S): 20 TABLET, DELAYED RELEASE ORAL at 06:45

## 2018-12-28 RX ADMIN — Medication 2: at 08:29

## 2018-12-28 RX ADMIN — INSULIN GLARGINE 33 UNIT(S): 100 INJECTION, SOLUTION SUBCUTANEOUS at 21:24

## 2018-12-28 RX ADMIN — HEPARIN SODIUM 5000 UNIT(S): 5000 INJECTION INTRAVENOUS; SUBCUTANEOUS at 21:23

## 2018-12-28 RX ADMIN — GABAPENTIN 300 MILLIGRAM(S): 400 CAPSULE ORAL at 21:23

## 2018-12-28 RX ADMIN — GABAPENTIN 100 MILLIGRAM(S): 400 CAPSULE ORAL at 17:05

## 2018-12-28 RX ADMIN — Medication 7.5 MILLIGRAM(S): at 17:05

## 2018-12-28 RX ADMIN — Medication 100 MILLIGRAM(S): at 08:30

## 2018-12-28 RX ADMIN — DICLOFENAC SODIUM 2 GRAM(S): 30 GEL TOPICAL at 14:02

## 2018-12-28 RX ADMIN — AMIODARONE HYDROCHLORIDE 200 MILLIGRAM(S): 400 TABLET ORAL at 06:45

## 2018-12-28 RX ADMIN — Medication 137 MICROGRAM(S): at 06:44

## 2018-12-28 RX ADMIN — Medication 8 UNIT(S): at 17:04

## 2018-12-28 RX ADMIN — OXYCODONE HYDROCHLORIDE 5 MILLIGRAM(S): 5 TABLET ORAL at 08:37

## 2018-12-28 RX ADMIN — Medication 81 MILLIGRAM(S): at 12:07

## 2018-12-28 RX ADMIN — HEPARIN SODIUM 5000 UNIT(S): 5000 INJECTION INTRAVENOUS; SUBCUTANEOUS at 06:45

## 2018-12-28 RX ADMIN — Medication 8 UNIT(S): at 08:31

## 2018-12-28 RX ADMIN — Medication 1 TABLET(S): at 12:07

## 2018-12-28 RX ADMIN — OXYCODONE HYDROCHLORIDE 5 MILLIGRAM(S): 5 TABLET ORAL at 21:32

## 2018-12-28 RX ADMIN — HEPARIN SODIUM 5000 UNIT(S): 5000 INJECTION INTRAVENOUS; SUBCUTANEOUS at 14:00

## 2018-12-28 RX ADMIN — FLUTICASONE PROPIONATE AND SALMETEROL 1 DOSE(S): 50; 250 POWDER ORAL; RESPIRATORY (INHALATION) at 08:43

## 2018-12-28 RX ADMIN — Medication 7.5 MILLIGRAM(S): at 06:45

## 2018-12-28 RX ADMIN — GABAPENTIN 100 MILLIGRAM(S): 400 CAPSULE ORAL at 06:45

## 2018-12-28 RX ADMIN — FLUTICASONE PROPIONATE AND SALMETEROL 1 DOSE(S): 50; 250 POWDER ORAL; RESPIRATORY (INHALATION) at 20:53

## 2018-12-28 RX ADMIN — Medication 250 MILLIGRAM(S): at 17:05

## 2018-12-28 RX ADMIN — Medication 8 UNIT(S): at 12:06

## 2018-12-28 RX ADMIN — TAMSULOSIN HYDROCHLORIDE 0.4 MILLIGRAM(S): 0.4 CAPSULE ORAL at 21:23

## 2018-12-28 RX ADMIN — OXYCODONE HYDROCHLORIDE 5 MILLIGRAM(S): 5 TABLET ORAL at 08:33

## 2018-12-28 NOTE — PROGRESS NOTE ADULT - ASSESSMENT
64 yo Female admitted to acute rehab s/p Type B aortic dissection and distal thoracic/conus spinal cord infarction      # Acute thoracic / conus Spinal cord infarction s/p  Type B Thoracic aortic dissection   - c/w ASA   - BP controlled  - c/w PT/OT as tolerated.     #Neurogenic bowel/bladder secondary to #1 above  -ISC prn  -bladder/bowel training exercises  -Laxatives prn, especially while on narcotics    # UTI (E.coli)  - afebrile, stable   - Ux noted.   - day 4/5 of Macrobid     #Paroxysmal Atrial fibrillation  c/w Amiodarone.   Not on AC due to recent aortic dissection with bleed.    # Asthma - asymptomatic  c/w prednisone 7.5 mg Q12h    c/w Duoneb/Advair, Montelukast     # DM type 2 - HBA1c 7.0 (dec 24), acceptable fingersticks, on insulin  c/w  Lantus   33 units QHS , c/w  Humalog 16 U TID-AC ,   Sliding Scale Insulin  Hypoglycemia Protocol.   Endocrine following    # Hypothyroidism - stable.   c/w Synthroid    #Hyperparathyroidism  -this is the etiology of the patient's hypercalcemia (when corrected - mild hypercalcemia ~ 11.5)  -Encourage PO hydration  -Will eventually need outpatient followup, including bone mineral density, 24 hours urine calcium, etc, endocrine follow-up    #Obesity  -Outpatient exercise regimen as tolerated    DVT/Gi Px : Heparin/Protonix 62 yo Female admitted to acute rehab s/p Type B aortic dissection and distal thoracic/conus spinal cord infarction      # Acute thoracic / conus Spinal cord infarction s/p  Type B Thoracic aortic dissection   - c/w ASA   - BP controlled  - c/w PT/OT as tolerated.     #Neurogenic bowel/bladder secondary to #1 above  -ISC prn  -bladder/bowel training exercises  -Laxatives prn, especially while on narcotics    # UTI (E.coli)  - afebrile, stable   - Ux noted.   - day 4/5 of Macrobid     #Bilateral calf tenderness  -U/S to r/o DVT    #Paroxysmal Atrial fibrillation  c/w Amiodarone.   Not on AC due to recent aortic dissection with bleed.    # Asthma - asymptomatic  c/w prednisone 7.5 mg Q12h    c/w Duoneb/Advair, Montelukast     # DM type 2 - HBA1c 7.0 (dec 24), acceptable fingersticks, on insulin  c/w  Lantus   33 units QHS , c/w  Humalog 16 U TID-AC ,   Sliding Scale Insulin  Hypoglycemia Protocol.   Endocrine following    # Hypothyroidism - stable.   c/w Synthroid    #Hyperparathyroidism  -this is the etiology of the patient's hypercalcemia (when corrected - mild hypercalcemia ~ 11.5)  -Encourage PO hydration  -Will eventually need outpatient followup, including bone mineral density, 24 hours urine calcium, etc, endocrine follow-up    #Obesity  -Outpatient exercise regimen as tolerated    DVT/Gi Px : Heparin/Protonix

## 2018-12-28 NOTE — PROGRESS NOTE ADULT - SUBJECTIVE AND OBJECTIVE BOX
Patient is a 63y old  Female who presents with a chief complaint of functional deficits after spinal cord infarction (28 Dec 2018 12:39)    Patient seen and examined at bedside.    ALLERGIES:  adhesives (Unknown)  Ceftin (Rash)  erythromycin (Rash)  fish (Unknown)  Levaquin (Rash)    MEDICATIONS  (STANDING):  ALBUTerol    90 MICROgram(s) HFA Inhaler 1 Puff(s) Inhalation every 4 hours  ALBUTerol    90 MICROgram(s) HFA Inhaler 1 Puff(s) Inhalation every 4 hours  amiodarone    Tablet 200 milliGRAM(s) Oral daily  aspirin enteric coated 81 milliGRAM(s) Oral daily  dextrose 5%. 1000 milliLiter(s) (50 mL/Hr) IV Continuous <Continuous>  dextrose 50% Injectable 12.5 Gram(s) IV Push once  dextrose 50% Injectable 25 Gram(s) IV Push once  dextrose 50% Injectable 25 Gram(s) IV Push once  ergocalciferol 28370 Unit(s) Oral every week  fluticasone propionate/ salmeterol 500-50 MICROgram(s) Diskus 1 Dose(s) Inhalation two times a day  gabapentin 300 milliGRAM(s) Oral at bedtime  gabapentin 100 milliGRAM(s) Oral two times a day  heparin  Injectable 5000 Unit(s) SubCutaneous every 8 hours  insulin glargine Injectable (LANTUS) 33 Unit(s) SubCutaneous at bedtime  insulin lispro (HumaLOG) corrective regimen sliding scale   SubCutaneous three times a day before meals  insulin lispro (HumaLOG) corrective regimen sliding scale   SubCutaneous at bedtime  insulin lispro Injectable (HumaLOG) 8 Unit(s) SubCutaneous three times a day before meals  levothyroxine 137 MICROGram(s) Oral daily  lidocaine   Patch 2 Patch Transdermal <User Schedule>  multivitamin 1 Tablet(s) Oral daily  nitrofurantoin monohydrate/macrocrystals (MACROBID) 100 milliGRAM(s) Oral two times a day with meals  pantoprazole    Tablet 40 milliGRAM(s) Oral before breakfast  predniSONE   Tablet 7.5 milliGRAM(s) Oral every 12 hours  saccharomyces boulardii 250 milliGRAM(s) Oral two times a day  tamsulosin 0.4 milliGRAM(s) Oral at bedtime    MEDICATIONS  (PRN):  acetaminophen   Tablet .. 650 milliGRAM(s) Oral every 6 hours PRN Temp greater or equal to 38C (100.4F), Mild Pain (1 - 3)  dextrose 40% Gel 15 Gram(s) Oral once PRN Blood Glucose LESS THAN 70 milliGRAM(s)/deciliter  diclofenac sodium 1% Gel 2 Gram(s) Topical three times a day PRN shoulder and bilateral knees pain  glucagon  Injectable 1 milliGRAM(s) IntraMuscular once PRN Glucose LESS THAN 70 milligrams/deciliter  oxyCODONE    IR 5 milliGRAM(s) Oral every 4 hours PRN Moderate Pain (4 - 6)  oxyCODONE    IR 10 milliGRAM(s) Oral every 4 hours PRN Severe Pain (7 - 10)  saline laxative (FLEET) Rectal Enema 0.5 Enema Rectal <User Schedule> PRN constipation  senna 2 Tablet(s) Oral at bedtime PRN Constipation    Vital Signs Last 24 Hrs  T(F): 97.8 (28 Dec 2018 09:06), Max: 98.2 (27 Dec 2018 22:00)  HR: 98 (28 Dec 2018 09:06) (78 - 98)  BP: 117/68 (28 Dec 2018 09:06) (117/68 - 126/80)  RR: 14 (28 Dec 2018 09:06) (14 - 14)  SpO2: 98% (28 Dec 2018 09:06) (96% - 98%)  I&O's Summary    PHYSICAL EXAM:  General: NAD, A/O x 3  ENT: MMM  Neck: Supple, No JVD  Lungs: Clear to auscultation bilaterally  Cardio: RRR, S1/S2, No murmurs  Abdomen: Soft, Nontender, Nondistended; Bowel sounds present, obese  Extremities: No calf tenderness, 2+ pitting edema bilaterally, chronic venous changes noted, bilateral calf tenderness    LABS:                        10.6   11.9  )-----------( 242      ( 26 Dec 2018 08:07 )             33.6     12-26    139  |  104  |  24  ----------------------------<  164  4.6   |  26  |  0.75    Ca    10.1      26 Dec 2018 08:07    Intact PTH: 98: PTH METHOD: Roche  Guide for Interpretation of PTH and Calcium Results                           Calcium             PTH                           MG/DL               PG/ML  Normal                   8.4-10.5            15-65  Primary  Hyperparathyroidism      >10.5               >50  Non-PTH Hypercalcemia    >10.5               0-20  Hypoparathroidism        <8.4                0-20  Pseudohypoparathyroid    <8.4                >50  This is intended as a guide only. Factors such as sunlight exposure,  Vitamin D status and renal function should be evaluated along with  clinical presentation. pg/mL (12.17.18 @ 09:37)          eGFR if Non African American: 83 mL/min/1.73M2 (12-26-18 @ 08:07)  eGFR if : 97 mL/min/1.73M2 (12-26-18 @ 08:07)    CAPILLARY BLOOD GLUCOSE      POCT Blood Glucose.: 106 mg/dL (28 Dec 2018 12:04)  POCT Blood Glucose.: 175 mg/dL (28 Dec 2018 07:30)  POCT Blood Glucose.: 290 mg/dL (27 Dec 2018 22:31)  POCT Blood Glucose.: 83 mg/dL (27 Dec 2018 16:11)    12-24 [id number] 7.0  12-07 [id number] 6.4        RADIOLOGY & ADDITIONAL TESTS:  US of lower extremity to r/o DVT - ORDERED by me    Care Discussed with Consultants/Other Providers: Dr. Mendoza

## 2018-12-28 NOTE — PROGRESS NOTE ADULT - SUBJECTIVE AND OBJECTIVE BOX
HISTORY OF PRESENT ILLNESS:  63 year old F with PMH of asthma (on chronic steroids), DM2, HLD, obesity, hypothyroid, PE (on Coumadin, with IVC filter) who presented initially to [facility] and then transferred to [facility] on 12/7/18 with sudden-onset upper back pain between her shoulder blades for one hour associated with shortness of breath. Patient also noted LE weakness that started around same time as back pain. At [facility], initial CTA of the chest revealed probable type A dissection with intramural hematoma and active hemorrhage, so she was transferred urgently to [facility] for possible CT surgery. Upon arrival to [facility], patient was started on Labetalol gtt for blood pressure control; repeat CTA chest revealed Type B dissection with hemoperitoneum, so patient did not undergo surgical intervention. TTE with pericaridal effusion and tamponade physiology.  INR was reversed with FFP and vitamin K.  MRI L-spine revealed abnormal signal within the conus medullaris and distal thoracic cord, with expansion of the conus, suspicious for spinal infarction. Neurosurgery consulted, no surgical intervention recommended. Recommended by neurology to continue ASA 81 mg daily.  Hospital course significant for Afib on telemetry monitoring; s/p Labetalol and amiodarone load. Course notable for orthostasis on 12/17 while working with PT which improved after diuretics were discontinued and IV fluids.  Also with urinary retention due to neurogenic bladder with epps catheter placed 12/18. Deemed medically stable for acute rehab on 12/19/18.        TODAY'S SUBJECTIVE & REVIEW OF SYMPTOMS:   Patient seen and examined.  Pt c/o intermittent mild knee and left shoulder pain.   Pt is voiding better without dysuria although still requiring occasional straight cath.  +BM yesterday x 3-soft stools.  Pt notes slight increased BLE sensation and strength.  Denies SOB, dyspnea or cough.   Reports discomfort from TEDs    [ x  ] Constitutional WNL  [   ] Cardio WNL  [   ] Resp WNL  [   ] GI WNL  [   ] Heme WNL  [   ] Endo WNL  [ x  ] Skin WNL  [   ] MSK WNL  [   ] Neuro WNL  [ x  ] Cognitive WNL  [ x  ] Psych WNL        PHYSICAL EXAM  Vital Signs Last 24 Hrs  T(C): 36.6 (28 Dec 2018 09:06), Max: 36.8 (27 Dec 2018 22:00)  T(F): 97.8 (28 Dec 2018 09:06), Max: 98.2 (27 Dec 2018 22:00)  HR: 98 (28 Dec 2018 09:06) (78 - 98)  BP: 117/68 (28 Dec 2018 09:06) (117/68 - 126/80)  BP(mean): --  RR: 14 (28 Dec 2018 09:06) (14 - 14)  SpO2: 98% (28 Dec 2018 09:06) (96% - 98%)    Gen - NAD, Comfortable  	HEENT - NCAT, EOMI, MMM  	Neck - Supple, No limited ROM  	Pulm - CTAB, No wheeze, No rhonchi, No crackles  	Cardiovascular - RRR, S1S2, No murmurs  	Abdomen - Soft, NT/ND, +BS  	Extremities - No C/C/E, No calf tenderness  	Neuro-  	   Cognitive - AAOx3  	   Communication - Fluent, No dysarthria  	   Attention: Intact   	   Judgement: Good evidence of judgement  	   Cranial Nerves - CN 2-12 grossly intact  	   Motor -   	                  LEFT    UE - ShAB 4/5 (limited due to chronic RTC injury), EF 5/5, EE 5/5, WE 5/5,  5/5  	                  RIGHT UE - ShAB 4/5, EF 5/5, EE 5/5, WE 5/5,  5/5  	                  LEFT    LE - HF 4-/5, KE 4/5, DF 4/5, PF 4/5  	                  RIGHT LE - HF 3/5, KE 3/5, DF 3-/5, PF 2/5     	   Sensory - Impaired to LT BLEs  	   Reflexes -No primitive reflexive  	   Coordination - FTN intact  	Psychiatric - Mood stable, Affect WNL  Skin:  Intact    FUNCTIONAL PROGRESS:  Bed mobility: Max A  Transfers: Mod A x 2  Gait: Unable  ADLs: UE dress max A, LE dress mod A      RECENT LABS:                        10.6   11.9  )-----------( 242      ( 26 Dec 2018 08:07 )             33.6   12-26    139  |  104  |  24<H>  ----------------------------<  164<H>  4.6   |  26  |  0.75    Ca    10.1      26 Dec 2018 08:07      CAPILLARY BLOOD GLUCOSE      POCT Blood Glucose.: 106 mg/dL (28 Dec 2018 12:04)  POCT Blood Glucose.: 175 mg/dL (28 Dec 2018 07:30)  POCT Blood Glucose.: 290 mg/dL (27 Dec 2018 22:31)  POCT Blood Glucose.: 83 mg/dL (27 Dec 2018 16:11)              Assessment and Plan:   62 yo Female with functional deficits after Type B aortic dissection and distal thoracic/conus spinal infarction      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  Lidoderm patches left shoulder  Ordered Voltaren gel for prn use as per pt request  - repeat CT scan in 3-4 months as per Dr. Kumar CT surgery     asthma-stable  - prednisone, advair.  As per hospitalist as pt has asthma and not COPD, d/c duonebs and Spiriva which may have anticholinergic s/e and instead Rx albuterol inh      atrial fibrillation   - amiodarone for rate control    DM2 on insulin-Uncontrolled  - likely due to prednisone and immobility  - lantus-increased to 33U, Cont humalog 16u TID AC, SSI, CC diet   - appreciate endo consult-Suggest to continue Basal/ Bolus insulin now & for discharge.   advised to stop starlix and januvia after discharge.    hypothyroid  - synthroid     neurogenic bowel and bladder  - epps catheter, future TOV-D/Cd 12/21.  PVRs 350 with straight cath 550. Decrease bladder scans from q6 to q8hrs and SC PRN.  Add Flomax 0.4mg  - Bowel/bladder program: Mini fleet enema q AM-changed to prn.  Toileting schedule- bedside to toilet/commode for voiding     acute blood loss anemia-H/H stable  -monitor cbc    leukocytosis-likely due to prednisone-improved  -monitor cbc  -check ua-+  Urine culture >100,000cfu/ml Ecoli-sensitive to Macrobid-Rx 5 day supply + Florastor  -cxr with reduced size of pleural effusions    Hoarse voice-improved  -Added magic mouth wash x 5days.    -D/C cepacol prn    Weight bearing status: WBAT     Precautions / PROPHYLAXIS:   - Falls, Cardiac, Sternal, Spinal   - Lungs: Aspiration, Incentive Spirometer   - Pressure injury/Skin: Turn Q2hrs while in bed   - DVT: SQ Heparin, SCDs, TEDs     Diet:  Regular + Thins CCHO

## 2018-12-28 NOTE — CONSULT NOTE ADULT - SUBJECTIVE AND OBJECTIVE BOX
consult dictated. discused with Pt.  Suggest to continue Basal/ Bolus insulin now & for discharge.   advised to stop starlix and januvia after discharge.

## 2018-12-29 PROCEDURE — 99233 SBSQ HOSP IP/OBS HIGH 50: CPT

## 2018-12-29 PROCEDURE — 93970 EXTREMITY STUDY: CPT | Mod: 26

## 2018-12-29 RX ADMIN — Medication 250 MILLIGRAM(S): at 05:35

## 2018-12-29 RX ADMIN — OXYCODONE HYDROCHLORIDE 5 MILLIGRAM(S): 5 TABLET ORAL at 16:35

## 2018-12-29 RX ADMIN — Medication 8 UNIT(S): at 16:55

## 2018-12-29 RX ADMIN — FLUTICASONE PROPIONATE AND SALMETEROL 1 DOSE(S): 50; 250 POWDER ORAL; RESPIRATORY (INHALATION) at 08:20

## 2018-12-29 RX ADMIN — Medication 2: at 11:41

## 2018-12-29 RX ADMIN — GABAPENTIN 100 MILLIGRAM(S): 400 CAPSULE ORAL at 17:40

## 2018-12-29 RX ADMIN — PANTOPRAZOLE SODIUM 40 MILLIGRAM(S): 20 TABLET, DELAYED RELEASE ORAL at 06:46

## 2018-12-29 RX ADMIN — AMIODARONE HYDROCHLORIDE 200 MILLIGRAM(S): 400 TABLET ORAL at 05:35

## 2018-12-29 RX ADMIN — HEPARIN SODIUM 5000 UNIT(S): 5000 INJECTION INTRAVENOUS; SUBCUTANEOUS at 21:46

## 2018-12-29 RX ADMIN — OXYCODONE HYDROCHLORIDE 5 MILLIGRAM(S): 5 TABLET ORAL at 21:22

## 2018-12-29 RX ADMIN — Medication 7.5 MILLIGRAM(S): at 05:34

## 2018-12-29 RX ADMIN — GABAPENTIN 300 MILLIGRAM(S): 400 CAPSULE ORAL at 21:47

## 2018-12-29 RX ADMIN — Medication 100 MILLIGRAM(S): at 08:33

## 2018-12-29 RX ADMIN — OXYCODONE HYDROCHLORIDE 5 MILLIGRAM(S): 5 TABLET ORAL at 20:37

## 2018-12-29 RX ADMIN — HEPARIN SODIUM 5000 UNIT(S): 5000 INJECTION INTRAVENOUS; SUBCUTANEOUS at 05:33

## 2018-12-29 RX ADMIN — Medication 81 MILLIGRAM(S): at 12:09

## 2018-12-29 RX ADMIN — Medication 250 MILLIGRAM(S): at 17:40

## 2018-12-29 RX ADMIN — Medication 8 UNIT(S): at 11:41

## 2018-12-29 RX ADMIN — OXYCODONE HYDROCHLORIDE 5 MILLIGRAM(S): 5 TABLET ORAL at 16:05

## 2018-12-29 RX ADMIN — GABAPENTIN 100 MILLIGRAM(S): 400 CAPSULE ORAL at 05:35

## 2018-12-29 RX ADMIN — Medication 8 UNIT(S): at 07:29

## 2018-12-29 RX ADMIN — Medication 137 MICROGRAM(S): at 05:34

## 2018-12-29 RX ADMIN — FLUTICASONE PROPIONATE AND SALMETEROL 1 DOSE(S): 50; 250 POWDER ORAL; RESPIRATORY (INHALATION) at 20:52

## 2018-12-29 RX ADMIN — Medication 7.5 MILLIGRAM(S): at 17:40

## 2018-12-29 RX ADMIN — TAMSULOSIN HYDROCHLORIDE 0.4 MILLIGRAM(S): 0.4 CAPSULE ORAL at 21:47

## 2018-12-29 RX ADMIN — INSULIN GLARGINE 33 UNIT(S): 100 INJECTION, SOLUTION SUBCUTANEOUS at 22:12

## 2018-12-29 RX ADMIN — Medication 1 TABLET(S): at 12:09

## 2018-12-29 RX ADMIN — Medication 2: at 16:56

## 2018-12-29 RX ADMIN — HEPARIN SODIUM 5000 UNIT(S): 5000 INJECTION INTRAVENOUS; SUBCUTANEOUS at 13:12

## 2018-12-29 RX ADMIN — DICLOFENAC SODIUM 2 GRAM(S): 30 GEL TOPICAL at 08:35

## 2018-12-29 NOTE — CHART NOTE - NSCHARTNOTEFT_GEN_A_CORE
Contacted by Dr. Slaughter at Rehab regarding resuming anticoagulation on pt Tk Bryant for B/L LE DVT on U/S. Pt was on Coumadin prior for hx of PE/DVT but held recently due to Type B dissection with hemopericardium. Case discussed with Dr. Kumar, would be ok to resume Coumadin for pt at this time and would recommend repeating a TTE/CT this week to evaluate for pericardial effusion.

## 2018-12-29 NOTE — PROGRESS NOTE ADULT - ASSESSMENT
62 yo Female admitted to acute rehab s/p Type B aortic dissection and distal thoracic/conus spinal cord infarction      1- Acute thoracic / conus Spinal cord infarction s/p  Type B Thoracic aortic dissection   - c/w ASA   - BP controlled  - c/w PT/OT as tolerated.     2 -Neurogenic bowel/bladder secondary to #1 above  -ISC prn  -bladder/bowel training exercises  -Laxatives prn, especially while on narcotics    3- UTI (E.coli)  completed abx treatment    4-Bilateral calf tenderness  -U/S to r/o DVT. ultrasound still pending     5-Paroxysmal Atrial fibrillation  c/w Amiodarone.   Not on AC due to recent aortic dissection with bleed.    6- Asthma - asymptomatic  c/w prednisone 7.5 mg Q12h    c/w Duoneb/Advair, Montelukast     7- DM type 2 - HBA1c 7.0 (dec 24), acceptable fingersticks, on insulin  c/w  Lantus   33 units QHS , c/w  Humalog 16 U TID-AC ,   Endocrine following    8-Hypothyroidism - stable.   c/w Synthroid    9-Hyperparathyroidism   endocrine follow-up    10-Obesity  -Outpatient exercise regimen as tolerated    11-DVTPx : Heparin

## 2018-12-29 NOTE — CHART NOTE - NSCHARTNOTEFT_GEN_A_CORE
Called by Radiologist as patient found to have b/l DVT    Patient seen and states that she has had lower extremity swelling and pain chronically without change. She states that she has history of b/l DVT but does not recall when. She was on coumadin with IVC filter but coumadin stopped with aortic dissection with hemoperitoneum. Patient has no complaints at this time and denies sob, palpitations, chest pain, n/v/ weakness or any other issues. She does not recall the name of her vascular surgeon.    T(C): 36.7 (12-29-18 @ 09:14), Max: 36.7 (12-29-18 @ 09:14)  HR: 83 (12-29-18 @ 09:14) (76 - 83)  BP: 122/83 (12-29-18 @ 09:14) (122/83 - 131/82)  RR: 12 (12-29-18 @ 09:14) (12 - 14)  SpO2: 99% (12-29-18 @ 09:14) (97% - 99%)  Wt(kg): --    Physical :  Gen- NAD, ncat  Cardio - s+1,s+2, rrr, no murmur  Lung - cta b/l, no wheeze, no rhonchi, no rales   Abdomen- +BS, NT/ND, no guarding, no rebound, no masses  Ext- b/l LE edema with ace wraps in place    LABS:    < from: US Duplex Venous Lower Ext Complete, Bilateral (12.29.18 @ 11:45) >    EXAM:  US DPLX LWR EXT VEINS COMPL BI      PROCEDURE DATE:  12/29/2018        INTERPRETATION:      REASON FOR EXAM:  63 year old bi-lateral calf tenderness and leg swelling    r/o DVT.         TECHNIQUE:   Gray-scale imaging and Doppler sono graphicevaluation,   including duplex spectral analysis and qualitative color flow sonography   of bilateral lower extremity venous system was performed.        COMPARISON:   None.  FINDINGS:  There is diminished flow , incomplete compression of the bilateral common   femoral, superficial femoral and popliteal veins consistent with   partially occlusive bilateral lower extremity deep venous thrombus. There   is a fluid collection the right popliteal fossa likely Baker's cyst   measuring 6.2 x 5 x 2 cm.  IMPRESSION:  extensive bilateral lower extremity deep venous thrombosis.    < end of copied text >    Assessment/Plan  63 year old F with PMH of asthma (on chronic steroids), DM2, HLD, obesity, hypothyroid, PE (was on Coumadin, with IVC filter)  with functional deficits after Type B aortic dissection and distal thoracic/conus spinal infarction   -patient informed of ultrasound findings along with her mother present at bedside.  -discussed with hospitalist, given Aortic dissection/hemoperitoneum, patient is at increased risk of bleed with therapeutic dose medication. SHe already has IVC filter in place. Will continue heparin sub q 8 since admission Called by Radiologist as patient found to have b/l DVT    Patient seen and states that she has had lower extremity swelling and pain chronically without change. She states that she has history of b/l DVT but does not recall when. She was on coumadin with IVC filter but coumadin stopped with aortic dissection with hemoperitoneum. Patient has no complaints at this time and denies sob, palpitations, chest pain, n/v/ weakness or any other issues. She does not recall the name of her vascular surgeon.    T(C): 36.7 (12-29-18 @ 09:14), Max: 36.7 (12-29-18 @ 09:14)  HR: 83 (12-29-18 @ 09:14) (76 - 83)  BP: 122/83 (12-29-18 @ 09:14) (122/83 - 131/82)  RR: 12 (12-29-18 @ 09:14) (12 - 14)  SpO2: 99% (12-29-18 @ 09:14) (97% - 99%)  Wt(kg): --    Physical :  Gen- NAD, ncat  Cardio - s+1,s+2, rrr, no murmur  Lung - cta b/l, no wheeze, no rhonchi, no rales   Abdomen- +BS, NT/ND, no guarding, no rebound, no masses  Ext- b/l LE edema with ace wraps in place    LABS:    < from: US Duplex Venous Lower Ext Complete, Bilateral (12.29.18 @ 11:45) >    EXAM:  US DPLX LWR EXT VEINS COMPL BI      PROCEDURE DATE:  12/29/2018        INTERPRETATION:      REASON FOR EXAM:  63 year old bi-lateral calf tenderness and leg swelling    r/o DVT.         TECHNIQUE:   Gray-scale imaging and Doppler sono graphicevaluation,   including duplex spectral analysis and qualitative color flow sonography   of bilateral lower extremity venous system was performed.        COMPARISON:   None.  FINDINGS:  There is diminished flow , incomplete compression of the bilateral common   femoral, superficial femoral and popliteal veins consistent with   partially occlusive bilateral lower extremity deep venous thrombus. There   is a fluid collection the right popliteal fossa likely Baker's cyst   measuring 6.2 x 5 x 2 cm.  IMPRESSION:  extensive bilateral lower extremity deep venous thrombosis.    < end of copied text >    Assessment/Plan  63 year old F with PMH of asthma (on chronic steroids), DM2, HLD, obesity, hypothyroid, PE (was on Coumadin, with IVC filter)  with functional deficits after Type B aortic dissection and distal thoracic/conus spinal infarction   -patient informed of ultrasound findings along with her mother present at bedside Patient currently clinically stable  -discussed with hospitalist, given Aortic dissection/hemoperitoneum, patient is at increased risk of bleed with therapeutic dose medication. SHe already has IVC filter in place. Will continue heparin sub q 8 since admission for now. D/C SCD.  -vascular consulted. D/w patient CT team at [facility].

## 2018-12-29 NOTE — PROGRESS NOTE ADULT - SUBJECTIVE AND OBJECTIVE BOX
No overnight events.  Patient able to void with about 60 cc residual.   Had a BM also yesterday.  Reports overall improvement in her back and shoulder pain.  Continues to have BLE swelling. Does not want TEDS as they hurt her and dont fit well.  Will use ACE as alternate.     REVIEW OF SYSTEMS  Constitutional - No fever,  No fatigue  HEENT - No vertigo, No neck pain  Neurological - No headaches, +loss of strength  Musculoskeletal - +joint pain, +joint swelling, +muscle pain    VITALS  T(C): 36.7 (12-29-18 @ 09:14), Max: 36.7 (12-29-18 @ 09:14)  HR: 83 (12-29-18 @ 09:14) (76 - 83)  BP: 122/83 (12-29-18 @ 09:14) (122/83 - 131/82)  RR: 12 (12-29-18 @ 09:14) (12 - 14)  SpO2: 99% (12-29-18 @ 09:14) (97% - 99%)  Wt(kg): --       MEDICATIONS   acetaminophen   Tablet .. 650 milliGRAM(s) every 6 hours PRN  ALBUTerol    90 MICROgram(s) HFA Inhaler 1 Puff(s) every 4 hours  ALBUTerol    90 MICROgram(s) HFA Inhaler 1 Puff(s) every 4 hours  amiodarone    Tablet 200 milliGRAM(s) daily  aspirin enteric coated 81 milliGRAM(s) daily  dextrose 40% Gel 15 Gram(s) once PRN  dextrose 5%. 1000 milliLiter(s) <Continuous>  dextrose 50% Injectable 12.5 Gram(s) once  dextrose 50% Injectable 25 Gram(s) once  dextrose 50% Injectable 25 Gram(s) once  diclofenac sodium 1% Gel 2 Gram(s) three times a day PRN  ergocalciferol 72731 Unit(s) every week  fluticasone propionate/ salmeterol 500-50 MICROgram(s) Diskus 1 Dose(s) two times a day  gabapentin 300 milliGRAM(s) at bedtime  gabapentin 100 milliGRAM(s) two times a day  glucagon  Injectable 1 milliGRAM(s) once PRN  heparin  Injectable 5000 Unit(s) every 8 hours  insulin glargine Injectable (LANTUS) 33 Unit(s) at bedtime  insulin lispro (HumaLOG) corrective regimen sliding scale   three times a day before meals  insulin lispro (HumaLOG) corrective regimen sliding scale   at bedtime  insulin lispro Injectable (HumaLOG) 8 Unit(s) three times a day before meals  levothyroxine 137 MICROGram(s) daily  lidocaine   Patch 2 Patch <User Schedule>  multivitamin 1 Tablet(s) daily  oxyCODONE    IR 5 milliGRAM(s) every 4 hours PRN  oxyCODONE    IR 10 milliGRAM(s) every 4 hours PRN  pantoprazole    Tablet 40 milliGRAM(s) before breakfast  predniSONE   Tablet 7.5 milliGRAM(s) every 12 hours  saccharomyces boulardii 250 milliGRAM(s) two times a day  saline laxative (FLEET) Rectal Enema 0.5 Enema <User Schedule> PRN  senna 2 Tablet(s) at bedtime PRN  tamsulosin 0.4 milliGRAM(s) at bedtime      RECENT LABS/IMAGING              POCT Blood Glucose.: 123 mg/dL (12-29-18 @ 07:27)  POCT Blood Glucose.: 212 mg/dL (12-28-18 @ 21:05)  POCT Blood Glucose.: 132 mg/dL (12-28-18 @ 16:25)  POCT Blood Glucose.: 106 mg/dL (12-28-18 @ 12:04)    ---------	  PHYSICAL EXAM  Constitutional - NAD, Comfortable  Pulm - Breathing comfortably, No wheezing  Abd - Soft, NTND  Extremities - BLE edema  Neurologic Exam -                    Cognitive - Awake, Alert     Communication - Fluent     Motor - BLE weakness     Sensory - Impaired in BLE to LT  Psychiatric - Mood WNL, Affect WNL    ASSESSMENT/PLAN  63y Female with functional deficits after Type B aortic dissection and distal thoracic/conus spinal infarction   CAD - ASA  DM2 - Humalog, Lantus  Pain - Tylenol PRN, Lidoderm, Neurontin, Oxycodone  Neurogenic Bowel - Protonix, Senna  Neurogenic Bladder - Bladder Scan, PVR  DVT PPX - Heparin   	  Continue 3hrs a day of comprehensive rehab program.

## 2018-12-29 NOTE — PROGRESS NOTE ADULT - SUBJECTIVE AND OBJECTIVE BOX
Patient is a 63y old  Female who presents with a chief complaint of functional deficits after spinal cord infarction (29 Dec 2018 09:27)      Patient seen and examined at bedside.    ALLERGIES:  adhesives (Unknown)  Ceftin (Rash)  erythromycin (Rash)  fish (Unknown)  Levaquin (Rash)    MEDICATIONS:  diclofenac sodium 1% Gel 2 Gram(s) Topical three times a day PRN  ergocalciferol 71561 Unit(s) Oral every week  fluticasone propionate/ salmeterol 500-50 MICROgram(s) Diskus 1 Dose(s) Inhalation two times a day  gabapentin 100 milliGRAM(s) Oral two times a day  insulin glargine Injectable (LANTUS) 33 Unit(s) SubCutaneous at bedtime  insulin lispro Injectable (HumaLOG) 8 Unit(s) SubCutaneous three times a day before meals  lidocaine   Patch 2 Patch Transdermal <User Schedule>  oxyCODONE    IR 5 milliGRAM(s) Oral every 4 hours PRN  oxyCODONE    IR 10 milliGRAM(s) Oral every 4 hours PRN  saccharomyces boulardii 250 milliGRAM(s) Oral two times a day  saline laxative (FLEET) Rectal Enema 0.5 Enema Rectal <User Schedule> PRN  tamsulosin 0.4 milliGRAM(s) Oral at bedtime    Vital Signs Last 24 Hrs  T(F): 98.1 (29 Dec 2018 09:14), Max: 98.1 (29 Dec 2018 09:14)  HR: 83 (29 Dec 2018 09:14) (76 - 83)  BP: 122/83 (29 Dec 2018 09:14) (122/83 - 131/82)  RR: 12 (29 Dec 2018 09:14) (12 - 14)  SpO2: 99% (29 Dec 2018 09:14) (97% - 99%)  I&O's Summary      PHYSICAL EXAM:  General: NAD, A/O x 3  ENT: MMM  Neck: Supple, No JVD  Lungs: Clear to auscultation bilaterally  Cardio: RRR, S1/S2, No murmurs  Abdomen: Soft, Nontender, Nondistended; Bowel sounds present  Extremities: chronic venous changes + tenderness bl legs no warmth no erythema     LABS:                            CAPILLARY BLOOD GLUCOSE      POCT Blood Glucose.: 123 mg/dL (29 Dec 2018 07:27)  POCT Blood Glucose.: 212 mg/dL (28 Dec 2018 21:05)  POCT Blood Glucose.: 132 mg/dL (28 Dec 2018 16:25)  POCT Blood Glucose.: 106 mg/dL (28 Dec 2018 12:04)    12-24 [id number] 7.0  12-07 [id number] 6.4          RADIOLOGY & ADDITIONAL TESTS:    Care Discussed with Consultants/Other Providers:

## 2018-12-30 PROCEDURE — 99233 SBSQ HOSP IP/OBS HIGH 50: CPT

## 2018-12-30 RX ORDER — FUROSEMIDE 40 MG
10 TABLET ORAL DAILY
Qty: 0 | Refills: 0 | Status: DISCONTINUED | OUTPATIENT
Start: 2018-12-30 | End: 2019-01-03

## 2018-12-30 RX ADMIN — HEPARIN SODIUM 5000 UNIT(S): 5000 INJECTION INTRAVENOUS; SUBCUTANEOUS at 05:15

## 2018-12-30 RX ADMIN — OXYCODONE HYDROCHLORIDE 5 MILLIGRAM(S): 5 TABLET ORAL at 21:57

## 2018-12-30 RX ADMIN — Medication 250 MILLIGRAM(S): at 17:22

## 2018-12-30 RX ADMIN — Medication 8 UNIT(S): at 11:40

## 2018-12-30 RX ADMIN — Medication 8 UNIT(S): at 16:53

## 2018-12-30 RX ADMIN — Medication 4: at 11:40

## 2018-12-30 RX ADMIN — GABAPENTIN 300 MILLIGRAM(S): 400 CAPSULE ORAL at 20:12

## 2018-12-30 RX ADMIN — GABAPENTIN 100 MILLIGRAM(S): 400 CAPSULE ORAL at 05:16

## 2018-12-30 RX ADMIN — AMIODARONE HYDROCHLORIDE 200 MILLIGRAM(S): 400 TABLET ORAL at 05:17

## 2018-12-30 RX ADMIN — OXYCODONE HYDROCHLORIDE 5 MILLIGRAM(S): 5 TABLET ORAL at 04:17

## 2018-12-30 RX ADMIN — INSULIN GLARGINE 33 UNIT(S): 100 INJECTION, SOLUTION SUBCUTANEOUS at 21:19

## 2018-12-30 RX ADMIN — OXYCODONE HYDROCHLORIDE 5 MILLIGRAM(S): 5 TABLET ORAL at 20:13

## 2018-12-30 RX ADMIN — Medication 8 UNIT(S): at 07:49

## 2018-12-30 RX ADMIN — TAMSULOSIN HYDROCHLORIDE 0.4 MILLIGRAM(S): 0.4 CAPSULE ORAL at 20:12

## 2018-12-30 RX ADMIN — Medication 137 MICROGRAM(S): at 05:16

## 2018-12-30 RX ADMIN — HEPARIN SODIUM 5000 UNIT(S): 5000 INJECTION INTRAVENOUS; SUBCUTANEOUS at 20:12

## 2018-12-30 RX ADMIN — FLUTICASONE PROPIONATE AND SALMETEROL 1 DOSE(S): 50; 250 POWDER ORAL; RESPIRATORY (INHALATION) at 08:26

## 2018-12-30 RX ADMIN — PANTOPRAZOLE SODIUM 40 MILLIGRAM(S): 20 TABLET, DELAYED RELEASE ORAL at 05:16

## 2018-12-30 RX ADMIN — HEPARIN SODIUM 5000 UNIT(S): 5000 INJECTION INTRAVENOUS; SUBCUTANEOUS at 14:09

## 2018-12-30 RX ADMIN — Medication 7.5 MILLIGRAM(S): at 17:22

## 2018-12-30 RX ADMIN — GABAPENTIN 100 MILLIGRAM(S): 400 CAPSULE ORAL at 17:22

## 2018-12-30 RX ADMIN — Medication 7.5 MILLIGRAM(S): at 05:17

## 2018-12-30 RX ADMIN — Medication 250 MILLIGRAM(S): at 05:16

## 2018-12-30 RX ADMIN — Medication 10 MILLIGRAM(S): at 12:28

## 2018-12-30 RX ADMIN — Medication 1 TABLET(S): at 12:28

## 2018-12-30 RX ADMIN — FLUTICASONE PROPIONATE AND SALMETEROL 1 DOSE(S): 50; 250 POWDER ORAL; RESPIRATORY (INHALATION) at 21:02

## 2018-12-30 RX ADMIN — Medication 2: at 07:49

## 2018-12-30 RX ADMIN — Medication 81 MILLIGRAM(S): at 12:28

## 2018-12-30 NOTE — PROGRESS NOTE ADULT - SUBJECTIVE AND OBJECTIVE BOX
Patient is a 63y old  Female who presents with a chief complaint of functional deficits after spinal cord infarction (30 Dec 2018 09:25)      Patient seen and examined at bedside.    ALLERGIES:  adhesives (Unknown)  Ceftin (Rash)  erythromycin (Rash)  fish (Unknown)  Levaquin (Rash)    MEDICATIONS:  diclofenac sodium 1% Gel 2 Gram(s) Topical three times a day PRN  ergocalciferol 59667 Unit(s) Oral every week  fluticasone propionate/ salmeterol 500-50 MICROgram(s) Diskus 1 Dose(s) Inhalation two times a day  furosemide    Tablet 10 milliGRAM(s) Oral daily  gabapentin 100 milliGRAM(s) Oral two times a day  insulin glargine Injectable (LANTUS) 33 Unit(s) SubCutaneous at bedtime  insulin lispro Injectable (HumaLOG) 8 Unit(s) SubCutaneous three times a day before meals  lidocaine   Patch 2 Patch Transdermal <User Schedule>  oxyCODONE    IR 5 milliGRAM(s) Oral every 4 hours PRN  oxyCODONE    IR 10 milliGRAM(s) Oral every 4 hours PRN  saccharomyces boulardii 250 milliGRAM(s) Oral two times a day  saline laxative (FLEET) Rectal Enema 0.5 Enema Rectal <User Schedule> PRN  tamsulosin 0.4 milliGRAM(s) Oral at bedtime    Vital Signs Last 24 Hrs  T(F): 98.1 (30 Dec 2018 08:56), Max: 98.5 (29 Dec 2018 20:27)  HR: 72 (30 Dec 2018 08:56) (72 - 83)  BP: 119/71 (30 Dec 2018 08:56) (119/71 - 120/69)  RR: 14 (30 Dec 2018 08:56) (14 - 16)  SpO2: 97% (30 Dec 2018 08:56) (97% - 100%)  I&O's Summary    29 Dec 2018 07:01  -  30 Dec 2018 07:00  --------------------------------------------------------  IN: 720 mL / OUT: 120 mL / NET: 600 mL        PHYSICAL EXAM:  General: NAD,  ENT: MMM  Neck: Supple, No JVD  Lungs: Clear to auscultation bilaterally  Cardio: RRR, S1/S2, No murmurs  Abdomen: Soft, Nontender, Nondistended; Bowel sounds present  Extremities: No cyanosis, No edema    LABS:                            CAPILLARY BLOOD GLUCOSE      POCT Blood Glucose.: 153 mg/dL (30 Dec 2018 07:33)  POCT Blood Glucose.: 127 mg/dL (29 Dec 2018 21:19)  POCT Blood Glucose.: 165 mg/dL (29 Dec 2018 16:54)  POCT Blood Glucose.: 193 mg/dL (29 Dec 2018 11:40)    12-24 [id number] 7.0  12-07 MgfrqxcuogJ9J 6.4          RADIOLOGY & ADDITIONAL TESTS:    Care Discussed with Consultants/Other Providers:

## 2018-12-30 NOTE — PROGRESS NOTE ADULT - ASSESSMENT
62 yo Female admitted to acute rehab s/p Type B aortic dissection and distal thoracic/conus spinal cord infarction      1- Acute thoracic / conus Spinal cord infarction s/p  Type B Thoracic aortic dissection   - c/w ASA   - BP controlled  - c/w PT/OT as tolerated.     2 -Neurogenic bowel/bladder secondary to #1 above  -ISC prn  -bladder/bowel training exercises  -Laxatives prn, especially while on narcotics    3- UTI (E.coli)  completed abx treatment    4- bilateral LE dvt patient cannot be a/c due to recent type b dissection and hemopericardium. Primary team s/w ct surgery who recommeded repeating ct and tte. as per patient she has two IVC filters in place at this time. f/u vascular surgery     5-Paroxysmal Atrial fibrillation  c/w Amiodarone.   Not on AC due to recent aortic dissection with bleed.    6- Asthma - asymptomatic  c/w prednisone 7.5 mg Q12h    c/w Duoneb/Advair, Montelukast     7- DM type 2 - HBA1c 7.0 (dec 24), acceptable fingersticks, on insulin  c/w  Lantus   33 units QHS , c/w  Humalog 16 U TID-AC ,   Endocrine following    8-Hypothyroidism - stable.   c/w Synthroid    9-Hyperparathyroidism   endocrine follow-up    10-Obesity  -Outpatient exercise regimen as tolerated    11-DVTPx : Heparin

## 2018-12-30 NOTE — PROGRESS NOTE ADULT - SUBJECTIVE AND OBJECTIVE BOX
Patient's BLE dopplers showed:  There is diminished flow , incomplete compression of the bilateral common   femoral, superficial femoral and popliteal veins consistent with   partially occlusive bilateral lower extremity deep venous thrombus. There   is a fluid collection the right popliteal fossa likely Baker's cyst   measuring 6.2 x 5 x 2 cm.    Patient reports that 15 and 20 years ago, she was diagnosed with BLE DVTs and a PE.  She is s/p IVC. Patient is unreliable for history of above events, as it changes frequently.     Patient does state that she was taking Lasix 20mg every few days for her swelling and was also taking Coumadin for her clots.    Now awaiting discussion on further management given dissection. Vascular consulted.     REVIEW OF SYSTEMS  Constitutional - No fever,  No fatigue  Neurological - No headaches, +memory loss, +loss of strength, No tremors  Musculoskeletal - +joint pain, +joint swelling, No muscle pain  Psychiatric - No depression, No anxiety    VITALS  T(C): 36.7 (12-30-18 @ 08:56), Max: 36.9 (12-29-18 @ 20:27)  HR: 72 (12-30-18 @ 08:56) (72 - 83)  BP: 119/71 (12-30-18 @ 08:56) (119/71 - 120/69)  RR: 14 (12-30-18 @ 08:56) (14 - 16)  SpO2: 97% (12-30-18 @ 08:56) (97% - 100%)  Wt(kg): --    153 mg/dL (12-30-18 @ 07:33)  127 mg/dL (12-29-18 @ 21:19)  165 mg/dL (12-29-18 @ 16:54)  193 mg/dL (12-29-18 @ 11:40)      MEDICATIONS   acetaminophen   Tablet .. 650 milliGRAM(s) every 6 hours PRN  ALBUTerol    90 MICROgram(s) HFA Inhaler 1 Puff(s) every 4 hours  ALBUTerol    90 MICROgram(s) HFA Inhaler 1 Puff(s) every 4 hours  amiodarone    Tablet 200 milliGRAM(s) daily  aspirin enteric coated 81 milliGRAM(s) daily  dextrose 40% Gel 15 Gram(s) once PRN  dextrose 5%. 1000 milliLiter(s) <Continuous>  dextrose 50% Injectable 12.5 Gram(s) once  dextrose 50% Injectable 25 Gram(s) once  dextrose 50% Injectable 25 Gram(s) once  diclofenac sodium 1% Gel 2 Gram(s) three times a day PRN  ergocalciferol 36001 Unit(s) every week  fluticasone propionate/ salmeterol 500-50 MICROgram(s) Diskus 1 Dose(s) two times a day  gabapentin 300 milliGRAM(s) at bedtime  gabapentin 100 milliGRAM(s) two times a day  glucagon  Injectable 1 milliGRAM(s) once PRN  heparin  Injectable 5000 Unit(s) every 8 hours  insulin glargine Injectable (LANTUS) 33 Unit(s) at bedtime  insulin lispro (HumaLOG) corrective regimen sliding scale   three times a day before meals  insulin lispro (HumaLOG) corrective regimen sliding scale   at bedtime  insulin lispro Injectable (HumaLOG) 8 Unit(s) three times a day before meals  levothyroxine 137 MICROGram(s) daily  lidocaine   Patch 2 Patch <User Schedule>  multivitamin 1 Tablet(s) daily  oxyCODONE    IR 5 milliGRAM(s) every 4 hours PRN  oxyCODONE    IR 10 milliGRAM(s) every 4 hours PRN  pantoprazole    Tablet 40 milliGRAM(s) before breakfast  predniSONE   Tablet 7.5 milliGRAM(s) every 12 hours  saccharomyces boulardii 250 milliGRAM(s) two times a day  saline laxative (FLEET) Rectal Enema 0.5 Enema <User Schedule> PRN  senna 2 Tablet(s) at bedtime PRN  tamsulosin 0.4 milliGRAM(s) at bedtime      RECENT LABS/IMAGING  As above    ---------	  PHYSICAL EXAM  Constitutional - NAD, Comfortable  Pulm - Breathing comfortably, No wheezing  Abd - Soft, NTND  Extremities - BLE edema  Neurologic Exam -                    Cognitive - Awake, Alert x3     Communication - Fluent     Motor - BLE weakness     Sensory - Impaired in BLE to LT  Psychiatric - Mood WNL, Affect WNL	    ASSESSMENT/PLAN  63y Female with functional deficits after Type B aortic dissection and distal thoracic/conus spinal infarction   CAD - ASA  DM2 - Humalog, Lantus  Pain - Tylenol PRN, Lidoderm, Neurontin, Oxycodone  Neurogenic Bowel - Protonix, Senna  Neurogenic Bladder - Bladder Scan, PVR  +DVT - Heparin, Plan for Coumadin  	  Continue 3hrs a day of comprehensive rehab program.

## 2018-12-31 LAB
ANION GAP SERPL CALC-SCNC: 5 MMOL/L — SIGNIFICANT CHANGE UP (ref 5–17)
BUN SERPL-MCNC: 24 MG/DL — HIGH (ref 7–23)
CALCIUM SERPL-MCNC: 10 MG/DL — SIGNIFICANT CHANGE UP (ref 8.4–10.5)
CHLORIDE SERPL-SCNC: 108 MMOL/L — SIGNIFICANT CHANGE UP (ref 96–108)
CO2 SERPL-SCNC: 30 MMOL/L — SIGNIFICANT CHANGE UP (ref 22–31)
CREAT SERPL-MCNC: 0.82 MG/DL — SIGNIFICANT CHANGE UP (ref 0.5–1.3)
GLUCOSE SERPL-MCNC: 102 MG/DL — HIGH (ref 70–99)
HCT VFR BLD CALC: 31.2 % — LOW (ref 34.5–45)
HGB BLD-MCNC: 9.5 G/DL — LOW (ref 11.5–15.5)
MCHC RBC-ENTMCNC: 28.4 PG — SIGNIFICANT CHANGE UP (ref 27–34)
MCHC RBC-ENTMCNC: 30.6 GM/DL — LOW (ref 32–36)
MCV RBC AUTO: 92.9 FL — SIGNIFICANT CHANGE UP (ref 80–100)
PLATELET # BLD AUTO: 206 K/UL — SIGNIFICANT CHANGE UP (ref 150–400)
POTASSIUM SERPL-MCNC: 4.5 MMOL/L — SIGNIFICANT CHANGE UP (ref 3.5–5.3)
POTASSIUM SERPL-SCNC: 4.5 MMOL/L — SIGNIFICANT CHANGE UP (ref 3.5–5.3)
RBC # BLD: 3.36 M/UL — LOW (ref 3.8–5.2)
RBC # FLD: 16.5 % — HIGH (ref 10.3–14.5)
SODIUM SERPL-SCNC: 143 MMOL/L — SIGNIFICANT CHANGE UP (ref 135–145)
WBC # BLD: 10 K/UL — SIGNIFICANT CHANGE UP (ref 3.8–10.5)
WBC # FLD AUTO: 10 K/UL — SIGNIFICANT CHANGE UP (ref 3.8–10.5)

## 2018-12-31 PROCEDURE — 99233 SBSQ HOSP IP/OBS HIGH 50: CPT

## 2018-12-31 RX ORDER — OXYCODONE HYDROCHLORIDE 5 MG/1
5 TABLET ORAL EVERY 4 HOURS
Qty: 0 | Refills: 0 | Status: DISCONTINUED | OUTPATIENT
Start: 2018-12-31 | End: 2019-01-03

## 2018-12-31 RX ORDER — OXYCODONE HYDROCHLORIDE 5 MG/1
10 TABLET ORAL EVERY 4 HOURS
Qty: 0 | Refills: 0 | Status: DISCONTINUED | OUTPATIENT
Start: 2018-12-31 | End: 2019-01-03

## 2018-12-31 RX ORDER — LANOLIN ALCOHOL/MO/W.PET/CERES
3 CREAM (GRAM) TOPICAL
Qty: 0 | Refills: 0 | Status: DISCONTINUED | OUTPATIENT
Start: 2018-12-31 | End: 2019-01-03

## 2018-12-31 RX ADMIN — Medication 250 MILLIGRAM(S): at 17:59

## 2018-12-31 RX ADMIN — HEPARIN SODIUM 5000 UNIT(S): 5000 INJECTION INTRAVENOUS; SUBCUTANEOUS at 14:49

## 2018-12-31 RX ADMIN — PANTOPRAZOLE SODIUM 40 MILLIGRAM(S): 20 TABLET, DELAYED RELEASE ORAL at 05:11

## 2018-12-31 RX ADMIN — Medication 1 TABLET(S): at 11:49

## 2018-12-31 RX ADMIN — OXYCODONE HYDROCHLORIDE 5 MILLIGRAM(S): 5 TABLET ORAL at 02:36

## 2018-12-31 RX ADMIN — AMIODARONE HYDROCHLORIDE 200 MILLIGRAM(S): 400 TABLET ORAL at 05:10

## 2018-12-31 RX ADMIN — Medication 81 MILLIGRAM(S): at 11:49

## 2018-12-31 RX ADMIN — Medication 8 UNIT(S): at 08:42

## 2018-12-31 RX ADMIN — FLUTICASONE PROPIONATE AND SALMETEROL 1 DOSE(S): 50; 250 POWDER ORAL; RESPIRATORY (INHALATION) at 08:48

## 2018-12-31 RX ADMIN — GABAPENTIN 100 MILLIGRAM(S): 400 CAPSULE ORAL at 17:58

## 2018-12-31 RX ADMIN — Medication 8 UNIT(S): at 11:49

## 2018-12-31 RX ADMIN — Medication 137 MICROGRAM(S): at 05:11

## 2018-12-31 RX ADMIN — Medication 7.5 MILLIGRAM(S): at 05:11

## 2018-12-31 RX ADMIN — TAMSULOSIN HYDROCHLORIDE 0.4 MILLIGRAM(S): 0.4 CAPSULE ORAL at 22:37

## 2018-12-31 RX ADMIN — OXYCODONE HYDROCHLORIDE 5 MILLIGRAM(S): 5 TABLET ORAL at 09:15

## 2018-12-31 RX ADMIN — Medication 3 MILLIGRAM(S): at 22:37

## 2018-12-31 RX ADMIN — FLUTICASONE PROPIONATE AND SALMETEROL 1 DOSE(S): 50; 250 POWDER ORAL; RESPIRATORY (INHALATION) at 21:12

## 2018-12-31 RX ADMIN — OXYCODONE HYDROCHLORIDE 5 MILLIGRAM(S): 5 TABLET ORAL at 08:45

## 2018-12-31 RX ADMIN — Medication 8 UNIT(S): at 17:59

## 2018-12-31 RX ADMIN — Medication 7.5 MILLIGRAM(S): at 17:58

## 2018-12-31 RX ADMIN — Medication 10 MILLIGRAM(S): at 05:10

## 2018-12-31 RX ADMIN — HEPARIN SODIUM 5000 UNIT(S): 5000 INJECTION INTRAVENOUS; SUBCUTANEOUS at 05:09

## 2018-12-31 RX ADMIN — INSULIN GLARGINE 33 UNIT(S): 100 INJECTION, SOLUTION SUBCUTANEOUS at 22:37

## 2018-12-31 RX ADMIN — GABAPENTIN 300 MILLIGRAM(S): 400 CAPSULE ORAL at 22:37

## 2018-12-31 RX ADMIN — OXYCODONE HYDROCHLORIDE 5 MILLIGRAM(S): 5 TABLET ORAL at 02:06

## 2018-12-31 RX ADMIN — Medication 250 MILLIGRAM(S): at 05:11

## 2018-12-31 RX ADMIN — HEPARIN SODIUM 5000 UNIT(S): 5000 INJECTION INTRAVENOUS; SUBCUTANEOUS at 22:37

## 2018-12-31 RX ADMIN — GABAPENTIN 100 MILLIGRAM(S): 400 CAPSULE ORAL at 05:10

## 2018-12-31 NOTE — PROGRESS NOTE ADULT - SUBJECTIVE AND OBJECTIVE BOX
Patient is a 63y old  Female who presents with a chief complaint of functional deficits after spinal cord infarction (30 Dec 2018 10:37)    Patient seen and examined at bedside.    ALLERGIES:  adhesives (Unknown)  Ceftin (Rash)  erythromycin (Rash)  fish (Unknown)  Levaquin (Rash)    MEDICATIONS  (STANDING):  ALBUTerol    90 MICROgram(s) HFA Inhaler 1 Puff(s) Inhalation every 4 hours  ALBUTerol    90 MICROgram(s) HFA Inhaler 1 Puff(s) Inhalation every 4 hours  amiodarone    Tablet 200 milliGRAM(s) Oral daily  aspirin enteric coated 81 milliGRAM(s) Oral daily  dextrose 5%. 1000 milliLiter(s) (50 mL/Hr) IV Continuous <Continuous>  dextrose 50% Injectable 12.5 Gram(s) IV Push once  dextrose 50% Injectable 25 Gram(s) IV Push once  dextrose 50% Injectable 25 Gram(s) IV Push once  ergocalciferol 72227 Unit(s) Oral every week  fluticasone propionate/ salmeterol 500-50 MICROgram(s) Diskus 1 Dose(s) Inhalation two times a day  furosemide    Tablet 10 milliGRAM(s) Oral daily  gabapentin 100 milliGRAM(s) Oral two times a day  gabapentin 300 milliGRAM(s) Oral at bedtime  heparin  Injectable 5000 Unit(s) SubCutaneous every 8 hours  insulin glargine Injectable (LANTUS) 33 Unit(s) SubCutaneous at bedtime  insulin lispro (HumaLOG) corrective regimen sliding scale   SubCutaneous three times a day before meals  insulin lispro (HumaLOG) corrective regimen sliding scale   SubCutaneous at bedtime  insulin lispro Injectable (HumaLOG) 8 Unit(s) SubCutaneous three times a day before meals  levothyroxine 137 MICROGram(s) Oral daily  lidocaine   Patch 2 Patch Transdermal <User Schedule>  multivitamin 1 Tablet(s) Oral daily  pantoprazole    Tablet 40 milliGRAM(s) Oral before breakfast  predniSONE   Tablet 7.5 milliGRAM(s) Oral every 12 hours  saccharomyces boulardii 250 milliGRAM(s) Oral two times a day  tamsulosin 0.4 milliGRAM(s) Oral at bedtime    MEDICATIONS  (PRN):  acetaminophen   Tablet .. 650 milliGRAM(s) Oral every 6 hours PRN Temp greater or equal to 38C (100.4F), Mild Pain (1 - 3)  dextrose 40% Gel 15 Gram(s) Oral once PRN Blood Glucose LESS THAN 70 milliGRAM(s)/deciliter  diclofenac sodium 1% Gel 2 Gram(s) Topical three times a day PRN shoulder and bilateral knees pain  glucagon  Injectable 1 milliGRAM(s) IntraMuscular once PRN Glucose LESS THAN 70 milligrams/deciliter  oxyCODONE    IR 5 milliGRAM(s) Oral every 4 hours PRN Moderate Pain (4 - 6)  oxyCODONE    IR 10 milliGRAM(s) Oral every 4 hours PRN Severe Pain (7 - 10)  saline laxative (FLEET) Rectal Enema 0.5 Enema Rectal <User Schedule> PRN constipation  senna 2 Tablet(s) Oral at bedtime PRN Constipation    Vital Signs Last 24 Hrs  T(F): 98.7 (31 Dec 2018 07:55), Max: 98.7 (31 Dec 2018 07:55)  HR: 82 (31 Dec 2018 08:49) (75 - 82)  BP: 121/72 (31 Dec 2018 07:55) (115/64 - 121/72)  RR: 14 (31 Dec 2018 07:55) (14 - 14)  SpO2: 96% (31 Dec 2018 08:49) (96% - 100%)  I&O's Summary    PHYSICAL EXAM:  General: NAD  Neck: Supple, No JVD  Lungs: Clear to auscultation bilaterally  Cardio: RRR, S1/S2, No murmurs  Abdomen: Soft, Nontender, Nondistended; Bowel sounds present; obese  Extremities: + bilateral calf tenderness, + lower extremity pitting edema, right leg weakness, left arm weakness    LABS:                        9.5    10.0  )-----------( 206      ( 31 Dec 2018 06:50 )             31.2     12-31    143  |  108  |  24  ----------------------------<  102  4.5   |  30  |  0.82    Ca    10.0      31 Dec 2018 06:50      eGFR if Non African American: 75 mL/min/1.73M2 (12-31-18 @ 06:50)  eGFR if African American: 87 mL/min/1.73M2 (12-31-18 @ 06:50)    CAPILLARY BLOOD GLUCOSE    POCT Blood Glucose.: 107 mg/dL (31 Dec 2018 11:37)  POCT Blood Glucose.: 89 mg/dL (31 Dec 2018 07:50)  POCT Blood Glucose.: 120 mg/dL (30 Dec 2018 21:18)  POCT Blood Glucose.: 137 mg/dL (30 Dec 2018 16:52)    12-24 [id number] 7.0  12-07 [id number] 6.4    RADIOLOGY & ADDITIONAL TESTS:  < from: US Duplex Venous Lower Ext Complete, Bilateral (12.29.18 @ 11:45) >  There is diminished flow , incomplete compression of the bilateral common   femoral, superficial femoral and popliteal veins consistent with   partially occlusive bilateral lower extremity deep venous thrombus. There   is a fluid collection the right popliteal fossa likely Baker's cyst   measuring 6.2 x 5 x 2 cm.    < end of copied text >    Care Discussed with Consultants/Other Providers: Dr. Mendoza

## 2018-12-31 NOTE — PROGRESS NOTE ADULT - ASSESSMENT
62 yo Female admitted to acute rehab s/p Type B aortic dissection and distal thoracic/conus spinal cord infarction     #Acute thoracic / conus Spinal cord infarction s/p  Type B Thoracic aortic dissection   - c/w ASA   - BP controlled  - c/w PT/OT as tolerated     #Neurogenic bowel/bladder secondary to #1 above  -ISC prn  -bladder/bowel training exercises -seems to be improving overall  -Laxatives prn, especially while on narcotics  -flomax started    #bilateral leg DVT (bilateral common femoral vein)   -patient with recent type b dissection and hemopericardium  -Seen by CT surgery over the weekend - from their perspective, okay to be on Coumadin if necessary  -I still see this as a risky option, and I am not sure if the benefits > risks considering that the patient has an IVC filter in place  -Would get opinion from vascular surgery and will need to have patient involved in an informed decision making    #Paroxysmal Atrial fibrillation  c/w Amiodarone.   Not on AC due to recent aortic dissection with bleed    # Asthma - asymptomatic  c/w prednisone 7.5 mg Q12h    c/w Duoneb/Advair, Montelukast     # DM type 2 - HBA1c 7.0 (dec 24), acceptable fingersticks, on insulin  c/w  Lantus   33 units QHS , c/w  Humalog 16 U TID-AC ,   Endocrine following    #Hypothyroidism - stable.   c/w Synthroid    #Hyperparathyroidism   endocrine follow-up    #Obesity  -Outpatient exercise regimen as tolerated    #DVT PPx : Heparin

## 2018-12-31 NOTE — PROGRESS NOTE ADULT - SUBJECTIVE AND OBJECTIVE BOX
HISTORY OF PRESENT ILLNESS:  63 year old F with PMH of asthma (on chronic steroids), DM2, HLD, obesity, hypothyroid, PE (on Coumadin, with IVC filter) who presented initially to [facility] and then transferred to [facility] on 12/7/18 with sudden-onset upper back pain between her shoulder blades for one hour associated with shortness of breath. Patient also noted LE weakness that started around same time as back pain. At [facility], initial CTA of the chest revealed probable type A dissection with intramural hematoma and active hemorrhage, so she was transferred urgently to [facility] for possible CT surgery. Upon arrival to [facility], patient was started on Labetalol gtt for blood pressure control; repeat CTA chest revealed Type B dissection with hemoperitoneum, so patient did not undergo surgical intervention. TTE with pericaridal effusion and tamponade physiology.  INR was reversed with FFP and vitamin K.  MRI L-spine revealed abnormal signal within the conus medullaris and distal thoracic cord, with expansion of the conus, suspicious for spinal infarction. Neurosurgery consulted, no surgical intervention recommended. Recommended by neurology to continue ASA 81 mg daily.  Hospital course significant for Afib on telemetry monitoring; s/p Labetalol and amiodarone load. Course notable for orthostasis on 12/17 while working with PT which improved after diuretics were discontinued and IV fluids.  Also with urinary retention due to neurogenic bladder with epps catheter placed 12/18. Deemed medically stable for acute rehab on 12/19/18.        TODAY'S SUBJECTIVE & REVIEW OF SYMPTOMS:   Patient seen and examined.  Pt c/o persistent intermittent mild knee and left shoulder pain relieved with Voltaren gel.   Pt is voiding better without incontinence or dysuria.  +BM yesterday; reports continent.  Pt notes  increased BLE sensation.  Persistent BLE weakness especially right>left.  Denies SOB, dyspnea or cough.       [ x  ] Constitutional WNL  [   ] Cardio WNL  [   ] Resp WNL  [   ] GI WNL  [   ] Heme WNL  [   ] Endo WNL  [ x  ] Skin WNL  [   ] MSK WNL  [   ] Neuro WNL  [ x  ] Cognitive WNL  [ x  ] Psych WNL        PHYSICAL EXAM  Vital Signs Last 24 Hrs  T(C): 37.1 (31 Dec 2018 07:55), Max: 37.1 (31 Dec 2018 07:55)  T(F): 98.7 (31 Dec 2018 07:55), Max: 98.7 (31 Dec 2018 07:55)  HR: 82 (31 Dec 2018 08:49) (75 - 82)  BP: 121/72 (31 Dec 2018 07:55) (115/64 - 121/72)  BP(mean): --  RR: 14 (31 Dec 2018 07:55) (14 - 14)  SpO2: 96% (31 Dec 2018 08:49) (96% - 100%)    Gen - NAD, Comfortable  	HEENT - NCAT, EOMI, MMM  	Neck - Supple, No limited ROM  	Pulm - CTAB, No wheeze, No rhonchi, No crackles  	Cardiovascular - RRR, S1S2, No murmurs  	Abdomen - Soft, NT/ND, +BS  	Extremities - No C/C/E, No calf tenderness  	Neuro-  	   Cognitive - AAOx3  	   Communication - Fluent, No dysarthria  	   Attention: Intact   	   Judgement: Good evidence of judgement  	   Cranial Nerves - CN 2-12 grossly intact  	   Motor -   	                  LEFT    UE - ShAB 4/5 (limited due to chronic RTC injury), EF 5/5, EE 5/5, WE 5/5,  5/5  	                  RIGHT UE - ShAB 4/5, EF 5/5, EE 5/5, WE 5/5,  5/5  	                  LEFT    LE - HF 4-/5, KE 4/5, DF 4/5, PF 4/5  	                  RIGHT LE - HF 3/5, KE 3/5, DF 3-/5, PF 2/5     	   Sensory - Impaired to LT BLEs  	   Reflexes -No primitive reflexive  	   Coordination - FTN intact  	Psychiatric - Mood stable, Affect WNL  Skin:  Intact    FUNCTIONAL PROGRESS:  Bed mobility: Max A  Transfers: ModA x 2  Gait: Unable  ADLs: UE dress max A, LE dress mod A      RECENT LABS:                        9.5    10.0  )-----------( 206      ( 31 Dec 2018 06:50 )             31.2   12-31    143  |  108  |  24<H>  ----------------------------<  102<H>  4.5   |  30  |  0.82    Ca    10.0      31 Dec 2018 06:50              CAPILLARY BLOOD GLUCOSE      POCT Blood Glucose.: 107 mg/dL (31 Dec 2018 11:37)  POCT Blood Glucose.: 89 mg/dL (31 Dec 2018 07:50)  POCT Blood Glucose.: 120 mg/dL (30 Dec 2018 21:18)  POCT Blood Glucose.: 137 mg/dL (30 Dec 2018 16:52)            Assessment and Plan:   64 yo Female with functional deficits after Type B aortic dissection and distal thoracic/conus spinal infarction      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  D/C Lidoderm patches as pt declines. Continue  Voltaren gel for prn use as per pt request  - repeat CT scan /TRINY in 3-4 months as per Dr. Kumar CT surgery     asthma-stable  - prednisone, advair.  As per hospitalist as pt has asthma and not COPD, d/c duonebs and Spiriva which may have anticholinergic s/e and instead Rx albuterol inh      atrial fibrillation   - amiodarone for rate control    H/O PE/DVT with venous duplex BLE DVT 12/29   -was on Coumadin which was discontinued recently due to Type B dissection with hemopericardium.   As per Dr. Kumar, would be ok to resume Coumadin for pt at this time and would recommend repeating a TTE/CT to evaluate for pericardial effusion; called office today-clarified studies should be performed 3-4 mo from now.    -Recent type b dissection and hemopericardium  -Seen by CT surgery last weekend - from their perspective, okay to be on Coumadin if necessary  -As per hospitalist, not sure if the benefits > risks considering that the patient has an IVC filter in place  -Would get opinion from vascular surgery and will need to have patient involved in an informed decision making; vascular consult with Dr Hurst pending    #Paroxysmal Atrial fibrillation  c/w Amiodarone.   Not on AC due to recent aortic dissection with bleed    DM2 on insulin-Uncontrolled  - likely due to prednisone and immobility  - lantus-increased to 33U, Cont humalog 16u TID AC, SSI, CC diet   - appreciate endo consult-Suggest to continue Basal/ Bolus insulin now & for discharge.   advised to stop starlix and januvia after discharge.    hypothyroid  - synthroid     neurogenic bowel and bladder  - epps catheter, future TOV-D/Cd 12/21.  PVRs improved; 271, 23. Decrease bladder scans to q12hrs and  SC PRN.  Added Flomax 0.4mg  - Bowel/bladder program: Mini fleet enema q AM-changed to prn.  Toileting schedule- bedside to toilet/commode for voiding     acute blood loss anemia-H/H stable  -monitor cbc    leukocytosis-likely due to prednisone-improved  -monitor cbc  -check ua-+  Urine culture >100,000cfu/ml Ecoli-sensitive to Macrobid-Rx 5 day supply + Florastor  -cxr with reduced size of pleural effusions    Hoarse voice-resolved  -Added magic mouth wash x 5days.    -D/C cepacol prn    Weight bearing status: WBAT     Precautions / PROPHYLAXIS:   - Falls, Cardiac, Sternal, Spinal   - Lungs: Aspiration, Incentive Spirometer   - Pressure injury/Skin: Turn Q2hrs while in bed   - DVT: SQ Heparin, SCDs, TEDs     Diet:  Regular + Thins CCHO

## 2018-12-31 NOTE — CHART NOTE - NSCHARTNOTEFT_GEN_A_CORE
Nutrition Follow Up Note  Hospital Course (Per Electronic Medical Record):   Source: Medical Record [X] Patient [X] Family [ ] Nursing Staff [ ]     Diet: Consistent Carbohydrate Diet w/ Thin Liquids   Tolerates Diet Well   No Chewing/Swallowing Difficulties   No Recent Nausea, Vomiting, Diarrhea or Constipation   Consumes % of Meals  (as Per Documentation)  & Varies @Times (Per Pt)  on Glucerna 8oz PO Daily  Educated Patient on Need for Supplementation     Enteral/Parenteral Nutrition: N/A    Current Weight: 222.4lb on 12/19  Obtain New Weight   Obtain Weights Weekly     Pertinent Medications: MEDICATIONS  (STANDING):  ALBUTerol    90 MICROgram(s) HFA Inhaler 1 Puff(s) Inhalation every 4 hours  ALBUTerol    90 MICROgram(s) HFA Inhaler 1 Puff(s) Inhalation every 4 hours  amiodarone    Tablet 200 milliGRAM(s) Oral daily  aspirin enteric coated 81 milliGRAM(s) Oral daily  dextrose 5%. 1000 milliLiter(s) (50 mL/Hr) IV Continuous <Continuous>  dextrose 50% Injectable 12.5 Gram(s) IV Push once  dextrose 50% Injectable 25 Gram(s) IV Push once  dextrose 50% Injectable 25 Gram(s) IV Push once  ergocalciferol 20787 Unit(s) Oral every week  fluticasone propionate/ salmeterol 500-50 MICROgram(s) Diskus 1 Dose(s) Inhalation two times a day  furosemide    Tablet 10 milliGRAM(s) Oral daily  gabapentin 100 milliGRAM(s) Oral two times a day  gabapentin 300 milliGRAM(s) Oral at bedtime  heparin  Injectable 5000 Unit(s) SubCutaneous every 8 hours  insulin glargine Injectable (LANTUS) 33 Unit(s) SubCutaneous at bedtime  insulin lispro (HumaLOG) corrective regimen sliding scale   SubCutaneous three times a day before meals  insulin lispro (HumaLOG) corrective regimen sliding scale   SubCutaneous at bedtime  insulin lispro Injectable (HumaLOG) 8 Unit(s) SubCutaneous three times a day before meals  levothyroxine 137 MICROGram(s) Oral daily  lidocaine   Patch 2 Patch Transdermal <User Schedule>  multivitamin 1 Tablet(s) Oral daily  pantoprazole    Tablet 40 milliGRAM(s) Oral before breakfast  predniSONE   Tablet 7.5 milliGRAM(s) Oral every 12 hours  saccharomyces boulardii 250 milliGRAM(s) Oral two times a day  tamsulosin 0.4 milliGRAM(s) Oral at bedtime    MEDICATIONS  (PRN):  acetaminophen   Tablet .. 650 milliGRAM(s) Oral every 6 hours PRN Temp greater or equal to 38C (100.4F), Mild Pain (1 - 3)  dextrose 40% Gel 15 Gram(s) Oral once PRN Blood Glucose LESS THAN 70 milliGRAM(s)/deciliter  diclofenac sodium 1% Gel 2 Gram(s) Topical three times a day PRN shoulder and bilateral knees pain  glucagon  Injectable 1 milliGRAM(s) IntraMuscular once PRN Glucose LESS THAN 70 milligrams/deciliter  oxyCODONE    IR 5 milliGRAM(s) Oral every 4 hours PRN Moderate Pain (4 - 6)  oxyCODONE    IR 10 milliGRAM(s) Oral every 4 hours PRN Severe Pain (7 - 10)  saline laxative (FLEET) Rectal Enema 0.5 Enema Rectal <User Schedule> PRN constipation  senna 2 Tablet(s) Oral at bedtime PRN Constipation    Pertinent Labs:  12-31 Na143 mmol/L Glu 102 mg/dL<H> K+ 4.5 mmol/L Cr  0.82 mg/dL BUN 24 mg/dL<H> 12-20 PAB 11 mg/dL<L> 12-24 [id number] 7.0 %<H>    Educated Patient on Blood Glucose Management     Skin: No Pressure Ulcers (Healed)    Edema: None Noted     Last BM: on 12/31    Estimated Needs:   [X] No Change since Previous Assessment    Previous Nutrition Diagnosis:   Obese  Increased Nutrient Needs - For Wound Healing    Nutrition Diagnosis is [X] Ongoing - Obese    [X] Resolved - Increased Nutrient Needs - Wound Healed     New Nutrition Diagnosis: [X] Not Applicable    Interventions:   1. Recommend Continue Nutrition Plan of Care   2. Educated Patient on Need for Supplementation     Monitoring & Evaluation:   [X] Weights   [X] PO Intake   [X] Follow Up (Per Protocol)  [X] Tolerance to Diet Prescription   [X] Other: Labs & PCOT    RD Remains Available.  Shant Rodriges RD

## 2019-01-01 PROCEDURE — 99232 SBSQ HOSP IP/OBS MODERATE 35: CPT

## 2019-01-01 RX ADMIN — Medication 250 MILLIGRAM(S): at 06:28

## 2019-01-01 RX ADMIN — Medication 4: at 11:37

## 2019-01-01 RX ADMIN — Medication 250 MILLIGRAM(S): at 17:28

## 2019-01-01 RX ADMIN — AMIODARONE HYDROCHLORIDE 200 MILLIGRAM(S): 400 TABLET ORAL at 06:29

## 2019-01-01 RX ADMIN — Medication 7.5 MILLIGRAM(S): at 06:30

## 2019-01-01 RX ADMIN — DICLOFENAC SODIUM 2 GRAM(S): 30 GEL TOPICAL at 11:43

## 2019-01-01 RX ADMIN — Medication 7.5 MILLIGRAM(S): at 17:28

## 2019-01-01 RX ADMIN — OXYCODONE HYDROCHLORIDE 5 MILLIGRAM(S): 5 TABLET ORAL at 22:18

## 2019-01-01 RX ADMIN — INSULIN GLARGINE 33 UNIT(S): 100 INJECTION, SOLUTION SUBCUTANEOUS at 21:22

## 2019-01-01 RX ADMIN — Medication 81 MILLIGRAM(S): at 18:05

## 2019-01-01 RX ADMIN — FLUTICASONE PROPIONATE AND SALMETEROL 1 DOSE(S): 50; 250 POWDER ORAL; RESPIRATORY (INHALATION) at 08:07

## 2019-01-01 RX ADMIN — Medication 3 MILLIGRAM(S): at 21:22

## 2019-01-01 RX ADMIN — HEPARIN SODIUM 5000 UNIT(S): 5000 INJECTION INTRAVENOUS; SUBCUTANEOUS at 15:06

## 2019-01-01 RX ADMIN — PANTOPRAZOLE SODIUM 40 MILLIGRAM(S): 20 TABLET, DELAYED RELEASE ORAL at 06:28

## 2019-01-01 RX ADMIN — TAMSULOSIN HYDROCHLORIDE 0.4 MILLIGRAM(S): 0.4 CAPSULE ORAL at 21:22

## 2019-01-01 RX ADMIN — OXYCODONE HYDROCHLORIDE 5 MILLIGRAM(S): 5 TABLET ORAL at 08:39

## 2019-01-01 RX ADMIN — GABAPENTIN 300 MILLIGRAM(S): 400 CAPSULE ORAL at 21:22

## 2019-01-01 RX ADMIN — HEPARIN SODIUM 5000 UNIT(S): 5000 INJECTION INTRAVENOUS; SUBCUTANEOUS at 06:37

## 2019-01-01 RX ADMIN — Medication 10 MILLIGRAM(S): at 06:29

## 2019-01-01 RX ADMIN — Medication 137 MICROGRAM(S): at 06:29

## 2019-01-01 RX ADMIN — GABAPENTIN 100 MILLIGRAM(S): 400 CAPSULE ORAL at 17:29

## 2019-01-01 RX ADMIN — Medication 8 UNIT(S): at 11:37

## 2019-01-01 RX ADMIN — Medication 8 UNIT(S): at 07:43

## 2019-01-01 RX ADMIN — GABAPENTIN 100 MILLIGRAM(S): 400 CAPSULE ORAL at 06:29

## 2019-01-01 RX ADMIN — FLUTICASONE PROPIONATE AND SALMETEROL 1 DOSE(S): 50; 250 POWDER ORAL; RESPIRATORY (INHALATION) at 21:38

## 2019-01-01 RX ADMIN — Medication 8 UNIT(S): at 17:29

## 2019-01-01 RX ADMIN — OXYCODONE HYDROCHLORIDE 5 MILLIGRAM(S): 5 TABLET ORAL at 00:51

## 2019-01-01 RX ADMIN — Medication 2: at 07:44

## 2019-01-01 RX ADMIN — OXYCODONE HYDROCHLORIDE 5 MILLIGRAM(S): 5 TABLET ORAL at 00:07

## 2019-01-01 RX ADMIN — HEPARIN SODIUM 5000 UNIT(S): 5000 INJECTION INTRAVENOUS; SUBCUTANEOUS at 21:22

## 2019-01-01 RX ADMIN — Medication 1 TABLET(S): at 11:42

## 2019-01-01 RX ADMIN — OXYCODONE HYDROCHLORIDE 5 MILLIGRAM(S): 5 TABLET ORAL at 09:30

## 2019-01-01 NOTE — PROGRESS NOTE ADULT - SUBJECTIVE AND OBJECTIVE BOX
No overnight events.  Patient notes that the swelling in her legs is not improved with small dose of Lasix. ACE wraps also help.  Pain is overall improved.    REVIEW OF SYSTEMS  Constitutional - No fever,  No fatigue  Neurological - No headaches, No memory loss, +loss of strength, No tremors  Musculoskeletal - +joint pain, +joint swelling, +muscle pain  Psychiatric - No depression, No anxiety    VITALS  T(C): 36.9 (01-01-19 @ 09:36), Max: 36.9 (01-01-19 @ 09:36)  HR: 73 (01-01-19 @ 09:36) (73 - 79)  BP: 129/81 (01-01-19 @ 09:36) (112/71 - 129/81)  RR: 12 (01-01-19 @ 09:36) (12 - 14)  SpO2: 100% (01-01-19 @ 09:36) (96% - 100%)  Wt(kg): --    163 mg/dL (01-01-19 @ 07:42)  88 mg/dL (12-31-18 @ 22:33)  106 mg/dL (12-31-18 @ 17:12)  107 mg/dL (12-31-18 @ 11:37)      MEDICATIONS   acetaminophen   Tablet .. 650 milliGRAM(s) every 6 hours PRN  ALBUTerol    90 MICROgram(s) HFA Inhaler 1 Puff(s) every 4 hours  ALBUTerol    90 MICROgram(s) HFA Inhaler 1 Puff(s) every 4 hours  amiodarone    Tablet 200 milliGRAM(s) daily  aspirin enteric coated 81 milliGRAM(s) daily  dextrose 40% Gel 15 Gram(s) once PRN  dextrose 5%. 1000 milliLiter(s) <Continuous>  dextrose 50% Injectable 12.5 Gram(s) once  dextrose 50% Injectable 25 Gram(s) once  dextrose 50% Injectable 25 Gram(s) once  diclofenac sodium 1% Gel 2 Gram(s) three times a day PRN  ergocalciferol 10794 Unit(s) every week  fluticasone propionate/ salmeterol 500-50 MICROgram(s) Diskus 1 Dose(s) two times a day  furosemide    Tablet 10 milliGRAM(s) daily  gabapentin 100 milliGRAM(s) two times a day  gabapentin 300 milliGRAM(s) at bedtime  glucagon  Injectable 1 milliGRAM(s) once PRN  heparin  Injectable 5000 Unit(s) every 8 hours  insulin glargine Injectable (LANTUS) 33 Unit(s) at bedtime  insulin lispro (HumaLOG) corrective regimen sliding scale   three times a day before meals  insulin lispro (HumaLOG) corrective regimen sliding scale   at bedtime  insulin lispro Injectable (HumaLOG) 8 Unit(s) three times a day before meals  levothyroxine 137 MICROGram(s) daily  melatonin 3 milliGRAM(s) <User Schedule>  multivitamin 1 Tablet(s) daily  oxyCODONE    IR 5 milliGRAM(s) every 4 hours PRN  oxyCODONE    IR 10 milliGRAM(s) every 4 hours PRN  pantoprazole    Tablet 40 milliGRAM(s) before breakfast  predniSONE   Tablet 7.5 milliGRAM(s) every 12 hours  saccharomyces boulardii 250 milliGRAM(s) two times a day  saline laxative (FLEET) Rectal Enema 0.5 Enema <User Schedule> PRN  senna 2 Tablet(s) at bedtime PRN  tamsulosin 0.4 milliGRAM(s) at bedtime      RECENT LABS/IMAGING  CBC Full  -  ( 31 Dec 2018 06:50 )  WBC Count : 10.0 K/uL  Hemoglobin : 9.5 g/dL  Hematocrit : 31.2 %  Platelet Count - Automated : 206 K/uL  Mean Cell Volume : 92.9 fl  Mean Cell Hemoglobin : 28.4 pg  Mean Cell Hemoglobin Concentration : 30.6 gm/dL  Auto Neutrophil # : x  Auto Lymphocyte # : x  Auto Monocyte # : x  Auto Eosinophil # : x  Auto Basophil # : x  Auto Neutrophil % : x  Auto Lymphocyte % : x  Auto Monocyte % : x  Auto Eosinophil % : x  Auto Basophil % : x    12-31    143  |  108  |  24<H>  ----------------------------<  102<H>  4.5   |  30  |  0.82    Ca    10.0      31 Dec 2018 06:50          ---------	  PHYSICAL EXAM  Constitutional - NAD, Comfortable  Pulm - Breathing comfortably, No wheezing  Abd - Soft, NTND  Extremities - BLE edema  Neurologic Exam -                    Cognitive - Awake, Alert x3     Communication - Fluent     Motor - BLE weakness     Sensory - Impaired in BLE to LT  Psychiatric - Mood WNL, Affect WNL	    ASSESSMENT/PLAN  63y Female with functional deficits after Type B aortic dissection and distal thoracic/conus spinal infarction   CAD - ASA  DM2 - Humalog, Lantus  Pain - Tylenol PRN, Lidoderm, Neurontin, Oxycodone  Neurogenic Bowel - Protonix, Senna  Neurogenic Bladder - Bladder Scan, PVR  +DVT - Heparin, Plan for Coumadin pending vascular  	  Continue 3hrs a day of comprehensive rehab program.

## 2019-01-02 PROCEDURE — 99232 SBSQ HOSP IP/OBS MODERATE 35: CPT

## 2019-01-02 PROCEDURE — 99233 SBSQ HOSP IP/OBS HIGH 50: CPT

## 2019-01-02 RX ADMIN — HEPARIN SODIUM 5000 UNIT(S): 5000 INJECTION INTRAVENOUS; SUBCUTANEOUS at 21:37

## 2019-01-02 RX ADMIN — Medication 81 MILLIGRAM(S): at 12:20

## 2019-01-02 RX ADMIN — Medication 7.5 MILLIGRAM(S): at 17:38

## 2019-01-02 RX ADMIN — Medication 250 MILLIGRAM(S): at 06:00

## 2019-01-02 RX ADMIN — Medication 8 UNIT(S): at 08:56

## 2019-01-02 RX ADMIN — Medication 10 MILLIGRAM(S): at 06:00

## 2019-01-02 RX ADMIN — GABAPENTIN 300 MILLIGRAM(S): 400 CAPSULE ORAL at 21:38

## 2019-01-02 RX ADMIN — Medication 137 MICROGRAM(S): at 06:00

## 2019-01-02 RX ADMIN — Medication 8 UNIT(S): at 17:37

## 2019-01-02 RX ADMIN — PANTOPRAZOLE SODIUM 40 MILLIGRAM(S): 20 TABLET, DELAYED RELEASE ORAL at 06:01

## 2019-01-02 RX ADMIN — Medication 7.5 MILLIGRAM(S): at 06:00

## 2019-01-02 RX ADMIN — OXYCODONE HYDROCHLORIDE 5 MILLIGRAM(S): 5 TABLET ORAL at 08:57

## 2019-01-02 RX ADMIN — Medication 250 MILLIGRAM(S): at 17:37

## 2019-01-02 RX ADMIN — AMIODARONE HYDROCHLORIDE 200 MILLIGRAM(S): 400 TABLET ORAL at 06:00

## 2019-01-02 RX ADMIN — FLUTICASONE PROPIONATE AND SALMETEROL 1 DOSE(S): 50; 250 POWDER ORAL; RESPIRATORY (INHALATION) at 08:00

## 2019-01-02 RX ADMIN — HEPARIN SODIUM 5000 UNIT(S): 5000 INJECTION INTRAVENOUS; SUBCUTANEOUS at 14:35

## 2019-01-02 RX ADMIN — Medication 2: at 12:21

## 2019-01-02 RX ADMIN — FLUTICASONE PROPIONATE AND SALMETEROL 1 DOSE(S): 50; 250 POWDER ORAL; RESPIRATORY (INHALATION) at 20:48

## 2019-01-02 RX ADMIN — GABAPENTIN 100 MILLIGRAM(S): 400 CAPSULE ORAL at 17:37

## 2019-01-02 RX ADMIN — SENNA PLUS 2 TABLET(S): 8.6 TABLET ORAL at 21:38

## 2019-01-02 RX ADMIN — Medication 1 TABLET(S): at 12:20

## 2019-01-02 RX ADMIN — TAMSULOSIN HYDROCHLORIDE 0.4 MILLIGRAM(S): 0.4 CAPSULE ORAL at 21:38

## 2019-01-02 RX ADMIN — HEPARIN SODIUM 5000 UNIT(S): 5000 INJECTION INTRAVENOUS; SUBCUTANEOUS at 06:00

## 2019-01-02 RX ADMIN — OXYCODONE HYDROCHLORIDE 5 MILLIGRAM(S): 5 TABLET ORAL at 21:46

## 2019-01-02 RX ADMIN — Medication 8 UNIT(S): at 12:20

## 2019-01-02 RX ADMIN — GABAPENTIN 100 MILLIGRAM(S): 400 CAPSULE ORAL at 06:00

## 2019-01-02 RX ADMIN — INSULIN GLARGINE 33 UNIT(S): 100 INJECTION, SOLUTION SUBCUTANEOUS at 21:37

## 2019-01-02 RX ADMIN — OXYCODONE HYDROCHLORIDE 5 MILLIGRAM(S): 5 TABLET ORAL at 09:30

## 2019-01-02 RX ADMIN — Medication 3 MILLIGRAM(S): at 21:38

## 2019-01-02 NOTE — PROGRESS NOTE ADULT - ASSESSMENT
62 yo Female admitted to acute rehab s/p Type B aortic dissection and distal thoracic/conus spinal cord infarction     #Acute thoracic / conus Spinal cord infarction s/p  Type B Thoracic aortic dissection   - c/w ASA   - c/w PT/OT as tolerated     #Neurogenic bowel/bladder secondary to #1 above  -ISC prn  -bladder/bowel training exercises -seems to be improving overall  -Laxatives prn, especially while on narcotics  -flomax started    #bilateral leg DVT (bilateral common femoral vein)   -patient with recent type b dissection and hemopericardium  -Seen by CT surgery over the weekend - from their perspective, okay to be on Coumadin if necessary  -+IVCF; hospitalist suggests vascular surgery input whether coumadin is necessary given risk bleed and plresence IVCF    #Paroxysmal Atrial fibrillation  c/w Amiodarone.   Not on AC due to recent aortic dissection with bleed    # Asthma - asymptomatic  c/w prednisone 7.5 mg Q12h    c/w Duoneb/Advair, Montelukast     # DM type 2 - HBA1c 7.0 (dec 24), acceptable fingersticks, on insulin  c/w  Lantus   33 units QHS , c/w  Humalog 16 U TID-AC ,   Endocrine following    #Hypothyroidism - stable.   c/w Synthroid    #Hyperparathyroidism   endocrine follow-up    #Obesity  -Outpatient exercise regimen as tolerated\    # ARMANDO in progress     #DVT PPx : Heparin  Labs:  vascular consult

## 2019-01-02 NOTE — PROGRESS NOTE ADULT - SUBJECTIVE AND OBJECTIVE BOX
Patient is a 63y old  Female who presents with a chief complaint of functional deficits after spinal cord infarction (01 Jan 2019 09:39)      HPI:  63 year old F with PMH of asthma (on chronic steroids), DM2, HLD, obesity, hypothyroid, PE (on Coumadin, with IVC filter) who presented initially to [facility] and then transferred to [facility] on 12/7/18 with sudden-onset upper back pain between her shoulder blades for one hour associated with shortness of breath. Patient also noted LE weakness that started around same time as back pain. At [facility], initial CTA of the chest revealed probable type A dissection with intramural hematoma and active hemorrhage, so she was transferred urgently to [facility] for possible CT surgery. Upon arrival to [facility], patient was started on Labetalol gtt for blood pressure control; repeat CTA chest revealed Type B dissection with hemoperitoneum, so patient did not undergo surgical intervention. TTE with pericaridal effusion and tamponade physiology.  INR was reversed with FFP and vitamin K.  MRI L-spine revealed abnormal signal within the conus medullaris and distal thoracic cord, with expansion of the conus, suspicious for spinal infarction. Neurosurgery consulted, no surgical intervention recommended. Recommended by neurology to continue ASA 81 mg daily.  Hospital course significant for Afib on telemetry monitoring; s/p Labetalol and amiodarone load. Course notable for orthostasis on 12/17 while working with PT which improved after diuretics were discontinued and IV fluids.  Also with urinary retention due to neurogenic bladder with epps catheter placed 12/18. Deemed medically stable for acute rehab on 12/19/18. (19 Dec 2018 15:29)      PAST MEDICAL & SURGICAL HISTORY:  Arthritis  Shingles  PE (pulmonary embolism)  Hypothyroidism  Hyperlipidemia  Diabetes mellitus  Asthma  History of cataract surgery  S/P cholecystectomy      MEDICATIONS  (STANDING):  ALBUTerol    90 MICROgram(s) HFA Inhaler 1 Puff(s) Inhalation every 4 hours  ALBUTerol    90 MICROgram(s) HFA Inhaler 1 Puff(s) Inhalation every 4 hours  amiodarone    Tablet 200 milliGRAM(s) Oral daily  aspirin enteric coated 81 milliGRAM(s) Oral daily  dextrose 5%. 1000 milliLiter(s) (50 mL/Hr) IV Continuous <Continuous>  dextrose 50% Injectable 12.5 Gram(s) IV Push once  dextrose 50% Injectable 25 Gram(s) IV Push once  dextrose 50% Injectable 25 Gram(s) IV Push once  ergocalciferol 09533 Unit(s) Oral every week  fluticasone propionate/ salmeterol 500-50 MICROgram(s) Diskus 1 Dose(s) Inhalation two times a day  furosemide    Tablet 10 milliGRAM(s) Oral daily  gabapentin 100 milliGRAM(s) Oral two times a day  gabapentin 300 milliGRAM(s) Oral at bedtime  heparin  Injectable 5000 Unit(s) SubCutaneous every 8 hours  insulin glargine Injectable (LANTUS) 33 Unit(s) SubCutaneous at bedtime  insulin lispro (HumaLOG) corrective regimen sliding scale   SubCutaneous three times a day before meals  insulin lispro (HumaLOG) corrective regimen sliding scale   SubCutaneous at bedtime  insulin lispro Injectable (HumaLOG) 8 Unit(s) SubCutaneous three times a day before meals  levothyroxine 137 MICROGram(s) Oral daily  melatonin 3 milliGRAM(s) Oral <User Schedule>  multivitamin 1 Tablet(s) Oral daily  pantoprazole    Tablet 40 milliGRAM(s) Oral before breakfast  predniSONE   Tablet 7.5 milliGRAM(s) Oral every 12 hours  saccharomyces boulardii 250 milliGRAM(s) Oral two times a day  tamsulosin 0.4 milliGRAM(s) Oral at bedtime    MEDICATIONS  (PRN):  acetaminophen   Tablet .. 650 milliGRAM(s) Oral every 6 hours PRN Temp greater or equal to 38C (100.4F), Mild Pain (1 - 3)  dextrose 40% Gel 15 Gram(s) Oral once PRN Blood Glucose LESS THAN 70 milliGRAM(s)/deciliter  diclofenac sodium 1% Gel 2 Gram(s) Topical three times a day PRN shoulder and bilateral knees pain  glucagon  Injectable 1 milliGRAM(s) IntraMuscular once PRN Glucose LESS THAN 70 milligrams/deciliter  oxyCODONE    IR 5 milliGRAM(s) Oral every 4 hours PRN Moderate Pain (4 - 6)  oxyCODONE    IR 10 milliGRAM(s) Oral every 4 hours PRN Severe Pain (7 - 10)  saline laxative (FLEET) Rectal Enema 0.5 Enema Rectal <User Schedule> PRN constipation  senna 2 Tablet(s) Oral at bedtime PRN Constipation      Allergies    adhesives (Unknown)  Ceftin (Rash)  erythromycin (Rash)  fish (Unknown)  Levaquin (Rash)    Intolerances        TODAY'S SUBJECTIVE & REVIEW OF SYMPTOMS:    Patient states she has started standing in PT, took 2 steps. Pain is better controlled with voltaren gel, has had long history of multiple joint arthritis. No CP, SOB, had BM today no new complaints. Feels that the sensation is improving in her LE    PHYSICAL EXAM  63y  Vital Signs Last 24 Hrs  T(C): 36.6 (02 Jan 2019 08:11), Max: 37.1 (01 Jan 2019 20:35)  T(F): 97.9 (02 Jan 2019 08:11), Max: 98.7 (01 Jan 2019 20:35)  HR: 71 (02 Jan 2019 08:11) (71 - 73)  BP: 145/93 (02 Jan 2019 08:11) (118/69 - 145/93)  BP(mean): --  RR: 14 (02 Jan 2019 08:11) (14 - 14)  SpO2: 99% (02 Jan 2019 08:11) (96% - 99%)  Daily     Daily     General: alert O c 4     Resp: clear bilaterally no wheezing  Cardio: NL: S1 and S2 regular  Abdomen: soft +BS NT  Extremities: ACE wraps bilaterally  right knee mild effusion, no joint line TTP no warmth or erythema  BLE 1+ edema, varicosities  chronic skin changes  right HF 2-3/5 left HF 3/5  reduced PROM and significant grinding/clicking GH joints bothe shoudlers      RECENT LABS:                      CAPILLARY BLOOD GLUCOSE      POCT Blood Glucose.: 181 mg/dL (02 Jan 2019 12:04)  POCT Blood Glucose.: 102 mg/dL (02 Jan 2019 08:05)  POCT Blood Glucose.: 104 mg/dL (01 Jan 2019 20:51)  POCT Blood Glucose.: 111 mg/dL (01 Jan 2019 17:04)    IMPRESSION AND PLAN:

## 2019-01-02 NOTE — PROGRESS NOTE ADULT - SUBJECTIVE AND OBJECTIVE BOX
Patient is a 63y old  Female who presents with a chief complaint of functional deficits after spinal cord infarction (02 Jan 2019 12:29)      Patient seen and examined at bedside. No new complaints.     ALLERGIES:  adhesives (Unknown)  Ceftin (Rash)  erythromycin (Rash)  fish (Unknown)  Levaquin (Rash)    MEDICATIONS:  diclofenac sodium 1% Gel 2 Gram(s) Topical three times a day PRN  ergocalciferol 22564 Unit(s) Oral every week  fluticasone propionate/ salmeterol 500-50 MICROgram(s) Diskus 1 Dose(s) Inhalation two times a day  furosemide    Tablet 10 milliGRAM(s) Oral daily  gabapentin 100 milliGRAM(s) Oral two times a day  insulin glargine Injectable (LANTUS) 33 Unit(s) SubCutaneous at bedtime  insulin lispro Injectable (HumaLOG) 8 Unit(s) SubCutaneous three times a day before meals  melatonin 3 milliGRAM(s) Oral <User Schedule>  oxyCODONE    IR 5 milliGRAM(s) Oral every 4 hours PRN  oxyCODONE    IR 10 milliGRAM(s) Oral every 4 hours PRN  saccharomyces boulardii 250 milliGRAM(s) Oral two times a day  saline laxative (FLEET) Rectal Enema 0.5 Enema Rectal <User Schedule> PRN  tamsulosin 0.4 milliGRAM(s) Oral at bedtime    Vital Signs Last 24 Hrs  T(F): 97.9 (02 Jan 2019 08:11), Max: 98.7 (01 Jan 2019 20:35)  HR: 71 (02 Jan 2019 08:11) (71 - 73)  BP: 145/93 (02 Jan 2019 08:11) (118/69 - 145/93)  RR: 14 (02 Jan 2019 08:11) (14 - 14)  SpO2: 99% (02 Jan 2019 08:11) (96% - 99%)  I&O's Summary    01 Jan 2019 07:01  -  02 Jan 2019 07:00  --------------------------------------------------------  IN: 0 mL / OUT: 275 mL / NET: -275 mL        PHYSICAL EXAM:  General: NAD, comfortable   ENT: MMM  Neck: Supple, No JVD  Lungs: CTA, BLAE, No added sounds.   Cardio: RRR, S1/S2, No murmurs  Abdomen: Soft, NT/ND, BS+   Extremities: No clubbing, cyanosis, or edema  Psych: A/O x 3     LABS:                        9.5    10.0  )-----------( 206      ( 31 Dec 2018 06:50 )             31.2     12-31    143  |  108  |  24  ----------------------------<  102  4.5   |  30  |  0.82    Ca    10.0      31 Dec 2018 06:50      eGFR if Non African American: 75 mL/min/1.73M2 (12-31-18 @ 06:50)  eGFR if African American: 87 mL/min/1.73M2 (12-31-18 @ 06:50)                    CAPILLARY BLOOD GLUCOSE      POCT Blood Glucose.: 181 mg/dL (02 Jan 2019 12:04)  POCT Blood Glucose.: 102 mg/dL (02 Jan 2019 08:05)  POCT Blood Glucose.: 104 mg/dL (01 Jan 2019 20:51)  POCT Blood Glucose.: 111 mg/dL (01 Jan 2019 17:04)    12-24 IcngpmzyypJ2O 7.0  12-07 PvndsdrtrjV1B 6.4          RADIOLOGY & ADDITIONAL TESTS:    Care Discussed with Consultants/Other Providers:

## 2019-01-02 NOTE — PROGRESS NOTE ADULT - ASSESSMENT
62 yo Female admitted to acute rehab s/p Type B aortic dissection and distal thoracic/conus spinal cord infarction     #Acute thoracic / conus Spinal cord infarction s/p  Type B Thoracic aortic dissection   - c/w ASA   - c/w PT/OT as tolerated     #Neurogenic bowel/bladder secondary to #1 above  -Intermittent cath   -bladder/bowel training exercises -seems to be improving overall  -Laxatives prn, especially while on narcotics  -flomax started    #Bilateral leg DVT (bilateral common femoral vein)   -patient with recent type b dissection and hemopericardium  -f/u Vascular Sx consult for recs on AC     #Paroxysmal Atrial fibrillation- rate controlled.   c/w Amiodarone.   No AC due to recent aortic dissection with bleed    # Asthma - asymptomatic  c/w prednisone 7.5 mg Q12h    c/w Duoneb/Advair, Montelukast     # DM type 2 - HBA1c 7.0 12/24  on insulin  c/w  Lantus   33 units QHS , c/w  Humalog 8 U TID-AC   c/w SSI  Hypoglycemia protocol.     #Hypothyroidism - stable.   c/w Synthroid    #Hyperparathyroidism   endocrine follow-up      #DVT PPx : Heparin

## 2019-01-03 ENCOUNTER — TRANSCRIPTION ENCOUNTER (OUTPATIENT)
Age: 64
End: 2019-01-03

## 2019-01-03 VITALS
SYSTOLIC BLOOD PRESSURE: 126 MMHG | RESPIRATION RATE: 14 BRPM | DIASTOLIC BLOOD PRESSURE: 72 MMHG | OXYGEN SATURATION: 96 % | HEART RATE: 79 BPM | TEMPERATURE: 98 F

## 2019-01-03 PROCEDURE — 99233 SBSQ HOSP IP/OBS HIGH 50: CPT

## 2019-01-03 PROCEDURE — 99239 HOSP IP/OBS DSCHRG MGMT >30: CPT

## 2019-01-03 RX ORDER — SENNA PLUS 8.6 MG/1
2 TABLET ORAL
Qty: 0 | Refills: 0 | DISCHARGE
Start: 2019-01-03

## 2019-01-03 RX ORDER — ACETAMINOPHEN 500 MG
2 TABLET ORAL
Qty: 0 | Refills: 0 | DISCHARGE
Start: 2019-01-03

## 2019-01-03 RX ORDER — GABAPENTIN 400 MG/1
1 CAPSULE ORAL
Qty: 0 | Refills: 0 | DISCHARGE
Start: 2019-01-03

## 2019-01-03 RX ORDER — INSULIN LISPRO 100/ML
0 VIAL (ML) SUBCUTANEOUS
Qty: 0 | Refills: 0 | COMMUNITY

## 2019-01-03 RX ORDER — DICLOFENAC SODIUM 30 MG/G
1 GEL TOPICAL
Qty: 0 | Refills: 0 | DISCHARGE
Start: 2019-01-03

## 2019-01-03 RX ORDER — ALBUTEROL 90 UG/1
1 AEROSOL, METERED ORAL
Qty: 0 | Refills: 0 | DISCHARGE
Start: 2019-01-03

## 2019-01-03 RX ORDER — OXYCODONE HYDROCHLORIDE 5 MG/1
1 TABLET ORAL
Qty: 0 | Refills: 0 | DISCHARGE
Start: 2019-01-03

## 2019-01-03 RX ORDER — ASPIRIN/CALCIUM CARB/MAGNESIUM 324 MG
1 TABLET ORAL
Qty: 0 | Refills: 0 | DISCHARGE
Start: 2019-01-03

## 2019-01-03 RX ORDER — PANTOPRAZOLE SODIUM 20 MG/1
1 TABLET, DELAYED RELEASE ORAL
Qty: 0 | Refills: 0 | DISCHARGE
Start: 2019-01-03

## 2019-01-03 RX ORDER — ERGOCALCIFEROL 1.25 MG/1
1 CAPSULE ORAL
Qty: 0 | Refills: 0 | DISCHARGE
Start: 2019-01-03

## 2019-01-03 RX ORDER — FUROSEMIDE 40 MG
10 TABLET ORAL DAILY
Qty: 0 | Refills: 0 | Status: DISCONTINUED | OUTPATIENT
Start: 2019-01-03 | End: 2019-01-03

## 2019-01-03 RX ORDER — LANOLIN ALCOHOL/MO/W.PET/CERES
1 CREAM (GRAM) TOPICAL
Qty: 0 | Refills: 0 | DISCHARGE
Start: 2019-01-03

## 2019-01-03 RX ORDER — LEVOTHYROXINE SODIUM 125 MCG
1 TABLET ORAL
Qty: 0 | Refills: 0 | DISCHARGE
Start: 2019-01-03

## 2019-01-03 RX ORDER — TAMSULOSIN HYDROCHLORIDE 0.4 MG/1
1 CAPSULE ORAL
Qty: 0 | Refills: 0 | DISCHARGE
Start: 2019-01-03

## 2019-01-03 RX ORDER — INSULIN LISPRO 100/ML
16 VIAL (ML) SUBCUTANEOUS
Qty: 0 | Refills: 0 | COMMUNITY

## 2019-01-03 RX ORDER — HEPARIN SODIUM 5000 [USP'U]/ML
5000 INJECTION INTRAVENOUS; SUBCUTANEOUS
Qty: 0 | Refills: 0 | DISCHARGE
Start: 2019-01-03

## 2019-01-03 RX ORDER — FUROSEMIDE 40 MG
10 TABLET ORAL
Qty: 0 | Refills: 0 | COMMUNITY
Start: 2019-01-03

## 2019-01-03 RX ADMIN — GABAPENTIN 100 MILLIGRAM(S): 400 CAPSULE ORAL at 06:37

## 2019-01-03 RX ADMIN — ERGOCALCIFEROL 50000 UNIT(S): 1.25 CAPSULE ORAL at 11:24

## 2019-01-03 RX ADMIN — FLUTICASONE PROPIONATE AND SALMETEROL 1 DOSE(S): 50; 250 POWDER ORAL; RESPIRATORY (INHALATION) at 09:13

## 2019-01-03 RX ADMIN — Medication 1 TABLET(S): at 11:24

## 2019-01-03 RX ADMIN — Medication 250 MILLIGRAM(S): at 06:37

## 2019-01-03 RX ADMIN — Medication 81 MILLIGRAM(S): at 11:24

## 2019-01-03 RX ADMIN — OXYCODONE HYDROCHLORIDE 5 MILLIGRAM(S): 5 TABLET ORAL at 11:13

## 2019-01-03 RX ADMIN — Medication 10 MILLIGRAM(S): at 06:56

## 2019-01-03 RX ADMIN — AMIODARONE HYDROCHLORIDE 200 MILLIGRAM(S): 400 TABLET ORAL at 06:37

## 2019-01-03 RX ADMIN — HEPARIN SODIUM 5000 UNIT(S): 5000 INJECTION INTRAVENOUS; SUBCUTANEOUS at 06:36

## 2019-01-03 RX ADMIN — Medication 8 UNIT(S): at 08:00

## 2019-01-03 RX ADMIN — Medication 8 UNIT(S): at 11:56

## 2019-01-03 RX ADMIN — Medication 137 MICROGRAM(S): at 06:37

## 2019-01-03 RX ADMIN — Medication 7.5 MILLIGRAM(S): at 06:37

## 2019-01-03 RX ADMIN — OXYCODONE HYDROCHLORIDE 5 MILLIGRAM(S): 5 TABLET ORAL at 11:24

## 2019-01-03 RX ADMIN — PANTOPRAZOLE SODIUM 40 MILLIGRAM(S): 20 TABLET, DELAYED RELEASE ORAL at 06:37

## 2019-01-03 NOTE — DISCHARGE NOTE ADULT - MEDICATION SUMMARY - MEDICATIONS TO TAKE
I will START or STAY ON the medications listed below when I get home from the hospital:    CTA of Chest / Abdomen and Pelvic with Contrast  -- Please repeat CTA of Chest / ABD / Pelvis in 2 months and Follow up with Dr. Dexter Kumar for results   -- Indication: For type B aortic dissection    predniSONE 2.5 mg oral tablet  -- 3 tab(s) by mouth every 12 hours  -- Indication: For Asthma    acetaminophen 325 mg oral tablet  -- 2 tab(s) by mouth every 6 hours, As needed, Temp greater or equal to 38C (100.4F), Mild Pain (1 - 3)  -- Indication: For pain    oxyCODONE 5 mg oral tablet  -- 1 tab(s) by mouth every 4 hours, As needed, Moderate Pain (4 - 6)  -- Indication: For pain    oxyCODONE 10 mg oral tablet  -- 1 tab(s) by mouth every 4 hours, As needed, Severe Pain (7 - 10)  -- Indication: For pain    aspirin 81 mg oral delayed release tablet  -- 1 tab(s) by mouth once a day  -- Indication: For Acute infarction of spinal cord    tamsulosin 0.4 mg oral capsule  -- 1 cap(s) by mouth once a day (at bedtime)  -- Indication: For urinary retention    amiodarone 200 mg oral tablet  -- 1 tab(s) by mouth once a day  -- Indication: For Atrial fibrillation - rate control    heparin  -- 5000 unit(s) subcutaneous every 8 hours  -- Indication: For DVT prophylaxis    gabapentin 300 mg oral capsule  -- 1 cap(s) by mouth once a day (at bedtime)  -- Indication: For pain    gabapentin 100 mg oral capsule  -- 1 cap(s) by mouth 2 times a day  -- Indication: For pain    insulin glargine  -- 33 unit(s) subcutaneous once a day (at bedtime)  -- Indication: For diabetes    HumaLOG 100 units/mL subcutaneous solution  -- 8 unit(s) subcutaneous 3 times a day (before meals)  -- Indication: For diabetes    fluticasone-salmeterol  -- 1 inhaler(s) by mouth 2 times a day  -- Indication: For Asthma    albuterol 90 mcg/inh inhalation aerosol  -- 1 puff(s) inhaled every 4 hours  -- Indication: For Asthma    albuterol 90 mcg/inh inhalation aerosol  -- 1 puff(s) inhaled every 4 hours  -- Indication: For Asthma    diclofenac 1% topical gel  -- Apply on skin to affected area 2 times a day  -- Indication: For pain    sodium biphosphate-sodium phosphate 7 g-19 g rectal enema  -- 0.5 each rectally , As needed, every day for constipation  -- Indication: For bowel incontinence     senna oral tablet  -- 2 tab(s) by mouth once a day (at bedtime), As needed, Constipation  -- Indication: For constipation    montelukast 10 mg oral tablet  -- 1 tab(s) by mouth once a day  -- Indication: For Asthma    melatonin 3 mg oral tablet  -- 1 tab(s) by mouth   -- Indication: For sleep    pantoprazole 40 mg oral delayed release tablet  -- 1 tab(s) by mouth once a day (before a meal)  -- Indication: For GI ppx on aspirin, heparin     levothyroxine 137 mcg (0.137 mg) oral tablet  -- 1 tab(s) by mouth once a day  -- Indication: For synthroid    Multiple Vitamins oral tablet  -- 1 tab(s) by mouth once a day  -- Indication: For nutrition    Drisdol 50,000 intl units (1.25 mg) oral capsule  -- 1 cap(s) by mouth once a week  -- Indication: For vitamin/nutrition

## 2019-01-03 NOTE — DISCHARGE NOTE ADULT - ADDITIONAL INSTRUCTIONS
1. Please schedule an appointment with Dr. Kumar once you have completed your chest CT in 2 months for a follow up visit and for then follow up in the Aortic Registry, please call the office to schedule an appointment at [phone].   Please schedule an appointment with DR. MONTANEZ vascular surgery [phone] within 1-2 weeks   2. Please schedule an appointment with your PCP in 1-2 weeks after discharged from rehab, call the office to schedule an appointment.  3. Please schedule an appointment with your Cardiologist in 1-2 weeks after discharged from rehab, please call the office to schedule an appointment.

## 2019-01-03 NOTE — PROGRESS NOTE ADULT - ASSESSMENT
62 yo Female admitted to acute rehab s/p Type B aortic dissection and distal thoracic/conus spinal cord infarction     #Acute thoracic / conus Spinal cord infarction s/p  Type B Thoracic aortic dissection   - c/w ASA   - c/w PT/OT as tolerated     #Neurogenic bowel/bladder secondary to above  -Intermittent cath   -voiding is improving   -c/w Flomax     #Bilateral leg DVT (bilateral common femoral vein)   -patient with recent type b dissection and hemopericardium  -f/u Vascular Sx consult for recs on AC     #Paroxysmal Atrial fibrillation- rate controlled.   c/w Amiodarone.   No AC due to recent aortic dissection with bleed      # DM type 2 - HBA1c 7.0 12/24, FS acceptable.   on insulin  c/w  Lantus   33 units QHS , c/w  Humalog 8 U TID-AC   c/w SSI  Hypoglycemia protocol.     #Hypothyroidism - stable.   c/w Synthroid    #Hyperparathyroidism   endocrine follow-up      # Asthma - asymptomatic  c/w prednisone 7.5 mg Q12h    c/w Duoneb/Advair, Montelukast     #DVT PPx : Heparin

## 2019-01-03 NOTE — PROGRESS NOTE ADULT - PROVIDER SPECIALTY LIST ADULT
Hospitalist
Physiatry
Physiatry
Rehab Medicine
Hospitalist
Rehab Medicine
Hospitalist

## 2019-01-03 NOTE — DISCHARGE NOTE ADULT - OTHER SIGNIFICANT FINDINGS
< from: US Duplex Venous Lower Ext Complete, Bilateral (12.29.18 @ 11:45) >  FINDINGS:    There is diminished flow , incomplete compression of the bilateral common   femoral, superficial femoral and popliteal veins consistent with   partially occlusive bilateral lower extremity deep venous thrombus. There   is a fluid collection the right popliteal fossa likely Baker's cyst   measuring 6.2 x 5 x 2 cm.    IMPRESSION:  extensive bilateral lower extremity deep venous thrombosis.    < end of copied text >    < from: Xray Chest 2 Views PA/Lat (12.20.18 @ 15:00) >  INTERPRETATION:  AP and lateral chest on December 20, 2018 at 2:43 PM.   Patient is short of breath.    Heart is enlarged. There is vascular ectasia of the superior mediastinum.   Clips above the medial right clavicle again noted.    On December 11 of this year there were mild basilar effusions right   greater than left.    Present film shows the effusions have reduced. There is some residual   slight atelectasis at right base.    IMPRESSION: As above.    < end of copied text >

## 2019-01-03 NOTE — PROGRESS NOTE ADULT - SUBJECTIVE AND OBJECTIVE BOX
Patient is a 63y old  Female who presents with a chief complaint of functional deficits after spinal cord infarction (02 Jan 2019 12:52)      Patient seen and examined at bedside. Comfortable. No new complaints.     ALLERGIES:  adhesives (Unknown)  Ceftin (Rash)  erythromycin (Rash)  fish (Unknown)  Levaquin (Rash)    MEDICATIONS:  diclofenac sodium 1% Gel 2 Gram(s) Topical three times a day PRN  ergocalciferol 06774 Unit(s) Oral every week  fluticasone propionate/ salmeterol 500-50 MICROgram(s) Diskus 1 Dose(s) Inhalation two times a day  furosemide    Tablet 10 milliGRAM(s) Oral daily  gabapentin 100 milliGRAM(s) Oral two times a day  insulin glargine Injectable (LANTUS) 33 Unit(s) SubCutaneous at bedtime  insulin lispro Injectable (HumaLOG) 8 Unit(s) SubCutaneous three times a day before meals  melatonin 3 milliGRAM(s) Oral <User Schedule>  oxyCODONE    IR 5 milliGRAM(s) Oral every 4 hours PRN  oxyCODONE    IR 10 milliGRAM(s) Oral every 4 hours PRN  saccharomyces boulardii 250 milliGRAM(s) Oral two times a day  saline laxative (FLEET) Rectal Enema 0.5 Enema Rectal <User Schedule> PRN  tamsulosin 0.4 milliGRAM(s) Oral at bedtime    Vital Signs Last 24 Hrs  T(F): 98.2 (02 Jan 2019 21:00), Max: 98.2 (02 Jan 2019 21:00)  HR: 77 (02 Jan 2019 21:00) (77 - 77)  BP: 124/80 (02 Jan 2019 21:00) (124/80 - 124/80)  RR: 14 (02 Jan 2019 21:00) (14 - 14)  SpO2: 97% (02 Jan 2019 21:00) (97% - 97%)  I&O's Summary      PHYSICAL EXAM:  General: NAD, comfortable   ENT: MMM  Neck: Supple, No JVD  Lungs: CTA, BLAE, No added sounds.   Cardio: RRR, S1/S2, No murmurs  Abdomen: Soft, NT/ND, BS+   Extremities: No clubbing, cyanosis, or edema  Psych: A/O x 3   BL lE weakness.     LABS:                            CAPILLARY BLOOD GLUCOSE      POCT Blood Glucose.: 116 mg/dL (03 Jan 2019 07:59)  POCT Blood Glucose.: 110 mg/dL (02 Jan 2019 21:20)  POCT Blood Glucose.: 130 mg/dL (02 Jan 2019 16:57)  POCT Blood Glucose.: 181 mg/dL (02 Jan 2019 12:04)    12-24 [id number] 7.0  12-07 SdngcdquptV6S 6.4          RADIOLOGY & ADDITIONAL TESTS:    Care Discussed with Consultants/Other Providers:

## 2019-01-03 NOTE — DISCHARGE NOTE ADULT - PLAN OF CARE
Medical management 1. Daily Shower  2. Weight yourself daily and notify any weight gain greater than 2-3 pounds in 24 hours.  3. Regular DASH / Diabetic diet - low fat, low cholesterol, no added salt.  4. Blood draws CBC and CMP twice a week every Monday's and Thursdays and PRN     Increase Activity as tolerated.    Call / Notify MD any fever greater than 101.0  Daily PT/ OT  Follow up with CT surgeon and follow up with Dr. Hurst to discuss when appropriate to resume Coumadin Medical Management Continue with ASA 81 mg PO daily   Daily PT/OT. Follow up with Dr. Mendoza for ongoing rehab needs as outpatient    strict BP control, avoid hypotension  Maintain epps to SD for Neurogenic bladder Glycemic  Control Continue lantus and humalog as prescribed   Accuchecks AC and HS with RISS coverage   Continue with DASH / Diabetic Diet  Follow up with Endocrinologist after discharged from rehab.

## 2019-01-03 NOTE — PROGRESS NOTE ADULT - SUBJECTIVE AND OBJECTIVE BOX
Patient is a 63y old  Female who presents with a chief complaint of functional deficits after spinal cord infarction (03 Jan 2019 09:03)      HPI:  63 year old F with PMH of asthma (on chronic steroids), DM2, HLD, obesity, hypothyroid, PE (on Coumadin, with IVC filter) who presented initially to [facility] and then transferred to [facility] on 12/7/18 with sudden-onset upper back pain between her shoulder blades for one hour associated with shortness of breath. Patient also noted LE weakness that started around same time as back pain. At [facility], initial CTA of the chest revealed probable type A dissection with intramural hematoma and active hemorrhage, so she was transferred urgently to [facility] for possible CT surgery. Upon arrival to [facility], patient was started on Labetalol gtt for blood pressure control; repeat CTA chest revealed Type B dissection with hemoperitoneum, so patient did not undergo surgical intervention. TTE with pericaridal effusion and tamponade physiology.  INR was reversed with FFP and vitamin K.  MRI L-spine revealed abnormal signal within the conus medullaris and distal thoracic cord, with expansion of the conus, suspicious for spinal infarction. Neurosurgery consulted, no surgical intervention recommended. Recommended by neurology to continue ASA 81 mg daily.  Hospital course significant for Afib on telemetry monitoring; s/p Labetalol and amiodarone load. Course notable for orthostasis on 12/17 while working with PT which improved after diuretics were discontinued and IV fluids.  Also with urinary retention due to neurogenic bladder with epps catheter placed 12/18. Deemed medically stable for acute rehab on 12/19/18. (19 Dec 2018 15:29)      PAST MEDICAL & SURGICAL HISTORY:  Arthritis  Shingles  PE (pulmonary embolism)  Hypothyroidism  Hyperlipidemia  Diabetes mellitus  Asthma  History of cataract surgery  S/P cholecystectomy      MEDICATIONS  (STANDING):  ALBUTerol    90 MICROgram(s) HFA Inhaler 1 Puff(s) Inhalation every 4 hours  ALBUTerol    90 MICROgram(s) HFA Inhaler 1 Puff(s) Inhalation every 4 hours  amiodarone    Tablet 200 milliGRAM(s) Oral daily  aspirin enteric coated 81 milliGRAM(s) Oral daily  dextrose 5%. 1000 milliLiter(s) (50 mL/Hr) IV Continuous <Continuous>  dextrose 50% Injectable 12.5 Gram(s) IV Push once  dextrose 50% Injectable 25 Gram(s) IV Push once  dextrose 50% Injectable 25 Gram(s) IV Push once  ergocalciferol 10364 Unit(s) Oral every week  fluticasone propionate/ salmeterol 500-50 MICROgram(s) Diskus 1 Dose(s) Inhalation two times a day  furosemide    Tablet 10 milliGRAM(s) Oral daily  gabapentin 100 milliGRAM(s) Oral two times a day  gabapentin 300 milliGRAM(s) Oral at bedtime  heparin  Injectable 5000 Unit(s) SubCutaneous every 8 hours  insulin glargine Injectable (LANTUS) 33 Unit(s) SubCutaneous at bedtime  insulin lispro (HumaLOG) corrective regimen sliding scale   SubCutaneous three times a day before meals  insulin lispro (HumaLOG) corrective regimen sliding scale   SubCutaneous at bedtime  insulin lispro Injectable (HumaLOG) 8 Unit(s) SubCutaneous three times a day before meals  levothyroxine 137 MICROGram(s) Oral daily  melatonin 3 milliGRAM(s) Oral <User Schedule>  multivitamin 1 Tablet(s) Oral daily  pantoprazole    Tablet 40 milliGRAM(s) Oral before breakfast  predniSONE   Tablet 7.5 milliGRAM(s) Oral every 12 hours  saccharomyces boulardii 250 milliGRAM(s) Oral two times a day  tamsulosin 0.4 milliGRAM(s) Oral at bedtime    MEDICATIONS  (PRN):  acetaminophen   Tablet .. 650 milliGRAM(s) Oral every 6 hours PRN Temp greater or equal to 38C (100.4F), Mild Pain (1 - 3)  dextrose 40% Gel 15 Gram(s) Oral once PRN Blood Glucose LESS THAN 70 milliGRAM(s)/deciliter  diclofenac sodium 1% Gel 2 Gram(s) Topical three times a day PRN shoulder and bilateral knees pain  glucagon  Injectable 1 milliGRAM(s) IntraMuscular once PRN Glucose LESS THAN 70 milligrams/deciliter  oxyCODONE    IR 5 milliGRAM(s) Oral every 4 hours PRN Moderate Pain (4 - 6)  oxyCODONE    IR 10 milliGRAM(s) Oral every 4 hours PRN Severe Pain (7 - 10)  saline laxative (FLEET) Rectal Enema 0.5 Enema Rectal <User Schedule> PRN constipation  senna 2 Tablet(s) Oral at bedtime PRN Constipation      Allergies    adhesives (Unknown)  Ceftin (Rash)  erythromycin (Rash)  fish (Unknown)  Levaquin (Rash)    Intolerances        TODAY'S SUBJECTIVE & REVIEW OF SYMPTOMS:    Patient states that he  was able to visit rehab facilities yesterday and they have made selections. concerned about whether or not she will receive adequate medical oversight; also, questions re; AC, risks/benefits, injection options discussed    PHYSICAL EXAM  63y  Vital Signs Last 24 Hrs  T(C): 36.6 (03 Jan 2019 09:07), Max: 36.8 (02 Jan 2019 21:00)  T(F): 97.8 (03 Jan 2019 09:07), Max: 98.2 (02 Jan 2019 21:00)  HR: 79 (03 Jan 2019 09:07) (77 - 79)  BP: 126/72 (03 Jan 2019 09:07) (124/80 - 126/72)  BP(mean): --  RR: 14 (03 Jan 2019 09:07) (14 - 14)  SpO2: 96% (03 Jan 2019 09:07) (96% - 97%)  Daily     Daily     General: alert O c 4    Resp: clear bilaterally no R?r/W  Cardio: HR regular 79 Nl S1 and S2  Abdomen: +BS soft, Nt ND  Extremities: no calf tenderness  BLE edema ACE wrap, improved from yesterday(seen in bed) 1+ compressivel, distensible  no erythema or warmth  RLE 2-3/5 left LE 3/5 proximally:      RECENT LABS:                      CAPILLARY BLOOD GLUCOSE      POCT Blood Glucose.: 116 mg/dL (03 Jan 2019 07:59)  POCT Blood Glucose.: 110 mg/dL (02 Jan 2019 21:20)  POCT Blood Glucose.: 130 mg/dL (02 Jan 2019 16:57)  POCT Blood Glucose.: 181 mg/dL (02 Jan 2019 12:04)    IMPRESSION AND PLAN:

## 2019-01-03 NOTE — DISCHARGE NOTE ADULT - CARE PROVIDER_API CALL
Dexter Kumar), Surgery; Thoracic and Cardiac Surgery  [address] [place] [address]  Phone: [phone]  Fax: [phone]    Kaleb Andrews), EndocrinologyMetabDiabetes; Internal Medicine  [address] [place] [address]  Phone: [phone]  Fax: [phone]    Ovidio Hua), Neurology; Vascular Neurology  [address]  [address]  [place] [address]  Phone: [phone]  Fax: [phone]    Ulises Hurst), Surgery  [address]  [address]  [place], [place] [id number]  Phone: [phone]  Fax: [phone]

## 2019-01-03 NOTE — DISCHARGE NOTE ADULT - PATIENT PORTAL LINK FT
You can access the [facility] Patient Portal, offered by [facility], by registering with the following website: [email or web address]

## 2019-01-03 NOTE — PROGRESS NOTE ADULT - ASSESSMENT
62 yo Female admitted to acute rehab s/p Type B aortic dissection and distal thoracic/conus spinal cord infarction     #Acute thoracic / conus Spinal cord infarction s/p  Type B Thoracic aortic dissection   - c/w ASA   - c/w PT/OT as tolerated     #Neurogenic bowel/bladder secondary to #1 above  -ISC prn  -bladder/bowel training exercises /toileting program-  -Laxatives prn, especially while on narcotics  -continue flomax    #bilateral leg DVT (bilateral common femoral vein)   -patient with recent type b dissection and hemopericardium  -vascular surgery not available until next week (Dr Hurst) given conern recent dissection and bleed, presence of IVCF and hospitalist recommendations, which were discussed in detail with pateint, recommend continuing DVT PPX dose of injectable for now. Patient may f/u with Dr Hurst upon return, including while in [facility] facility for opinion and clearance in strating AC and options. As this would not change whether patient remains in [facility] vs [facility], she is medically cleared for transfer once accepted/bed available, was discussed  -informed patient we will put Dr Hurst's contact information in dsicharge summary, and notify Dr Hurst's office of disposition when it occurs    #Paroxysmal Atrial fibrillation  c/w Amiodarone.   Not on AC due to recent aortic dissection with bleed    # Asthma - asymptomatic  c/w prednisone 7.5 mg Q12h    c/w Duoneb/Advair, Montelukast     # DM type 2 - HBA1c 7.0 (dec 24), acceptable fingersticks, on insulin  c/w  Lantus   33 units QHS , c/w  Humalog 16 U TID-AC ,   Endocrine following    #Hypothyroidism - stable.   c/w Synthroid    #Hyperparathyroidism   endocrine follow-up    #Obesity  -Outpatient exercise regimen as tolerated\    # [facility] in progress. Patient aware  -f/u after dc from [facility] discussed  -contact information for vascular and communication with Dr Hurst's office prior to transfer discussed    #DVT PPx : Heparin

## 2019-01-03 NOTE — DISCHARGE NOTE ADULT - CARE PROVIDERS DIRECT ADDRESSES
[email or web address],DirectAddress_Unknown

## 2019-01-03 NOTE — DISCHARGE NOTE ADULT - MEDICATION SUMMARY - MEDICATIONS TO STOP TAKING
I will STOP taking the medications listed below when I get home from the hospital:    oxycodone-acetaminophen 5 mg-325 mg oral tablet  -- 1 tab(s) by mouth every 4 hours, As needed, Moderate Pain (4 - 6)

## 2019-01-03 NOTE — DISCHARGE NOTE ADULT - HOSPITAL COURSE
63 year old F with PMH of asthma (on chronic steroids), DM2, HLD, obesity, hypothyroid, PE (on Coumadin, with IVC filter) who presented initially to [facility] and then transferred to [facility] on 12/7/18 with sudden-onset upper back pain between her shoulder blades for one hour associated with shortness of breath. Patient also noted LE weakness that started around same time as back pain. At [facility], initial CTA of the chest revealed probable type A dissection with intramural hematoma and active hemorrhage, so she was transferred urgently to [facility] for possible CT surgery. Upon arrival to [facility], patient was started on Labetalol gtt for blood pressure control; repeat CTA chest revealed Type B dissection with hemoperitoneum, so patient did not undergo surgical intervention. TTE with pericaridal effusion and tamponade physiology.  INR was reversed with FFP and vitamin K.  MRI L-spine revealed abnormal signal within the conus medullaris and distal thoracic cord, with expansion of the conus, suspicious for spinal infarction. Neurosurgery consulted, no surgical intervention recommended. Recommended by neurology to continue ASA 81 mg daily.  Hospital course significant for Afib on telemetry monitoring; s/p Labetalol and amiodarone load. Course notable for orthostasis on 12/17 while working with PT which improved after diuretics were discontinued and IV fluids.  Also with urinary retention due to neurogenic bladder with epps catheter placed 12/18. Deemed medically stable for acute rehab on 12/19/18.      #Acute thoracic / conus Spinal cord infarction s/p  Type B Thoracic aortic dissection   - c/w ASA   - c/w PT/OT as tolerated     #Neurogenic bowel/bladder secondary to #1 above  -ISC prn  -bladder/bowel training exercises /toileting program-  -Laxatives prn, especially while on narcotics  -continue flomax  - E. coli UTI s/p Macrobid x 5 days 12/24-12/29    #bilateral leg DVT (bilateral common femoral vein)   -patient with recent type b dissection and hemopericardium  -vascular surgery not available until next week (Dr Hurst) given concern recent dissection and bleed, presence of IVCF and hospitalist recommendations, which were discussed in detail with patient recommend continuing DVT PPX dose of injectable for now. Patient may f/u with Dr Hurst upon return, including while in [facility] facility for opinion and clearance in strating AC and options. As this would not change whether patient remains in [facility] vs [facility], she is medically cleared for transfer once accepted/bed available, was discussed  -informed patient we will put Dr Hurst's contact information in dsicharge summary, and notify Dr Hurst's office of disposition when it occurs    #Paroxysmal Atrial fibrillation  c/w Amiodarone.   Not on AC due to recent aortic dissection with bleed    # Asthma - asymptomatic  c/w prednisone 7.5 mg Q12h    c/w Duoneb/Advair, Montelukast     # DM type 2 - HBA1c 7.0 (dec 24), acceptable fingersticks, on insulin  c/w  Lantus   33 units QHS , c/w  Humalog 16 U TID-AC ,   Endocrine following    #Hypothyroidism - stable.   c/w Synthroid    #Hyperparathyroidism   endocrine follow-up    #Obesity  -Outpatient exercise regimen as tolerated\    # [facility] in progress. Patient aware  -f/u after dc from [facility] discussed  -contact information for vascular and communication with Dr Hurst's office prior to transfer discussed    #DVT PPx : Heparin    Currently stable for subacute rehab. 63 year old F with PMH of asthma (on chronic steroids), DM2, HLD, obesity, hypothyroid, PE (on Coumadin, with IVC filter) who presented initially to [facility] and then transferred to [facility] on 12/7/18 with sudden-onset upper back pain between her shoulder blades for one hour associated with shortness of breath. Patient also noted LE weakness that started around same time as back pain. At [facility], initial CTA of the chest revealed probable type A dissection with intramural hematoma and active hemorrhage, so she was transferred urgently to [facility] for possible CT surgery. Upon arrival to [facility], patient was started on Labetalol gtt for blood pressure control; repeat CTA chest revealed Type B dissection with hemoperitoneum, so patient did not undergo surgical intervention. TTE with pericaridal effusion and tamponade physiology.  INR was reversed with FFP and vitamin K.  MRI L-spine revealed abnormal signal within the conus medullaris and distal thoracic cord, with expansion of the conus, suspicious for spinal infarction. Neurosurgery consulted, no surgical intervention recommended. Recommended by neurology to continue ASA 81 mg daily.  Hospital course significant for Afib on telemetry monitoring; s/p Labetalol and amiodarone load. Course notable for orthostasis on 12/17 while working with PT which improved after diuretics were discontinued and IV fluids.  Also with urinary retention due to neurogenic bladder with epps catheter placed 12/18. Deemed medically stable for acute rehab on 12/19/18.      #Acute thoracic / conus Spinal cord infarction s/p  Type B Thoracic aortic dissection   - c/w ASA   - c/w PT/OT as tolerated     #Neurogenic bowel/bladder secondary to #1 above  -ISC prn  -bladder/bowel training exercises /toileting program-  -Laxatives prn, especially while on narcotics  -continue flomax  - E. coli UTI s/p Macrobid x 5 days 12/24-12/29    #bilateral leg DVT (bilateral common femoral vein)   -patient with recent type b dissection and hemopericardium  +h/o IVCF continue DVT PPX dose of injectable for now. Switch to oral anticoagulant to be considered by vascular surgery in future    #Paroxysmal Atrial fibrillation  c/w Amiodarone.  Not on AC due to recent aortic dissection with bleed    # Asthma - asymptomatic  c/w prednisone 7.5 mg Q12h    c/w Duoneb/Advair, Montelukast     # DM type 2 - HBA1c 7.0 (dec 24)  c/w  Lantus and humalog      #Hypothyroidism -  c/w Synthroid    #Hyperparathyroidism   endocrine follow-up on discharge    #DVT PPx : Heparin

## 2019-01-03 NOTE — DISCHARGE NOTE ADULT - CARE PLAN
Principal Discharge DX:	Dissection of thoracic aorta  Goal:	Medical management  Assessment and plan of treatment:	1. Daily Shower  2. Weight yourself daily and notify any weight gain greater than 2-3 pounds in 24 hours.  3. Regular DASH / Diabetic diet - low fat, low cholesterol, no added salt.  4. Blood draws CBC and CMP twice a week every Monday's and Thursdays and PRN     Increase Activity as tolerated.    Call / Notify MD any fever greater than 101.0  Daily PT/ OT  Follow up with CT surgeon and follow up with Dr. Hurst to discuss when appropriate to resume Coumadin  Secondary Diagnosis:	Spinal cord infarction  Goal:	Medical Management  Assessment and plan of treatment:	Continue with ASA 81 mg PO daily   Daily PT/OT. Follow up with Dr. Mendoza for ongoing rehab needs as outpatient    strict BP control, avoid hypotension  Maintain epps to SD for Neurogenic bladder  Secondary Diagnosis:	Diabetes mellitus  Goal:	Glycemic  Control  Assessment and plan of treatment:	Continue lantus and humalog as prescribed   Accuchecks AC and HS with RISS coverage   Continue with DASH / Diabetic Diet  Follow up with Endocrinologist after discharged from rehab.

## 2019-01-03 NOTE — DISCHARGE NOTE ADULT - NS MD DC FALL RISK RISK
----- Message from Clarence Garcia MD sent at 2/27/2018  4:34 PM CST -----  Please notify patient    CBC: satisfactory  CMP: satisfactory  TSH: normal  Vit D: normal    Patient and spouse on the same extension, both informed   For information on Fall & Injury Prevention, visit [email or web address]

## 2019-01-11 ENCOUNTER — APPOINTMENT (OUTPATIENT)
Dept: FAMILY MEDICINE | Facility: CLINIC | Age: 64
End: 2019-01-11
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
## 2019-01-15 DIAGNOSIS — R15.9 FULL INCONTINENCE OF FECES: ICD-10-CM

## 2019-01-15 DIAGNOSIS — Z79.4 LONG TERM (CURRENT) USE OF INSULIN: ICD-10-CM

## 2019-01-15 DIAGNOSIS — Z79.82 LONG TERM (CURRENT) USE OF ASPIRIN: ICD-10-CM

## 2019-01-15 DIAGNOSIS — Z79.52 LONG TERM (CURRENT) USE OF SYSTEMIC STEROIDS: ICD-10-CM

## 2019-01-15 DIAGNOSIS — K59.2 NEUROGENIC BOWEL, NOT ELSEWHERE CLASSIFIED: ICD-10-CM

## 2019-01-15 DIAGNOSIS — I71.00 DISSECTION OF UNSPECIFIED SITE OF AORTA: ICD-10-CM

## 2019-01-15 DIAGNOSIS — B96.20 UNSPECIFIED ESCHERICHIA COLI [E. COLI] AS THE CAUSE OF DISEASES CLASSIFIED ELSEWHERE: ICD-10-CM

## 2019-01-15 DIAGNOSIS — R60.9 EDEMA, UNSPECIFIED: ICD-10-CM

## 2019-01-15 DIAGNOSIS — M79.662 PAIN IN LEFT LOWER LEG: ICD-10-CM

## 2019-01-15 DIAGNOSIS — N39.0 URINARY TRACT INFECTION, SITE NOT SPECIFIED: ICD-10-CM

## 2019-01-15 DIAGNOSIS — D72.829 ELEVATED WHITE BLOOD CELL COUNT, UNSPECIFIED: ICD-10-CM

## 2019-01-15 DIAGNOSIS — E11.65 TYPE 2 DIABETES MELLITUS WITH HYPERGLYCEMIA: ICD-10-CM

## 2019-01-15 DIAGNOSIS — Z95.828 PRESENCE OF OTHER VASCULAR IMPLANTS AND GRAFTS: ICD-10-CM

## 2019-01-15 DIAGNOSIS — G95.11 ACUTE INFARCTION OF SPINAL CORD (EMBOLIC) (NONEMBOLIC): ICD-10-CM

## 2019-01-15 DIAGNOSIS — E21.3 HYPERPARATHYROIDISM, UNSPECIFIED: ICD-10-CM

## 2019-01-15 DIAGNOSIS — R49.0 DYSPHONIA: ICD-10-CM

## 2019-01-15 DIAGNOSIS — J45.909 UNSPECIFIED ASTHMA, UNCOMPLICATED: ICD-10-CM

## 2019-01-15 DIAGNOSIS — Z51.89 ENCOUNTER FOR OTHER SPECIFIED AFTERCARE: ICD-10-CM

## 2019-01-15 DIAGNOSIS — I82.413 ACUTE EMBOLISM AND THROMBOSIS OF FEMORAL VEIN, BILATERAL: ICD-10-CM

## 2019-01-15 DIAGNOSIS — R32 UNSPECIFIED URINARY INCONTINENCE: ICD-10-CM

## 2019-01-15 DIAGNOSIS — T38.0X5A ADVERSE EFFECT OF GLUCOCORTICOIDS AND SYNTHETIC ANALOGUES, INITIAL ENCOUNTER: ICD-10-CM

## 2019-01-15 DIAGNOSIS — M19.90 UNSPECIFIED OSTEOARTHRITIS, UNSPECIFIED SITE: ICD-10-CM

## 2019-01-15 DIAGNOSIS — I48.91 UNSPECIFIED ATRIAL FIBRILLATION: ICD-10-CM

## 2019-01-15 DIAGNOSIS — E66.9 OBESITY, UNSPECIFIED: ICD-10-CM

## 2019-01-15 DIAGNOSIS — D62 ACUTE POSTHEMORRHAGIC ANEMIA: ICD-10-CM

## 2019-01-15 DIAGNOSIS — M79.661 PAIN IN RIGHT LOWER LEG: ICD-10-CM

## 2019-01-15 DIAGNOSIS — Z86.711 PERSONAL HISTORY OF PULMONARY EMBOLISM: ICD-10-CM

## 2019-01-15 DIAGNOSIS — R29.898 OTHER SYMPTOMS AND SIGNS INVOLVING THE MUSCULOSKELETAL SYSTEM: ICD-10-CM

## 2019-01-15 DIAGNOSIS — E03.9 HYPOTHYROIDISM, UNSPECIFIED: ICD-10-CM

## 2019-01-15 PROCEDURE — 97535 SELF CARE MNGMENT TRAINING: CPT

## 2019-01-15 PROCEDURE — 97167 OT EVAL HIGH COMPLEX 60 MIN: CPT

## 2019-01-15 PROCEDURE — 97530 THERAPEUTIC ACTIVITIES: CPT

## 2019-01-15 PROCEDURE — 87186 SC STD MICRODIL/AGAR DIL: CPT

## 2019-01-15 PROCEDURE — 94640 AIRWAY INHALATION TREATMENT: CPT

## 2019-01-15 PROCEDURE — 71046 X-RAY EXAM CHEST 2 VIEWS: CPT

## 2019-01-15 PROCEDURE — 80048 BASIC METABOLIC PNL TOTAL CA: CPT

## 2019-01-15 PROCEDURE — 97116 GAIT TRAINING THERAPY: CPT

## 2019-01-15 PROCEDURE — 93970 EXTREMITY STUDY: CPT

## 2019-01-15 PROCEDURE — 80053 COMPREHEN METABOLIC PANEL: CPT

## 2019-01-15 PROCEDURE — 87086 URINE CULTURE/COLONY COUNT: CPT

## 2019-01-15 PROCEDURE — 81001 URINALYSIS AUTO W/SCOPE: CPT

## 2019-01-15 PROCEDURE — 83036 HEMOGLOBIN GLYCOSYLATED A1C: CPT

## 2019-01-15 PROCEDURE — 82962 GLUCOSE BLOOD TEST: CPT

## 2019-01-15 PROCEDURE — 97110 THERAPEUTIC EXERCISES: CPT

## 2019-01-15 PROCEDURE — 84134 ASSAY OF PREALBUMIN: CPT

## 2019-01-15 PROCEDURE — 85027 COMPLETE CBC AUTOMATED: CPT

## 2019-01-15 PROCEDURE — 97163 PT EVAL HIGH COMPLEX 45 MIN: CPT

## 2019-02-22 ENCOUNTER — APPOINTMENT (OUTPATIENT)
Dept: FAMILY MEDICINE | Facility: CLINIC | Age: 64
End: 2019-02-22
Payer: MEDICARE

## 2019-02-22 VITALS
RESPIRATION RATE: 16 BRPM | HEART RATE: 83 BPM | SYSTOLIC BLOOD PRESSURE: 100 MMHG | TEMPERATURE: 98 F | BODY MASS INDEX: 34.1 KG/M2 | WEIGHT: 225 LBS | OXYGEN SATURATION: 99 % | HEIGHT: 68 IN | DIASTOLIC BLOOD PRESSURE: 60 MMHG

## 2019-02-22 DIAGNOSIS — Z92.29 PERSONAL HISTORY OF OTHER DRUG THERAPY: ICD-10-CM

## 2019-02-22 DIAGNOSIS — Z23 ENCOUNTER FOR IMMUNIZATION: ICD-10-CM

## 2019-02-22 DIAGNOSIS — Z79.01 LONG TERM (CURRENT) USE OF ANTICOAGULANTS: ICD-10-CM

## 2019-02-22 PROCEDURE — 99496 TRANSJ CARE MGMT HIGH F2F 7D: CPT

## 2019-02-22 RX ORDER — UBIDECARENONE 30 MG
81 CAPSULE ORAL
Qty: 30 | Refills: 0 | Status: ACTIVE | COMMUNITY
Start: 2019-02-22 | End: 1900-01-01

## 2019-02-22 RX ORDER — CYCLOBENZAPRINE HYDROCHLORIDE 5 MG/1
5 TABLET, FILM COATED ORAL
Qty: 90 | Refills: 1 | Status: DISCONTINUED | COMMUNITY
Start: 2018-10-06 | End: 2019-02-22

## 2019-02-23 ENCOUNTER — MOBILE ON CALL (OUTPATIENT)
Age: 64
End: 2019-02-23

## 2019-02-23 RX ORDER — CLOTRIMAZOLE 10 MG/1
10 LOZENGE ORAL 4 TIMES DAILY
Qty: 1 | Refills: 0 | Status: COMPLETED | COMMUNITY
Start: 2017-07-14 | End: 2019-02-23

## 2019-02-23 RX ORDER — TAMSULOSIN HYDROCHLORIDE 0.4 MG/1
0.4 CAPSULE ORAL
Qty: 30 | Refills: 0 | Status: DISCONTINUED | COMMUNITY
Start: 2019-02-20

## 2019-02-23 RX ORDER — FUROSEMIDE 40 MG/1
40 TABLET ORAL
Qty: 15 | Refills: 0 | Status: DISCONTINUED | COMMUNITY
Start: 2019-02-20

## 2019-02-23 RX ORDER — UBIDECARENONE 400 MG
400 CAPSULE ORAL DAILY
Qty: 30 | Refills: 0 | Status: DISCONTINUED | OUTPATIENT
Start: 2017-11-21 | End: 2019-02-23

## 2019-02-23 RX ORDER — PANTOPRAZOLE 40 MG/1
40 TABLET, DELAYED RELEASE ORAL
Qty: 30 | Refills: 0 | Status: DISCONTINUED | COMMUNITY
Start: 2019-02-20

## 2019-02-23 RX ORDER — POTASSIUM CHLORIDE 750 MG/1
10 TABLET, FILM COATED, EXTENDED RELEASE ORAL
Qty: 30 | Refills: 0 | Status: DISCONTINUED | COMMUNITY
Start: 2019-02-20

## 2019-02-23 RX ORDER — GABAPENTIN 300 MG/1
300 CAPSULE ORAL
Qty: 30 | Refills: 0 | Status: DISCONTINUED | COMMUNITY
Start: 2019-02-20

## 2019-02-23 NOTE — REVIEW OF SYSTEMS
[Fatigue] : fatigue [Postnasal Drip] : postnasal drip [Constipation] : constipation [Muscle Weakness] : muscle weakness [Muscle Pain] : muscle pain [Memory Loss] : memory loss [Unsteady Walking] : ataxia [Easy Bruising] : easy bruising [Negative] : Psychiatric [Fever] : no fever [Chills] : no chills [Earache] : no earache [Hearing Loss] : no hearing loss [Hoarseness] : no hoarseness [Nasal Discharge] : no nasal discharge [Sore Throat] : no sore throat [Shortness Of Breath] : no shortness of breath [Wheezing] : no wheezing [Cough] : no cough [Dyspnea on Exertion] : no dyspnea on exertion [Headache] : no headache [Dizziness] : no dizziness [Fainting] : no fainting [Easy Bleeding] : no easy bleeding [Swollen Glands] : no swollen glands [FreeTextEntry6] : no current pulmonary complaints [FreeTextEntry7] : wishes to stay off the senna and colace at this time [FreeTextEntry8] : had retention while in rehab on tamulosin with results but now that she is up more and going to PT and walking a little bit she is not having that issue. off tamulosin at home, wishes not to take it [FreeTextEntry9] : still weak post spinal cord infarction from aortic disection [de-identified] : wound in the back of right lower leg- being treated with xeroform with improvement at the rehab facility [de-identified] : has neuropathy of the legs - not able to feel much below the knee

## 2019-02-23 NOTE — HEALTH RISK ASSESSMENT
[Intercurrent hospitalizations] : was admitted to the hospital  [6-10] : 6-10 [Any fall with injury in past year] : Patient reported fall with injury in the past year [0] : 2) Feeling down, depressed, or hopeless: Not at all (0) [HIV test declined] : HIV test declined [Change in mental status noted] : Change in mental status noted [With Significant Other] : lives with significant other [# of Members in Household ___] :  household currently consist of [unfilled] member(s) [Retired] : retired [Graduate School] : graduate school [] :  [# Of Children ___] : has [unfilled] children [Feels Safe at Home] : Feels safe at home [Fully functional (bathing, dressing, toileting, transferring, walking, feeding)] : Fully functional (bathing, dressing, toileting, transferring, walking, feeding) [Fully functional (using the telephone, shopping, preparing meals, housekeeping, doing laundry, using] : Fully functional and needs no help or supervision to perform IADLs (using the telephone, shopping, preparing meals, housekeeping, doing laundry, using transportation, managing medications and managing finances) [Reports normal functional visual acuity (ie: able to read med bottle)] : Reports normal functional visual acuity [Smoke Detector] : smoke detector [Carbon Monoxide Detector] : carbon monoxide detector [Safety elements used in home] : safety elements used in home [Seat Belt] :  uses seat belt [Sunscreen] : uses sunscreen [] : No [de-identified] : [facility] post discharge [de-identified] : vascular surgery while in hospital as well as endo in hospital [YearQuit] : 1977 [de-identified] : PT [de-identified] : low salt, low carb, low fat [[id number]] : 0 [Sexually Active] : not sexually active [High Risk Behavior] : no high risk behavior [Reports changes in hearing] : Reports no changes in hearing [Reports changes in vision] : Reports no changes in vision [Reports changes in dental health] : Reports no changes in dental health [de-identified] : has had some memory issues since admitted for dissecting aneurysm in December but improving slowly [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

## 2019-02-23 NOTE — PHYSICAL EXAM
[No Acute Distress] : no acute distress [No Respiratory Distress] : no respiratory distress  [No Accessory Muscle Use] : no accessory muscle use [Alert and Oriented x3] : oriented to person, place, and time [High Complexity requires an extensive number of possible diagnoses and/or the management options, extensive complexity of the medical data (tests, etc.) to be reviewed, and a high risk of significant complications, morbidity, and/or mortality as well as c] : High Complexity  [Normal Sclera/Conjunctiva] : normal sclera/conjunctiva [de-identified] : decreased breath sounds [de-identified] : trace edema, TEDs stockings  [de-identified] : wound back of the right calf with xerofrom guaze inside the wound with good granulation tissue [de-identified] : patient unable to feel anything below the knee bilaterally due to peripheral neuropathy

## 2019-02-23 NOTE — COUNSELING
[Weight management counseling provided] : Weight management [Healthy eating counseling provided] : healthy eating [Behavioral health counseling provided] : behavioral health  [Fall prevention counseling provided] : fall prevention  [Needs reinforcement, provided] : Patient needs reinforcement on understanding of disease, goals and obesity follow-up plan; reinforcement was provided [Low Fat Diet] : Low fat diet [Low Salt Diet] : Low salt diet [Decrease Portions] : Decrease food portions [Sleep ___ hours/day] : Sleep [unfilled] hours/day [Engage in a relaxing activity] : Engage in a relaxing activity [Adequate lighting] : Adequate lighting [Use recommended devices] : Use recommended devices [None] : None

## 2019-02-23 NOTE — HISTORY OF PRESENT ILLNESS
[Post-hospitalization from ___ Hospital] : Post-hospitalization from [unfilled] Hospital [Discharge Summary] : discharge summary [Pertinent Labs] : pertinent labs [Radiology Findings] : radiology findings [Discharge Med List] : discharge medication list [Patient Contacted By: ____] : and contacted by [unfilled] [Admitted on: ___] : The patient was admitted on [unfilled] [Discharged on ___] : discharged on [unfilled] [FreeTextEntry2] : Patient admitted to [facility] after being diagnosed at [place] [facility] with Grade B aortic dissection. She was in the ICU for over 1 week and stabilized. 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 Upon stabilization she was sent to  for acute rehab where she progressed only minimally. She was discharged to [facility] in [facility] and progressed a good deal more and was just discharged yesterday. She will have home care and home PT for further treatment. While in [facility] she developed a pressure injury on the back of her right lower leg. This has been treated with xeroform with improvement. She is here today for follow up.

## 2019-03-01 ENCOUNTER — MESSAGE (OUTPATIENT)
Age: 64
End: 2019-03-01

## 2019-03-01 DIAGNOSIS — R33.9 RETENTION OF URINE, UNSPECIFIED: ICD-10-CM

## 2019-03-04 ENCOUNTER — FORM ENCOUNTER (OUTPATIENT)
Age: 64
End: 2019-03-04

## 2019-03-05 ENCOUNTER — OUTPATIENT (OUTPATIENT)
Dept: OUTPATIENT SERVICES | Facility: HOSPITAL | Age: 64
LOS: 1 days | End: 2019-03-05
Payer: MEDICARE

## 2019-03-05 DIAGNOSIS — I71.01 DISSECTION OF THORACIC AORTA: ICD-10-CM

## 2019-03-05 DIAGNOSIS — Z98.49 CATARACT EXTRACTION STATUS, UNSPECIFIED EYE: Chronic | ICD-10-CM

## 2019-03-05 DIAGNOSIS — Z90.49 ACQUIRED ABSENCE OF OTHER SPECIFIED PARTS OF DIGESTIVE TRACT: Chronic | ICD-10-CM

## 2019-03-05 PROCEDURE — 74174 CTA ABD&PLVS W/CONTRAST: CPT | Mod: 26

## 2019-03-05 PROCEDURE — 74174 CTA ABD&PLVS W/CONTRAST: CPT

## 2019-03-05 PROCEDURE — 71275 CT ANGIOGRAPHY CHEST: CPT | Mod: 26

## 2019-03-05 PROCEDURE — 71275 CT ANGIOGRAPHY CHEST: CPT

## 2019-03-07 ENCOUNTER — MEDICATION RENEWAL (OUTPATIENT)
Age: 64
End: 2019-03-07

## 2019-03-08 ENCOUNTER — APPOINTMENT (OUTPATIENT)
Dept: RHEUMATOLOGY | Facility: CLINIC | Age: 64
End: 2019-03-08

## 2019-03-11 ENCOUNTER — APPOINTMENT (OUTPATIENT)
Dept: CARDIOTHORACIC SURGERY | Facility: CLINIC | Age: 64
End: 2019-03-11
Payer: MEDICARE

## 2019-03-18 ENCOUNTER — APPOINTMENT (OUTPATIENT)
Dept: CARDIOTHORACIC SURGERY | Facility: CLINIC | Age: 64
End: 2019-03-18
Payer: MEDICARE

## 2019-03-18 VITALS
OXYGEN SATURATION: 98 % | SYSTOLIC BLOOD PRESSURE: 166 MMHG | RESPIRATION RATE: 14 BRPM | HEART RATE: 91 BPM | TEMPERATURE: 98.6 F | HEIGHT: 68 IN | DIASTOLIC BLOOD PRESSURE: 96 MMHG

## 2019-03-18 VITALS — SYSTOLIC BLOOD PRESSURE: 151 MMHG | DIASTOLIC BLOOD PRESSURE: 89 MMHG

## 2019-03-18 PROCEDURE — 99214 OFFICE O/P EST MOD 30 MIN: CPT

## 2019-03-18 NOTE — ASSESSMENT
[FreeTextEntry1] :  63 year old F presenting for a follow up visit for her hospitalization for Type B dissection of Thoracic aorta. CTA chest revealed probable type A dissection with intramural hematoma w/ active hemorrhage. \par She is Home with Home PT. She has weakness in her bilateral LE, Some degree of numbness in the feet especially and legs. Range of motion for her arms are limited. She says overall function improved. \par She was hypertensive at the time of visit here today in the office. But she says at home when the Home care RN monitors it is 120's/60-70's. She has history of PE and DVT S/p Green field filter in the past and was on Coumadin. \par On Examination today she is doing well. Lungs are clear and equal, Regular rhythm. Bilateral lower extremity edema 1-2+. \par She is still with Home PT. Continue with maintenance therapy.\par \par Plan:\par May resume Coumadin if there is any indication.\par Control HTN and monitor Closely.\par Yearly CT scans for follow up.\par Follow up with Your Cardiologist and PCP. \par \par \par

## 2019-03-18 NOTE — CONSULT LETTER
[FreeTextEntry2] : Ovidio Hua M.D.\par [address]  [address]\[place] [address]\par  [FreeTextEntry3] : Dexter Kumar MD\par Attending Surgeon\par \par Cardiovascular & Thoracic Surgery\par [facility].\par

## 2019-03-18 NOTE — HISTORY OF PRESENT ILLNESS
[FreeTextEntry1] : KASHIF NATARAJAN is a 63 year old F presenting for a follow up visit for her hospitalization for Type B dissection of Thoracic aorta. She was hospitalized with sudden onset of upper back pain between her shoulder blades on 12/7/2018 -  CTA chest revealed probable type A dissection with intramural hematoma w/ active hemorrhage.  She had Bilateral lower extremity weakness, 2/4 strength on exam. Anti coagulation was discontinued due to Type B dissection with  hemoperitoneum.\par Her  past medical history includes long standing asthma ( with chronic steroid use) DM, HLD, obesity, hypothyroid, pulmonary emboli ( on Coumadin and S/p IVC filter).\par She was discharged to a Rehab from the Hospital and from there she went to Sub Acute Rehab on 1/3/2019. Now she is discharged from there 2/28/2019 to home. She is still in a wheel chair, but uses a platform walker at home and is being trained by Home PT to use the regular Walker and training Steps.  She still has her [facility] Aide to assist her with Bathing , dressing and some of her daily activities. \par \par

## 2019-03-18 NOTE — REVIEW OF SYSTEMS
[Feeling Tired] : feeling tired [Incontinence] : incontinence [Lower Ext Edema] : lower extremity edema [Difficulty Walking] : difficulty walking [Negative] : Heme/Lymph [de-identified] : Wheel Chair bound , Platform walker.

## 2019-03-18 NOTE — PHYSICAL EXAM
[PERRL With Normal Accommodation] : pupils were equal in size, round, and reactive to light [Sclera] : the sclera and conjunctiva were normal [Jugular Venous Distention Increased] : there was no jugular-venous distention [Neck Appearance] : the appearance of the neck was normal [Respiration, Rhythm And Depth] : normal respiratory rhythm and effort [Auscultation Breath Sounds / Voice Sounds] : lungs were clear to auscultation bilaterally [] : no respiratory distress [Heart Rate And Rhythm] : heart rate was normal and rhythm regular [Heart Sounds] : normal S1 and S2 [Murmurs] : no murmurs [Regular] : the rhythm was regular [Examination Of The Chest] : the chest was normal in appearance [2+] : left 2+ [1+] : left 1+ [No Abnormalities] : the abdominal aorta was not enlarged and no bruit was heard [Bowel Sounds] : normal bowel sounds [Abdomen Tenderness] : non-tender [Abdomen Soft] : soft [No CVA Tenderness] : no ~M costovertebral angle tenderness [Skin Color & Pigmentation] : normal skin color and pigmentation [Skin Turgor] : normal skin turgor [No Focal Deficits] : no focal deficits [Oriented To Time, Place, And Person] : oriented to person, place, and time [Impaired Insight] : insight and judgment were intact [Right Carotid Bruit] : no bruit heard over the right carotid [Left Carotid Bruit] : no bruit heard over the left carotid [Left Femoral Bruit] : no bruit heard over the left femoral artery [Right Femoral Bruit] : no bruit heard over the right femoral artery [FreeTextEntry1] : Wheel Chair bound, Right Knee brace , S/p Right ACL tear, Right Meniscus tear. Right Rotator cuff and biceps tear

## 2019-03-19 ENCOUNTER — MESSAGE (OUTPATIENT)
Age: 64
End: 2019-03-19

## 2019-03-21 ENCOUNTER — APPOINTMENT (OUTPATIENT)
Dept: RHEUMATOLOGY | Facility: CLINIC | Age: 64
End: 2019-03-21
Payer: MEDICARE

## 2019-03-21 ENCOUNTER — TRANSCRIPTION ENCOUNTER (OUTPATIENT)
Age: 64
End: 2019-03-21

## 2019-03-21 VITALS
SYSTOLIC BLOOD PRESSURE: 163 MMHG | HEIGHT: 68 IN | OXYGEN SATURATION: 98 % | TEMPERATURE: 98.5 F | RESPIRATION RATE: 16 BRPM | BODY MASS INDEX: 34.1 KG/M2 | HEART RATE: 80 BPM | DIASTOLIC BLOOD PRESSURE: 89 MMHG | WEIGHT: 225 LBS

## 2019-03-21 PROCEDURE — 99214 OFFICE O/P EST MOD 30 MIN: CPT | Mod: 25

## 2019-03-21 PROCEDURE — 20610 DRAIN/INJ JOINT/BURSA W/O US: CPT | Mod: RT

## 2019-03-21 RX ORDER — METHYLPRED ACET/NACL,ISO-OS/PF 40 MG/ML
40 VIAL (ML) INJECTION
Qty: 1 | Refills: 0 | Status: COMPLETED | OUTPATIENT
Start: 2019-03-21

## 2019-03-21 RX ORDER — LIDOCAINE HYDROCHLORIDE 10 MG/ML
1 INJECTION, SOLUTION INFILTRATION; PERINEURAL
Refills: 0 | Status: COMPLETED | OUTPATIENT
Start: 2019-03-21

## 2019-03-21 RX ADMIN — LIDOCAINE HYDROCHLORIDE %: 10 INJECTION, SOLUTION INFILTRATION; PERINEURAL at 00:00

## 2019-03-21 RX ADMIN — METHYLPREDNISOLONE ACETATE 1 MG/ML: 40 INJECTION, SUSPENSION INTRA-ARTICULAR; INTRALESIONAL; INTRAMUSCULAR; SOFT TISSUE at 00:00

## 2019-03-26 ENCOUNTER — APPOINTMENT (OUTPATIENT)
Dept: FAMILY MEDICINE | Facility: CLINIC | Age: 64
End: 2019-03-26
Payer: MEDICARE

## 2019-03-26 VITALS
HEART RATE: 80 BPM | DIASTOLIC BLOOD PRESSURE: 78 MMHG | SYSTOLIC BLOOD PRESSURE: 130 MMHG | OXYGEN SATURATION: 96 % | RESPIRATION RATE: 16 BRPM

## 2019-03-26 PROCEDURE — 99213 OFFICE O/P EST LOW 20 MIN: CPT

## 2019-03-27 NOTE — COUNSELING
[Weight management counseling provided] : Weight management [Healthy eating counseling provided] : healthy eating [Activity counseling provided] : activity [Behavioral health counseling provided] : behavioral health  [Good understanding] : Patient has a good understanding of disease, goals and obesity follow-up plan [Sleep ___ hours/day] : Sleep [unfilled] hours/day [Engage in a relaxing activity] : Engage in a relaxing activity

## 2019-03-27 NOTE — PATIENT PROFILE ADULT - FALL HARM RISK TYPE OF ASSESSMENT
Scribe Attestation (For Scribes USE Only)... Attending Attestation (For Attendings USE Only).../Scribe Attestation (For Scribes USE Only)... admission

## 2019-03-27 NOTE — REVIEW OF SYSTEMS
[Fatigue] : fatigue [Postnasal Drip] : postnasal drip [Constipation] : constipation [Muscle Weakness] : muscle weakness [Muscle Pain] : muscle pain [Memory Loss] : memory loss [Unsteady Walking] : ataxia [Easy Bruising] : easy bruising [Negative] : Psychiatric [Fever] : no fever [Chills] : no chills [Earache] : no earache [Hearing Loss] : no hearing loss [Hoarseness] : no hoarseness [Nasal Discharge] : no nasal discharge [Sore Throat] : no sore throat [Shortness Of Breath] : no shortness of breath [Wheezing] : no wheezing [Cough] : no cough [Dyspnea on Exertion] : no dyspnea on exertion [Headache] : no headache [Dizziness] : no dizziness [Fainting] : no fainting [Easy Bleeding] : no easy bleeding [Swollen Glands] : no swollen glands [FreeTextEntry6] : no current pulmonary complaints [FreeTextEntry7] : constipation is improved as she is more active [FreeTextEntry8] : had retention while in rehab on tamulosin with results but now that she is up more and going to PT and walking a little bit she is not having that issue. off tamulosin at home, wishes not to take it [FreeTextEntry9] : still weak post spinal cord infarction from aortic dissection [de-identified] : wound in the back of right lower leg- being treated with xeroform with improvement at the rehab facility, now has second wound on the left leg. wound right leg has resolved [de-identified] : has neuropathy of the legs - not able to feel much below the knee

## 2019-03-27 NOTE — HISTORY OF PRESENT ILLNESS
[FreeTextEntry8] : CC: New wound on the opposite leg (left) from the original wound from the hospital stay. This began several days ago and she is here for evaluation and possible referral for wound care to home care. denies any specific trauma this happened when she removed the TEDS stocking from this leg 3 days ago. She noticed a little blood after removing the ANJANA and then saw the skin tear. It is a small flap laceration which stopped bleeding almost immediately. It has not bled since the initial finding of the laceration.

## 2019-03-27 NOTE — HEALTH RISK ASSESSMENT
[0] : 2) Feeling down, depressed, or hopeless: Not at all (0) [] : No [de-identified] : rheumatology- had cortisone shot with some pain improvement, Vascular neuro- notes reviewed [de-identified] : sedentary  [de-identified] : low salt and low carbohydrate,  [[id number]] : 0

## 2019-03-27 NOTE — PHYSICAL EXAM
[No Acute Distress] : no acute distress [Normal Sclera/Conjunctiva] : normal sclera/conjunctiva [de-identified] : skin flap open on the left shin middle of the shin, 1 cm flap, which recently bled

## 2019-04-08 ENCOUNTER — MEDICATION RENEWAL (OUTPATIENT)
Age: 64
End: 2019-04-08

## 2019-04-09 LAB
HBA1C MFR BLD HPLC: 6.2
MICROALBUMIN/CREAT 24H UR-RTO: 5

## 2019-04-10 ENCOUNTER — TRANSCRIPTION ENCOUNTER (OUTPATIENT)
Age: 64
End: 2019-04-10

## 2019-04-12 ENCOUNTER — APPOINTMENT (OUTPATIENT)
Dept: FAMILY MEDICINE | Facility: CLINIC | Age: 64
End: 2019-04-12
Payer: MEDICARE

## 2019-04-12 VITALS
OXYGEN SATURATION: 97 % | RESPIRATION RATE: 16 BRPM | DIASTOLIC BLOOD PRESSURE: 60 MMHG | SYSTOLIC BLOOD PRESSURE: 90 MMHG | HEART RATE: 87 BPM | TEMPERATURE: 97.2 F

## 2019-04-12 DIAGNOSIS — M62.81 MUSCLE WEAKNESS (GENERALIZED): ICD-10-CM

## 2019-04-12 PROCEDURE — 99214 OFFICE O/P EST MOD 30 MIN: CPT

## 2019-04-13 NOTE — PHYSICAL EXAM
[No Acute Distress] : no acute distress [No JVD] : no jugular venous distention [Normal Outer Ear/Nose] : the outer ears and nose were normal in appearance [Normal Sclera/Conjunctiva] : normal sclera/conjunctiva [Clear to Auscultation] : lungs were clear to auscultation bilaterally [No Respiratory Distress] : no respiratory distress  [Supple] : supple [No Accessory Muscle Use] : no accessory muscle use [Normal S1, S2] : normal S1 and S2 [Normal Rate] : normal rate  [Regular Rhythm] : with a regular rhythm [No Murmur] : no murmur heard [Non Tender] : non-tender [Soft] : abdomen soft [Non-distended] : non-distended [No HSM] : no HSM [No Spinal Tenderness] : no spinal tenderness [Alert and Oriented x3] : oriented to person, place, and time [Normal Affect] : the affect was normal [Normal Insight/Judgement] : insight and judgment were intact [None] : no ulcers in either foot were found [] : both feet [de-identified] : strength remains decreased lower extremities more than upper [de-identified] : right great toe with nail uplifted, some bleeding noted and surrounding erythema and swelling of the distal foot [de-identified] : diffusely diminished to monofilament testing [de-identified] : skin flap open on the left shin middle of the shin, 1 cm flap, which is now closing and much smaller

## 2019-04-13 NOTE — REVIEW OF SYSTEMS
[Wheezing] : wheezing [Joint Stiffness] : joint stiffness [Joint Pain] : joint pain [Muscle Weakness] : muscle weakness [Muscle Pain] : muscle pain [Back Pain] : back pain [Nail Changes] : nail changes [Easy Bruising] : easy bruising [Negative] : Gastrointestinal [Suicidal] : not suicidal [Insomnia] : no insomnia [Easy Bleeding] : no easy bleeding [Anxiety] : no anxiety [Depression] : no depression [FreeTextEntry6] : occasional wheezing with allergy season beginning, no steroid increases though [FreeTextEntry9] : continuing with muscle weakness after spinal cord infarction [de-identified] : right great toe with onset this am of bleeding and nail issue as well as redness surrounding the nail and distal foot [de-identified] : now able to walk 15-20 feet with regular walker, further with platform walker, doing ADL's with minimal assist now, other tasks more difficult [de-identified] : a little frustrated with her slow progress

## 2019-04-13 NOTE — COUNSELING
[Healthy eating counseling provided] : healthy eating [___ min/wk activity recom] : [unfilled] min/wk activity recommended [Activity counseling provided] : activity [Disease Management counseling provided] : disease management  [Check feet daily] : Check feet daily [Take medications as directed] : Take medications as directed [Check sugar ___ times per week] : Check blood sugar [unfilled]  times per week [Maintain symptoms  log] : Maintain symptoms log [Avoid asthma triggers] : Avoid asthma triggers [Keep FS  log to bring to next visit] : Keep finger stick log and  bring  to next visit [Identify asthma triggers] : Identify asthma triggers [None] : None [Needs reinforcement, provided] : Patient needs reinforcement on understanding lifestyle changes and  the steps needed to achieve self management goals and reinforcement was provided

## 2019-04-13 NOTE — HISTORY OF PRESENT ILLNESS
[Diabetes Mellitus] : Diabetes Mellitus [Obesity] : Obesity [Check glucose ___ x/day] : Patient checks  blood glucose [unfilled]  times a day [# of episodes ___] : Reports [unfilled] hypoglycemic episodes since the last visit. [Regular podiatrist visits] : Patient had regular podiatrist visits [Understanding of foot care] : Patient expressed understanding of foot care [Target goal met] : A1C target goal met [Target A1C:  ___] : Target A1C is [unfilled] [Severe Persistent] : severe persistent [___ x/month] : Patient awakes at night [unfilled] times per month [Allergies] : allergies [Cold Temperature] : cold temperature [Other: ___] : [unfilled] [Inhaler Use] : inhaler use [Daily] : Daily [Does not check Peak Flow] : The patient is not checking peak flow [Somewhat controlled] : Condition is somewhat controlled [Most Recent A1C: ___] : Most recent A1C was [unfilled] [de-identified] : will observe for too many hypogmycemic events and if continues to have these advised to back off the basaglar to 12 units, having lost weight this may be sufficient.  [de-identified] : new complaint of red and swollen right great toe since this am, did not recall any trauma to the area. Feels nothing in that area.  [FreeTextEntry1] : paroxysmal afib no symptoms of palpitations or chest pain- continue amiodarone until cardiology f/u with Dr. Di gagnon

## 2019-04-15 ENCOUNTER — RX RENEWAL (OUTPATIENT)
Age: 64
End: 2019-04-15

## 2019-04-25 ENCOUNTER — APPOINTMENT (OUTPATIENT)
Dept: CARDIOLOGY | Facility: CLINIC | Age: 64
End: 2019-04-25
Payer: MEDICARE

## 2019-04-25 ENCOUNTER — NON-APPOINTMENT (OUTPATIENT)
Age: 64
End: 2019-04-25

## 2019-04-25 VITALS
RESPIRATION RATE: 17 BRPM | WEIGHT: 225 LBS | OXYGEN SATURATION: 98 % | SYSTOLIC BLOOD PRESSURE: 139 MMHG | HEIGHT: 68 IN | DIASTOLIC BLOOD PRESSURE: 80 MMHG | HEART RATE: 82 BPM | BODY MASS INDEX: 34.1 KG/M2

## 2019-04-25 VITALS — SYSTOLIC BLOOD PRESSURE: 140 MMHG | HEART RATE: 72 BPM | DIASTOLIC BLOOD PRESSURE: 80 MMHG

## 2019-04-25 PROCEDURE — 93000 ELECTROCARDIOGRAM COMPLETE: CPT

## 2019-04-25 PROCEDURE — 99205 OFFICE O/P NEW HI 60 MIN: CPT

## 2019-05-01 ENCOUNTER — FORM ENCOUNTER (OUTPATIENT)
Age: 64
End: 2019-05-01

## 2019-05-02 ENCOUNTER — MEDICATION RENEWAL (OUTPATIENT)
Age: 64
End: 2019-05-02

## 2019-05-02 ENCOUNTER — OUTPATIENT (OUTPATIENT)
Dept: OUTPATIENT SERVICES | Facility: HOSPITAL | Age: 64
LOS: 1 days | End: 2019-05-02
Payer: MEDICARE

## 2019-05-02 ENCOUNTER — APPOINTMENT (OUTPATIENT)
Dept: FAMILY MEDICINE | Facility: CLINIC | Age: 64
End: 2019-05-02
Payer: MEDICARE

## 2019-05-02 ENCOUNTER — APPOINTMENT (OUTPATIENT)
Dept: RADIOLOGY | Facility: HOSPITAL | Age: 64
End: 2019-05-02
Payer: MEDICARE

## 2019-05-02 VITALS
HEART RATE: 90 BPM | RESPIRATION RATE: 14 BRPM | DIASTOLIC BLOOD PRESSURE: 63 MMHG | OXYGEN SATURATION: 96 % | TEMPERATURE: 98.3 F | SYSTOLIC BLOOD PRESSURE: 117 MMHG

## 2019-05-02 DIAGNOSIS — Z98.49 CATARACT EXTRACTION STATUS, UNSPECIFIED EYE: Chronic | ICD-10-CM

## 2019-05-02 DIAGNOSIS — S49.91XA UNSPECIFIED INJURY OF RIGHT SHOULDER AND UPPER ARM, INITIAL ENCOUNTER: ICD-10-CM

## 2019-05-02 DIAGNOSIS — Z90.49 ACQUIRED ABSENCE OF OTHER SPECIFIED PARTS OF DIGESTIVE TRACT: Chronic | ICD-10-CM

## 2019-05-02 PROCEDURE — 73030 X-RAY EXAM OF SHOULDER: CPT

## 2019-05-02 PROCEDURE — 99213 OFFICE O/P EST LOW 20 MIN: CPT

## 2019-05-02 PROCEDURE — 73030 X-RAY EXAM OF SHOULDER: CPT | Mod: 26,RT

## 2019-05-02 NOTE — HISTORY OF PRESENT ILLNESS
[FreeTextEntry8] : Here following injruy last Friday to her right shoulder. Now very painful, continues to be very black and blue and swollen. Injury occurred while using her arm to boost herself up and she felt a cracking sensation in the shoulder. It has continue to be swollen and painful with decreased ROM. She has taken tylenol which does not give relief. Unable to move the right arm much at all and not able to do what she was doing in PT until this episode.

## 2019-05-02 NOTE — PHYSICAL EXAM
[Supple] : supple [de-identified] : mild distress due to pain [de-identified] : right shoulder joint with contusion and ecchymosis over the shoulder joint 7 cm size area, swelling of the shoulder joint and the ACV region

## 2019-05-02 NOTE — ADDENDUM
[FreeTextEntry1] : xray shows fractured distal clavicle and arthritic changes- advised to see Dr. Santiago as discussed in the office.

## 2019-05-02 NOTE — REVIEW OF SYSTEMS
[Joint Pain] : joint pain [Joint Stiffness] : joint stiffness [Muscle Weakness] : muscle weakness [Muscle Pain] : muscle pain [Back Pain] : back pain [Nail Changes] : nail changes [Easy Bruising] : easy bruising [Negative] : Psychiatric [Anxiety] : no anxiety [Suicidal] : not suicidal [Insomnia] : no insomnia [Depression] : no depression [FreeTextEntry6] : no wheezing at this time [Easy Bleeding] : no easy bleeding [de-identified] : a little frustrated with her slow progress [de-identified] : now able to walk 15-20 feet with regular walker, further with platform walker, doing ADL's with minimal assist now, other tasks more difficult [de-identified] : right great toe with onset this am of bleeding and nail issue as well as redness surrounding the nail and distal foot [FreeTextEntry9] : continuing with muscle weakness after spinal cord infarction, severe pain and swelling of the right shoulder following injury

## 2019-05-02 NOTE — HEALTH RISK ASSESSMENT
[Any fall with injury in past year] : Patient reported fall with injury in the past year [] : No [de-identified] : PT for spinal cord infarction and recovery from aneurysm disection [de-identified] : PT- no notes recently [de-identified] : tries to do low fat and low carb

## 2019-05-03 ENCOUNTER — APPOINTMENT (OUTPATIENT)
Dept: ORTHOPEDIC SURGERY | Facility: CLINIC | Age: 64
End: 2019-05-03
Payer: MEDICARE

## 2019-05-03 VITALS — HEIGHT: 68 IN | WEIGHT: 225 LBS | BODY MASS INDEX: 34.1 KG/M2

## 2019-05-03 PROCEDURE — 99203 OFFICE O/P NEW LOW 30 MIN: CPT

## 2019-05-03 NOTE — PHYSICAL EXAM
[Normal LUE] : Left Upper Extremity: No scars, rashes, lesions, ulcers, skin intact [Wheelchair] : uses a wheelchair [Normal RUE] : Right Upper Extremity: No scars, rashes, lesions, ulcers, skin intact [Normal Touch] : sensation intact for touch [Normal] : No swelling, no edema, normal pedal pulses and normal temperature [Obese] : obese [Poor Appearance] : well-appearing [Acute Distress] : not in acute distress [de-identified] : Right Upper Extremity\par o Shoulder :\par ¦ Inspection/Palpation : marked tenderness over the acromion, diffuse swelling and ecchymosis over the superior shoulder, no deformity \par ¦ Stability : no joint instability on provocative testing\par o Upper Arm : no tenderness, no swelling, no deformities\par o Muscle Bulk : no atrophy \par o Sensation : sensation intact to light touch \par o Skin : no skin rash, diffuse erythema and ecchymosis about the anterior shoulder\par o Vascular Exam : no edema, no cyanosis, radial and ulnar pulses normal  [de-identified] : o XRays of the right shoulder were obtained at [facility] [place] on 5/2/2019, revealed:\par Minimally displaced fracture of the right acromion, degenerative change of the greater tuberosity, severe glenohumeral osteoarthritis with proximal migration of the humeral head.

## 2019-05-03 NOTE — DISCUSSION/SUMMARY
[de-identified] : The underlying pathophysiology was reviewed in great detail with the patient as well as the various treatment options, including ice, analgesics, NSAIDs, Physical therapy, steroid injections, right reverse TSA.\par \par The patient is a high risk surgical candidate and I am recommending that we proceed with conservative treatment at this time.\par \par I recommend the use of a sling, one was provided for her \par \par Activity modifications and restrictions were discussed. \par \par FU 2-3 weeks for repeat XRays.

## 2019-05-03 NOTE — HISTORY OF PRESENT ILLNESS
[de-identified] : RHD 64 year old female presents for an evaluation of right shoulder pain that began on 4/24/2019, she was using her right arm to push herself up and noted pain to her shoulder upon doing so. The following day she was in the shower and realized that she had erythema and ecchymosis about the anterior aspect of her shoulder. She says that she has been diagnosed with glenohumeral osteoarthritis and a rotator cuff tear of her right shoulder prior to this injury. Today she rates her pain an 8/10 and describes it as a constant sharp throbbing pain located about the top of her shoulder and exacerbated with movement of her right arm. Dr. Smith sent her for XRays of the right shoulder on 5/2/2019,  the patient presents with the films. Of note, the patient had an aneurysm 6 months ago and lost feeling to her lower extremities that she has been slowly gaining back with the help of physical therapy, she is currently ambulating with the assistance of a wheelchair.

## 2019-05-03 NOTE — ADDENDUM
[FreeTextEntry1] : I, Maria T Ramirez, acted solely as a scribe for Dr. Jhonatan Santiago on this date 05/03/2019.

## 2019-05-03 NOTE — END OF VISIT
[FreeTextEntry3] : All medical record entries made by the Billieibe were at my, Dr. Jhonatan Santiago, direction and personally dictated by me on 05/03/2019. I have reviewed the chart and agree that the record accurately reflects my personal performance of the history, physical exam, assessment and plan. I have also personally directed, reviewed, and agreed with the chart.

## 2019-05-03 NOTE — CONSULT LETTER
[Dear  ___] : Dear  [unfilled], [Please see my note below.] : Please see my note below. [Consult Letter:] : I had the pleasure of evaluating your patient, [unfilled]. [Sincerely,] : Sincerely, [Consult Closing:] : Thank you very much for allowing me to participate in the care of this patient.  If you have any questions, please do not hesitate to contact me. [FreeTextEntry3] :  Dr. Jhonatan Santiago

## 2019-05-14 ENCOUNTER — APPOINTMENT (OUTPATIENT)
Dept: FAMILY MEDICINE | Facility: CLINIC | Age: 64
End: 2019-05-14
Payer: MEDICARE

## 2019-05-14 ENCOUNTER — MEDICATION RENEWAL (OUTPATIENT)
Age: 64
End: 2019-05-14

## 2019-05-14 VITALS
DIASTOLIC BLOOD PRESSURE: 80 MMHG | OXYGEN SATURATION: 98 % | RESPIRATION RATE: 14 BRPM | TEMPERATURE: 97.4 F | SYSTOLIC BLOOD PRESSURE: 108 MMHG | HEART RATE: 78 BPM

## 2019-05-14 PROCEDURE — 99214 OFFICE O/P EST MOD 30 MIN: CPT

## 2019-05-15 RX ORDER — LANCETS 28 GAUGE
EACH MISCELLANEOUS
Qty: 5 | Refills: 3 | Status: DISCONTINUED | COMMUNITY
Start: 2017-11-30 | End: 2019-05-15

## 2019-05-16 ENCOUNTER — MESSAGE (OUTPATIENT)
Age: 64
End: 2019-05-16

## 2019-05-16 RX ORDER — BLOOD SUGAR DIAGNOSTIC
STRIP MISCELLANEOUS
Qty: 5 | Refills: 3 | Status: DISCONTINUED | COMMUNITY
Start: 2017-11-30 | End: 2019-05-16

## 2019-05-20 ENCOUNTER — APPOINTMENT (OUTPATIENT)
Dept: ORTHOPEDIC SURGERY | Facility: CLINIC | Age: 64
End: 2019-05-20
Payer: MEDICARE

## 2019-05-20 VITALS — BODY MASS INDEX: 34.1 KG/M2 | WEIGHT: 225 LBS | HEIGHT: 68 IN

## 2019-05-20 PROCEDURE — 99214 OFFICE O/P EST MOD 30 MIN: CPT

## 2019-05-20 PROCEDURE — 73030 X-RAY EXAM OF SHOULDER: CPT | Mod: RT

## 2019-05-20 NOTE — ADDENDUM
[FreeTextEntry1] : I, Maria T Ramirez, acted solely as a scribe for Dr. Jhonatan Santiago on this date 05/20/2019.

## 2019-05-20 NOTE — HISTORY OF PRESENT ILLNESS
[de-identified] : RHD 64 year old female presents for an evaluation of right shoulder pain, the patient has been diagnosed with glenohumeral osteoarthritis and a rotator cuff tear of her right shoulder prior to her current injury. On 4/24/2019, when she sustained a minimally displaced fracture of the right acromion after pushing herself up from her wheelchair with her arms. The patient is accompanied by her  and she is here today for repeat XRays of her right shoulder. She has not been utilizing a sling since her last visit, though she says she has been keeping her arm propped up with pillows and she has been resting her arm. Her pain has slightly improved though she has continued to experience discomfort about her shoulder. Of note, the patient had an aneurysm 6 months ago and lost feeling to her lower extremities that she has been slowly gaining back with the help of physical therapy, she is currently ambulating with the assistance of a wheelchair.

## 2019-05-20 NOTE — END OF VISIT
[FreeTextEntry3] : All medical record entries made by the Billieibe were at my, Dr. Jhonatan Santiago, direction and personally dictated by me on 05/20/2019. I have reviewed the chart and agree that the record accurately reflects my personal performance of the history, physical exam, assessment and plan. I have also personally directed, reviewed, and agreed with the chart.

## 2019-05-21 NOTE — HISTORY OF PRESENT ILLNESS
[No episodes] : No hypoglycemic episodes since the last visit. [Review glucose log over ___ months] : Glucose logs reviewed over the past [unfilled] months reveal: [Check glucose ___ x/day] : Patient checks  blood glucose [unfilled]  times a day [Post-Prandial: ___] : Post-Prandial Blood Sugar: [unfilled] mg/dL [Fasting:  ___] : Fasting Blood Sugar: [unfilled] mg/dL [Regular podiatrist visits] : Patient had regular podiatrist visits [Understanding of foot care] : Patient expressed understanding of foot care [Target A1C:  ___] : Target A1C is [unfilled] [Most Recent A1C: ___] : Most recent A1C was [unfilled] [No therapy] : Patient is not on statin therapy [Contraindication to therapy ___] : Contraindications to statin  therapy: [unfilled] [Does not check BP] : The patient is not checking blood pressure [120/80] : Target blood pressure is 120/80 [Target goal met] : BP target goal met [No nocturnal awakening] : Patient denies nocturnal awakening [Allergies] : allergies [Inhaler Use] : inhaler use [Other: ___] : [unfilled] [___ x/week] : [unfilled] times per week [Somewhat controlled] : Condition is somewhat controlled [No Weight Changes] : No weight changes

## 2019-05-21 NOTE — PHYSICAL EXAM
[No Acute Distress] : no acute distress [Normal Sclera/Conjunctiva] : normal sclera/conjunctiva [Normal Outer Ear/Nose] : the outer ears and nose were normal in appearance [No JVD] : no jugular venous distention [Supple] : supple [No Respiratory Distress] : no respiratory distress  [Clear to Auscultation] : lungs were clear to auscultation bilaterally [No Accessory Muscle Use] : no accessory muscle use [Normal Rate] : normal rate  [Regular Rhythm] : with a regular rhythm [Normal S1, S2] : normal S1 and S2 [No Murmur] : no murmur heard [Pedal Pulses Present] : the pedal pulses are present [Alert and Oriented x3] : oriented to person, place, and time [de-identified] : right shoulder joint with contusion and ecchymosis over the shoulder joint 7 cm size area, swelling of the shoulder joint and the ACV region [de-identified] : thinning of skin diffusely secondary to steroids chronic use, also with healed wounds of the shins [de-identified] : deficits related to spinal cord ischemia- improving some on the lower extremity strength now 1-2/5 bilaterally [de-identified] : deferred- saw podiatry this am, resolving nail injury still on doxycycline

## 2019-05-21 NOTE — REVIEW OF SYSTEMS
[Fatigue] : fatigue [Wheezing] : wheezing [Incontinence] : incontinence [Joint Pain] : joint pain [Negative] : Psychiatric [Fever] : no fever [Cough] : no cough [Dyspnea on Exertion] : no dyspnea on exertion [FreeTextEntry6] : rare wheezing [FreeTextEntry8] : better on tamulosin [FreeTextEntry9] : pain clavicle fracture [de-identified] : secondary to spinal cord issues

## 2019-05-21 NOTE — HEALTH RISK ASSESSMENT
[] : No [de-identified] : ortho, podiatry [de-identified] : PT for spinal cord ischemia and fractured clavicle allowing return at this time [de-identified] : tries to follow low car and fat

## 2019-05-21 NOTE — COUNSELING
[Weight management counseling provided] : Weight management [Healthy eating counseling provided] : healthy eating [Activity counseling provided] : activity [Disease Management counseling provided] : disease management  [Decrease Portions] : Decrease food portions [Keep Food Diary] : Keep food diary [Take medications as directed] : Take medications as directed [Check sugar ___ times per week] : Check blood sugar [unfilled]  times per week [Check feet daily] : Check feet daily [Keep FS  log to bring to next visit] : Keep finger stick log and  bring  to next visit [Maintain symptoms  log] : Maintain symptoms log [Avoid asthma triggers] : Avoid asthma triggers [Identify asthma triggers] : Identify asthma triggers

## 2019-05-22 ENCOUNTER — MEDICATION RENEWAL (OUTPATIENT)
Age: 64
End: 2019-05-22

## 2019-05-28 ENCOUNTER — MOBILE ON CALL (OUTPATIENT)
Age: 64
End: 2019-05-28

## 2019-05-29 ENCOUNTER — MEDICATION RENEWAL (OUTPATIENT)
Age: 64
End: 2019-05-29

## 2019-06-04 ENCOUNTER — MOBILE ON CALL (OUTPATIENT)
Age: 64
End: 2019-06-04

## 2019-06-06 ENCOUNTER — APPOINTMENT (OUTPATIENT)
Dept: ORTHOPEDIC SURGERY | Facility: CLINIC | Age: 64
End: 2019-06-06
Payer: MEDICARE

## 2019-06-06 VITALS — BODY MASS INDEX: 34.1 KG/M2 | HEIGHT: 68 IN | WEIGHT: 225 LBS

## 2019-06-06 PROCEDURE — 73030 X-RAY EXAM OF SHOULDER: CPT | Mod: RT

## 2019-06-06 PROCEDURE — 99214 OFFICE O/P EST MOD 30 MIN: CPT

## 2019-06-06 NOTE — END OF VISIT
[FreeTextEntry3] : All medical record entries made by the Billieibnatasha were at my, Dr. Jhonatan Santiago, direction and personally dictated by me on 06/06/2019. I have reviewed the chart and agree that the record accurately reflects my personal performance of the history, physical exam, assessment and plan. I have also personally directed, reviewed, and agreed with the chart.

## 2019-06-06 NOTE — DISCUSSION/SUMMARY
[de-identified] : The underlying pathophysiology was reviewed in great detail with the patient as well as the various treatment options, including ice, analgesics, NSAIDs, Physical therapy, steroid injections, right reverse TSA.\par \par The patient is a high risk surgical candidate, we will continue with conservative treatment. \par \par A note was provided stating she can return to physical therapy for her lower extremities. \par \par FU 4 weeks for repeat XRays.

## 2019-06-06 NOTE — HISTORY OF PRESENT ILLNESS
[de-identified] : RHD 64 year old female presents for an evaluation of right shoulder pain, the patient has been diagnosed with glenohumeral osteoarthritis and a rotator cuff tear of her right shoulder prior to her current injury. On 4/24/2019, when she sustained a minimally displaced fracture of the right acromion. The patient is accompanied by her  and she is here today for repeat XRays of her right shoulder. She says that her right shoulder pain has significantly improved though she has been experiencing a left shoulder pain due to overcompensation. Of note, the patient had an aneurysm 6 months ago and lost feeling to her lower extremities that she has been slowly gaining back with the help of physical therapy, she is currently ambulating with the assistance of a wheelchair.

## 2019-06-06 NOTE — ADDENDUM
[FreeTextEntry1] : I, Maria T Ramirez, acted solely as a scribe for Dr. Jhonatan Santiago on this date 06/06/2019.

## 2019-06-06 NOTE — PHYSICAL EXAM
[Wheelchair] : uses a wheelchair [Normal RUE] : Right Upper Extremity: No scars, rashes, lesions, ulcers, skin intact [Normal LUE] : Left Upper Extremity: No scars, rashes, lesions, ulcers, skin intact [Normal Touch] : sensation intact for touch [Normal] : No swelling, no edema, normal pedal pulses and normal temperature [Obese] : obese [Poor Appearance] : well-appearing [Acute Distress] : not in acute distress [de-identified] : Right Upper Extremity\par o Shoulder :\par ¦ Inspection/Palpation : mild tenderness and swelling over the acromion, no deformity \par ¦ Range of Motion : ACTIVE FORWARD ELEVATION: Measured at 85  degrees, ACTIVE EXTERNAL ROTATION: Measured at 20 degrees, ACTIVE INTERNAL ROTATION: Measured at L5. Crepitus on ROM. \par ¦ Strength : external rotation 3+/5, internal rotation 5/5, supraspinatus 3/5\par ¦ Stability : no joint instability on provocative testing\par o Upper Arm : no tenderness, no swelling, no deformities\par o Muscle Bulk : no atrophy \par o Sensation : sensation intact to light touch \par o Skin : no skin rash, no discoloration \par o Vascular Exam : no edema, no cyanosis, radial and ulnar pulses normal  [de-identified] : o  Right Shoulder : Internal/External rotation, and outlet views were obtained, there are no soft tissue abnormalities, fracture of the right acromion with a 6.0 mm displacement, callus formation unchanged from prior films, degenerative change of the greater tuberosity, severe glenohumeral osteoarthritis with proximal migration of the humeral head.

## 2019-06-21 ENCOUNTER — APPOINTMENT (OUTPATIENT)
Dept: RHEUMATOLOGY | Facility: CLINIC | Age: 64
End: 2019-06-21
Payer: MEDICARE

## 2019-06-21 ENCOUNTER — APPOINTMENT (OUTPATIENT)
Dept: ENDOCRINOLOGY | Facility: CLINIC | Age: 64
End: 2019-06-21
Payer: MEDICARE

## 2019-06-21 VITALS
OXYGEN SATURATION: 98 % | DIASTOLIC BLOOD PRESSURE: 78 MMHG | HEART RATE: 80 BPM | RESPIRATION RATE: 16 BRPM | TEMPERATURE: 97.7 F | SYSTOLIC BLOOD PRESSURE: 122 MMHG

## 2019-06-21 VITALS — HEIGHT: 66 IN | BODY MASS INDEX: 31.82 KG/M2 | WEIGHT: 198 LBS

## 2019-06-21 PROCEDURE — 77080 DXA BONE DENSITY AXIAL: CPT | Mod: GA

## 2019-06-21 PROCEDURE — 99215 OFFICE O/P EST HI 40 MIN: CPT

## 2019-06-25 ENCOUNTER — MEDICATION RENEWAL (OUTPATIENT)
Age: 64
End: 2019-06-25

## 2019-06-25 ENCOUNTER — MESSAGE (OUTPATIENT)
Age: 64
End: 2019-06-25

## 2019-06-26 ENCOUNTER — CLINICAL ADVICE (OUTPATIENT)
Age: 64
End: 2019-06-26

## 2019-06-26 DIAGNOSIS — M85.80 OTHER SPECIFIED DISORDERS OF BONE DENSITY AND STRUCTURE, UNSPECIFIED SITE: ICD-10-CM

## 2019-06-26 LAB
25(OH)D3 SERPL-MCNC: 20.5 NG/ML
ALBUMIN SERPL ELPH-MCNC: 4.1 G/DL
ALP BLD-CCNC: 54 U/L
ALT SERPL-CCNC: 14 U/L
ANION GAP SERPL CALC-SCNC: 15 MMOL/L
AST SERPL-CCNC: 10 U/L
BASOPHILS # BLD AUTO: 0.07 K/UL
BASOPHILS NFR BLD AUTO: 0.8 %
BILIRUB SERPL-MCNC: 0.7 MG/DL
BUN SERPL-MCNC: 20 MG/DL
CALCIUM SERPL-MCNC: 10.2 MG/DL
CCP AB SER IA-ACNC: <8 UNITS
CHLORIDE SERPL-SCNC: 105 MMOL/L
CK SERPL-CCNC: 40 U/L
CO2 SERPL-SCNC: 23 MMOL/L
CREAT SERPL-MCNC: 0.58 MG/DL
CRP SERPL-MCNC: 0.16 MG/DL
EOSINOPHIL # BLD AUTO: 0.13 K/UL
EOSINOPHIL NFR BLD AUTO: 1.4 %
ESTIMATED AVERAGE GLUCOSE: 114 MG/DL
GLUCOSE SERPL-MCNC: 75 MG/DL
HBA1C MFR BLD HPLC: 5.6 %
HCT VFR BLD CALC: 41.5 %
HGB BLD-MCNC: 12.7 G/DL
IMM GRANULOCYTES NFR BLD AUTO: 2.3 %
LYMPHOCYTES # BLD AUTO: 0.99 K/UL
LYMPHOCYTES NFR BLD AUTO: 10.7 %
MAN DIFF?: NORMAL
MCHC RBC-ENTMCNC: 30 PG
MCHC RBC-ENTMCNC: 30.6 GM/DL
MCV RBC AUTO: 97.9 FL
MONOCYTES # BLD AUTO: 0.87 K/UL
MONOCYTES NFR BLD AUTO: 9.4 %
NEUTROPHILS # BLD AUTO: 7.01 K/UL
NEUTROPHILS NFR BLD AUTO: 75.4 %
PLATELET # BLD AUTO: 239 K/UL
POTASSIUM SERPL-SCNC: 4.2 MMOL/L
PROT SERPL-MCNC: 5.9 G/DL
RBC # BLD: 4.24 M/UL
RBC # FLD: 17 %
RF+CCP IGG SER-IMP: NEGATIVE
RHEUMATOID FACT SER QL: <10 IU/ML
SODIUM SERPL-SCNC: 143 MMOL/L
TSH SERPL-ACNC: 1.8 UIU/ML
WBC # FLD AUTO: 9.28 K/UL

## 2019-06-27 ENCOUNTER — APPOINTMENT (OUTPATIENT)
Dept: ORTHOPEDIC SURGERY | Facility: CLINIC | Age: 64
End: 2019-06-27
Payer: MEDICARE

## 2019-06-27 PROCEDURE — 20610 DRAIN/INJ JOINT/BURSA W/O US: CPT | Mod: RT

## 2019-06-27 PROCEDURE — 73564 X-RAY EXAM KNEE 4 OR MORE: CPT | Mod: RT

## 2019-06-27 PROCEDURE — 99214 OFFICE O/P EST MOD 30 MIN: CPT | Mod: 25

## 2019-06-27 NOTE — ADDENDUM
[FreeTextEntry1] : I, Maria T Ramirez, acted solely as a scribe for Dr. Jhonatan Santiago on this date 06/27/2019.

## 2019-06-27 NOTE — PROCEDURE
[de-identified] : At this point I recommended a therapeutic injection and under sterile precautions an injection of 4 cc of Monovisc (Lot: [id number], Expiration: 12/2021), was placed into the joint of the Right knee without complication.

## 2019-06-27 NOTE — DISCUSSION/SUMMARY
[de-identified] : The underlying pathophysiology was reviewed in great detail with the patient as well as the various treatment options, including ice, analgesics, NSAIDs, Physical therapy, steroid injections.\par \par The patient wishes to proceed with a MONOVISC injection of the right knee. \par \par A prescription was provided for a medial  brace. \par \par FU PRN.

## 2019-06-27 NOTE — HISTORY OF PRESENT ILLNESS
[de-identified] : 64 year old female presents for an evaluation of chronic right knee pain, an MRI obtained on 5/27/2015 revealed severe lateral compartment osteoarthritis. Today she describes a constant aching pain located along the medial aspect of her right knee that is exacerbated with walking, bending, and weight bearing. The patient is ambulating with the assistance of a walker due to weakness of her knees She has been utilizing Voltaren gel to alleviate her symptoms. She has no other complaints at this time.

## 2019-06-27 NOTE — END OF VISIT
[FreeTextEntry3] : All medical record entries made by the Billieibe were at my, Dr. Jhonatan Santiago, direction and personally dictated by me on 06/27/2019. I have reviewed the chart and agree that the record accurately reflects my personal performance of the history, physical exam, assessment and plan. I have also personally directed, reviewed, and agreed with the chart.

## 2019-06-27 NOTE — PHYSICAL EXAM
[de-identified] : Right Lower Extremity\par o Knee :\par ¦ Inspection/Palpation : tenderness along the anteromedial knee, no swelling, valgus alignment \par ¦ Range of Motion : 0 - 110 degrees\par ¦ Stability : no valgus or varus instability present on provocative testing, Lachman’s Test (-)\par ¦ Strength : flexion and extension 4/5\par o Muscle Bulk : normal muscle bulk present\par o Skin : no erythema, no ecchymosis \par o Sensation : sensation to pin intact\par o Vascular Exam : no edema, no cyanosis, dorsalis pedis artery pulse 2+, posterior tibial artery pulse 2+  [de-identified] : o An MRI of the right knee was obtained at [facility] on 5/27/2015, revealed:\par Severe lateral compartment osteoarthritis. Diffuse degenerative tear and maceration of the lateral meniscus. Joint effusion. Small popliteal cyst. Chronic ACL tear. Degenerative intact PCL. Medial collateral ligament bursitis. \par \par XRays obtained in the office today, 6/27/2019:\par o Right Knee : AP, lateral, sunrise, and [place]al[place] views of the knee were obtained, there are no soft tissue abnormalities, no fractures, valgus alignment, severe lateral compartment osteoarthritis with bone on bone apposition, vascular calcifications.

## 2019-07-05 ENCOUNTER — MEDICATION RENEWAL (OUTPATIENT)
Age: 64
End: 2019-07-05

## 2019-07-12 ENCOUNTER — APPOINTMENT (OUTPATIENT)
Dept: FAMILY MEDICINE | Facility: CLINIC | Age: 64
End: 2019-07-12
Payer: MEDICARE

## 2019-07-12 VITALS
HEART RATE: 70 BPM | DIASTOLIC BLOOD PRESSURE: 60 MMHG | SYSTOLIC BLOOD PRESSURE: 96 MMHG | TEMPERATURE: 98.4 F | RESPIRATION RATE: 15 BRPM | HEIGHT: 66 IN | BODY MASS INDEX: 30.53 KG/M2 | WEIGHT: 190 LBS | OXYGEN SATURATION: 97 %

## 2019-07-12 PROCEDURE — 99214 OFFICE O/P EST MOD 30 MIN: CPT

## 2019-07-12 RX ORDER — DOXYCYCLINE 100 MG/1
100 TABLET, FILM COATED ORAL TWICE DAILY
Qty: 20 | Refills: 0 | Status: COMPLETED | COMMUNITY
Start: 2019-04-12 | End: 2019-07-12

## 2019-07-12 RX ORDER — LANCETS 33 GAUGE
EACH MISCELLANEOUS
Qty: 3 | Refills: 0 | Status: COMPLETED | COMMUNITY
Start: 2019-05-15 | End: 2019-07-12

## 2019-07-18 ENCOUNTER — APPOINTMENT (OUTPATIENT)
Dept: ORTHOPEDIC SURGERY | Facility: CLINIC | Age: 64
End: 2019-07-18
Payer: MEDICARE

## 2019-07-18 VITALS — WEIGHT: 190 LBS | BODY MASS INDEX: 30.53 KG/M2 | HEIGHT: 66 IN

## 2019-07-18 PROCEDURE — 99213 OFFICE O/P EST LOW 20 MIN: CPT

## 2019-07-18 NOTE — PHYSICAL EXAM
[No Acute Distress] : no acute distress [Normal Sclera/Conjunctiva] : normal sclera/conjunctiva [Normal Outer Ear/Nose] : the outer ears and nose were normal in appearance [No JVD] : no jugular venous distention [No Respiratory Distress] : no respiratory distress  [No Accessory Muscle Use] : no accessory muscle use [Clear to Auscultation] : lungs were clear to auscultation bilaterally [Normal Rate] : normal rate  [Regular Rhythm] : with a regular rhythm [Normal S1, S2] : normal S1 and S2 [No Murmur] : no murmur heard [Normal Affect] : the affect was normal [Alert and Oriented x3] : oriented to person, place, and time [Normal Insight/Judgement] : insight and judgment were intact [None] : no ulcers in either foot were found [] : both feet [de-identified] : obese [de-identified] : minimal peripheral edema of the lower extremities [de-identified] : right arm with improved ROM s/p clavicular fracture [de-identified] : leg wounds have healed completely at this time and no new ones noted [de-identified] : foot exam much imorved and nails have healed back to baseline [de-identified] : diffusely diminished to monofilament testing

## 2019-07-18 NOTE — PHYSICAL EXAM
[Wheelchair] : uses a wheelchair [Normal RUE] : Right Upper Extremity: No scars, rashes, lesions, ulcers, skin intact [Normal LUE] : Left Upper Extremity: No scars, rashes, lesions, ulcers, skin intact [Normal Touch] : sensation intact for touch [Normal] : No swelling, no edema, normal pedal pulses and normal temperature [Obese] : obese [Poor Appearance] : well-appearing [Acute Distress] : not in acute distress [de-identified] : Right Upper Extremity\par o Shoulder :\par ¦ Inspection/Palpation : mild tenderness and swelling over the acromion, no deformity \par ¦ Range of Motion : ACTIVE FORWARD ELEVATION: Measured at 90  degrees, ACTIVE EXTERNAL ROTATION: Measured at 10 degrees, ACTIVE INTERNAL ROTATION: Measured at T10. Crepitus on ROM. \par ¦ Strength : external rotation 4/5, internal rotation 5/5, supraspinatus 3/5\par ¦ Stability : no joint instability on provocative testing\par o Upper Arm : no tenderness, no swelling, no deformities\par o Muscle Bulk : no atrophy \par o Sensation : sensation intact to light touch \par o Skin : no skin rash, no discoloration \par o Vascular Exam : no edema, no cyanosis, radial and ulnar pulses normal

## 2019-07-18 NOTE — ADDENDUM
[FreeTextEntry1] : I, Maria T Ramirez, acted solely as a scribe for Dr. Jhonatan Santiago on this date 07/18/2019.

## 2019-07-18 NOTE — HISTORY OF PRESENT ILLNESS
[de-identified] : RHD 64 year old female presents for an evaluation of right shoulder pain, the patient has been diagnosed with rotator cuff arthropathy and on 4/24/2019 she sustained a minimally displaced fracture of the right acromion.  The patient reports that she has been attending physical therapy and has made improvements, though she continues to experience severe limitations in her shoulder mobility. She also reports pain along the top of her shoulder with long periods of use of her right arm. Of note, the patient had an aneurysm 6 months ago and lost feeling to her lower extremities that she has been slowly gaining back with the help of physical therapy, she is currently ambulating with the assistance of a wheelchair.

## 2019-07-18 NOTE — HISTORY OF PRESENT ILLNESS
[Diabetes Mellitus] : Diabetes Mellitus [Hyperlipidemia] : Hyperlipidemia [No episodes] : No hypoglycemic episodes since the last visit. [Does not check] : Patient does not check blood glucose regularly [Regular podiatrist visits] : Patient had regular podiatrist visits [Understanding of foot care] : Patient expressed understanding of foot care [Most Recent A1C: ___] : Most recent A1C was [unfilled] [Target A1C:  ___] : Target A1C is [unfilled] [Target goal met] : A1C target goal met [Neuropathy] :  neuropathy [Contraindication to therapy ___] : Contraindications to statin  therapy: [unfilled] [Stable] : Patient is stable [No therapy] : Patient is not on statin therapy [No Weight Changes] : No weight changes [Sedentary] : Patient is sedentary [Following  treatment as advised] : Patient is following  treatment as advised [Action] : Action: patient is following a plan to lose weight [Needs reinforcement, provided] : Patient needs reinforcement on understanding of disease, goals and obesity follow-up plan; reinforcement was provided [EyeExamDate] : 5 [de-identified] : trying to [de-identified] : going to PT for spinal

## 2019-07-18 NOTE — COUNSELING
[Weight management counseling provided] : Weight management [Healthy eating counseling provided] : healthy eating [Activity counseling provided] : activity [Disease Management counseling provided] : disease management  [___ min/wk activity recom] : [unfilled] min/wk activity recommended [Decrease Portions] : Decrease food portions [Take medications as directed] : Take medications as directed [Check sugar ___ times per week] : Check blood sugar [unfilled]  times per week [Check feet daily] : Check feet daily [Keep FS  log to bring to next visit] : Keep finger stick log and  bring  to next visit [Maintain symptoms  log] : Maintain symptoms log [Maintain weight log] : Maintain weight log [Avoid asthma triggers] : Avoid asthma triggers [Identify asthma triggers] : Identify asthma triggers [None] : None [Needs reinforcement, provided] : Patient needs reinforcement on understanding lifestyle changes and  the steps needed to achieve self management goals and reinforcement was provided

## 2019-07-18 NOTE — DISCUSSION/SUMMARY
[de-identified] : The underlying pathophysiology was reviewed in great detail with the patient as well as the various treatment options, including ice, analgesics, NSAIDs, Physical therapy, steroid injections, right reverse TSA.\par \par The patient is a high risk surgical candidate, we will continue with conservative treatment. \par \par She is to continue with physical therapy.\par \par FU 6 weeks.

## 2019-07-18 NOTE — REVIEW OF SYSTEMS
[Fatigue] : fatigue [Incontinence] : incontinence [Joint Pain] : joint pain [Negative] : Psychiatric [Fever] : no fever [Dyspnea on Exertion] : no dyspnea on exertion [FreeTextEntry6] : wheezing resolved since last visit [FreeTextEntry8] : better on tamulosin- continues to improve [FreeTextEntry9] : clavicle imporved with PT but pain with the use of walker especially with the regular walker [de-identified] : leg wounds have healed at this time and no new skin tears, bruises [de-identified] : secondary to spinal cord issues- with slow improvement

## 2019-07-18 NOTE — END OF VISIT
[FreeTextEntry3] : All medical record entries made by the Billieibe were at my, Dr. Jhonatan Santiago, direction and personally dictated by me on 07/18/2019. I have reviewed the chart and agree that the record accurately reflects my personal performance of the history, physical exam, assessment and plan. I have also personally directed, reviewed, and agreed with the chart.

## 2019-08-20 ENCOUNTER — RX RENEWAL (OUTPATIENT)
Age: 64
End: 2019-08-20

## 2019-08-21 ENCOUNTER — NON-APPOINTMENT (OUTPATIENT)
Age: 64
End: 2019-08-21

## 2019-08-21 ENCOUNTER — MEDICATION RENEWAL (OUTPATIENT)
Age: 64
End: 2019-08-21

## 2019-08-21 ENCOUNTER — APPOINTMENT (OUTPATIENT)
Dept: CARDIOLOGY | Facility: CLINIC | Age: 64
End: 2019-08-21
Payer: MEDICARE

## 2019-08-21 VITALS
RESPIRATION RATE: 16 BRPM | HEIGHT: 66 IN | DIASTOLIC BLOOD PRESSURE: 86 MMHG | SYSTOLIC BLOOD PRESSURE: 132 MMHG | HEART RATE: 73 BPM | OXYGEN SATURATION: 97 %

## 2019-08-21 VITALS — HEART RATE: 80 BPM | DIASTOLIC BLOOD PRESSURE: 80 MMHG | SYSTOLIC BLOOD PRESSURE: 100 MMHG

## 2019-08-21 PROCEDURE — 93000 ELECTROCARDIOGRAM COMPLETE: CPT

## 2019-08-21 PROCEDURE — 93306 TTE W/DOPPLER COMPLETE: CPT

## 2019-08-21 PROCEDURE — 99214 OFFICE O/P EST MOD 30 MIN: CPT

## 2019-08-21 RX ORDER — AMIODARONE HYDROCHLORIDE 200 MG/1
200 TABLET ORAL
Qty: 90 | Refills: 2 | Status: DISCONTINUED | COMMUNITY
Start: 2019-02-22 | End: 2019-08-21

## 2019-08-21 NOTE — REASON FOR VISIT
[Follow-Up - Clinic] : a clinic follow-up of [Atrial Fibrillation] : atrial fibrillation [Medication Management] : Medication management [FreeTextEntry1] : Patient returns for followup she continues rehabilitation. She underwent echocardiography today which revealed a mild pericardial effusion. She denies chest discomfort shortness of breath palpitations dizziness or syncope.

## 2019-08-21 NOTE — ASSESSMENT
[FreeTextEntry1] : Impression:\par 1. Patient with aortic dissection and pericardial tamponade who in the course of being critically ill had atrial fibrillation. Patient has had no clinical recurrences since that time. She has not been cleared to start on anticoagulation. However given his current clinical picture believe it reasonable for her to come off amiodarone as long term risks may outweigh benefit. No beta blockers due to asthma. No diltiazem at this time due to relative hypotension

## 2019-08-21 NOTE — PHYSICAL EXAM
[General Appearance - Well Developed] : well developed [Normal Appearance] : normal appearance [Well Groomed] : well groomed [General Appearance - Well Nourished] : well nourished [No Deformities] : no deformities [General Appearance - In No Acute Distress] : no acute distress [No Oral Pallor] : no oral pallor [Normal Oral Mucosa] : normal oral mucosa [No Oral Cyanosis] : no oral cyanosis [No Jugular Venous Soria A Waves] : no jugular venous soria A waves [Normal Jugular Venous A Waves Present] : normal jugular venous A waves present [Normal Jugular Venous V Waves Present] : normal jugular venous V waves present [Respiration, Rhythm And Depth] : normal respiratory rhythm and effort [Exaggerated Use Of Accessory Muscles For Inspiration] : no accessory muscle use [Auscultation Breath Sounds / Voice Sounds] : lungs were clear to auscultation bilaterally [Abdomen Tenderness] : non-tender [Abdomen Soft] : soft [Abdomen Mass (___ Cm)] : no abdominal mass palpated [FreeTextEntry1] : in wheelchair [Nail Clubbing] : no clubbing of the fingernails [Cyanosis, Localized] : no localized cyanosis [Petechial Hemorrhages (___cm)] : no petechial hemorrhages [Skin Color & Pigmentation] : normal skin color and pigmentation [] : no rash [Skin Lesions] : no skin lesions [No Venous Stasis] : no venous stasis [No Skin Ulcers] : no skin ulcer [No Xanthoma] : no  xanthoma was observed [Oriented To Time, Place, And Person] : oriented to person, place, and time [Affect] : the affect was normal [Mood] : the mood was normal [No Anxiety] : not feeling anxious [Normal S2] : normal S2 [Normal S1] : normal S1 [Normal Rate] : normal [S3] : no S3 [S4] : no S4 [No Murmur] : no murmurs heard [Left Carotid Bruit] : no bruit heard over the left carotid [Right Carotid Bruit] : no bruit heard over the right carotid [Left Femoral Bruit] : no bruit heard over the left femoral artery [Right Femoral Bruit] : no bruit heard over the right femoral artery [2+] : right 2+ [No Abnormalities] : the abdominal aorta was not enlarged and no bruit was heard [No Pitting Edema] : no pitting edema present

## 2019-08-23 ENCOUNTER — APPOINTMENT (OUTPATIENT)
Dept: RHEUMATOLOGY | Facility: CLINIC | Age: 64
End: 2019-08-23
Payer: MEDICARE

## 2019-08-23 VITALS
RESPIRATION RATE: 16 BRPM | TEMPERATURE: 98 F | SYSTOLIC BLOOD PRESSURE: 107 MMHG | DIASTOLIC BLOOD PRESSURE: 78 MMHG | OXYGEN SATURATION: 98 % | HEART RATE: 82 BPM

## 2019-08-23 DIAGNOSIS — Z00.00 ENCOUNTER FOR GENERAL ADULT MEDICAL EXAMINATION W/OUT ABNORMAL FINDINGS: ICD-10-CM

## 2019-08-23 DIAGNOSIS — Z79.899 OTHER LONG TERM (CURRENT) DRUG THERAPY: ICD-10-CM

## 2019-08-23 PROCEDURE — 99213 OFFICE O/P EST LOW 20 MIN: CPT | Mod: 25

## 2019-08-23 PROCEDURE — 96372 THER/PROPH/DIAG INJ SC/IM: CPT

## 2019-08-23 RX ORDER — DENOSUMAB 60 MG/ML
60 INJECTION SUBCUTANEOUS
Qty: 1 | Refills: 0 | Status: COMPLETED | OUTPATIENT
Start: 2019-08-23

## 2019-08-23 RX ADMIN — DENOSUMAB 1 MG/ML: 60 INJECTION SUBCUTANEOUS at 00:00

## 2019-09-13 ENCOUNTER — MEDICATION RENEWAL (OUTPATIENT)
Age: 64
End: 2019-09-13

## 2019-09-17 ENCOUNTER — APPOINTMENT (OUTPATIENT)
Dept: FAMILY MEDICINE | Facility: CLINIC | Age: 64
End: 2019-09-17
Payer: MEDICARE

## 2019-09-17 VITALS
TEMPERATURE: 98 F | HEART RATE: 76 BPM | OXYGEN SATURATION: 98 % | SYSTOLIC BLOOD PRESSURE: 90 MMHG | DIASTOLIC BLOOD PRESSURE: 70 MMHG | HEIGHT: 66 IN

## 2019-09-17 DIAGNOSIS — Z86.19 PERSONAL HISTORY OF OTHER INFECTIOUS AND PARASITIC DISEASES: ICD-10-CM

## 2019-09-17 PROCEDURE — G0008: CPT

## 2019-09-17 PROCEDURE — 99214 OFFICE O/P EST MOD 30 MIN: CPT | Mod: 25

## 2019-09-17 PROCEDURE — 90686 IIV4 VACC NO PRSV 0.5 ML IM: CPT

## 2019-09-17 RX ORDER — UBIDECARENONE 100 MG
100 CAPSULE ORAL DAILY
Qty: 100 | Refills: 0 | Status: ACTIVE | COMMUNITY
Start: 2019-09-17

## 2019-09-18 NOTE — HISTORY OF PRESENT ILLNESS
[Diabetes Mellitus] : Diabetes Mellitus [Hyperlipidemia] : Hyperlipidemia [Obesity] : Obesity [Check glucose ___ x/day] : Patient checks  blood glucose [unfilled]  times a day [Fasting:  ___] : Fasting Blood Sugar: [unfilled] mg/dL [Regular podiatrist visits] : Patient had regular podiatrist visits [Understanding of foot care] : Patient expressed understanding of foot care [Retinopathy] : Retinopathy [Target A1C:  ___] : Target A1C is [unfilled] [Target goal met] : A1C target goal met [Neuropathy] :  neuropathy [Contraindication to therapy ___] : Contraindications to statin  therapy: [unfilled] [Severe Persistent] : severe persistent [Wheezing] : wheezing [Allergies] : allergies [No nocturnal awakening] : Patient denies nocturnal awakening [___ x/week] : [unfilled] times per week [Inhaler Use] : inhaler use [Does not check Peak Flow] : The patient is not checking peak flow [Well controlled] : Condition is well controlled [FreeTextEntry1] : afib- lightheaded- from low BP, off amiodarone now but still having issues with this

## 2019-09-18 NOTE — COUNSELING
[Fall prevention counseling provided] : Fall prevention counseling provided [Adequate lighting] : Adequate lighting [Use proper foot wear] : Use proper foot wear [No throw rugs] : No throw rugs [Use recommended devices] : Use recommended devices [Sleep ___ hours/day] : Sleep [unfilled] hours/day [Behavioral health counseling provided] : Behavioral health counseling provided [Engage in a relaxing activity] : Engage in a relaxing activity [Needs reinforcement, provided] : Patient needs reinforcement on understanding of lifestyle changes and steps needed to achieve self management goal; reinforcement was provided [None] : None [FreeTextEntry1] : wheelchair and walker

## 2019-09-18 NOTE — REVIEW OF SYSTEMS
[Joint Pain] : joint pain [Fatigue] : fatigue [Wheezing] : wheezing [Negative] : Cardiovascular [FreeTextEntry3] : needs eye follow up [FreeTextEntry6] : one episode last month of wheezing use of rescue inhaler for 2 days at night- cleared with weather change to less humidity [FreeTextEntry9] : had injection in the knee- will go back for steroid injection [FreeTextEntry8] : still having urinary retention- has sense that she is not emptying the bladder. Suggest urology referral

## 2019-09-18 NOTE — HEALTH RISK ASSESSMENT
[Patient not ambulatory] : Patient is not ambulatory [Assistive Device] : Patient uses an assistive device [de-identified] : PT post spinal cord infarction [de-identified] : cardiology notes reviewed, rheum- note reviewed [de-identified] : wheelchair bound [de-identified] : healthy

## 2019-09-18 NOTE — PHYSICAL EXAM
[No Acute Distress] : no acute distress [Normal Sclera/Conjunctiva] : normal sclera/conjunctiva [No JVD] : no jugular venous distention [Normal Outer Ear/Nose] : the outer ears and nose were normal in appearance [No Respiratory Distress] : no respiratory distress  [No Accessory Muscle Use] : no accessory muscle use [Normal Rate] : normal rate  [Clear to Auscultation] : lungs were clear to auscultation bilaterally [Regular Rhythm] : with a regular rhythm [Normal S1, S2] : normal S1 and S2 [No Murmur] : no murmur heard [Normal Affect] : the affect was normal [Alert and Oriented x3] : oriented to person, place, and time [Normal Insight/Judgement] : insight and judgment were intact [None] : no ulcers in either foot were found [] : normal [de-identified] : minimal peripheral edema of the lower extremities [de-identified] : obese [de-identified] : right arm with improved ROM s/p clavicular fracture [de-identified] : leg wounds have healed completely at this time and no new ones noted [de-identified] : foot exam much imorved and nails have healed back to baseline [TWNoteComboBox3] : +1 [de-identified] : diffusely diminished to monofilament testing [TWNoteComboBox4] : +1

## 2019-10-17 ENCOUNTER — APPOINTMENT (OUTPATIENT)
Dept: ORTHOPEDIC SURGERY | Facility: CLINIC | Age: 64
End: 2019-10-17
Payer: MEDICARE

## 2019-10-17 PROCEDURE — 20610 DRAIN/INJ JOINT/BURSA W/O US: CPT | Mod: RT

## 2019-10-17 PROCEDURE — 99214 OFFICE O/P EST MOD 30 MIN: CPT | Mod: 25

## 2019-10-17 NOTE — ADDENDUM
[FreeTextEntry1] : I, Samantha Ludwig, acted solely as a scribe for Dr. Jhonatan Santiago on this date 10/17/2019.

## 2019-10-17 NOTE — PROCEDURE
[de-identified] : At this point I recommended a therapeutic injection and under sterile precautions an injection of 5 cc 1% lidocaine with 0.5 cc of Kenalog and 0.5 cc of Dexamethasone - was placed into the joint of the Right knee without complication, and after several minutes, the patient felt significant relief.

## 2019-10-17 NOTE — HISTORY OF PRESENT ILLNESS
[de-identified] : 64 year old female presents for an evaluation of chronic right knee pain, she has been diagnosed with severe osteoarthritis of her right knee. On 5/27/2015, an MRI of the right knee was obtained which revealed diffuse degenerative tear and maceration of the lateral meniscus, chronic ACL tear, degenerative intact PCL, medial collateral ligament bursitis, as well as severe lateral compartment osteoarthritis.The patient presents to the office ambulating with the assistance of a wheelchair. She as been utilizing a medial  brace for added support and stability as needed. At a prior visit on 6/26/2019 she received a Monovisc injection of the right knee which provided no alleviation of her pain. She describes an aching pain located along the anterior aspect of her right knee that is exacerbated with walking, weight bearing, and with deep bending. Of note, she has DM.

## 2019-10-17 NOTE — PHYSICAL EXAM
[de-identified] : Right Lower Extremity\par o Knee :\par ¦ Inspection/Palpation : tenderness along the medial tibial plateau, mild medial and lateral joint line tenderness, tenderness along the anteromedial knee, no swelling, valgus alignment \par ¦ Range of Motion : 0 - 110 degrees\par ¦ Stability : no valgus or varus instability present on provocative testing, Lachman’s Test (-)\par ¦ Strength : flexion and extension 4/5\par o Muscle Bulk : normal muscle bulk present\par o Skin : no erythema, no ecchymosis \par o Sensation : sensation to pin intact\par o Vascular Exam : no edema, no cyanosis, dorsalis pedis artery pulse 2+, posterior tibial artery pulse 2+

## 2019-10-17 NOTE — DISCUSSION/SUMMARY
[de-identified] : The underlying pathophysiology was reviewed in great detail with the patient as well as the various treatment options, including ice, analgesics, NSAIDs, Physical therapy, steroid injections and TKR.\par \par The patient wishes to proceed with an INJECTION of the right knee. \par \par FU PRN.

## 2019-10-17 NOTE — END OF VISIT
[FreeTextEntry3] : All medical record entries made by the Scribe were at my, Dr. Jhonatan Santiago, direction and personally dictated by me on 10/17/2019. I have reviewed the chart and agree that the record accurately reflects my personal performance of the history, physical exam, assessment and plan. I have also personally directed, reviewed, and agreed with the chart.

## 2019-10-29 ENCOUNTER — MEDICATION RENEWAL (OUTPATIENT)
Age: 64
End: 2019-10-29

## 2019-10-29 ENCOUNTER — MOBILE ON CALL (OUTPATIENT)
Age: 64
End: 2019-10-29

## 2019-11-21 ENCOUNTER — APPOINTMENT (OUTPATIENT)
Dept: ORTHOPEDIC SURGERY | Facility: CLINIC | Age: 64
End: 2019-11-21
Payer: MEDICARE

## 2019-11-21 VITALS — HEIGHT: 66 IN | BODY MASS INDEX: 30.53 KG/M2 | WEIGHT: 190 LBS

## 2019-11-21 PROCEDURE — 20610 DRAIN/INJ JOINT/BURSA W/O US: CPT | Mod: RT

## 2019-11-21 PROCEDURE — 73030 X-RAY EXAM OF SHOULDER: CPT | Mod: RT

## 2019-11-21 PROCEDURE — 99214 OFFICE O/P EST MOD 30 MIN: CPT | Mod: 25

## 2019-11-21 NOTE — PHYSICAL EXAM
[Wheelchair] : uses a wheelchair [Normal RUE] : Right Upper Extremity: No scars, rashes, lesions, ulcers, skin intact [Normal LUE] : Left Upper Extremity: No scars, rashes, lesions, ulcers, skin intact [Normal Touch] : sensation intact for touch [Normal] : No swelling, no edema, normal pedal pulses and normal temperature [Obese] : obese [Poor Appearance] : well-appearing [Acute Distress] : not in acute distress [de-identified] : Right Upper Extremity\par o Shoulder :\par ¦ Inspection/Palpation : marked tenderness diffusely about the shoulder, crepitus on palpation of the acromion, swelling with 2+ effusion, no deformity \par ¦ Range of Motion : ACTIVE FORWARD ELEVATION: Measured at 75 degrees, ACTIVE EXTERNAL ROTATION: Measured at 30 degrees, ACTIVE INTERNAL ROTATION: Measured at PSIS\par ¦ Strength : external rotation 3/5, internal rotation 5/5, supraspinatus 3/5, all with crepitus \par ¦ Stability : no joint instability on provocative testing\par o Upper Arm : no tenderness, no swelling, no deformities\par o Muscle Bulk : no atrophy \par o Sensation : sensation intact to light touch \par o Skin : no skin rash, no discoloration \par o Vascular Exam : no edema, no cyanosis, radial and ulnar pulses normal  [de-identified] : o Right Shoulder : Internal/External rotation, and outlet views were obtained, there are no soft tissue abnormalities, fracture of the right acromion with increased callus formation but chronic appearing nonunion, severe glenohumeral osteoarthritis with proximal migration of the humeral head,  degenerative change of the greater tuberosity.

## 2019-11-21 NOTE — HISTORY OF PRESENT ILLNESS
[de-identified] : RHD 64 year old female presents for an evaluation of right shoulder pain, she has been diagnosed with rotator cuff arthropathy and on 4/24/2019 she sustained a minimally displaced fracture of the right acromion. The patient reports that her right shoulder pain has been progressively worsening over time. She has been experiencing a sharp stabbing pain about her right shoulder that is exacerbated with strenuous or prolonged use of her right arm. She has been taking Tramadol to manage her pain as needed. Of note, the patient has a history of an aneurysm and lost feeling in her lower extremities that she has been gaining back with physical therapy; she is currently ambulating with the assistance of a wheelchair.

## 2019-11-21 NOTE — ADDENDUM
[FreeTextEntry1] : I, Samantha Ludwig, acted solely as a scribe for Dr. Jhonatan Santiago on this date 11/21/2019.

## 2019-11-21 NOTE — PROCEDURE
[de-identified] : At this point I recommended aspiration and therapeutic injection and under sterile precautions an injection of 4 cc 1% lidocaine with 0.5 cc of Kenalog and 0.5 cc of Dexamethasone- was placed into the subacromial space of the Right shoulder without complication after 70 cc of clear synovial fluid was aspirated and after several minutes the patient felt significant relief.

## 2019-11-21 NOTE — END OF VISIT
[FreeTextEntry3] : All medical record entries made by the Scribe were at my, Dr. Jhonatan Santiago, direction and personally dictated by me on 11/21/2019. I have reviewed the chart and agree that the record accurately reflects my personal performance of the history, physical exam, assessment and plan. I have also personally directed, reviewed, and agreed with the chart.

## 2019-11-21 NOTE — DISCUSSION/SUMMARY
[de-identified] : The underlying pathophysiology was reviewed in great detail with the patient as well as the various treatment options, including ice, analgesics, NSAIDs, Physical therapy, steroid injections, right reverse TSA.\par \par The patient is a high risk surgical candidate, we will continue with conservative treatment. \par \par The patient wishes to proceed with an ASPIRATION and INJECTION of the right shoulder. \par \par A prescription was provided for Lidocaine patches. \par \par She is to continue with Physical Therapy. \par \par FU PRN.

## 2019-11-22 ENCOUNTER — APPOINTMENT (OUTPATIENT)
Dept: RHEUMATOLOGY | Facility: CLINIC | Age: 64
End: 2019-11-22
Payer: MEDICARE

## 2019-11-22 VITALS
TEMPERATURE: 98.2 F | HEART RATE: 82 BPM | OXYGEN SATURATION: 98 % | DIASTOLIC BLOOD PRESSURE: 80 MMHG | SYSTOLIC BLOOD PRESSURE: 130 MMHG

## 2019-11-22 DIAGNOSIS — E83.52 HYPERCALCEMIA: ICD-10-CM

## 2019-11-22 PROCEDURE — 99214 OFFICE O/P EST MOD 30 MIN: CPT

## 2019-11-25 PROBLEM — H26.491 SECONDARY CATARACT OF RIGHT EYE WITH VISION OBSCURED: Status: RESOLVED | Noted: 2019-07-12 | Resolved: 2019-11-25

## 2019-11-25 PROBLEM — J45.909 ASTHMA, ACUTE: Noted: 2019-10-04

## 2019-11-26 ENCOUNTER — APPOINTMENT (OUTPATIENT)
Dept: FAMILY MEDICINE | Facility: CLINIC | Age: 64
End: 2019-11-26
Payer: MEDICARE

## 2019-11-26 VITALS
RESPIRATION RATE: 16 BRPM | DIASTOLIC BLOOD PRESSURE: 80 MMHG | OXYGEN SATURATION: 98 % | TEMPERATURE: 98.2 F | HEART RATE: 79 BPM | SYSTOLIC BLOOD PRESSURE: 124 MMHG

## 2019-11-26 DIAGNOSIS — J45.909 UNSPECIFIED ASTHMA, UNCOMPLICATED: ICD-10-CM

## 2019-11-26 DIAGNOSIS — L03.031 CELLULITIS OF RIGHT TOE: ICD-10-CM

## 2019-11-26 DIAGNOSIS — H26.491 OTHER SECONDARY CATARACT, RIGHT EYE: ICD-10-CM

## 2019-11-26 LAB
25(OH)D3 SERPL-MCNC: 31.7 NG/ML
ALBUMIN MFR SERPL ELPH: 61.3 %
ALBUMIN SERPL ELPH-MCNC: 4.3 G/DL
ALBUMIN SERPL-MCNC: 4 G/DL
ALBUMIN/GLOB SERPL: 1.6 RATIO
ALP BLD-CCNC: 42 U/L
ALPHA1 GLOB MFR SERPL ELPH: 4.7 %
ALPHA1 GLOB SERPL ELPH-MCNC: 0.3 G/DL
ALPHA2 GLOB MFR SERPL ELPH: 12.2 %
ALPHA2 GLOB SERPL ELPH-MCNC: 0.8 G/DL
ALT SERPL-CCNC: 14 U/L
ANION GAP SERPL CALC-SCNC: 16 MMOL/L
AST SERPL-CCNC: 9 U/L
B-GLOBULIN MFR SERPL ELPH: 11.1 %
B-GLOBULIN SERPL ELPH-MCNC: 0.7 G/DL
BASOPHILS # BLD AUTO: 0.05 K/UL
BASOPHILS NFR BLD AUTO: 0.4 %
BILIRUB SERPL-MCNC: 0.6 MG/DL
BUN SERPL-MCNC: 23 MG/DL
CALCIUM SERPL-MCNC: 10.9 MG/DL
CHLORIDE SERPL-SCNC: 105 MMOL/L
CO2 SERPL-SCNC: 24 MMOL/L
CREAT SERPL-MCNC: 0.48 MG/DL
CRP SERPL-MCNC: 0.53 MG/DL
EOSINOPHIL # BLD AUTO: 0.03 K/UL
EOSINOPHIL NFR BLD AUTO: 0.3 %
ERYTHROCYTE [SEDIMENTATION RATE] IN BLOOD BY WESTERGREN METHOD: 11 MM/HR
GAMMA GLOB FLD ELPH-MCNC: 0.7 G/DL
GAMMA GLOB MFR SERPL ELPH: 10.7 %
GLUCOSE SERPL-MCNC: 87 MG/DL
HCT VFR BLD CALC: 41 %
HGB BLD-MCNC: 12.3 G/DL
IMM GRANULOCYTES NFR BLD AUTO: 2.5 %
INTERPRETATION SERPL IEP-IMP: NORMAL
LDH SERPL-CCNC: 162 U/L
LYMPHOCYTES # BLD AUTO: 1.19 K/UL
LYMPHOCYTES NFR BLD AUTO: 10.2 %
MAN DIFF?: NORMAL
MCHC RBC-ENTMCNC: 29.9 PG
MCHC RBC-ENTMCNC: 30 GM/DL
MCV RBC AUTO: 99.5 FL
MONOCYTES # BLD AUTO: 1.26 K/UL
MONOCYTES NFR BLD AUTO: 10.8 %
NEUTROPHILS # BLD AUTO: 8.86 K/UL
NEUTROPHILS NFR BLD AUTO: 75.8 %
PLATELET # BLD AUTO: 251 K/UL
POTASSIUM SERPL-SCNC: 4.4 MMOL/L
PROT SERPL-MCNC: 5.9 G/DL
PROT SERPL-MCNC: 6.5 G/DL
PROT SERPL-MCNC: 6.5 G/DL
RBC # BLD: 4.12 M/UL
RBC # FLD: 16.1 %
SODIUM SERPL-SCNC: 145 MMOL/L
WBC # FLD AUTO: 11.68 K/UL

## 2019-11-26 PROCEDURE — 99214 OFFICE O/P EST MOD 30 MIN: CPT

## 2019-11-27 PROBLEM — L03.031 CELLULITIS OF TOE OF RIGHT FOOT: Status: RESOLVED | Noted: 2019-04-12 | Resolved: 2019-11-27

## 2019-11-27 NOTE — COUNSELING
[Fall prevention counseling provided] : Fall prevention counseling provided [Adequate lighting] : Adequate lighting [No throw rugs] : No throw rugs [Use proper foot wear] : Use proper foot wear [Use recommended devices] : Use recommended devices [Sleep ___ hours/day] : Sleep [unfilled] hours/day [Behavioral health counseling provided] : Behavioral health counseling provided [Engage in a relaxing activity] : Engage in a relaxing activity [Potential consequences of obesity discussed] : Potential consequences of obesity discussed [Benefits of weight loss discussed] : Benefits of weight loss discussed [Decrease Portions] : decrease portions [Weigh Self Weekly] : weigh self weekly [____ min/wk Activity] : [unfilled] min/wk activity [Needs reinforcement, provided] : Patient needs reinforcement on understanding of disease, goals and obesity follow-up plan; reinforcement was provided [None] : None [Good understanding] : Patient has a good understanding of lifestyle changes and steps needed to achieve self management goal [FreeTextEntry1] : walker or wheelchair

## 2019-11-27 NOTE — HEALTH RISK ASSESSMENT
[Any fall with injury in past year] : Patient reported fall with injury in the past year [Assistive Device] : Patient uses an assistive device [de-identified] : rheumatology- notes reviewed and labs reviewed as well as discussion about steroids and osteoporosis [de-identified] : sedentary due to spinal cord infacrtion from aneurysm [de-identified] : tries to follow low fat and carb diet [de-identified] : walker and wheelchair

## 2019-11-27 NOTE — PHYSICAL EXAM
[No Acute Distress] : no acute distress [Normal Sclera/Conjunctiva] : normal sclera/conjunctiva [Normal Outer Ear/Nose] : the outer ears and nose were normal in appearance [No JVD] : no jugular venous distention [No Respiratory Distress] : no respiratory distress  [No Accessory Muscle Use] : no accessory muscle use [Clear to Auscultation] : lungs were clear to auscultation bilaterally [Normal Rate] : normal rate  [Regular Rhythm] : with a regular rhythm [No Murmur] : no murmur heard [Normal S1, S2] : normal S1 and S2 [Alert and Oriented x3] : oriented to person, place, and time [Normal Affect] : the affect was normal [Normal Insight/Judgement] : insight and judgment were intact [None] : no ulcers in either foot were found [] : both feet [de-identified] : obese [de-identified] : minimal peripheral edema of the lower extremities, less significant stasis changes of the ankles [de-identified] : right arm with improved ROM s/p clavicular fracture [de-identified] : leg wounds have healed completely at this time and no new ones noted [de-identified] : using wheelchair for transport into the office to prevent falls [de-identified] : foot exam much imorved and nails have healed back to baseline [de-identified] : diffusely diminished to monofilament testing [TWNoteComboBox3] : +1 [TWNoteComboBox4] : +1

## 2019-11-27 NOTE — REVIEW OF SYSTEMS
[Fatigue] : fatigue [Wheezing] : wheezing [Joint Pain] : joint pain [Unsteady Walking] : ataxia [Easy Bruising] : easy bruising [Negative] : Psychiatric [Easy Bleeding] : no easy bleeding [FreeTextEntry3] : needs eye follow up- again discussed with patient [FreeTextEntry6] : wheezing is less at this time, currently has only been uing rescue inhaler 1 or maybe two times per week. usually occurs at night [FreeTextEntry8] : still having urinary retention- has sense that she is not emptying the bladder. Suggest urology referral [FreeTextEntry9] : had injection in the knee- will go back for steroid injection, also had fluid removed from and steroid given in right shoulder with relief of pain due to bursitis

## 2019-11-27 NOTE — HISTORY OF PRESENT ILLNESS
[Asthma] : Asthma [Hyperlipidemia] : Hyperlipidemia [Diabetes Mellitus] : Diabetes Mellitus [Obesity] : Obesity [Hypertension] : Hypertension [Does not check] : Patient does not check blood glucose regularly [Regular podiatrist visits] : Patient had regular podiatrist visits [Understanding of foot care] : Patient expressed understanding of foot care [Most Recent A1C: ___] : Most recent A1C was [unfilled] [Target goal met] : A1C target goal met [Target A1C:  ___] : Target A1C is [unfilled] [Neuropathy] :  neuropathy [Contraindication to therapy ___] : Contraindications to statin  therapy: [unfilled] [Severe Persistent] : severe persistent [Dyspnea on Exertion] : dyspnea on exertion [Wheezing] : wheezing [Cold Temperature] : cold temperature [Allergies] : allergies [Other: ___] : [unfilled] [Inhaler Use] : inhaler use [Does not check Peak Flow] : The patient is not checking peak flow [___ x/week] : [unfilled] times per week [Stable] : Condition is stable [No Weight Changes] : No weight changes [Shortness of Breath] : no shortness of breath [Chest Pain] : no chest pain [Cough] : no cough [FreeTextEntry1] : would like to try to get patient off oral steroids- discussed at length this plan and will refer to new pulmonary specialist to attempt a conversion using a newer inhaled medication- has been unsuccessful  in the past but newer meds may allow for  this to happen now.

## 2019-12-04 ENCOUNTER — MEDICATION RENEWAL (OUTPATIENT)
Age: 64
End: 2019-12-04

## 2019-12-21 NOTE — DIETITIAN INITIAL EVALUATION ADULT. - PROBLEM SELECTOR PLAN 1
Type B w/ intramural hematoma  - Labetolol gtt and possibly cardene to maintain MAPs around 50. Currently Mapping 70  - STAT echo to check for tamponade; may need drain  - Given steroid use and comorbidities she is not a surgical canditate. Medical therapy.  - INR 1.8 give Vit K and FFP to reverse INR and stop progression of hemoperotenuem  - Case discussed w/ Dr. Kumar and patient
yes

## 2019-12-23 ENCOUNTER — MEDICATION RENEWAL (OUTPATIENT)
Age: 64
End: 2019-12-23

## 2020-01-09 ENCOUNTER — FORM ENCOUNTER (OUTPATIENT)
Age: 65
End: 2020-01-09

## 2020-01-10 ENCOUNTER — OUTPATIENT (OUTPATIENT)
Dept: OUTPATIENT SERVICES | Facility: HOSPITAL | Age: 65
LOS: 1 days | End: 2020-01-10
Payer: MEDICARE

## 2020-01-10 DIAGNOSIS — Z98.49 CATARACT EXTRACTION STATUS, UNSPECIFIED EYE: Chronic | ICD-10-CM

## 2020-01-10 DIAGNOSIS — Z90.49 ACQUIRED ABSENCE OF OTHER SPECIFIED PARTS OF DIGESTIVE TRACT: Chronic | ICD-10-CM

## 2020-01-10 DIAGNOSIS — I71.01 DISSECTION OF THORACIC AORTA: ICD-10-CM

## 2020-01-10 PROCEDURE — 74174 CTA ABD&PLVS W/CONTRAST: CPT | Mod: 26

## 2020-01-10 PROCEDURE — 71275 CT ANGIOGRAPHY CHEST: CPT

## 2020-01-10 PROCEDURE — 74174 CTA ABD&PLVS W/CONTRAST: CPT

## 2020-01-10 PROCEDURE — 71275 CT ANGIOGRAPHY CHEST: CPT | Mod: 26

## 2020-01-14 ENCOUNTER — RX RENEWAL (OUTPATIENT)
Age: 65
End: 2020-01-14

## 2020-01-15 ENCOUNTER — TRANSCRIPTION ENCOUNTER (OUTPATIENT)
Age: 65
End: 2020-01-15

## 2020-01-22 ENCOUNTER — NON-APPOINTMENT (OUTPATIENT)
Age: 65
End: 2020-01-22

## 2020-01-22 ENCOUNTER — APPOINTMENT (OUTPATIENT)
Dept: CARDIOLOGY | Facility: CLINIC | Age: 65
End: 2020-01-22
Payer: MEDICARE

## 2020-01-22 VITALS
SYSTOLIC BLOOD PRESSURE: 160 MMHG | HEART RATE: 82 BPM | HEIGHT: 66 IN | OXYGEN SATURATION: 97 % | DIASTOLIC BLOOD PRESSURE: 80 MMHG

## 2020-01-22 VITALS — SYSTOLIC BLOOD PRESSURE: 140 MMHG | DIASTOLIC BLOOD PRESSURE: 90 MMHG

## 2020-01-22 VITALS — DIASTOLIC BLOOD PRESSURE: 80 MMHG | SYSTOLIC BLOOD PRESSURE: 140 MMHG | HEART RATE: 84 BPM

## 2020-01-22 PROCEDURE — 93000 ELECTROCARDIOGRAM COMPLETE: CPT

## 2020-01-22 PROCEDURE — 99214 OFFICE O/P EST MOD 30 MIN: CPT

## 2020-01-22 NOTE — REASON FOR VISIT
[Follow-Up - Clinic] : a clinic follow-up of [Atrial Fibrillation] : atrial fibrillation [FreeTextEntry1] : Patient returns for followup. Feeling well. Offers no complaints of chest discomfort shortness of breath palpitations dizziness or syncope.\par

## 2020-01-22 NOTE — PHYSICAL EXAM
[General Appearance - Well Developed] : well developed [Well Groomed] : well groomed [Normal Appearance] : normal appearance [No Deformities] : no deformities [General Appearance - Well Nourished] : well nourished [General Appearance - In No Acute Distress] : no acute distress [Normal Oral Mucosa] : normal oral mucosa [No Oral Cyanosis] : no oral cyanosis [No Oral Pallor] : no oral pallor [Normal Jugular Venous V Waves Present] : normal jugular venous V waves present [No Jugular Venous Soria A Waves] : no jugular venous soria A waves [Normal Jugular Venous A Waves Present] : normal jugular venous A waves present [Exaggerated Use Of Accessory Muscles For Inspiration] : no accessory muscle use [Respiration, Rhythm And Depth] : normal respiratory rhythm and effort [Auscultation Breath Sounds / Voice Sounds] : lungs were clear to auscultation bilaterally [Abdomen Soft] : soft [Abdomen Tenderness] : non-tender [Abdomen Mass (___ Cm)] : no abdominal mass palpated [FreeTextEntry1] : in wheelchair [Nail Clubbing] : no clubbing of the fingernails [Cyanosis, Localized] : no localized cyanosis [Skin Color & Pigmentation] : normal skin color and pigmentation [Petechial Hemorrhages (___cm)] : no petechial hemorrhages [No Venous Stasis] : no venous stasis [] : no rash [Skin Lesions] : no skin lesions [No Skin Ulcers] : no skin ulcer [Affect] : the affect was normal [No Xanthoma] : no  xanthoma was observed [Oriented To Time, Place, And Person] : oriented to person, place, and time [Mood] : the mood was normal [No Anxiety] : not feeling anxious [Normal Rate] : normal [Normal S1] : normal S1 [Normal S2] : normal S2 [S4] : no S4 [S3] : no S3 [No Murmur] : no murmurs heard [Left Carotid Bruit] : no bruit heard over the left carotid [Right Carotid Bruit] : no bruit heard over the right carotid [Right Femoral Bruit] : no bruit heard over the right femoral artery [Left Femoral Bruit] : no bruit heard over the left femoral artery [2+] : right 2+ [No Abnormalities] : the abdominal aorta was not enlarged and no bruit was heard [No Pitting Edema] : no pitting edema present

## 2020-01-22 NOTE — REVIEW OF SYSTEMS
[No Sensation] : anesthesia [see HPI] : see HPI [Inability To Walk] : inability to walk [Negative] : Heme/Lymph

## 2020-01-22 NOTE — ASSESSMENT
[FreeTextEntry1] : Impression:\par 1. Patient with history of distal aortic dissection now healed patient had atrial fibrillation at that time. She is not anticoagulation. She has been off her amiodarone for some time without any recurrence\par \par Plan:\par 1. For now, continue current medication\par 2. No new cardiac workup or treatment indicated at this time

## 2020-01-24 ENCOUNTER — APPOINTMENT (OUTPATIENT)
Dept: CARDIOTHORACIC SURGERY | Facility: CLINIC | Age: 65
End: 2020-01-24
Payer: MEDICARE

## 2020-01-24 VITALS
HEART RATE: 75 BPM | DIASTOLIC BLOOD PRESSURE: 82 MMHG | RESPIRATION RATE: 16 BRPM | WEIGHT: 225 LBS | SYSTOLIC BLOOD PRESSURE: 122 MMHG | TEMPERATURE: 98.5 F | OXYGEN SATURATION: 97 % | BODY MASS INDEX: 36.16 KG/M2 | HEIGHT: 66 IN

## 2020-01-24 PROCEDURE — 99214 OFFICE O/P EST MOD 30 MIN: CPT

## 2020-01-24 RX ORDER — GAUZE BANDAGE 4"X3.5"
SPONGE TOPICAL
Qty: 1 | Refills: 0 | Status: COMPLETED | COMMUNITY
Start: 2019-03-26 | End: 2020-01-24

## 2020-01-24 RX ORDER — LANCETS 33 GAUGE
EACH MISCELLANEOUS
Qty: 3 | Refills: 1 | Status: COMPLETED | COMMUNITY
Start: 2019-04-15 | End: 2020-01-24

## 2020-01-24 NOTE — HISTORY OF PRESENT ILLNESS
[FreeTextEntry1] : KASHIF NATARAJAN is a 63 year old Female with past medical history of chronic severe persistent asthma ( with chronic steroid use),  T2DM, HLD( not on statin due to severe myalgia) , obesity, hypothyroidism , pulmonary emboli ( on Aspirin and S/p IVC filter ),  Anti coagulation was discontinued due to Type B dissection with  hemoperitoneum and Hx of Type B dissection with complaints of dyspnea on exertion , wheezing . She is here for follow up visit with CTA Chest/Abdomen/pelvis .\par \par

## 2020-01-24 NOTE — DATA REVIEWED
[FreeTextEntry1] : 12/7/2018 -  CTA chest revealed probable type A dissection with intramural hematoma w/ active hemorrhage.\par \par 1/10/20 CTA C/A/P revealed Interval resolution of previously noted aortic intramural hematoma/thrombosed dissection. No evidence for acute intimal flap or aortic dissection. Aneurysmal dilatation of the ascending aorta, 4.1 cm at the level of main pulmonary artery.\par - Small pericardial effusion with minimal amount of fluid within the superior  pericardial recesses.

## 2020-01-24 NOTE — CONSULT LETTER
[FreeTextEntry3] : Dexter Kumar MD\par Attending Surgeon\par \par Cardiovascular & Thoracic Surgery\par [facility].\par  [FreeTextEntry2] : [place] Hua M.D.\par [address]  [address]\[place] [address]\par

## 2020-01-24 NOTE — REVIEW OF SYSTEMS
[Lower Ext Edema] : lower extremity edema [Shortness Of Breath] : shortness of breath [SOB on Exertion] : shortness of breath during exertion [Dizziness] : dizziness [Easy Bruising] : a tendency for easy bruising [Negative] : Endocrine [Feeling Tired] : not feeling tired [Chest Pain] : no chest pain [Palpitations] : no palpitations [Fainting] : no fainting [Easy Bleeding] : no tendency for easy bleeding

## 2020-01-24 NOTE — END OF VISIT
[FreeTextEntry3] : \par I personally scribed for ABDULLAHI HALL on Jan 24 2020 10:30AM . \par \par \par \par \par Physician Attestation:\par Documented by JORGE RASMUSSEN acting as a scribe for ABDULLAHI HALL 01/24/2020 . \par                 All medical record entries made by the Scribe were at my, ABDULLAHI HALL , direction and personally dictated by me on 01/24/2020 . I have reviewed the chart and agree that the record accurately reflects my personal performance of the history, physical exam, assessment and plan\par \par \par

## 2020-01-24 NOTE — ASSESSMENT
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 She is here for follow up visit with CTA Chest/Abdomen/pelvis . \par \par This visit , she stated that she has Dyspnea on exertion but she walks less due to LE pain and Knee pain. Denies recent hospitalization, ER visits, or surgeries. He denies fever, chills, fatigue, headache, blurred vision, syncope, chest pain, palpitations, nausea, vomiting, abdominal pain, back pain, BRBPR .\par \par I have reviewed the CTA chest and explained to patient.1/10/20 CTA C/A/P revealed Interval resolution of previously noted aortic intramural hematoma/thrombosed dissection. No evidence for acute intimal flap or aortic dissection. Aneurysmal dilatation of the ascending aorta, 4.1 cm at the level of main pulmonary artery.\par - Small pericardial effusion with minimal amount of fluid within the superior  pericardial recesses. \par \par Plan:\par 1) Follow up with Cardiologist and PCP\par 2) Complete healing and resolution of intra mural Hematoma. Ascending aorta dilatation 4.1 cm , Follow up with CTA C/A/P in 2 years \par 3) Blood pressure management\par 4) May return to clinic on PRN basis \par \par \par

## 2020-01-24 NOTE — PHYSICAL EXAM
[PERRL With Normal Accommodation] : pupils were equal in size, round, and reactive to light [Sclera] : the sclera and conjunctiva were normal [Neck Appearance] : the appearance of the neck was normal [] : no respiratory distress [Respiration, Rhythm And Depth] : normal respiratory rhythm and effort [Auscultation Breath Sounds / Voice Sounds] : lungs were clear to auscultation bilaterally [Heart Rate And Rhythm] : heart rate was normal and rhythm regular [Apical Impulse] : the apical impulse was normal [Examination Of The Chest] : the chest was normal in appearance [Murmurs] : no murmurs [Heart Sounds] : normal S1 and S2 [2+] : left 2+ [Bowel Sounds] : normal bowel sounds [Breast Appearance] : normal in appearance [Abdomen Soft] : soft [No CVA Tenderness] : no ~M costovertebral angle tenderness [Involuntary Movements] : no involuntary movements were seen [Skin Color & Pigmentation] : normal skin color and pigmentation [Skin Turgor] : normal skin turgor [Oriented To Time, Place, And Person] : oriented to person, place, and time [No Focal Deficits] : no focal deficits [Affect] : the affect was normal [Mood] : the mood was normal [Impaired Insight] : insight and judgment were intact [Memory Remote] : remote memory was not impaired [Memory Recent] : recent memory was not impaired [FreeTextEntry1] : Uses wheel chair . Uses RT Knee brace - for ACL tear

## 2020-01-27 ENCOUNTER — TRANSCRIPTION ENCOUNTER (OUTPATIENT)
Age: 65
End: 2020-01-27

## 2020-02-07 ENCOUNTER — APPOINTMENT (OUTPATIENT)
Dept: FAMILY MEDICINE | Facility: CLINIC | Age: 65
End: 2020-02-07
Payer: MEDICARE

## 2020-02-07 VITALS
TEMPERATURE: 97.5 F | DIASTOLIC BLOOD PRESSURE: 70 MMHG | BODY MASS INDEX: 34.1 KG/M2 | HEIGHT: 68 IN | OXYGEN SATURATION: 97 % | SYSTOLIC BLOOD PRESSURE: 108 MMHG | RESPIRATION RATE: 15 BRPM | HEART RATE: 82 BPM | WEIGHT: 225 LBS

## 2020-02-07 DIAGNOSIS — S49.91XA UNSPECIFIED INJURY OF RIGHT SHOULDER AND UPPER ARM, INITIAL ENCOUNTER: ICD-10-CM

## 2020-02-07 PROCEDURE — 99214 OFFICE O/P EST MOD 30 MIN: CPT

## 2020-02-07 NOTE — HISTORY OF PRESENT ILLNESS
[Asthma] : Asthma [Diabetes Mellitus] : Diabetes Mellitus [Hyperlipidemia] : Hyperlipidemia [Obesity] : Obesity [Does not check] : Patient does not check blood glucose regularly [Regular podiatrist visits] : Patient had regular podiatrist visits [Understanding of foot care] : Patient expressed understanding of foot care [Most Recent A1C: ___] : Most recent A1C was [unfilled] [Target A1C:  ___] : Target A1C is [unfilled] [Neuropathy] :  neuropathy [Does not check BP] : The patient is not checking blood pressure [Stable] : Patient is stable [Target goal met] : BP target goal met [120/80] : Target blood pressure is 120/80 [Contraindication to therapy ___] : Contraindications to statin  therapy: [unfilled] [No therapy] : Patient is not on statin therapy [Lindaifestyle management] : lifestyle management [Severe Persistent] : severe persistent [___ x/month] : Patient awakes at night [unfilled] times per month [Cold Temperature] : cold temperature [Wheezing] : wheezing [Inhaler Use] : inhaler use [Allergies] : allergies [Other: ___] : [unfilled] [Does not check Peak Flow] : The patient is not checking peak flow [Somewhat controlled] : Condition is somewhat controlled [___ x/week] : [unfilled] times per week [No Weight Changes] : No weight changes [___ # of attempts] : [unfilled] weight lost attempts [FreeTextEntry1] : discussion re: getting off the steroids for asthma- new medications now available and will have second opinion pulmonary consultation prior to next visit [de-identified] : PT 2-3 times per week but no walking due to severe right knee pain

## 2020-02-07 NOTE — COUNSELING
[Fall prevention counseling provided] : Fall prevention counseling provided [Adequate lighting] : Adequate lighting [Use recommended devices] : Use recommended devices [Use proper foot wear] : Use proper foot wear [No throw rugs] : No throw rugs [Benefits of weight loss discussed] : Benefits of weight loss discussed [Potential consequences of obesity discussed] : Potential consequences of obesity discussed [None] : None [Good understanding] : Patient has a good understanding of lifestyle changes and steps needed to achieve self management goal [FreeTextEntry1] : wheelchair only at this time- unabl to walk

## 2020-02-07 NOTE — PHYSICAL EXAM
[No Acute Distress] : no acute distress [Normal Sclera/Conjunctiva] : normal sclera/conjunctiva [Normal Outer Ear/Nose] : the outer ears and nose were normal in appearance [No JVD] : no jugular venous distention [No Respiratory Distress] : no respiratory distress  [No Accessory Muscle Use] : no accessory muscle use [Clear to Auscultation] : lungs were clear to auscultation bilaterally [Normal Rate] : normal rate  [Regular Rhythm] : with a regular rhythm [Normal S1, S2] : normal S1 and S2 [No Murmur] : no murmur heard [Normal Affect] : the affect was normal [Alert and Oriented x3] : oriented to person, place, and time [Normal Insight/Judgement] : insight and judgment were intact [None] : no ulcers in either foot were found [] : both feet [Pedal Pulses Present] : the pedal pulses are present [de-identified] : minimal peripheral edema of the lower extremities, less significant stasis changes of the ankles [de-identified] : obese [de-identified] : right arm with improved ROM s/p clavicular fracture [de-identified] : leg wounds have healed completely at this time and no new ones noted [de-identified] : using wheelchair for transport into the office to prevent falls [de-identified] : diffusely diminished to monofilament testing [de-identified] : foot exam much imorved and nails have healed back to baseline [TWNoteComboBox3] : +1 [TWNoteComboBox4] : +1

## 2020-02-07 NOTE — REVIEW OF SYSTEMS
[Wheezing] : wheezing [Fatigue] : fatigue [Dyspnea on Exertion] : dyspnea on exertion [Joint Pain] : joint pain [Muscle Pain] : muscle pain [Unsteady Walking] : ataxia [Easy Bruising] : easy bruising [Negative] : Psychiatric [Cough] : no cough [Easy Bleeding] : no easy bleeding [FreeTextEntry3] : needs eye follow up- again discussed with patient- stll did not go for this [FreeTextEntry8] : still having urinary retention- has sense that she is not emptying the bladder. Suggest urology referral [de-identified] : no active wounds on her legs at this time [FreeTextEntry9] : had injection in the knee- will go back for steroid injection, also had fluid removed from and steroid given in right  shoulder with relief of pain due to bursitis, left shoulder also still weak due to prior rotator cuff tear-

## 2020-02-13 ENCOUNTER — APPOINTMENT (OUTPATIENT)
Dept: ORTHOPEDIC SURGERY | Facility: CLINIC | Age: 65
End: 2020-02-13
Payer: MEDICARE

## 2020-02-13 PROCEDURE — 99213 OFFICE O/P EST LOW 20 MIN: CPT | Mod: 25

## 2020-02-13 PROCEDURE — 20610 DRAIN/INJ JOINT/BURSA W/O US: CPT | Mod: RT

## 2020-02-13 NOTE — ADDENDUM
[FreeTextEntry1] : I, Samantha Ludwig, acted solely as a scribe for Dr. Jhonatan Santiago on this date 02/13/2020.

## 2020-02-13 NOTE — HISTORY OF PRESENT ILLNESS
[de-identified] : RHD 64 year old female presents for an evaluation of right shoulder pain, she has been diagnosed with rotator cuff arthropathy and on 4/24/2019 she sustained a minimally displaced fracture of the right acromion. She had temporarily stopped attending physical therapy, noting progressive exacerbation of her shoulder pain. The patient finds it hard to strengthen her right knee due to difficulty using a walker secondary to her shoulder pain. She to the office ambulating with the assistance of a wheelchair. At her last visit on 11/21/2019 she received an aspiration and corticosteroid injection of her right shoulder which helped to alleviate her symptoms for approximately 2 months. Her pain has returned and she describes a severe sharp stabbing pain about her right shoulder that is exacerbated with certain shoulder rotations and with prolonged use of her right arm.  Of note, she has DM and her most recent A1C was 6.2.

## 2020-02-13 NOTE — DISCUSSION/SUMMARY
[de-identified] : The underlying pathophysiology was reviewed in great detail with the patient as well as the various treatment options, including ice, analgesics, NSAIDs, Physical therapy, steroid injections, right reverse TSA.\par \par The patient wishes to proceed with an ASPIRATION and INJECTION of the right shoulder. \par \par FU 2 weeks.

## 2020-02-13 NOTE — PROCEDURE
[de-identified] : At this point I recommended aspiration and therapeutic injection and under sterile precautions an injection of 4 cc 1% lidocaine with 0.5 cc of Kenalog and 0.5 cc of Dexamethasone- was placed into the subacromial space of the Right shoulder without complication after 70 cc of clear synovial fluid was aspirated and after several minutes the patient felt significant relief.

## 2020-02-13 NOTE — END OF VISIT
[FreeTextEntry3] : All medical record entries made by the Billieibnatasha were at my, Dr. Jhonatan Santiago, direction and personally dictated by me on 02/13/2020. I have reviewed the chart and agree that the record accurately reflects my personal performance of the history, physical exam, assessment and plan. I have also personally directed, reviewed, and agreed with the chart.

## 2020-02-13 NOTE — PHYSICAL EXAM
[Normal RUE] : Right Upper Extremity: No scars, rashes, lesions, ulcers, skin intact [Wheelchair] : uses a wheelchair [Normal Touch] : sensation intact for touch [Normal] : No swelling, no edema, normal pedal pulses and normal temperature [Obese] : obese [Poor Appearance] : well-appearing [Acute Distress] : not in acute distress [de-identified] : Right Upper Extremity\par o Shoulder :\par ¦ Inspection/Palpation : marked tenderness diffusely about the shoulder, crepitus on palpation of the acromion, swelling with 2+ effusion, no deformity \par ¦ Range of Motion : ACTIVE FORWARD ELEVATION: Measured at 75 degrees, ACTIVE EXTERNAL ROTATION: Measured at 30 degrees, ACTIVE INTERNAL ROTATION: Measured at PSIS\par ¦ Strength : external rotation 3/5, internal rotation 5/5, supraspinatus 3/5, all with crepitus \par ¦ Stability : no joint instability on provocative testing\par o Upper Arm : no tenderness, no swelling, no deformities\par o Muscle Bulk : no atrophy \par o Sensation : sensation intact to light touch \par o Skin : no skin rash, no discoloration \par o Vascular Exam : no edema, no cyanosis, radial and ulnar pulses normal

## 2020-02-20 ENCOUNTER — APPOINTMENT (OUTPATIENT)
Dept: RHEUMATOLOGY | Facility: CLINIC | Age: 65
End: 2020-02-20

## 2020-02-25 ENCOUNTER — APPOINTMENT (OUTPATIENT)
Dept: RHEUMATOLOGY | Facility: CLINIC | Age: 65
End: 2020-02-25
Payer: MEDICARE

## 2020-02-25 VITALS
DIASTOLIC BLOOD PRESSURE: 90 MMHG | TEMPERATURE: 98.1 F | HEART RATE: 86 BPM | SYSTOLIC BLOOD PRESSURE: 145 MMHG | OXYGEN SATURATION: 98 % | RESPIRATION RATE: 16 BRPM

## 2020-02-25 PROCEDURE — 99213 OFFICE O/P EST LOW 20 MIN: CPT | Mod: 25

## 2020-02-25 PROCEDURE — 96372 THER/PROPH/DIAG INJ SC/IM: CPT

## 2020-02-25 RX ORDER — DENOSUMAB 60 MG/ML
60 INJECTION SUBCUTANEOUS
Qty: 1 | Refills: 0 | Status: COMPLETED | OUTPATIENT
Start: 2020-02-25

## 2020-02-25 RX ADMIN — DENOSUMAB 1 MG/ML: 60 INJECTION SUBCUTANEOUS at 00:00

## 2020-02-27 ENCOUNTER — APPOINTMENT (OUTPATIENT)
Dept: ORTHOPEDIC SURGERY | Facility: CLINIC | Age: 65
End: 2020-02-27
Payer: MEDICARE

## 2020-02-27 PROCEDURE — 99213 OFFICE O/P EST LOW 20 MIN: CPT | Mod: 25

## 2020-02-27 PROCEDURE — 20610 DRAIN/INJ JOINT/BURSA W/O US: CPT | Mod: RT

## 2020-02-27 NOTE — HISTORY OF PRESENT ILLNESS
[de-identified] : RHD 64 year old female presents for an evaluation of right knee pain due to osteoarthritis. She was previous diagnosed with osteoarthritis of the right knee in the medial compartment. She has since been ambulating in a wheelchair outdoors and with a platform walker indoors. She reports constant right knee pain which is sharp and dull intermittently with all activities. Her knee pain has been complicating her functionality. Previous cortisone injections have been helpful in reducing her knee pain.\par \par Patient also has a history of right shoulder pain, she has been diagnosed with rotator cuff arthropathy and on 4/24/2019 she sustained a minimally displaced fracture of the right acromion. At her last visit on 2/13, she had her right shoulder aspirated and injected with cortisone. She reports shortly after she had some increased pain but it has now been resolving.

## 2020-02-27 NOTE — END OF VISIT
[FreeTextEntry3] : All medical record entries made by the Billieibnatasha were at my, Dr. Jhonatan Santiago, direction and personally dictated by me on 02/27/2020. I have reviewed the chart and agree that the record accurately reflects my personal performance of the history, physical exam, assessment and plan. I have also personally directed, reviewed, and agreed with the chart.

## 2020-02-27 NOTE — ADDENDUM
[FreeTextEntry1] : I, Madai Dickerson, acted solely as a scribe for Dr. Jhonatan Santiago on this date 02/27/2020

## 2020-02-27 NOTE — PROCEDURE
[de-identified] : At this point I recommended a therapeutic injection and under sterile precautions an injection of 5 cc 1% lidocaine with 0.5 cc of Kenalog and 0.5 cc of Dexamethasone - was placed into the joint of the Right knee without complication, and after several minutes, the patient felt significant relief.

## 2020-02-27 NOTE — PHYSICAL EXAM
[Normal Touch] : sensation intact for touch [Wheelchair] : uses a wheelchair [Normal RUE] : Right Upper Extremity: No scars, rashes, lesions, ulcers, skin intact [Normal] : No swelling, no edema, normal pedal pulses and normal temperature [Obese] : obese [Poor Appearance] : well-appearing [Acute Distress] : not in acute distress [de-identified] : Right Lower Extremity\par o Knee :\par ¦ Inspection/Palpation : lateral tenderness to palpation, tenderness over patella tendon, no swelling, valgus deformity \par ¦ Range of Motion : 0 - 110 degrees, no crepitus\par ¦ Stability : no valgus or varus instability present on provocative testing, Lachman’s Test (-)\par ¦ Strength : flexion and extension 5/5\par o Muscle Bulk : normal muscle bulk present\par o Skin : no erythema, no ecchymosis\par o Sensation : sensation to pin intact\par o Vascular Exam : no edema, no cyanosis, dorsalis pedis artery pulse 2+, posterior tibial artery pulse 2+

## 2020-02-27 NOTE — DISCUSSION/SUMMARY
[de-identified] : The underlying pathophysiology was reviewed in great detail with the patient as well as the various treatment options, including ice, analgesics, NSAIDs, Physical therapy, steroid injections, total knee replacement. \par \par The patient wishes to proceed with an INJECTION of cortisone into her right knee for symptomatic relief. PT has been prescribed for strengthening and improvement of functionality. \par \par She will follow up on Monday.

## 2020-03-19 ENCOUNTER — TRANSCRIPTION ENCOUNTER (OUTPATIENT)
Age: 65
End: 2020-03-19

## 2020-03-20 ENCOUNTER — TRANSCRIPTION ENCOUNTER (OUTPATIENT)
Age: 65
End: 2020-03-20

## 2020-03-25 ENCOUNTER — TRANSCRIPTION ENCOUNTER (OUTPATIENT)
Age: 65
End: 2020-03-25

## 2020-03-27 ENCOUNTER — TRANSCRIPTION ENCOUNTER (OUTPATIENT)
Age: 65
End: 2020-03-27

## 2020-03-27 ENCOUNTER — APPOINTMENT (OUTPATIENT)
Dept: PULMONOLOGY | Facility: CLINIC | Age: 65
End: 2020-03-27

## 2020-04-24 LAB — HBA1C MFR BLD HPLC: 6.2

## 2020-05-05 ENCOUNTER — TRANSCRIPTION ENCOUNTER (OUTPATIENT)
Age: 65
End: 2020-05-05

## 2020-05-08 LAB — LDLC SERPL DIRECT ASSAY-MCNC: 127

## 2020-05-12 ENCOUNTER — APPOINTMENT (OUTPATIENT)
Dept: FAMILY MEDICINE | Facility: CLINIC | Age: 65
End: 2020-05-12
Payer: MEDICARE

## 2020-05-12 VITALS
SYSTOLIC BLOOD PRESSURE: 130 MMHG | DIASTOLIC BLOOD PRESSURE: 80 MMHG | HEART RATE: 88 BPM | RESPIRATION RATE: 16 BRPM | OXYGEN SATURATION: 98 % | TEMPERATURE: 98.8 F

## 2020-05-12 PROCEDURE — 99214 OFFICE O/P EST MOD 30 MIN: CPT

## 2020-05-12 NOTE — HISTORY OF PRESENT ILLNESS
[Asthma] : Asthma [Diabetes Mellitus] : Diabetes Mellitus [Hyperlipidemia] : Hyperlipidemia [Obesity] : Obesity [# of episodes ___] : Reports [unfilled] hypoglycemic episodes since the last visit. [Check glucose ___ x/week] : Patient checks blood glucose [unfilled]  times a week [Fasting:  ___] : Fasting Blood Sugar: [unfilled] mg/dL [Post-Prandial: ___] : Post-Prandial Blood Sugar: [unfilled] mg/dL [Most Recent A1C: ___] : Most recent A1C was [unfilled] [Regular podiatrist visits] : Patient had regular podiatrist visits [Understanding of foot care] : Patient expressed understanding of foot care [Target A1C:  ___] : Target A1C is [unfilled] [Neuropathy] :  neuropathy [Severe Persistent] : severe persistent [None] : Patient does not have any asthma related symptoms [No nocturnal awakening] : Patient denies nocturnal awakening [Allergies] : allergies [Daily] : Daily [Inhaler Use] : inhaler use [No Weight Changes] : No weight changes [Sedentary] : Patient is sedentary [Following Diet Successfully] : Following the diet successfully [de-identified] : unable to tolerate statin [Does not check BP] : The patient is not checking blood pressure [120/80] : Target blood pressure is 120/80 [Target goal met] : BP target goal met [Stable] : Patient is stable [No therapy] : Patient is not on statin therapy [Lindaifestyle management] : lifestyle management [Contraindication to therapy ___] : Contraindications to statin  therapy: [unfilled] [Cold Temperature] : cold temperature [Does not check Peak Flow] : The patient is not checking peak flow [de-identified] : unable to weight here [de-identified] : stopped PT

## 2020-05-12 NOTE — PHYSICAL EXAM
[No Acute Distress] : no acute distress [Normal Sclera/Conjunctiva] : normal sclera/conjunctiva [PERRL] : pupils equal round and reactive to light [Normal Outer Ear/Nose] : the outer ears and nose were normal in appearance [No JVD] : no jugular venous distention [No Respiratory Distress] : no respiratory distress  [No Accessory Muscle Use] : no accessory muscle use [Clear to Auscultation] : lungs were clear to auscultation bilaterally [Normal Rate] : normal rate  [Regular Rhythm] : with a regular rhythm [Normal S1, S2] : normal S1 and S2 [No Murmur] : no murmur heard [Pedal Pulses Present] : the pedal pulses are present [No Edema] : there was no peripheral edema [Normal Supraclavicular Nodes] : no supraclavicular lymphadenopathy [Normal Anterior Cervical Nodes] : no anterior cervical lymphadenopathy [Normal Affect] : the affect was normal [Alert and Oriented x3] : oriented to person, place, and time [Normal Insight/Judgement] : insight and judgment were intact [None] : no ulcers in either foot were found [de-identified] : obese [] : both feet [de-identified] : reduced shoulder ROM bilaterally [de-identified] : leg wounds have healed completely at this time and no new ones noted [de-identified] : using wheelchair for transport into the office to prevent falls [de-identified] : foot exam much improved and nails have healed back to baseline- nails longer than usual since no podiatry visit- has scheduled for 3 weeks from now [TWNoteComboBox3] : +1 [de-identified] : diffusely diminished to monofilament testing [TWNoteComboBox4] : +1

## 2020-05-12 NOTE — COUNSELING
[None] : None [Good understanding] : Patient has a good understanding of lifestyle changes and steps needed to achieve self management goal [Fall prevention counseling provided] : Fall prevention counseling provided [Adequate lighting] : Adequate lighting [No throw rugs] : No throw rugs [Use proper foot wear] : Use proper foot wear [Use recommended devices] : Use recommended devices [FreeTextEntry1] : wheelchair and platform walker

## 2020-05-12 NOTE — REVIEW OF SYSTEMS
[Fatigue] : fatigue [Fever] : no fever [Chills] : no chills [Joint Stiffness] : joint stiffness [Joint Pain] : joint pain [Muscle Weakness] : muscle weakness [Muscle Pain] : muscle pain [Negative] : Heme/Lymph [FreeTextEntry6] : no recent asthma exacerbations [de-identified] : no active wounds of the legs or feet at this time [FreeTextEntry9] : shoulder pains and stiffness  [de-identified] : tremors- unchanged

## 2020-06-15 ENCOUNTER — TRANSCRIPTION ENCOUNTER (OUTPATIENT)
Age: 65
End: 2020-06-15

## 2020-07-07 ENCOUNTER — TRANSCRIPTION ENCOUNTER (OUTPATIENT)
Age: 65
End: 2020-07-07

## 2020-07-09 ENCOUNTER — APPOINTMENT (OUTPATIENT)
Dept: ORTHOPEDIC SURGERY | Facility: CLINIC | Age: 65
End: 2020-07-09
Payer: MEDICARE

## 2020-07-09 PROCEDURE — 99213 OFFICE O/P EST LOW 20 MIN: CPT | Mod: 25

## 2020-07-09 PROCEDURE — 20610 DRAIN/INJ JOINT/BURSA W/O US: CPT | Mod: RT

## 2020-07-09 NOTE — HISTORY OF PRESENT ILLNESS
[de-identified] : RHD 64 year old female presents for an evaluation of right knee pain due to osteoarthritis. She was previous diagnosed with osteoarthritis of the right knee in the medial compartment. She has since been ambulating in a wheelchair outdoors and with a platform walker indoors. She reports constant right knee pain which is sharp and dull intermittently with all activities. Her knee pain has been complicating her functionality. Previous cortisone injections have been helpful in reducing her knee pain. On 02/27/2020 she received a corticosteroid injection of the right knee that provided her with good pain relief \par Patient also has a history of right shoulder pain, she has been diagnosed with rotator cuff arthropathy and on 4/24/2019 she sustained a minimally displaced fracture of the right acromion. On  2/13/2020 she had her right shoulder aspirated and injected with cortisone. She reports shortly after she had some increased pain but the injection provided her with good pain relief for about 4 months. Patient denies any other complaints at this time.

## 2020-07-09 NOTE — PHYSICAL EXAM
[Wheelchair] : uses a wheelchair [Normal Touch] : sensation intact for touch [Normal RUE] : Right Upper Extremity: No scars, rashes, lesions, ulcers, skin intact [Normal] : No swelling, no edema, normal pedal pulses and normal temperature [Obese] : obese [Poor Appearance] : well-appearing [de-identified] : Right Lower Extremity\par o Knee :\par ¦ Inspection/Palpation : lateral tenderness to palpation, tenderness over patella tendon, no swelling, valgus deformity \par ¦ Range of Motion : 0 - 110 degrees, no crepitus\par ¦ Stability : no valgus or varus instability present on provocative testing, Lachman’s Test (-)\par ¦ Strength : flexion and extension 5/5\par o Muscle Bulk : normal muscle bulk present\par o Skin : no erythema, no ecchymosis\par o Sensation : sensation to pin intact\par o Vascular Exam : no edema, no cyanosis, dorsalis pedis artery pulse 2+, posterior tibial artery pulse 2+\par \par Right Upper Extremity\par o Shoulder :\par ¦ Inspection/Palpation : marked tenderness diffusely about the shoulder, crepitus on palpation of the acromion, swelling with 2+ effusion, no deformity \par ¦ Range of Motion : ACTIVE FORWARD ELEVATION: Measured at 80 degrees, ACTIVE EXTERNAL ROTATION: Measured at 30 degrees, ACTIVE INTERNAL ROTATION: Measured at PSIS\par ¦ Strength : external rotation 3/5, internal rotation 5/5, supraspinatus 3/5 with crepitus\par ¦ Stability : no joint instability on provocative testing\par ¦ Tests/Signs : Neer (-), Cleary (-)\par o Upper Arm : no tenderness, no swelling, no deformities\par o Muscle Bulk : no atrophy\par o Sensation : sensation intact to light touch\par o Skin : no skin rash or discoloration\par o Vascular Exam : no edema, no cyanosis, radial and ulnar pulses normal [Acute Distress] : not in acute distress

## 2020-07-09 NOTE — DISCUSSION/SUMMARY
[de-identified] : The underlying pathophysiology was reviewed in great detail with the patient as well as the various treatment options, including ice, analgesics, NSAIDs, Physical therapy, steroid injections, total knee replacement. \par \par The patient wishes to proceed with an aspiration and INJECTION of cortisone into her right shoulder for symptomatic relief.\par \par PT has been prescribed for strengthening and improvement of functionality. A prescription for Physical Therapy was provided.\par \par FU 4-6 weeks.

## 2020-07-09 NOTE — PROCEDURE
[de-identified] : At this point I recommended aspiration and therapeutic injection and under sterile precautions an injection of 4 cc 1% lidocaine with 0.5 cc of Kenalog and 0.5 cc of Dexamethasone- was placed into the subacromial space of the Right shoulder without complication after 60 cc of clear synovial fluid was aspirated and after several minutes the patient felt significant relief.

## 2020-08-03 ENCOUNTER — RX RENEWAL (OUTPATIENT)
Age: 65
End: 2020-08-03

## 2020-08-27 ENCOUNTER — RX RENEWAL (OUTPATIENT)
Age: 65
End: 2020-08-27

## 2020-09-01 ENCOUNTER — TRANSCRIPTION ENCOUNTER (OUTPATIENT)
Age: 65
End: 2020-09-01

## 2020-09-01 ENCOUNTER — NON-APPOINTMENT (OUTPATIENT)
Age: 65
End: 2020-09-01

## 2020-09-03 ENCOUNTER — APPOINTMENT (OUTPATIENT)
Dept: RHEUMATOLOGY | Facility: CLINIC | Age: 65
End: 2020-09-03
Payer: MEDICARE

## 2020-09-03 VITALS
BODY MASS INDEX: 34.1 KG/M2 | HEART RATE: 82 BPM | TEMPERATURE: 98.2 F | HEIGHT: 68 IN | SYSTOLIC BLOOD PRESSURE: 126 MMHG | WEIGHT: 225 LBS | RESPIRATION RATE: 16 BRPM | OXYGEN SATURATION: 98 % | DIASTOLIC BLOOD PRESSURE: 71 MMHG

## 2020-09-03 PROCEDURE — 96372 THER/PROPH/DIAG INJ SC/IM: CPT

## 2020-09-03 PROCEDURE — 99214 OFFICE O/P EST MOD 30 MIN: CPT | Mod: CS,25

## 2020-09-03 RX ORDER — DENOSUMAB 60 MG/ML
60 INJECTION SUBCUTANEOUS
Qty: 1 | Refills: 0 | Status: COMPLETED | OUTPATIENT
Start: 2020-09-03

## 2020-09-03 RX ADMIN — DENOSUMAB 1 MG/ML: 60 INJECTION SUBCUTANEOUS at 00:00

## 2020-09-08 ENCOUNTER — TRANSCRIPTION ENCOUNTER (OUTPATIENT)
Age: 65
End: 2020-09-08

## 2020-09-08 DIAGNOSIS — M79.89 OTHER SPECIFIED SOFT TISSUE DISORDERS: ICD-10-CM

## 2020-09-08 LAB
25(OH)D3 SERPL-MCNC: 30.1 NG/ML
ALBUMIN SERPL ELPH-MCNC: 4.2 G/DL
ALP BLD-CCNC: 54 U/L
ALT SERPL-CCNC: 14 U/L
ANION GAP SERPL CALC-SCNC: 12 MMOL/L
AST SERPL-CCNC: 10 U/L
BASOPHILS # BLD AUTO: 0.06 K/UL
BASOPHILS NFR BLD AUTO: 0.6 %
BILIRUB SERPL-MCNC: 0.6 MG/DL
BUN SERPL-MCNC: 20 MG/DL
CALCIUM SERPL-MCNC: 10.7 MG/DL
CCP AB SER IA-ACNC: <8 UNITS
CHLORIDE SERPL-SCNC: 105 MMOL/L
CO2 SERPL-SCNC: 26 MMOL/L
CREAT SERPL-MCNC: 0.51 MG/DL
CRP SERPL-MCNC: 0.18 MG/DL
EOSINOPHIL # BLD AUTO: 0.15 K/UL
EOSINOPHIL NFR BLD AUTO: 1.4 %
ERYTHROCYTE [SEDIMENTATION RATE] IN BLOOD BY WESTERGREN METHOD: 20 MM/HR
GLUCOSE SERPL-MCNC: 85 MG/DL
HCT VFR BLD CALC: 45 %
HGB BLD-MCNC: 13.2 G/DL
IMM GRANULOCYTES NFR BLD AUTO: 1.7 %
LYMPHOCYTES # BLD AUTO: 1.08 K/UL
LYMPHOCYTES NFR BLD AUTO: 10.2 %
MAN DIFF?: NORMAL
MCHC RBC-ENTMCNC: 29.3 GM/DL
MCHC RBC-ENTMCNC: 29.3 PG
MCV RBC AUTO: 99.8 FL
MONOCYTES # BLD AUTO: 0.83 K/UL
MONOCYTES NFR BLD AUTO: 7.9 %
NEUTROPHILS # BLD AUTO: 8.25 K/UL
NEUTROPHILS NFR BLD AUTO: 78.2 %
PLATELET # BLD AUTO: 215 K/UL
POTASSIUM SERPL-SCNC: 4 MMOL/L
PROT SERPL-MCNC: 6.2 G/DL
RBC # BLD: 4.51 M/UL
RBC # FLD: 16.1 %
RF+CCP IGG SER-IMP: NEGATIVE
RHEUMATOID FACT SER QL: <10 IU/ML
SARS-COV-2 IGG SERPL IA-ACNC: <0.1 INDEX
SARS-COV-2 IGG SERPL QL IA: NEGATIVE
SODIUM SERPL-SCNC: 143 MMOL/L
WBC # FLD AUTO: 10.55 K/UL

## 2020-09-14 LAB — MICROALBUMIN/CREAT 24H UR-RTO: 7

## 2020-09-15 ENCOUNTER — APPOINTMENT (OUTPATIENT)
Dept: FAMILY MEDICINE | Facility: CLINIC | Age: 65
End: 2020-09-15
Payer: MEDICARE

## 2020-09-22 ENCOUNTER — APPOINTMENT (OUTPATIENT)
Dept: FAMILY MEDICINE | Facility: CLINIC | Age: 65
End: 2020-09-22
Payer: MEDICARE

## 2020-09-22 VITALS
TEMPERATURE: 97.7 F | RESPIRATION RATE: 16 BRPM | SYSTOLIC BLOOD PRESSURE: 110 MMHG | HEART RATE: 82 BPM | OXYGEN SATURATION: 98 % | DIASTOLIC BLOOD PRESSURE: 80 MMHG

## 2020-09-22 DIAGNOSIS — H91.93 UNSPECIFIED HEARING LOSS, BILATERAL: ICD-10-CM

## 2020-09-22 DIAGNOSIS — Z23 ENCOUNTER FOR IMMUNIZATION: ICD-10-CM

## 2020-09-22 PROCEDURE — G0008: CPT

## 2020-09-22 PROCEDURE — 99214 OFFICE O/P EST MOD 30 MIN: CPT | Mod: 25

## 2020-09-22 PROCEDURE — 90662 IIV NO PRSV INCREASED AG IM: CPT

## 2020-09-26 VITALS — HEIGHT: 68 IN | WEIGHT: 225 LBS | BODY MASS INDEX: 34.1 KG/M2

## 2020-09-26 NOTE — REVIEW OF SYSTEMS
[Fatigue] : fatigue [Joint Pain] : joint pain [Joint Stiffness] : joint stiffness [Muscle Weakness] : muscle weakness [Muscle Pain] : muscle pain [Negative] : Heme/Lymph [Fever] : no fever [Chills] : no chills [FreeTextEntry6] : had some wheezing over the last few weeks during the time when allergy symptoms had increased [FreeTextEntry9] : shoulder pains and stiffness  [de-identified] : no active wounds of the legs or feet at this time [de-identified] : tremors- unchanged

## 2020-09-26 NOTE — COUNSELING
[Fall prevention counseling provided] : Fall prevention counseling provided [Adequate lighting] : Adequate lighting [No throw rugs] : No throw rugs [Use proper foot wear] : Use proper foot wear [Use recommended devices] : Use recommended devices [Behavioral health counseling provided] : Behavioral health counseling provided [Sleep ___ hours/day] : Sleep [unfilled] hours/day [Engage in a relaxing activity] : Engage in a relaxing activity [Potential consequences of obesity discussed] : Potential consequences of obesity discussed [Benefits of weight loss discussed] : Benefits of weight loss discussed [Encouraged to maintain food diary] : Encouraged to maintain food diary [Decrease Portions] : decrease portions [None] : None [Needs reinforcement, provided] : Patient needs reinforcement on understanding of lifestyle changes and steps needed to achieve self management goal; reinforcement was provided [FreeTextEntry1] : wheelchair, walker

## 2020-09-26 NOTE — PHYSICAL EXAM
[No Acute Distress] : no acute distress [Normal Sclera/Conjunctiva] : normal sclera/conjunctiva [PERRL] : pupils equal round and reactive to light [Normal Outer Ear/Nose] : the outer ears and nose were normal in appearance [No JVD] : no jugular venous distention [No Respiratory Distress] : no respiratory distress  [No Accessory Muscle Use] : no accessory muscle use [Clear to Auscultation] : lungs were clear to auscultation bilaterally [Normal Rate] : normal rate  [Regular Rhythm] : with a regular rhythm [Normal S1, S2] : normal S1 and S2 [No Murmur] : no murmur heard [Pedal Pulses Present] : the pedal pulses are present [No Edema] : there was no peripheral edema [Normal Supraclavicular Nodes] : no supraclavicular lymphadenopathy [Normal Anterior Cervical Nodes] : no anterior cervical lymphadenopathy [Normal Affect] : the affect was normal [Alert and Oriented x3] : oriented to person, place, and time [Normal Insight/Judgement] : insight and judgment were intact [None] : no ulcers in either foot were found [] : both feet [de-identified] : obese [de-identified] : reduced shoulder ROM bilaterally [de-identified] : no active wounds at this time [de-identified] : using wheelchair for transport into the office  [de-identified] : foot exam much improved and nails have healed back to baseline- nails longer than usual since no podiatry visit- has scheduled for 3 weeks from now [TWNoteComboBox3] : +1 [TWNoteComboBox4] : +1

## 2020-09-26 NOTE — HISTORY OF PRESENT ILLNESS
[Asthma] : Asthma [Diabetes Mellitus] : Diabetes Mellitus [Hyperlipidemia] : Hyperlipidemia [Obesity] : Obesity [Severe Persistent] : severe persistent [Cough] : cough [Wheezing] : wheezing [Cold Temperature] : cold temperature [Allergies] : allergies [Rest] : rest [Inhaler Use] : inhaler use [Does not check Peak Flow] : The patient is not checking peak flow [___ x/week] : [unfilled] times per week [Stable] : Condition is stable [No episodes] : No hypoglycemic episodes since the last visit. [Check glucose ___ x/day] : Patient checks  blood glucose [unfilled]  times a day [Regular podiatrist visits] : Patient had regular podiatrist visits [Understanding of foot care] : Patient expressed understanding of foot care [No Retinopathy] : No retinopathy [Most Recent A1C: ___] : Most recent A1C was [unfilled] [Target A1C:  ___] : Target A1C is [unfilled] [Target goal met] : A1C target goal met [No therapy] : Patient is not on statin therapy [No Weight Changes] : No weight changes [Sedentary] : Patient is sedentary [Following  treatment as advised] : Patient is following  treatment as advised [Action] : Action: patient is following a plan to lose weight [Shortness of Breath] : no shortness of breath [Dyspnea on Exertion] : no dyspnea on exertion [Chest Pain] : no chest pain [Sputum Production] : non productive cough [de-identified] : wheelchair bound, walker

## 2020-10-28 ENCOUNTER — TRANSCRIPTION ENCOUNTER (OUTPATIENT)
Age: 65
End: 2020-10-28

## 2020-10-29 ENCOUNTER — APPOINTMENT (OUTPATIENT)
Dept: ORTHOPEDIC SURGERY | Facility: CLINIC | Age: 65
End: 2020-10-29
Payer: MEDICARE

## 2020-10-29 VITALS — WEIGHT: 220 LBS | HEIGHT: 68 IN | BODY MASS INDEX: 33.34 KG/M2

## 2020-10-29 DIAGNOSIS — S42.124K: ICD-10-CM

## 2020-10-29 PROCEDURE — 99213 OFFICE O/P EST LOW 20 MIN: CPT | Mod: 25

## 2020-10-29 PROCEDURE — 20610 DRAIN/INJ JOINT/BURSA W/O US: CPT | Mod: RT

## 2020-10-29 NOTE — DISCUSSION/SUMMARY
[de-identified] : The underlying pathophysiology was reviewed in great detail with the patient as well as the various treatment options, including ice, analgesics, NSAIDs, Physical therapy, steroid injections, total knee replacement. \par \par The patient wishes to proceed with an aspiration and INJECTION of cortisone into her right knee for symptomatic relief.\par \par PT has been prescribed for strengthening and improvement of functionality. A prescription for Physical Therapy was provided.\par \par FU 4-6 weeks.

## 2020-10-29 NOTE — PHYSICAL EXAM
[Wheelchair] : uses a wheelchair [Normal RUE] : Right Upper Extremity: No scars, rashes, lesions, ulcers, skin intact [Normal Touch] : sensation intact for touch [Normal] : No swelling, no edema, normal pedal pulses and normal temperature [Obese] : obese [Poor Appearance] : well-appearing [Acute Distress] : not in acute distress [de-identified] : Right Lower Extremity\par o Knee :\par ¦ Inspection/Palpation : lateral tenderness to palpation, tenderness over patella tendon, no swelling, valgus deformity \par ¦ Range of Motion : 0 - 110 degrees, no crepitus\par ¦ Stability : no valgus or varus instability present on provocative testing, Lachman’s Test (-)\par ¦ Strength : flexion and extension 5/5\par o Muscle Bulk : normal muscle bulk present\par o Skin : no erythema, no ecchymosis\par o Sensation : sensation to pin intact\par o Vascular Exam : no edema, no cyanosis, dorsalis pedis artery pulse 2+, posterior tibial artery pulse 2+\par \par Right Upper Extremity\par o Shoulder :\par ¦ Inspection/Palpation : marked tenderness diffusely about the shoulder, crepitus on palpation of the acromion, swelling with trace effusion, no deformity \par ¦ Range of Motion : ACTIVE FORWARD ELEVATION: Measured at 80 degrees, ACTIVE EXTERNAL ROTATION: Measured at 30 degrees, ACTIVE INTERNAL ROTATION: Measured at PSIS\par ¦ Strength : external rotation 3/5, internal rotation 5/5, supraspinatus 3/5 with crepitus\par ¦ Stability : no joint instability on provocative testing\par ¦ Tests/Signs : Neer (-), Cleary (-)\par o Upper Arm : no tenderness, no swelling, no deformities\par o Muscle Bulk : no atrophy\par o Sensation : sensation intact to light touch\par o Skin : no skin rash or discoloration\par o Vascular Exam : no edema, no cyanosis, radial and ulnar pulses normal

## 2020-10-29 NOTE — PROCEDURE
[de-identified] : At this point I recommended a therapeutic injection and under sterile precautions an injection of 5 cc 1% lidocaine with 0.5 cc of Kenalog and 0.5 cc of Dexamethasone - was placed into the joint of the right knee without complication, and after several minutes, the patient felt significant relief.\par \par

## 2020-10-29 NOTE — HISTORY OF PRESENT ILLNESS
[de-identified] : RHD 65 year old female presents for an evaluation of right knee pain due to osteoarthritis. She was previous diagnosed with osteoarthritis of the right knee in the medial compartment. She has since been ambulating in a wheelchair outdoors and with a platform walker indoors. She reports constant right knee pain which is sharp and dull intermittently with all activities. Her knee pain has been complicating her functionality. Previous cortisone injections have been helpful in reducing her knee pain. On 02/27/2020 she received a corticosteroid injection of the right knee that provided her with good pain relief. \par Patient also has a history of right shoulder pain, she has been diagnosed with rotator cuff arthropathy and on 4/24/2019 she sustained a minimally displaced fracture of the right acromion. On  2/13/2020 she had her right shoulder aspirated and injected with cortisone. She reports shortly after she had some increased pain but the injection provided her with good pain relief for about 4 months. At her last visit on 07/09/2020 patient received a corticosteroid injection and aspiration of the right shoulder noting good symptomatic relief temporarily. Patient denies any other complaints at this time.

## 2020-11-03 ENCOUNTER — RX RENEWAL (OUTPATIENT)
Age: 65
End: 2020-11-03

## 2020-11-03 ENCOUNTER — APPOINTMENT (OUTPATIENT)
Dept: CARDIOLOGY | Facility: CLINIC | Age: 65
End: 2020-11-03
Payer: MEDICARE

## 2020-11-03 ENCOUNTER — NON-APPOINTMENT (OUTPATIENT)
Age: 65
End: 2020-11-03

## 2020-11-03 VITALS — SYSTOLIC BLOOD PRESSURE: 100 MMHG | DIASTOLIC BLOOD PRESSURE: 80 MMHG

## 2020-11-03 VITALS
HEIGHT: 68 IN | TEMPERATURE: 97.4 F | OXYGEN SATURATION: 99 % | SYSTOLIC BLOOD PRESSURE: 192 MMHG | HEART RATE: 82 BPM | DIASTOLIC BLOOD PRESSURE: 84 MMHG | RESPIRATION RATE: 16 BRPM

## 2020-11-03 PROCEDURE — 93000 ELECTROCARDIOGRAM COMPLETE: CPT

## 2020-11-03 PROCEDURE — 99214 OFFICE O/P EST MOD 30 MIN: CPT

## 2020-11-03 NOTE — ASSESSMENT
[FreeTextEntry1] : Impression:\par 1. Patient with history of distal aortic dissection now healed patient had atrial fibrillation at that time. She is not anticoagulation. She has been off her amiodarone for some time without any recurrence\par \par Plan:\par 1. For now, continue current medication\par

## 2020-11-03 NOTE — PHYSICAL EXAM
[General Appearance - Well Developed] : well developed [Normal Appearance] : normal appearance [Well Groomed] : well groomed [General Appearance - Well Nourished] : well nourished [No Deformities] : no deformities [General Appearance - In No Acute Distress] : no acute distress [Normal Oral Mucosa] : normal oral mucosa [No Oral Pallor] : no oral pallor [No Oral Cyanosis] : no oral cyanosis [Normal Jugular Venous A Waves Present] : normal jugular venous A waves present [Normal Jugular Venous V Waves Present] : normal jugular venous V waves present [No Jugular Venous Soria A Waves] : no jugular venous soria A waves [Respiration, Rhythm And Depth] : normal respiratory rhythm and effort [Exaggerated Use Of Accessory Muscles For Inspiration] : no accessory muscle use [Auscultation Breath Sounds / Voice Sounds] : lungs were clear to auscultation bilaterally [Abdomen Soft] : soft [Abdomen Tenderness] : non-tender [Abdomen Mass (___ Cm)] : no abdominal mass palpated [FreeTextEntry1] : in wheelchair [Nail Clubbing] : no clubbing of the fingernails [Cyanosis, Localized] : no localized cyanosis [Petechial Hemorrhages (___cm)] : no petechial hemorrhages [Skin Color & Pigmentation] : normal skin color and pigmentation [] : no rash [No Venous Stasis] : no venous stasis [Skin Lesions] : no skin lesions [No Skin Ulcers] : no skin ulcer [No Xanthoma] : no  xanthoma was observed [Oriented To Time, Place, And Person] : oriented to person, place, and time [Affect] : the affect was normal [Mood] : the mood was normal [No Anxiety] : not feeling anxious [Normal Rate] : normal [Normal S1] : normal S1 [Normal S2] : normal S2 [S3] : no S3 [S4] : no S4 [No Murmur] : no murmurs heard [Right Carotid Bruit] : no bruit heard over the right carotid [Left Carotid Bruit] : no bruit heard over the left carotid [Right Femoral Bruit] : no bruit heard over the right femoral artery [Left Femoral Bruit] : no bruit heard over the left femoral artery [2+] : left 2+ [No Abnormalities] : the abdominal aorta was not enlarged and no bruit was heard [No Pitting Edema] : no pitting edema present

## 2020-11-19 ENCOUNTER — APPOINTMENT (OUTPATIENT)
Dept: ORTHOPEDIC SURGERY | Facility: CLINIC | Age: 65
End: 2020-11-19
Payer: MEDICARE

## 2020-11-19 VITALS — WEIGHT: 220 LBS | HEIGHT: 68 IN | BODY MASS INDEX: 33.34 KG/M2

## 2020-11-19 DIAGNOSIS — M17.0 BILATERAL PRIMARY OSTEOARTHRITIS OF KNEE: ICD-10-CM

## 2020-11-19 PROCEDURE — 99213 OFFICE O/P EST LOW 20 MIN: CPT | Mod: 25

## 2020-11-19 PROCEDURE — 20610 DRAIN/INJ JOINT/BURSA W/O US: CPT | Mod: RT

## 2020-11-19 NOTE — DISCUSSION/SUMMARY
[de-identified] : The underlying pathophysiology was reviewed in great detail with the patient as well as the various treatment options, including ice, analgesics, NSAIDs, Physical therapy, steroid injections, total knee replacement. \par \par The patient wishes to proceed with an aspiration and INJECTION of Zilretta into her right shoulder for symptomatic relief.\par \par Continue physical therapy as prescribed. \par \par Activity modifications and restrictions were discussed. I advised avoiding overhead lifting. I advised the patient to work on good posture. \par \par FU 4-6 weeks. \par \par All questions were answered, all alternatives discussed and the patient is in complete agreement with that plan. Follow-up appointment as instructed. Any issues and the patient will call or come in sooner.

## 2020-11-19 NOTE — PHYSICAL EXAM
[Wheelchair] : uses a wheelchair [Normal RUE] : Right Upper Extremity: No scars, rashes, lesions, ulcers, skin intact [Normal Touch] : sensation intact for touch [Normal] : No swelling, no edema, normal pedal pulses and normal temperature [Obese] : obese [Poor Appearance] : well-appearing [Acute Distress] : not in acute distress [de-identified] : Right Upper Extremity\par o Shoulder :\par ¦ Inspection/Palpation : marked tenderness diffusely about the shoulder, crepitus on palpation of the acromion, 2+ effusion, no deformity \par ¦ Range of Motion : ACTIVE FORWARD ELEVATION: Measured at 80 degrees, ACTIVE EXTERNAL ROTATION: Measured at 30 degrees, ACTIVE INTERNAL ROTATION: Measured at PSIS\par ¦ Strength : external rotation 3/5, internal rotation 5/5, supraspinatus 3/5 with crepitus\par ¦ Stability : no joint instability on provocative testing\par ¦ Tests/Signs : Neer (-), Cleary (-)\par o Upper Arm : no tenderness, no swelling, no deformities\par o Muscle Bulk : no atrophy\par o Sensation : sensation intact to light touch\par o Skin : no skin rash or discoloration\par o Vascular Exam : no edema, no cyanosis, radial and ulnar pulses normal\par \par Right Lower Extremity\par o Knee :\par ¦ Inspection/Palpation : lateral tenderness to palpation, tenderness over patella tendon, no swelling, valgus deformity \par ¦ Range of Motion : 0 - 110 degrees, no crepitus\par ¦ Stability : no valgus or varus instability present on provocative testing, Lachman’s Test (-)\par ¦ Strength : flexion and extension 5/5\par o Muscle Bulk : normal muscle bulk present\par o Skin : no erythema, no ecchymosis\par o Sensation : sensation to pin intact\par o Vascular Exam : no edema, no cyanosis, dorsalis pedis artery pulse 2+, posterior tibial artery pulse 2+\par

## 2020-11-19 NOTE — HISTORY OF PRESENT ILLNESS
[de-identified] : RHD 65 year old female presents for an evaluation of right knee pain due to osteoarthritis. She was previous diagnosed with osteoarthritis of the right knee in the medial compartment. She has since been ambulating in a wheelchair outdoors and with a platform walker indoors. She reports constant right knee pain which is sharp and dull intermittently with all activities. Her knee pain has been complicating her functionality. Previous cortisone injections have been helpful in reducing her knee pain. On 10/29/2020 she received a corticosteroid injection of the right knee that provided her with good pain relief. \par Patient also has a history of right shoulder pain, she has been diagnosed with rotator cuff arthropathy and on 4/24/2019 she sustained a minimally displaced fracture of the right acromion. On  2/13/2020 she had her right shoulder aspirated and injected with cortisone. She reports shortly after she had some increased pain but the injection provided her with good pain relief for about 4 months. At her last visit on 07/09/2020 patient received a corticosteroid injection and aspiration of the right shoulder noting good symptomatic relief temporarily. Patient denies any other complaints at this time.  She is here today for an aspiration and a Zilretta injection of the right shoulder.

## 2020-11-19 NOTE — PROCEDURE
[de-identified] :  At this point I recommended aspiration and under sterile precautions an injection of 2 cc 1% lidocaine -was placed into the joint of the Right shoulder without complication and clear synovial fluid was aspirated: 50 cc total fluid removed followed by a therapeutic injection and under sterile precautions an injection of 4 cc of Zilretta (Lot: , Expiration: 02/2021), was placed into the joint of the right shoulder without complication.\par \par

## 2020-11-28 NOTE — PATIENT PROFILE ADULT - NSPROMEDSHERBAL_GEN_A_NUR
pt BIBEMS for c/o SOB over the last couple weeks. Pt reports history of COPD and CHF and wears 02 nasal canula at home at 4L 24hour daily. Pt 02 saturation in low 70s without proper 02 ventilation.  PT denies fever, vomiting diarrhea. PT reports anxious and feeling and in very irritable. Pt is unable to rest comfortably. Pt  in acute respiratory distress at this moment. no

## 2020-12-21 ENCOUNTER — TRANSCRIPTION ENCOUNTER (OUTPATIENT)
Age: 65
End: 2020-12-21

## 2020-12-22 ENCOUNTER — TRANSCRIPTION ENCOUNTER (OUTPATIENT)
Age: 65
End: 2020-12-22

## 2021-01-01 ENCOUNTER — APPOINTMENT (OUTPATIENT)
Dept: RADIOLOGY | Facility: HOSPITAL | Age: 66
End: 2021-01-01
Payer: MEDICARE

## 2021-01-01 ENCOUNTER — TRANSCRIPTION ENCOUNTER (OUTPATIENT)
Age: 66
End: 2021-01-01

## 2021-01-01 ENCOUNTER — NON-APPOINTMENT (OUTPATIENT)
Age: 66
End: 2021-01-01

## 2021-01-01 ENCOUNTER — APPOINTMENT (OUTPATIENT)
Dept: RHEUMATOLOGY | Facility: CLINIC | Age: 66
End: 2021-01-01
Payer: MEDICARE

## 2021-01-01 ENCOUNTER — RX RENEWAL (OUTPATIENT)
Age: 66
End: 2021-01-01

## 2021-01-01 ENCOUNTER — APPOINTMENT (OUTPATIENT)
Dept: ORTHOPEDIC SURGERY | Facility: CLINIC | Age: 66
End: 2021-01-01
Payer: MEDICARE

## 2021-01-01 ENCOUNTER — APPOINTMENT (OUTPATIENT)
Dept: CARDIOLOGY | Facility: CLINIC | Age: 66
End: 2021-01-01
Payer: MEDICARE

## 2021-01-01 ENCOUNTER — RESULT REVIEW (OUTPATIENT)
Age: 66
End: 2021-01-01

## 2021-01-01 ENCOUNTER — APPOINTMENT (OUTPATIENT)
Dept: FAMILY MEDICINE | Facility: CLINIC | Age: 66
End: 2021-01-01
Payer: MEDICARE

## 2021-01-01 ENCOUNTER — OUTPATIENT (OUTPATIENT)
Dept: OUTPATIENT SERVICES | Facility: HOSPITAL | Age: 66
LOS: 1 days | End: 2021-01-01
Payer: MEDICARE

## 2021-01-01 ENCOUNTER — EMERGENCY (EMERGENCY)
Facility: HOSPITAL | Age: 66
LOS: 1 days | Discharge: HOME | End: 2021-01-01
Attending: EMERGENCY MEDICINE | Admitting: EMERGENCY MEDICINE
Payer: MEDICARE

## 2021-01-01 ENCOUNTER — RX CHANGE (OUTPATIENT)
Age: 66
End: 2021-01-01

## 2021-01-01 VITALS
HEART RATE: 79 BPM | HEIGHT: 68 IN | WEIGHT: 190 LBS | SYSTOLIC BLOOD PRESSURE: 130 MMHG | DIASTOLIC BLOOD PRESSURE: 82 MMHG | OXYGEN SATURATION: 98 % | RESPIRATION RATE: 16 BRPM | TEMPERATURE: 97.6 F | BODY MASS INDEX: 28.79 KG/M2

## 2021-01-01 VITALS — BODY MASS INDEX: 28.04 KG/M2 | HEIGHT: 68 IN | WEIGHT: 185 LBS

## 2021-01-01 VITALS
HEART RATE: 84 BPM | DIASTOLIC BLOOD PRESSURE: 82 MMHG | TEMPERATURE: 97 F | OXYGEN SATURATION: 96 % | RESPIRATION RATE: 16 BRPM | HEIGHT: 68 IN | SYSTOLIC BLOOD PRESSURE: 126 MMHG

## 2021-01-01 VITALS
DIASTOLIC BLOOD PRESSURE: 80 MMHG | SYSTOLIC BLOOD PRESSURE: 138 MMHG | HEART RATE: 83 BPM | BODY MASS INDEX: 28.79 KG/M2 | WEIGHT: 190 LBS | HEIGHT: 68 IN

## 2021-01-01 VITALS
OXYGEN SATURATION: 100 % | DIASTOLIC BLOOD PRESSURE: 74 MMHG | SYSTOLIC BLOOD PRESSURE: 151 MMHG | RESPIRATION RATE: 18 BRPM | HEART RATE: 101 BPM | TEMPERATURE: 98 F

## 2021-01-01 VITALS
TEMPERATURE: 97.4 F | DIASTOLIC BLOOD PRESSURE: 79 MMHG | HEART RATE: 80 BPM | OXYGEN SATURATION: 97 % | HEIGHT: 68 IN | WEIGHT: 183 LBS | RESPIRATION RATE: 16 BRPM | BODY MASS INDEX: 27.74 KG/M2 | SYSTOLIC BLOOD PRESSURE: 119 MMHG

## 2021-01-01 VITALS
TEMPERATURE: 97.1 F | HEART RATE: 94 BPM | DIASTOLIC BLOOD PRESSURE: 74 MMHG | SYSTOLIC BLOOD PRESSURE: 120 MMHG | OXYGEN SATURATION: 97 % | RESPIRATION RATE: 16 BRPM

## 2021-01-01 VITALS
OXYGEN SATURATION: 100 % | TEMPERATURE: 98 F | SYSTOLIC BLOOD PRESSURE: 130 MMHG | WEIGHT: 184.97 LBS | HEART RATE: 113 BPM | DIASTOLIC BLOOD PRESSURE: 70 MMHG | RESPIRATION RATE: 18 BRPM | HEIGHT: 68 IN

## 2021-01-01 VITALS — BODY MASS INDEX: 28.13 KG/M2 | WEIGHT: 185 LBS

## 2021-01-01 VITALS
HEIGHT: 68 IN | WEIGHT: 183 LBS | RESPIRATION RATE: 17 BRPM | BODY MASS INDEX: 27.74 KG/M2 | OXYGEN SATURATION: 98 % | TEMPERATURE: 97 F | HEART RATE: 67 BPM

## 2021-01-01 DIAGNOSIS — Z20.822 CONTACT WITH AND (SUSPECTED) EXPOSURE TO COVID-19: ICD-10-CM

## 2021-01-01 DIAGNOSIS — M12.811 UNSPECIFIED ROTATOR CUFF TEAR OR RUPTURE OF RIGHT SHOULDER, NOT SPECIFIED AS TRAUMATIC: ICD-10-CM

## 2021-01-01 DIAGNOSIS — Z90.49 ACQUIRED ABSENCE OF OTHER SPECIFIED PARTS OF DIGESTIVE TRACT: Chronic | ICD-10-CM

## 2021-01-01 DIAGNOSIS — M17.11 UNILATERAL PRIMARY OSTEOARTHRITIS, RIGHT KNEE: ICD-10-CM

## 2021-01-01 DIAGNOSIS — M81.0 AGE-RELATED OSTEOPOROSIS WITHOUT CURRENT PATHOLOGICAL FRACTURE: ICD-10-CM

## 2021-01-01 DIAGNOSIS — M75.101 UNSPECIFIED ROTATOR CUFF TEAR OR RUPTURE OF RIGHT SHOULDER, NOT SPECIFIED AS TRAUMATIC: ICD-10-CM

## 2021-01-01 DIAGNOSIS — Z98.49 CATARACT EXTRACTION STATUS, UNSPECIFIED EYE: Chronic | ICD-10-CM

## 2021-01-01 DIAGNOSIS — M19.90 UNSPECIFIED OSTEOARTHRITIS, UNSPECIFIED SITE: ICD-10-CM

## 2021-01-01 LAB
25(OH)D3 SERPL-MCNC: 28.7 NG/ML
ALBUMIN SERPL ELPH-MCNC: 3.9 G/DL
ALP BLD-CCNC: 50 U/L
ALT SERPL-CCNC: 15 U/L
ANION GAP SERPL CALC-SCNC: 12 MMOL/L
AST SERPL-CCNC: 10 U/L
BASOPHILS # BLD AUTO: 0.05 K/UL
BASOPHILS NFR BLD AUTO: 0.6 %
BILIRUB SERPL-MCNC: 0.8 MG/DL
BUN SERPL-MCNC: 19 MG/DL
CALCIUM SERPL-MCNC: 10.4 MG/DL
CHLORIDE SERPL-SCNC: 105 MMOL/L
CO2 SERPL-SCNC: 26 MMOL/L
CREAT SERPL-MCNC: 0.5 MG/DL
CRP SERPL-MCNC: 4 MG/L
EOSINOPHIL # BLD AUTO: 0.1 K/UL
EOSINOPHIL NFR BLD AUTO: 1.1 %
GLUCOSE SERPL-MCNC: 78 MG/DL
HCT VFR BLD CALC: 41.5 %
HGB BLD-MCNC: 12.5 G/DL
IMM GRANULOCYTES NFR BLD AUTO: 2.1 %
LYMPHOCYTES # BLD AUTO: 0.9 K/UL
LYMPHOCYTES NFR BLD AUTO: 9.9 %
MAN DIFF?: NORMAL
MCHC RBC-ENTMCNC: 30.1 GM/DL
MCHC RBC-ENTMCNC: 30.1 PG
MCV RBC AUTO: 100 FL
MONOCYTES # BLD AUTO: 0.88 K/UL
MONOCYTES NFR BLD AUTO: 9.7 %
NEUTROPHILS # BLD AUTO: 6.94 K/UL
NEUTROPHILS NFR BLD AUTO: 76.6 %
PLATELET # BLD AUTO: 227 K/UL
POTASSIUM SERPL-SCNC: 4 MMOL/L
PROT SERPL-MCNC: 5.7 G/DL
RBC # BLD: 4.15 M/UL
RBC # FLD: 16.2 %
SARS-COV-2 N GENE NPH QL NAA+PROBE: NOT DETECTED
SODIUM SERPL-SCNC: 142 MMOL/L
WBC # FLD AUTO: 9.06 K/UL

## 2021-01-01 PROCEDURE — 96372 THER/PROPH/DIAG INJ SC/IM: CPT

## 2021-01-01 PROCEDURE — 70450 CT HEAD/BRAIN W/O DYE: CPT | Mod: ME

## 2021-01-01 PROCEDURE — 93000 ELECTROCARDIOGRAM COMPLETE: CPT

## 2021-01-01 PROCEDURE — 70450 CT HEAD/BRAIN W/O DYE: CPT | Mod: 26,ME

## 2021-01-01 PROCEDURE — G1004: CPT

## 2021-01-01 PROCEDURE — 73562 X-RAY EXAM OF KNEE 3: CPT | Mod: 26,LT

## 2021-01-01 PROCEDURE — 73590 X-RAY EXAM OF LOWER LEG: CPT | Mod: 26,LT

## 2021-01-01 PROCEDURE — 99214 OFFICE O/P EST MOD 30 MIN: CPT

## 2021-01-01 PROCEDURE — 99284 EMERGENCY DEPT VISIT MOD MDM: CPT | Mod: 25

## 2021-01-01 PROCEDURE — 99215 OFFICE O/P EST HI 40 MIN: CPT

## 2021-01-01 PROCEDURE — 99213 OFFICE O/P EST LOW 20 MIN: CPT | Mod: 25

## 2021-01-01 PROCEDURE — 73564 X-RAY EXAM KNEE 4 OR MORE: CPT | Mod: RT

## 2021-01-01 PROCEDURE — 20610 DRAIN/INJ JOINT/BURSA W/O US: CPT | Mod: RT

## 2021-01-01 PROCEDURE — 73030 X-RAY EXAM OF SHOULDER: CPT | Mod: RT

## 2021-01-01 PROCEDURE — 73590 X-RAY EXAM OF LOWER LEG: CPT

## 2021-01-01 PROCEDURE — 99284 EMERGENCY DEPT VISIT MOD MDM: CPT

## 2021-01-01 PROCEDURE — 73562 X-RAY EXAM OF KNEE 3: CPT

## 2021-01-01 PROCEDURE — 99213 OFFICE O/P EST LOW 20 MIN: CPT

## 2021-01-01 PROCEDURE — 77080 DXA BONE DENSITY AXIAL: CPT

## 2021-01-01 PROCEDURE — 77080 DXA BONE DENSITY AXIAL: CPT | Mod: 26

## 2021-01-01 RX ORDER — CHOLESTYRAMINE 4 G/9G
4 POWDER, FOR SUSPENSION ORAL DAILY
Qty: 90 | Refills: 3 | Status: ACTIVE | COMMUNITY
Start: 2017-12-13 | End: 1900-01-01

## 2021-01-01 RX ORDER — CRANBERRY FRUIT EXTRACT 650 MG
50 MCG CAPSULE ORAL
Qty: 90 | Refills: 0 | Status: ACTIVE | COMMUNITY
Start: 2017-12-13 | End: 1900-01-01

## 2021-01-01 RX ORDER — PEN NEEDLE, DIABETIC 29 G X1/2"
31G X 5 MM NEEDLE, DISPOSABLE MISCELLANEOUS
Qty: 1 | Refills: 2 | Status: ACTIVE | COMMUNITY
Start: 2019-02-20 | End: 1900-01-01

## 2021-01-01 RX ORDER — DENOSUMAB 60 MG/ML
60 INJECTION SUBCUTANEOUS
Qty: 1 | Refills: 0 | Status: COMPLETED | OUTPATIENT
Start: 2021-01-01

## 2021-01-01 RX ORDER — PREDNISONE 10 MG/1
10 TABLET ORAL
Qty: 53 | Refills: 0 | Status: ACTIVE | COMMUNITY
Start: 2021-01-01 | End: 1900-01-01

## 2021-01-01 RX ORDER — INSULIN GLARGINE 100 [IU]/ML
100 INJECTION, SOLUTION SUBCUTANEOUS DAILY
Qty: 1 | Refills: 3 | Status: ACTIVE | COMMUNITY
Start: 2018-04-04 | End: 1900-01-01

## 2021-01-01 RX ORDER — FUROSEMIDE 20 MG/1
20 TABLET ORAL
Qty: 24 | Refills: 5 | Status: ACTIVE | COMMUNITY
Start: 2021-01-01 | End: 1900-01-01

## 2021-01-01 RX ADMIN — DENOSUMAB 1 MG/ML: 60 INJECTION SUBCUTANEOUS at 00:00

## 2021-01-13 ENCOUNTER — RX RENEWAL (OUTPATIENT)
Age: 66
End: 2021-01-13

## 2021-01-18 PROBLEM — S81.812A LACERATION OF LEFT LOWER EXTREMITY, INITIAL ENCOUNTER: Status: RESOLVED | Noted: 2019-03-26 | Resolved: 2021-01-18

## 2021-01-19 ENCOUNTER — APPOINTMENT (OUTPATIENT)
Dept: FAMILY MEDICINE | Facility: CLINIC | Age: 66
End: 2021-01-19
Payer: MEDICARE

## 2021-01-19 VITALS
SYSTOLIC BLOOD PRESSURE: 110 MMHG | BODY MASS INDEX: 29.55 KG/M2 | HEIGHT: 68 IN | TEMPERATURE: 97.2 F | RESPIRATION RATE: 16 BRPM | WEIGHT: 195 LBS | DIASTOLIC BLOOD PRESSURE: 80 MMHG | HEART RATE: 90 BPM | OXYGEN SATURATION: 98 %

## 2021-01-19 DIAGNOSIS — S81.812A LACERATION W/OUT FOREIGN BODY, LEFT LOWER LEG, INITIAL ENCOUNTER: ICD-10-CM

## 2021-01-19 PROCEDURE — 99215 OFFICE O/P EST HI 40 MIN: CPT

## 2021-01-21 NOTE — HEALTH RISK ASSESSMENT
[No] : No [Never (0 pts)] : Never (0 points) [1 or 2 (0 pts)] : 1 or 2 (0 points) [de-identified] : podiatry- no notes availale

## 2021-01-21 NOTE — HISTORY OF PRESENT ILLNESS
[Diabetes Mellitus] : Diabetes Mellitus [Hyperlipidemia] : Hyperlipidemia [Obesity] : Obesity [Check glucose ___ x/day] : Patient checks  blood glucose [unfilled]  times a day [Regular podiatrist visits] : Patient had regular podiatrist visits [Understanding of foot care] : Patient expressed understanding of foot care [Most Recent A1C: ___] : Most recent A1C was [unfilled] [Target A1C:  ___] : Target A1C is [unfilled] [Target goal met] : A1C target goal met [Moderate Intensity] : Patient is currently on moderate intensity statin  therapy [Does not check BP] : The patient is not checking blood pressure [120/80] : Target blood pressure is 120/80 [Lindaifestyle management] : lifestyle management [No episodes] : No hypoglycemic episodes since the last visit. [Neuropathy] :  neuropathy [Contraindication to therapy ___] : Contraindications to statin  therapy: [unfilled] [Severe Persistent] : severe persistent [None] : Patient does not have any asthma related symptoms [No nocturnal awakening] : Patient denies nocturnal awakening [Cold Temperature] : cold temperature [Allergies] : allergies [Inhaler Use] : inhaler use [Other: ___] : [unfilled] [___ x/week] : [unfilled] times per week [Stable] : Condition is stable

## 2021-01-21 NOTE — PHYSICAL EXAM
[Normal Sclera/Conjunctiva] : normal sclera/conjunctiva [PERRL] : pupils equal round and reactive to light [Normal Outer Ear/Nose] : the outer ears and nose were normal in appearance [No JVD] : no jugular venous distention [No Respiratory Distress] : no respiratory distress  [No Accessory Muscle Use] : no accessory muscle use [Clear to Auscultation] : lungs were clear to auscultation bilaterally [Normal Rate] : normal rate  [Regular Rhythm] : with a regular rhythm [Normal S1, S2] : normal S1 and S2 [No Murmur] : no murmur heard [Pedal Pulses Present] : the pedal pulses are present [No Edema] : there was no peripheral edema [Normal Supraclavicular Nodes] : no supraclavicular lymphadenopathy [Normal Anterior Cervical Nodes] : no anterior cervical lymphadenopathy [Normal Affect] : the affect was normal [Alert and Oriented x3] : oriented to person, place, and time [Normal Insight/Judgement] : insight and judgment were intact [None] : no ulcers in either foot were found [] : both feet [de-identified] : obese [de-identified] : reduced shoulder ROM bilaterally [de-identified] : no active wounds at this time [de-identified] : foot exam much improved and nails have healed back to baseline- nails longer than usual since no podiatry visit- has scheduled for 3 weeks from now [de-identified] : using wheelchair for transport into the office  [TWNoteComboBox3] : +1 [TWNoteComboBox4] : +1

## 2021-01-21 NOTE — COUNSELING
[Fall prevention counseling provided] : Fall prevention counseling provided [Adequate lighting] : Adequate lighting [No throw rugs] : No throw rugs [Use proper foot wear] : Use proper foot wear [Use recommended devices] : Use recommended devices [Behavioral health counseling provided] : Behavioral health counseling provided [Sleep ___ hours/day] : Sleep [unfilled] hours/day [Engage in a relaxing activity] : Engage in a relaxing activity

## 2021-01-21 NOTE — REVIEW OF SYSTEMS
[Fatigue] : fatigue [Joint Pain] : joint pain [Joint Stiffness] : joint stiffness [Muscle Weakness] : muscle weakness [Muscle Pain] : muscle pain [Negative] : Heme/Lymph [Fever] : no fever [Chills] : no chills [FreeTextEntry9] : shoulder pains and stiffness had set back after not going for 6 months at onset of covid, able to stand most days but sometimes tremors keep her from walking- platform walker at home [de-identified] : no active wounds of the legs or feet at this time [de-identified] : tremors- unchanged

## 2021-01-21 NOTE — ASSESSMENT
[FreeTextEntry1] : discussed PT therapy and low salt, low carb/fat diet. Plenty of water and advised on covid vaccine, follow up in 3 months with labs

## 2021-02-11 ENCOUNTER — TRANSCRIPTION ENCOUNTER (OUTPATIENT)
Age: 66
End: 2021-02-11

## 2021-02-12 ENCOUNTER — TRANSCRIPTION ENCOUNTER (OUTPATIENT)
Age: 66
End: 2021-02-12

## 2021-02-16 ENCOUNTER — TRANSCRIPTION ENCOUNTER (OUTPATIENT)
Age: 66
End: 2021-02-16

## 2021-02-18 ENCOUNTER — TRANSCRIPTION ENCOUNTER (OUTPATIENT)
Age: 66
End: 2021-02-18

## 2021-02-25 ENCOUNTER — NON-APPOINTMENT (OUTPATIENT)
Age: 66
End: 2021-02-25

## 2021-03-02 ENCOUNTER — APPOINTMENT (OUTPATIENT)
Dept: CARDIOLOGY | Facility: CLINIC | Age: 66
End: 2021-03-02
Payer: MEDICARE

## 2021-03-02 ENCOUNTER — NON-APPOINTMENT (OUTPATIENT)
Age: 66
End: 2021-03-02

## 2021-03-02 VITALS
SYSTOLIC BLOOD PRESSURE: 176 MMHG | RESPIRATION RATE: 16 BRPM | HEART RATE: 85 BPM | OXYGEN SATURATION: 97 % | HEIGHT: 68 IN | TEMPERATURE: 98.5 F | DIASTOLIC BLOOD PRESSURE: 95 MMHG

## 2021-03-02 VITALS — HEART RATE: 80 BPM | DIASTOLIC BLOOD PRESSURE: 70 MMHG | SYSTOLIC BLOOD PRESSURE: 120 MMHG

## 2021-03-02 PROCEDURE — 99214 OFFICE O/P EST MOD 30 MIN: CPT

## 2021-03-02 PROCEDURE — 93306 TTE W/DOPPLER COMPLETE: CPT

## 2021-03-02 PROCEDURE — 93000 ELECTROCARDIOGRAM COMPLETE: CPT

## 2021-03-02 NOTE — REASON FOR VISIT
[Follow-Up - Clinic] : a clinic follow-up of [Atrial Fibrillation] : atrial fibrillation [Hypertension] : hypertension [FreeTextEntry1] : Patient returns for followup. Feeling well. Offers no complaints of chest discomfort shortness of breath palpitations dizziness or syncope.She states she is making gradual improvement and physical therapy. She is still unable to walk but is beginning to weight bear to a certain extent. She underwent echocardiography today which still revealed a small pericardial effusion and normal left ventricular systolic function. She is scheduled she states to have a CAT scan to reassess her distal dissection.\par

## 2021-03-02 NOTE — ASSESSMENT
[FreeTextEntry1] : Impression:\par 1. Patient with history of distal aortic dissection now healed patient had atrial fibrillation at that time. She is not onanticoagulation. She has been off her amiodarone for some time without any recurrence\par \par Plan:\par 1. For now, continue current medication\par

## 2021-03-09 ENCOUNTER — APPOINTMENT (OUTPATIENT)
Dept: RHEUMATOLOGY | Facility: CLINIC | Age: 66
End: 2021-03-09
Payer: MEDICARE

## 2021-03-09 VITALS
WEIGHT: 195 LBS | BODY MASS INDEX: 29.55 KG/M2 | HEART RATE: 82 BPM | TEMPERATURE: 98 F | RESPIRATION RATE: 16 BRPM | SYSTOLIC BLOOD PRESSURE: 123 MMHG | OXYGEN SATURATION: 97 % | HEIGHT: 68 IN | DIASTOLIC BLOOD PRESSURE: 72 MMHG

## 2021-03-09 PROCEDURE — 96372 THER/PROPH/DIAG INJ SC/IM: CPT

## 2021-03-09 PROCEDURE — 99214 OFFICE O/P EST MOD 30 MIN: CPT | Mod: 25

## 2021-03-09 RX ORDER — DENOSUMAB 60 MG/ML
60 INJECTION SUBCUTANEOUS
Qty: 1 | Refills: 0 | Status: COMPLETED | OUTPATIENT
Start: 2021-03-09

## 2021-03-09 RX ADMIN — DENOSUMAB 1 MG/ML: 60 INJECTION SUBCUTANEOUS at 00:00

## 2021-03-10 ENCOUNTER — TRANSCRIPTION ENCOUNTER (OUTPATIENT)
Age: 66
End: 2021-03-10

## 2021-03-15 NOTE — PATIENT PROFILE ADULT - NSPROGENANESREACTION_GEN_A_NUR
Initial / Assessment/Plan of Care Note     Baseline Assessment  71 year old admitted 3/14/2021 as Inpatient with a diagnosis of Acute Kidney Injury.   Prior to admission patient was living with Alone and residing at Assisted living.  Patient does  have a Power of  for Healthcare.  Document is not activated.  Agent is son Chuy Nance.Patient’s Primary Care Provider is Saeid Swanson MD.     Medical History  Past Medical History:   Diagnosis Date   • Acute canaliculitis of right lacrimal passage 9/1/16    Dr Jurado   • Acute on chronic heart failure with preserved ejection fraction (HFpEF) (CMS/[facility]) 3/14/2021   • Anemia    • Anxiety    • Arthritis     lumbar, knees, shoulders   • Chronic back pain    • Family history of malignant hyperthermia     Son - age 4 surgery - death   • GERD    • HTN (hypertension)    • Hx of complete eye exam [id number]    no diabetic retinopathy   • Hyperlipidemia    • Insomnia    • Major depression    • MRSA nasal colonization 7/17/2016   • Multinodular goiter    • Nephrolithiasis    • Onychomycosis     x 10, w/onychocryptosis bilateral hallux   • Osteoporosis    • Paget's disease    • Peripheral neuropathy    • Pes planus     with pronation syndrome   • Pneumonia 4/11/2020   • Shingles    • Sinusitis, chronic    • Sleep apnea    • Spinal headache    • Type 2 diabetes mellitus (CMS/[facility])    • Urinary incontinence    • Vertigo    • Vitamin B12 deficiency    • Vitamin D deficiency    • Wears glasses        Prior to Admission Status  Functional Status  Ambulation: Walker  Bathing: Facility Staff  Dressing: Independent/Self  Toileting: Independent/Self  Medication Preparation: Pharmacy Setup  Medication Administration: Staff Administered    Agency/Support  Type of Services Prior to Hospitalization: Housekeeping, Nurse (specify), Delivered meals  Support Systems: Children  Home Devices/Equipment: Shower chair, Elevated toilet seat  Mobility Assist Devices: Four wheel walker  Sensory  Support Devices: Eyeglasses    Current Status  PT Ambulation Tips: Use gait belt, Use walker, Walk to bathroom, Needs supervision  PT Transfer Tips: Needs supervision, Transfers independently, Up in chair for meals, Use walker   OT Bathing Tips:    OT Dressing Tips:    OT Toileting Tips:    OT Feeding Tips:    SLP Swallow/Feeding Tips:    SLP Comm/Cog Tips:    Current Mental Status: Pleasant  Stressors:      Insurance  Primary: [facility] [employer]  Secondary: [employer] [facility]    Barriers to Discharge  Identified Barriers to Discharge/Transition Planning: Assessment/stabilization in progress    Progress Note  SW was referred by MD consult for discharge planning on 3/14/21. SW reviewed pt's chart and initial assessment completed. SW met with pt and agreed to SW assessment. SW explained SW role and services.     Patient does have a valid Healthcare Power of  located in [facility]. Document is NOT activated and agent is Chuy Claireirvingcyndi (son).    Patient is admitted from [facility]. Patient does use a walker as assistive device. Patient is independent with ADL's and facility assists with IADL's.    Therapies have been ordered for this hospitalization and SW will follow PT/OT recommendations to assist with discharge planning.    SW met with patient to discuss discharge planning. Patient was laying in bed, stated was very uncomfortable and had significant back pain. SW will work with [facility] to see what plan is for patient to discharge back to facility as facility is [facility] and patient may require higher level of care. Patient did not want SW to update son Kelechi at this time.    SERENITY called and spoke with Rad at [facility] ((P#[phone], F#[phone]) to discuss barriers to patient returning. Rad indicated that had spoken to Linda, supervisor, who indicated this was patients \"last chance at [facility]\" and would not be able to return. Rad stated did not agree with this and thought that patient  could discharge back to [facility] with hospice care. SERENITY stated MD would have to order palliative care consult and patient would have to qualify for hospice. Rad is off the next 2 days so SW to follow up with Linda on plan of care. At this time, facility unsure of discharge plan and would like to see how patient progresses at hospital.      SW to continue to follow.       Plan  SW/CM - Recommendations for Discharge: Assisted living   PT - Recommendations for Discharge: Home, Home therapy    OT - Recommendations for Discharge:      SLP - Recommendations for Discharge:     Anticipate patient will need post-hospital services. Necessary services are available.  Anticipate patient can return to the environment from which patient entered the hospital.   Anticipate patient cannot provide self-care at discharge.    Refer to /CM Flowsheet for Goals and objective data.      no previous reaction

## 2021-03-19 ENCOUNTER — RX RENEWAL (OUTPATIENT)
Age: 66
End: 2021-03-19

## 2021-03-20 ENCOUNTER — TRANSCRIPTION ENCOUNTER (OUTPATIENT)
Age: 66
End: 2021-03-20

## 2021-03-22 ENCOUNTER — TRANSCRIPTION ENCOUNTER (OUTPATIENT)
Age: 66
End: 2021-03-22

## 2021-04-01 ENCOUNTER — RESULT REVIEW (OUTPATIENT)
Age: 66
End: 2021-04-01

## 2021-04-01 ENCOUNTER — OUTPATIENT (OUTPATIENT)
Dept: OUTPATIENT SERVICES | Facility: HOSPITAL | Age: 66
LOS: 1 days | End: 2021-04-01
Payer: MEDICARE

## 2021-04-01 DIAGNOSIS — I71.01 DISSECTION OF THORACIC AORTA: ICD-10-CM

## 2021-04-01 DIAGNOSIS — Z98.49 CATARACT EXTRACTION STATUS, UNSPECIFIED EYE: Chronic | ICD-10-CM

## 2021-04-01 DIAGNOSIS — Z90.49 ACQUIRED ABSENCE OF OTHER SPECIFIED PARTS OF DIGESTIVE TRACT: Chronic | ICD-10-CM

## 2021-04-01 DIAGNOSIS — G95.11 ACUTE INFARCTION OF SPINAL CORD (EMBOLIC) (NONEMBOLIC): ICD-10-CM

## 2021-04-01 PROCEDURE — 74177 CT ABD & PELVIS W/CONTRAST: CPT | Mod: 26,MH

## 2021-04-01 PROCEDURE — 85610 PROTHROMBIN TIME: CPT

## 2021-04-01 PROCEDURE — 71275 CT ANGIOGRAPHY CHEST: CPT

## 2021-04-01 PROCEDURE — 85025 COMPLETE CBC W/AUTO DIFF WBC: CPT

## 2021-04-01 PROCEDURE — 71275 CT ANGIOGRAPHY CHEST: CPT | Mod: 26,MH

## 2021-04-01 PROCEDURE — 84100 ASSAY OF PHOSPHORUS: CPT

## 2021-04-01 PROCEDURE — 80053 COMPREHEN METABOLIC PANEL: CPT

## 2021-04-01 PROCEDURE — 74177 CT ABD & PELVIS W/CONTRAST: CPT

## 2021-04-01 PROCEDURE — 36415 COLL VENOUS BLD VENIPUNCTURE: CPT

## 2021-04-08 NOTE — DISCUSSION/SUMMARY
[de-identified] : The underlying pathophysiology was reviewed in great detail with the patient as well as the various treatment options, including ice, analgesics, NSAIDs, Physical therapy, steroid injections, right reverse TSA.\par \par The patient is a high risk surgical candidate and I am recommending that we proceed with conservative treatment at this time.\par \par Activity modifications and restrictions were discussed. \par \par FU 2-3 weeks for repeat XRays. 
+STEVENS, orthopnea, bilat leg swelling, wt gain

## 2021-04-13 ENCOUNTER — APPOINTMENT (OUTPATIENT)
Dept: FAMILY MEDICINE | Facility: CLINIC | Age: 66
End: 2021-04-13
Payer: MEDICARE

## 2021-04-13 VITALS
DIASTOLIC BLOOD PRESSURE: 70 MMHG | OXYGEN SATURATION: 97 % | RESPIRATION RATE: 16 BRPM | BODY MASS INDEX: 28.89 KG/M2 | HEART RATE: 84 BPM | SYSTOLIC BLOOD PRESSURE: 116 MMHG | WEIGHT: 190 LBS | TEMPERATURE: 96.8 F

## 2021-04-13 PROCEDURE — 99215 OFFICE O/P EST HI 40 MIN: CPT | Mod: CS

## 2021-04-17 NOTE — PHYSICAL EXAM
[No Acute Distress] : no acute distress [Normal Sclera/Conjunctiva] : normal sclera/conjunctiva [Normal Outer Ear/Nose] : the outer ears and nose were normal in appearance [No JVD] : no jugular venous distention [No Respiratory Distress] : no respiratory distress  [No Accessory Muscle Use] : no accessory muscle use [Clear to Auscultation] : lungs were clear to auscultation bilaterally [Normal Rate] : normal rate  [Regular Rhythm] : with a regular rhythm [Normal S1, S2] : normal S1 and S2 [No Murmur] : no murmur heard [Pedal Pulses Present] : the pedal pulses are present [Normal Supraclavicular Nodes] : no supraclavicular lymphadenopathy [Normal Anterior Cervical Nodes] : no anterior cervical lymphadenopathy [Normal Affect] : the affect was normal [Alert and Oriented x3] : oriented to person, place, and time [Normal Insight/Judgement] : insight and judgment were intact [None] : no ulcers in either foot were found [] : both feet [de-identified] : overweight  [de-identified] : decreased breath sounds [de-identified] : trace edema  [de-identified] : reduced shoulder ROM bilaterally [de-identified] : no active wounds at this time [de-identified] : using wheelchair for transport into the office  [de-identified] : foot exam much improved and nails have healed back to baseline- nails longer than usual since no podiatry visit- earlier this week [TWNoteComboBox3] : +1 [TWNoteComboBox4] : +1

## 2021-04-17 NOTE — CURRENT MEDS
[Takes medication as prescribed] : takes [None] : Patient does not have any barriers to medication adherence [Yes] : Reviewed medication list for presence of high-risk medications. [Opioids] : opioids [Muscle Relaxants] : muscle relaxants [FreeTextEntry1] : post dissecting aortic aneurysm -

## 2021-04-17 NOTE — ASSESSMENT
[FreeTextEntry1] : multiple comorbid conditions which carry high risk for CV disease and steroid dependent asthma, as well as DM2 currently well controlled. Continuing with vascular follow up CT annually at this time as this is now stable. PT as able to continue for maintaining her ability to ambulate with walker. Advised eye exam which is overdue. Needs pcr covid testing to go [place] care for grandchild

## 2021-04-17 NOTE — REVIEW OF SYSTEMS
[Fatigue] : fatigue [Joint Pain] : joint pain [Joint Stiffness] : joint stiffness [Muscle Weakness] : muscle weakness [Muscle Pain] : muscle pain [Negative] : Heme/Lymph [Fever] : no fever [Chills] : no chills [FreeTextEntry9] : doing better again with ability to walk with the platform walker- going weekly to PT at this time and [place] take [place] baby [de-identified] : no active wounds of the legs or feet at this time  [de-identified] : tremors- unchanged-  bilateral upper extremities

## 2021-04-17 NOTE — HISTORY OF PRESENT ILLNESS
[Diabetes Mellitus] : Diabetes Mellitus [Hyperlipidemia] : Hyperlipidemia [No episodes] : No hypoglycemic episodes since the last visit. [Check glucose ___ x/week] : Patient checks blood glucose [unfilled]  times a week [Regular podiatrist visits] : Patient had regular podiatrist visits [Understanding of foot care] : Patient expressed understanding of foot care [Most Recent A1C: ___] : Most recent A1C was [unfilled] [Target A1C:  ___] : Target A1C is [unfilled] [Target goal met] : A1C target goal met [Neuropathy] :  neuropathy [Stable] : Patient is stable [Lindaifestyle management] : lifestyle management [Contraindication to therapy ___] : Contraindications to statin  therapy: [unfilled] [Severe Persistent] : severe persistent [Cough] : cough [No nocturnal awakening] : Patient denies nocturnal awakening [Allergies] : allergies [Inhaler Use] : inhaler use [Does not check Peak Flow] : The patient is not checking peak flow [___ x/day] : [unfilled]  times per day [Somewhat controlled] : Condition is somewhat controlled [EyeExamDate] : pending [FreeTextEntry1] : still taking steroids and required a boost in the dose of steroid for the last week or so.

## 2021-05-05 ENCOUNTER — TRANSCRIPTION ENCOUNTER (OUTPATIENT)
Age: 66
End: 2021-05-05

## 2021-06-13 NOTE — PROCEDURE
[de-identified] : At this point I recommended aspiration and therapeutic injection and under sterile precautions an injection of 4 cc 1% lidocaine with 0.5 cc of Kenalog and 0.5 cc of Dexamethasone- was placed into the subacromial space of the Right shoulder without complication after 50 cc of clear synovial fluid was aspirated and after several minutes the patient felt significant relief. \par

## 2021-06-13 NOTE — DISCUSSION/SUMMARY
[de-identified] : The underlying pathophysiology was reviewed in great detail with the patient as well as the various treatment options, including ice, analgesics, NSAIDs, Physical therapy, steroid injections, total shoulder replacement. \par \par The patient wishes to proceed with an aspiration and INJECTION of cortisone into her right shoulder for symptomatic relief.\par \par Continue home exercise program. \par \par Activity modifications and restrictions were discussed. I advised avoiding overhead lifting. I advised the patient to work on good posture. \par \par Patient will follow up in 2 weeks for her right knee. We will discuss  brace, and injections at this time. \par \par All questions were answered, all alternatives discussed and the patient is in complete agreement with that plan. Follow-up appointment as instructed. Any issues and the patient will call or come in sooner.

## 2021-06-13 NOTE — HISTORY OF PRESENT ILLNESS
[de-identified] : RHD 66 year old female presents for an evaluation of right shoulder pain. she has been diagnosed with rotator cuff arthropathy and on 4/24/2019 she sustained a minimally displaced fracture of the right acromion. On  2/13/2020 she had her right shoulder aspirated and injected with cortisone. She reports shortly after she had some increased pain but the injection provided her with good pain relief for about 4 months. At her last visit on 11/19/2020  patient received a Zilretta injection and aspiration of the right shoulder noting less symptomatic relief compared to the regular corticosteroid injection. Patient denies any other complaints at this time.  She is here today for an aspiration and a Zilretta injection of the right shoulder.

## 2021-06-13 NOTE — PHYSICAL EXAM
[Wheelchair] : uses a wheelchair [Normal RUE] : Right Upper Extremity: No scars, rashes, lesions, ulcers, skin intact [Normal Touch] : sensation intact for touch [Normal] : No swelling, no edema, normal pedal pulses and normal temperature [Obese] : obese [Poor Appearance] : well-appearing [Acute Distress] : not in acute distress [de-identified] : Right Upper Extremity\par o Shoulder :\par ¦ Inspection/Palpation : marked tenderness diffusely about the shoulder, crepitus on palpation of the acromion, 2+ effusion, no deformity \par ¦ Range of Motion : ACTIVE FORWARD ELEVATION: Measured at 80 degrees, ACTIVE EXTERNAL ROTATION: Measured at 30 degrees, ACTIVE INTERNAL ROTATION: Measured at PSIS\par ¦ Strength : external rotation 3/5, internal rotation 5/5, supraspinatus 3/5 with crepitus\par ¦ Stability : no joint instability on provocative testing\par ¦ Tests/Signs : Neer (-), Cleary (-)\par o Upper Arm : no tenderness, no swelling, no deformities\par o Muscle Bulk : no atrophy\par o Sensation : sensation intact to light touch\par o Skin : no skin rash or discoloration\par o Vascular Exam : no edema, no cyanosis, radial and ulnar pulses normal\par

## 2021-06-24 PROBLEM — M17.11 ARTHRITIS OF RIGHT KNEE: Status: ACTIVE | Noted: 2018-03-20

## 2021-06-24 NOTE — DISCUSSION/SUMMARY
[de-identified] : The underlying pathophysiology was reviewed in great detail with the patient as well as the various treatment options, including ice, analgesics, NSAIDs, Physical therapy, steroid injections, hyaluronic gel injections, total knee replacement. \par \par Patient received a corticosteroid injection of the right knee today. \par \par A home exercise sheet was given and discussed with the patient to follow.A Thera-Band was provided for exercise program. \par \par Activity modifications and restrictions were discussed. I advised avoiding overhead lifting. I advised the patient to work on good posture. \par \par I discussed the importance of weight loss to alleviate her knee pain\par \par I have recommended utilizing a knee sleeve to provide added support and stability. \par \par FU 4-6 weeks. \par \par All questions were answered, all alternatives discussed and the patient is in complete agreement with that plan. Follow-up appointment as instructed. Any issues and the patient will call or come in sooner.

## 2021-06-24 NOTE — HISTORY OF PRESENT ILLNESS
[de-identified] : RHD 66 year old female presents for an evaluation of right knee pain due to osteoarthritis. She was previous diagnosed with osteoarthritis of the right knee in the medial compartment. She has since been ambulating in a wheelchair outdoors and with a platform walker indoors. She reports constant right knee pain which is sharp and dull intermittently with all activities. Her knee pain has been complicating her functionality. Previous cortisone injections have been helpful in reducing her knee pain. On 10/29/2020 she received a corticosteroid injection of the right knee that provided her with good pain relief. 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 She did not require surgical intervention at this time. She has been recovering at home since. She notes increased knee pain and weakness after being hospitalized for so long.  Cristina Bonds)  Cardiovascular Disease; Internal Medicine  [address] [place] [address]  Phone: [phone]  Fax: [phone]  Follow Up Time:

## 2021-06-24 NOTE — PROCEDURE
[de-identified] : At this point I recommended a therapeutic injection and under sterile precautions an injection of 4 cc 1% lidocaine with 0.5 cc of Kenalog and 0.5 cc of Dexamethasone - was placed into the joint of the Right knee without complication, and after several minutes, the patient felt significant relief.\par \par

## 2021-06-24 NOTE — PHYSICAL EXAM
[Wheelchair] : uses a wheelchair [Normal RUE] : Right Upper Extremity: No scars, rashes, lesions, ulcers, skin intact [Normal Touch] : sensation intact for touch [Normal] : No swelling, no edema, normal pedal pulses and normal temperature [Obese] : obese [Poor Appearance] : well-appearing [Acute Distress] : not in acute distress [de-identified] : Right Lower Extremity\par o Knee :\par ¦ Inspection/Palpation : lateral tenderness to palpation, tenderness over patella tendon, no swelling, valgus deformity \par ¦ Range of Motion : 0 - 110 degrees, no crepitus\par ¦ Stability : no valgus or varus instability present on provocative testing, Lachman’s Test (-)\par ¦ Strength : flexion and extension 3/5\par o Muscle Bulk : normal muscle bulk present\par o Skin : no erythema, no ecchymosis\par o Sensation : sensation to pin intact\par o Vascular Exam : no edema, no cyanosis, dorsalis pedis artery pulse 2+, posterior tibial artery pulse 2+\par  [de-identified] : o Right Knee : AP, lateral, sunrise, and Escalante views of the knee were obtained, there are no soft tissue abnormalities, no fractures, valgus alignment, severe lateral compartment osteoarthritis with bone on bone apposition, vascular calcifications.

## 2021-08-03 PROBLEM — Z20.822 EXPOSURE TO COVID-19 VIRUS: Status: RESOLVED | Noted: 2020-09-03 | Resolved: 2021-01-01

## 2021-08-03 NOTE — CURRENT MEDS
[Takes medication as prescribed] : takes [Side Effects] :  side effects [Yes] : Reviewed medication list for presence of high-risk medications. [Opioids] : opioids

## 2021-08-06 NOTE — HISTORY OF PRESENT ILLNESS
[Diabetes Mellitus] : Diabetes Mellitus [Hyperlipidemia] : Hyperlipidemia [Obesity] : Obesity [Other: ___] : [unfilled] [No episodes] : No hypoglycemic episodes since the last visit. [Check glucose ___ x/week] : Patient checks blood glucose [unfilled]  times a week [Regular podiatrist visits] : Patient had regular podiatrist visits [Understanding of foot care] : Patient expressed understanding of foot care [Most Recent A1C: ___] : Most recent A1C was [unfilled] [Target A1C:  ___] : Target A1C is [unfilled] [Neuropathy] :  neuropathy [Contraindication to therapy ___] : Contraindications to statin  therapy: [unfilled] [Does not check BP] : The patient is not checking blood pressure [<140/90] : Target blood pressure is <140/90 [Target goal met] : BP target goal met [Severe Persistent] : severe persistent [Wheezing] : wheezing [No nocturnal awakening] : Patient denies nocturnal awakening [Allergies] : allergies [Inhaler Use] : inhaler use [___ x/week] : [unfilled] times per week [Stable] : Condition is stable [No Weight Changes] : No weight changes

## 2021-08-06 NOTE — ASSESSMENT
[FreeTextEntry1] : referred for annual eye exam for DM2, also discussed her recent asthma control which has been stable with use of rescue inhaler about 2-3 times per week. Her diabetes is well controlled with oral metformin and diet. continues with essential tremors and no changes in her feet with continuing neuropathy - seeing podiatry every 6 weeks. Also no issues with the aortic dissection at this time. Had follow up with surgeon- CT scan stable. Hypertension stable as well. Chronic use of tramadol intermittently

## 2021-08-06 NOTE — PHYSICAL EXAM
[No Acute Distress] : no acute distress [Normal Sclera/Conjunctiva] : normal sclera/conjunctiva [Normal Outer Ear/Nose] : the outer ears and nose were normal in appearance [No JVD] : no jugular venous distention [No Respiratory Distress] : no respiratory distress  [No Accessory Muscle Use] : no accessory muscle use [Clear to Auscultation] : lungs were clear to auscultation bilaterally [Normal Rate] : normal rate  [Regular Rhythm] : with a regular rhythm [Normal S1, S2] : normal S1 and S2 [No Murmur] : no murmur heard [Pedal Pulses Present] : the pedal pulses are present [Normal Supraclavicular Nodes] : no supraclavicular lymphadenopathy [Normal Anterior Cervical Nodes] : no anterior cervical lymphadenopathy [Normal Affect] : the affect was normal [Alert and Oriented x3] : oriented to person, place, and time [Normal Insight/Judgement] : insight and judgment were intact [None] : no ulcers in either foot were found [] : both feet [de-identified] : overweight  [de-identified] : decreased breath sounds but no wheezes actively today [de-identified] : trace edema  [de-identified] : reduced shoulder ROM bilaterally [de-identified] : no active wounds at this time [de-identified] : using wheelchair for transport into the office  [de-identified] : foot exam much improved and nails have healed back to baseline- nails longer than usual since no podiatry visit- earlier this week [TWNoteComboBox3] : +1 [TWNoteComboBox4] : +1

## 2021-08-06 NOTE — REVIEW OF SYSTEMS
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
## 2021-09-10 PROBLEM — M19.90 OSTEOARTHRITIS: Status: ACTIVE | Noted: 2018-03-20

## 2021-11-05 NOTE — ED ADULT NURSE NOTE - NSIMPLEMENTINTERV_GEN_ALL_ED
Implemented All Fall Risk Interventions:  [place] to call system. Call bell, personal items and telephone within reach. Instruct patient to call for assistance. Room bathroom lighting operational. Non-slip footwear when patient is off stretcher. Physically safe environment: no spills, clutter or unnecessary equipment. Stretcher in lowest position, wheels locked, appropriate side rails in place. Provide visual cue, wrist band, yellow gown, etc. Monitor gait and stability. Monitor for mental status changes and reorient to person, place, and time. Review medications for side effects contributing to fall risk. Reinforce activity limits and safety measures with patient and family.

## 2021-11-05 NOTE — ED PROVIDER NOTE - PATIENT PORTAL LINK FT
You can access the FollowMyHealth Patient Portal offered by [facility] by registering at the following website: [email or web address]. By joining [facility]’s FollowMyHealth portal, you will also be able to view your health information using other applications (apps) compatible with our system.

## 2021-11-05 NOTE — ED PROVIDER NOTE - OBJECTIVE STATEMENT
67yo female bib ems s/p fall with head injury and left lower leg. pt was getting out of bed and lost her balance with her walker and fell and hit her head, no LOC, pt was not able to get up c/o leg pain, no neck pain no dizziness, no other complaitns

## 2021-11-05 NOTE — ED PROVIDER NOTE - NSICDXPASTMEDICALHX_GEN_ALL_CORE_FT
PAST MEDICAL HISTORY:  Arthritis     Asthma     Diabetes mellitus     Hyperlipidemia     Hypothyroidism     PE (pulmonary embolism)     Shingles

## 2021-11-05 NOTE — ED PROVIDER NOTE - NSFOLLOWUPINSTRUCTIONS_ED_ALL_ED_FT
Head Injury    WHAT YOU NEED TO KNOW:    A head injury can include your scalp, face, skull, or brain and range from mild to severe. Effects can appear immediately after the injury or develop later. The effects may last a short time or be permanent. Healthcare providers may want to check your recovery over time. Treatment may change as you recover or develop new health problems from the head injury.    DISCHARGE INSTRUCTIONS:    Call your local emergency number (911 in the [place]), or have someone else call if:   •You cannot be woken.      •You have a seizure.      •You stop responding to others or you faint.      •You have blurry or double vision.      •Your speech becomes slurred or confused.      •You have arm or leg weakness, loss of feeling, or new problems with coordination.      •Your pupils are larger than usual, or one pupil is a different size than the other.      •You have blood or clear fluid coming out of your ears or nose.      Return to the emergency department if:   •You have repeated or forceful vomiting.      •You feel confused.      •Your headache gets worse or becomes severe.      •You or someone caring for you notices that you are harder to wake than usual.      Call your doctor if:   •Your symptoms last longer than 6 weeks after the injury.      •You have questions or concerns about your condition or care.      Medicines:   •Acetaminophen decreases pain and fever. It is available without a doctor's order. Ask how much to take and how often to take it. Follow directions. Read the labels of all other medicines you are using to see if they also contain acetaminophen, or ask your doctor or pharmacist. Acetaminophen can cause liver damage if not taken correctly. Do not use more than 4 grams (4,000 milligrams) total of acetaminophen in one day.       •Take your medicine as directed. Contact your healthcare provider if you think your medicine is not helping or if you have side effects. Tell him or her if you are allergic to any medicine. Keep a list of the medicines, vitamins, and herbs you take. Include the amounts, and when and why you take them. Bring the list or the pill bottles to follow-up visits. Carry your medicine list with you in case of an emergency.      Self-care:   •Rest or do quiet activities. Limit your time watching TV, using the computer, or doing tasks that require a lot of thinking. Slowly return to your normal activities as directed. Do not play sports or do activities that may cause you to get hit in the head. Ask your healthcare provider when you can return to sports.      •Apply ice on your head for 15 to 20 minutes every hour or as directed. Use an ice pack, or put crushed ice in a plastic bag. Cover it with a towel before you apply it to your skin. Ice helps prevent tissue damage and decreases swelling and pain.      •Have someone stay with you for 24 hours , or as directed. This person can monitor you for problems and call for help if needed. When you are awake, the person should ask you a few questions every few hours to see if you are thinking clearly. An example is to ask your name or address.      Prevent another head injury:   •Wear a helmet that fits properly. Do this when you play sports, or ride a bike, scooter, or skateboard. Helmets help decrease your risk for a serious head injury. Talk to your healthcare provider about other ways you can protect yourself if you play sports.      •Wear your seatbelt every time you are in a car. This helps lower your risk for a head injury if you are in a car accident.      Follow up with your doctor as directed: Write down your questions so you remember to ask them during your visits.       © Copyright [employer] 2021

## 2021-11-05 NOTE — ED ADULT NURSE NOTE - OBJECTIVE STATEMENT
Brought in by ambulance patient states she slipped and fell while getting up of bed this morning. Denies LOC/ dizziness/ nausea/ vomiting. On assessment noted with hematoma on the left side of forehead and on the median side of left knee.

## 2021-11-14 NOTE — PHYSICAL EXAM
[No Acute Distress] : no acute distress [Normal Sclera/Conjunctiva] : normal sclera/conjunctiva [Normal Outer Ear/Nose] : the outer ears and nose were normal in appearance [No JVD] : no jugular venous distention [No Respiratory Distress] : no respiratory distress  [No Accessory Muscle Use] : no accessory muscle use [Clear to Auscultation] : lungs were clear to auscultation bilaterally [Normal Rate] : normal rate  [Regular Rhythm] : with a regular rhythm [Normal S1, S2] : normal S1 and S2 [No Murmur] : no murmur heard [Pedal Pulses Present] : the pedal pulses are present [Normal Supraclavicular Nodes] : no supraclavicular lymphadenopathy [Normal Anterior Cervical Nodes] : no anterior cervical lymphadenopathy [Normal Affect] : the affect was normal [Alert and Oriented x3] : oriented to person, place, and time [Normal Insight/Judgement] : insight and judgment were intact [None] : no ulcers in either foot were found [] : both feet [de-identified] : overweight  [de-identified] : decreased breath sounds but no wheezes actively today [de-identified] : trace edema  [de-identified] : reduced shoulder ROM bilaterally [de-identified] : using wheelchair for transport into the office  [de-identified] : no active wounds at this time [de-identified] : foot exam much improved and nails have healed back to baseline-  [TWNoteComboBox3] : +1 [TWNoteComboBox4] : +1

## 2021-11-14 NOTE — REVIEW OF SYSTEMS
[Joint Pain] : joint pain [Wheezing] : wheezing [Dyspnea on Exertion] : dyspnea on exertion [Easy Bruising] : easy bruising [Negative] : Psychiatric [Cough] : no cough [FreeTextEntry3] : eye exam scheduled [FreeTextEntry6] : intermittent wheezing- see the HPI for asthma [FreeTextEntry8] : stable [FreeTextEntry9] : right knee and right arm pain post fracture- non-union, left rotator cuff tear- chronic [de-identified] : continuing lower extremity weakness  [de-identified] : fall which caused bruising above the left eyebrow, and also bruising of the lower extremity- seen in the ER no need for sutures.

## 2021-11-14 NOTE — HEALTH RISK ASSESSMENT
[Any fall with injury in past year] : Patient reported fall with injury in the past year [Assistive Device] : Patient uses an assistive device [de-identified] : podiatry, eye exam upcoming- no notes from podiatry [de-identified] : trying to eat healthy [de-identified] : PT only  [de-identified] : uses wheelchair

## 2021-11-14 NOTE — ASSESSMENT
[FreeTextEntry1] : review of recent labs for DM, hypothyroidism, renal status related to DM and hypertension. Discussed diet, \par review of fall risk and need for caution when getting from bed etc. Advised to use the wheelchair and platform walker as educated by PT. orders for ongoing PT will be updated as needed. Advised eye exam for DM2, hypertension. Discussed diet and activity as needed. Follow up with rheumatology and pulmonary for asthma.

## 2021-11-14 NOTE — HISTORY OF PRESENT ILLNESS
[Diabetes Mellitus] : Diabetes Mellitus [Hyperlipidemia] : Hyperlipidemia [Hypertension] : Hypertension [Obesity] : Obesity [No episodes] : No hypoglycemic episodes since the last visit. [Regular podiatrist visits] : Patient had regular podiatrist visits [Understanding of foot care] : Patient expressed understanding of foot care [Most Recent A1C: ___] : Most recent A1C was [unfilled] [Target A1C:  ___] : Target A1C is [unfilled] [Neuropathy] :  neuropathy [No therapy] : Patient is not on statin therapy [Contraindication to therapy ___] : Contraindications to statin  therapy: [unfilled] [Does not check BP] : The patient is not checking blood pressure [<140/90] : Target blood pressure is <140/90 [Target goal met] : BP target goal met [Managed with medications] : managed with  medication [Bile acid sequestrants] : Patient is on nonstatin therapy - Bile acid sequestrants [Severe Persistent] : severe persistent [Wheezing] : wheezing [No nocturnal awakening] : Patient denies nocturnal awakening [Cold Temperature] : cold temperature [Allergies] : allergies [Inhaler Use] : inhaler use [Does not check Peak Flow] : The patient is not checking peak flow [___ x/week] : [unfilled] times per week [Stable] : Condition is stable [de-identified] : continuing to have stability in diabetes with A1C below 6 without any hypo episodes  [FreeTextEntry1] : continuing on steroids has been unable to be weaned in the past- for pulmonary follow up

## 2021-12-09 PROBLEM — M75.101 ROTATOR CUFF TEAR ARTHROPATHY OF RIGHT SHOULDER: Status: ACTIVE | Noted: 2019-05-03

## 2021-12-09 NOTE — PHYSICAL EXAM
[Wheelchair] : uses a wheelchair [Normal RUE] : Right Upper Extremity: No scars, rashes, lesions, ulcers, skin intact [Normal Touch] : sensation intact for touch [Normal] : No swelling, no edema, normal pedal pulses and normal temperature [Obese] : obese [Poor Appearance] : well-appearing [Acute Distress] : not in acute distress [de-identified] : Right Upper Extremity\par o Shoulder :\par ¦ Inspection/Palpation : marked tenderness diffusely about the shoulder, crepitus on palpation of the acromion, 2+ effusion, no deformity \par ¦ Range of Motion : ACTIVE FORWARD ELEVATION: Measured at 80 degrees, ACTIVE EXTERNAL ROTATION: Measured at 30 degrees, ACTIVE INTERNAL ROTATION: Measured at PSIS\par ¦ Strength : external rotation 3/5, internal rotation 5/5, supraspinatus 3/5 with crepitus\par ¦ Stability : no joint instability on provocative testing\par ¦ Tests/Signs : Neer (-), Cleary (-)\par o Upper Arm : no tenderness, no swelling, no deformities\par o Muscle Bulk : no atrophy\par o Sensation : sensation intact to light touch\par o Skin : no skin rash or discoloration\par o Vascular Exam : no edema, no cyanosis, radial and ulnar pulses normal\par  [de-identified] : o Right Shoulder : Grashey, Axillary and Outlet views were obtained, there are no soft tissue abnormalities, no fractures, normal bone density, no bony lesions, marked superior migration of humeral head with erosions of acromion process and distal clavicle,  glenohumeral osteoarthritis \par \par

## 2021-12-09 NOTE — DISCUSSION/SUMMARY
[de-identified] : The underlying pathophysiology was reviewed in great detail with the patient as well as the various treatment options, including ice, analgesics, NSAIDs, Physical therapy, steroid injections, total shoulder replacement. \par \par The patient wishes to proceed with an aspiration and INJECTION of cortisone into her right shoulder for symptomatic relief.\par \par Continue home exercise program. \par \par Activity modifications and restrictions were discussed. I advised avoiding overhead lifting. I advised the patient to work on good posture. \par \par Patient will follow up prn\par \par All questions were answered, all alternatives discussed and the patient is in complete agreement with that plan. Follow-up appointment as instructed. Any issues and the patient will call or come in sooner.

## 2021-12-09 NOTE — HISTORY OF PRESENT ILLNESS
[de-identified] : RHD 66 year old female presents for an evaluation of right shoulder pain. she has been diagnosed with rotator cuff arthropathy and on 4/24/2019 she sustained a minimally displaced fracture of the right acromion. On 06/10/2021 and previously on 2/13/2020 she had her right shoulder aspirated and injected with cortisone. She reports the injection provided her with good pain relief for about 4 months. On 11/19/2020  patient received a Zilretta injection and aspiration of the right shoulder noting less symptomatic relief compared to the regular corticosteroid injection. Patient denies any other complaints at this time.

## 2021-12-09 NOTE — PROCEDURE
[de-identified] : At this point I recommended aspiration and therapeutic injection and under sterile precautions an injection of 4 cc 1% lidocaine with 0.5 cc of Kenalog and 0.5 cc of Dexamethasone- was placed into the subacromial space of the Right shoulder without complication after 40 cc of blood tinged synovial fluid was aspirated and after several minutes the patient felt significant relief. \par

## 2022-01-01 ENCOUNTER — RESULT REVIEW (OUTPATIENT)
Age: 67
End: 2022-01-01

## 2022-01-01 ENCOUNTER — APPOINTMENT (OUTPATIENT)
Dept: PULMONOLOGY | Facility: CLINIC | Age: 67
End: 2022-01-01
Payer: MEDICARE

## 2022-01-01 ENCOUNTER — APPOINTMENT (OUTPATIENT)
Dept: FAMILY MEDICINE | Facility: CLINIC | Age: 67
End: 2022-01-01
Payer: MEDICARE

## 2022-01-01 ENCOUNTER — NON-APPOINTMENT (OUTPATIENT)
Age: 67
End: 2022-01-01

## 2022-01-01 ENCOUNTER — TRANSCRIPTION ENCOUNTER (OUTPATIENT)
Age: 67
End: 2022-01-01

## 2022-01-01 ENCOUNTER — APPOINTMENT (OUTPATIENT)
Dept: VASCULAR SURGERY | Facility: CLINIC | Age: 67
End: 2022-01-01
Payer: MEDICARE

## 2022-01-01 ENCOUNTER — RX RENEWAL (OUTPATIENT)
Age: 67
End: 2022-01-01

## 2022-01-01 ENCOUNTER — OUTPATIENT (OUTPATIENT)
Dept: OUTPATIENT SERVICES | Facility: HOSPITAL | Age: 67
LOS: 1 days | End: 2022-01-01
Payer: MEDICARE

## 2022-01-01 ENCOUNTER — INPATIENT (INPATIENT)
Facility: HOSPITAL | Age: 67
LOS: 2 days | DRG: 871 | End: 2022-05-06
Attending: STUDENT IN AN ORGANIZED HEALTH CARE EDUCATION/TRAINING PROGRAM | Admitting: STUDENT IN AN ORGANIZED HEALTH CARE EDUCATION/TRAINING PROGRAM
Payer: MEDICARE

## 2022-01-01 ENCOUNTER — APPOINTMENT (OUTPATIENT)
Dept: RHEUMATOLOGY | Facility: CLINIC | Age: 67
End: 2022-01-01
Payer: MEDICARE

## 2022-01-01 ENCOUNTER — APPOINTMENT (OUTPATIENT)
Dept: VASCULAR SURGERY | Facility: HOSPITAL | Age: 67
End: 2022-01-01

## 2022-01-01 ENCOUNTER — APPOINTMENT (OUTPATIENT)
Dept: CARDIOLOGY | Facility: CLINIC | Age: 67
End: 2022-01-01
Payer: MEDICARE

## 2022-01-01 ENCOUNTER — OUTPATIENT (OUTPATIENT)
Dept: INPATIENT UNIT | Facility: HOSPITAL | Age: 67
LOS: 1 days | Discharge: HOME | End: 2022-01-01
Payer: MEDICARE

## 2022-01-01 ENCOUNTER — LABORATORY RESULT (OUTPATIENT)
Age: 67
End: 2022-01-01

## 2022-01-01 VITALS — HEART RATE: 45 BPM | RESPIRATION RATE: 13 BRPM | OXYGEN SATURATION: 78 %

## 2022-01-01 VITALS
TEMPERATURE: 98 F | OXYGEN SATURATION: 99 % | HEIGHT: 68 IN | WEIGHT: 182.98 LBS | RESPIRATION RATE: 16 BRPM | SYSTOLIC BLOOD PRESSURE: 172 MMHG | DIASTOLIC BLOOD PRESSURE: 90 MMHG | HEART RATE: 72 BPM

## 2022-01-01 VITALS
RESPIRATION RATE: 16 BRPM | OXYGEN SATURATION: 93 % | SYSTOLIC BLOOD PRESSURE: 118 MMHG | WEIGHT: 183 LBS | BODY MASS INDEX: 27.74 KG/M2 | HEART RATE: 58 BPM | HEIGHT: 68 IN | DIASTOLIC BLOOD PRESSURE: 68 MMHG | TEMPERATURE: 97.3 F

## 2022-01-01 VITALS
HEART RATE: 86 BPM | TEMPERATURE: 96.9 F | SYSTOLIC BLOOD PRESSURE: 164 MMHG | DIASTOLIC BLOOD PRESSURE: 76 MMHG | TEMPERATURE: 97.3 F | OXYGEN SATURATION: 98 % | HEART RATE: 88 BPM | RESPIRATION RATE: 16 BRPM | RESPIRATION RATE: 16 BRPM | SYSTOLIC BLOOD PRESSURE: 140 MMHG | OXYGEN SATURATION: 98 % | DIASTOLIC BLOOD PRESSURE: 87 MMHG

## 2022-01-01 VITALS
HEIGHT: 68 IN | DIASTOLIC BLOOD PRESSURE: 70 MMHG | TEMPERATURE: 97.5 F | OXYGEN SATURATION: 98 % | SYSTOLIC BLOOD PRESSURE: 112 MMHG | BODY MASS INDEX: 27.28 KG/M2 | WEIGHT: 180 LBS | RESPIRATION RATE: 16 BRPM | HEART RATE: 80 BPM

## 2022-01-01 VITALS
RESPIRATION RATE: 16 BRPM | DIASTOLIC BLOOD PRESSURE: 80 MMHG | TEMPERATURE: 97.6 F | HEART RATE: 89 BPM | OXYGEN SATURATION: 95 % | SYSTOLIC BLOOD PRESSURE: 167 MMHG

## 2022-01-01 VITALS
HEIGHT: 68 IN | HEART RATE: 90 BPM | SYSTOLIC BLOOD PRESSURE: 176 MMHG | DIASTOLIC BLOOD PRESSURE: 78 MMHG | OXYGEN SATURATION: 98 % | TEMPERATURE: 97.3 F

## 2022-01-01 VITALS
OXYGEN SATURATION: 95 % | HEIGHT: 68 IN | HEART RATE: 74 BPM | WEIGHT: 179.9 LBS | TEMPERATURE: 97 F | DIASTOLIC BLOOD PRESSURE: 28 MMHG | RESPIRATION RATE: 22 BRPM | SYSTOLIC BLOOD PRESSURE: 46 MMHG

## 2022-01-01 VITALS — HEIGHT: 68 IN | BODY MASS INDEX: 27.74 KG/M2 | WEIGHT: 183 LBS

## 2022-01-01 VITALS
HEIGHT: 68 IN | HEART RATE: 84 BPM | OXYGEN SATURATION: 100 % | RESPIRATION RATE: 16 BRPM | SYSTOLIC BLOOD PRESSURE: 157 MMHG | WEIGHT: 182.98 LBS | DIASTOLIC BLOOD PRESSURE: 83 MMHG | TEMPERATURE: 98 F

## 2022-01-01 VITALS — DIASTOLIC BLOOD PRESSURE: 76 MMHG | SYSTOLIC BLOOD PRESSURE: 140 MMHG

## 2022-01-01 VITALS — TEMPERATURE: 97.6 F | OXYGEN SATURATION: 99 % | HEART RATE: 81 BPM | RESPIRATION RATE: 16 BRPM

## 2022-01-01 VITALS — WEIGHT: 178 LBS | HEIGHT: 68 IN | BODY MASS INDEX: 26.98 KG/M2 | OXYGEN SATURATION: 98 % | HEART RATE: 79 BPM

## 2022-01-01 VITALS — HEART RATE: 72 BPM

## 2022-01-01 VITALS — SYSTOLIC BLOOD PRESSURE: 108 MMHG | DIASTOLIC BLOOD PRESSURE: 80 MMHG

## 2022-01-01 VITALS
SYSTOLIC BLOOD PRESSURE: 154 MMHG | DIASTOLIC BLOOD PRESSURE: 76 MMHG | RESPIRATION RATE: 18 BRPM | OXYGEN SATURATION: 100 % | HEART RATE: 87 BPM

## 2022-01-01 VITALS
TEMPERATURE: 97.3 F | SYSTOLIC BLOOD PRESSURE: 140 MMHG | RESPIRATION RATE: 16 BRPM | HEART RATE: 76 BPM | DIASTOLIC BLOOD PRESSURE: 70 MMHG | OXYGEN SATURATION: 95 %

## 2022-01-01 VITALS — BODY MASS INDEX: 27.37 KG/M2 | WEIGHT: 180 LBS

## 2022-01-01 DIAGNOSIS — N17.9 ACUTE KIDNEY FAILURE, UNSPECIFIED: ICD-10-CM

## 2022-01-01 DIAGNOSIS — I48.0 PAROXYSMAL ATRIAL FIBRILLATION: ICD-10-CM

## 2022-01-01 DIAGNOSIS — M81.0 AGE-RELATED OSTEOPOROSIS W/OUT CURRENT PATHOLOGICAL FRACTURE: ICD-10-CM

## 2022-01-01 DIAGNOSIS — I73.9 PERIPHERAL VASCULAR DISEASE, UNSPECIFIED: ICD-10-CM

## 2022-01-01 DIAGNOSIS — Z90.49 ACQUIRED ABSENCE OF OTHER SPECIFIED PARTS OF DIGESTIVE TRACT: Chronic | ICD-10-CM

## 2022-01-01 DIAGNOSIS — I71.01 DISSECTION OF THORACIC AORTA: ICD-10-CM

## 2022-01-01 DIAGNOSIS — E03.9 HYPOTHYROIDISM, UNSPECIFIED: ICD-10-CM

## 2022-01-01 DIAGNOSIS — E78.5 HYPERLIPIDEMIA, UNSPECIFIED: ICD-10-CM

## 2022-01-01 DIAGNOSIS — M86.9 OSTEOMYELITIS, UNSPECIFIED: ICD-10-CM

## 2022-01-01 DIAGNOSIS — Z01.818 ENCOUNTER FOR OTHER PREPROCEDURAL EXAMINATION: ICD-10-CM

## 2022-01-01 DIAGNOSIS — Z01.812 ENCOUNTER FOR PREPROCEDURAL LABORATORY EXAMINATION: ICD-10-CM

## 2022-01-01 DIAGNOSIS — Z95.828 PRESENCE OF OTHER VASCULAR IMPLANTS AND GRAFTS: Chronic | ICD-10-CM

## 2022-01-01 DIAGNOSIS — J45.50 SEVERE PERSISTENT ASTHMA, UNCOMPLICATED: ICD-10-CM

## 2022-01-01 DIAGNOSIS — E11.9 TYPE 2 DIABETES MELLITUS WITHOUT COMPLICATIONS: ICD-10-CM

## 2022-01-01 DIAGNOSIS — K46.9 UNSPECIFIED ABDOMINAL HERNIA WITHOUT OBSTRUCTION OR GANGRENE: Chronic | ICD-10-CM

## 2022-01-01 DIAGNOSIS — Z82.49 FAMILY HISTORY OF ISCHEMIC HEART DISEASE AND OTHER DISEASES OF THE CIRCULATORY SYSTEM: ICD-10-CM

## 2022-01-01 DIAGNOSIS — J45.909 UNSPECIFIED ASTHMA, UNCOMPLICATED: ICD-10-CM

## 2022-01-01 DIAGNOSIS — G95.11 ACUTE INFARCTION OF SPINAL CORD (EMBOLIC) (NONEMBOLIC): ICD-10-CM

## 2022-01-01 DIAGNOSIS — J30.9 ALLERGIC RHINITIS, UNSPECIFIED: ICD-10-CM

## 2022-01-01 DIAGNOSIS — I70.235 ATHEROSCLEROSIS OF NATIVE ARTERIES OF RIGHT LEG WITH ULCERATION OF OTHER PART OF FOOT: ICD-10-CM

## 2022-01-01 DIAGNOSIS — Z29.9 ENCOUNTER FOR PROPHYLACTIC MEASURES, UNSPECIFIED: ICD-10-CM

## 2022-01-01 DIAGNOSIS — A41.9 SEPSIS, UNSPECIFIED ORGANISM: ICD-10-CM

## 2022-01-01 DIAGNOSIS — Z86.39 PERSONAL HISTORY OF OTHER ENDOCRINE, NUTRITIONAL AND METABOLIC DISEASE: ICD-10-CM

## 2022-01-01 DIAGNOSIS — E11.9 TYPE 2 DIABETES MELLITUS W/OUT COMPLICATIONS: ICD-10-CM

## 2022-01-01 DIAGNOSIS — R06.83 SNORING: ICD-10-CM

## 2022-01-01 DIAGNOSIS — Z01.811 ENCOUNTER FOR PREPROCEDURAL RESPIRATORY EXAMINATION: ICD-10-CM

## 2022-01-01 DIAGNOSIS — Z79.4 TYPE 2 DIABETES MELLITUS W/OUT COMPLICATIONS: ICD-10-CM

## 2022-01-01 DIAGNOSIS — Z98.49 CATARACT EXTRACTION STATUS, UNSPECIFIED EYE: Chronic | ICD-10-CM

## 2022-01-01 DIAGNOSIS — G72.9 MYOPATHY, UNSPECIFIED: ICD-10-CM

## 2022-01-01 DIAGNOSIS — K21.9 GASTRO-ESOPHAGEAL REFLUX DISEASE W/OUT ESOPHAGITIS: ICD-10-CM

## 2022-01-01 DIAGNOSIS — R06.02 SHORTNESS OF BREATH: ICD-10-CM

## 2022-01-01 DIAGNOSIS — I10 ESSENTIAL (PRIMARY) HYPERTENSION: ICD-10-CM

## 2022-01-01 DIAGNOSIS — Z12.11 ENCOUNTER FOR SCREENING FOR MALIGNANT NEOPLASM OF COLON: ICD-10-CM

## 2022-01-01 DIAGNOSIS — E11.49 TYPE 2 DIABETES MELLITUS WITH OTHER DIABETIC NEUROLOGICAL COMPLICATION: ICD-10-CM

## 2022-01-01 DIAGNOSIS — R74.01 ELEVATION OF LEVELS OF LIVER TRANSAMINASE LEVELS: ICD-10-CM

## 2022-01-01 DIAGNOSIS — I71.2 DISSECTION OF THORACIC AORTA: ICD-10-CM

## 2022-01-01 DIAGNOSIS — R93.89 ABNORMAL FINDINGS ON DIAGNOSTIC IMAGING OF OTHER SPECIFIED BODY STRUCTURES: ICD-10-CM

## 2022-01-01 DIAGNOSIS — Z86.711 PERSONAL HISTORY OF PULMONARY EMBOLISM: ICD-10-CM

## 2022-01-01 LAB
-  AMIKACIN: SIGNIFICANT CHANGE UP
-  AMPICILLIN/SULBACTAM: SIGNIFICANT CHANGE UP
-  AMPICILLIN: SIGNIFICANT CHANGE UP
-  AZTREONAM: SIGNIFICANT CHANGE UP
-  CEFAZOLIN: SIGNIFICANT CHANGE UP
-  CEFEPIME: SIGNIFICANT CHANGE UP
-  CEFOXITIN: SIGNIFICANT CHANGE UP
-  CEFTRIAXONE: SIGNIFICANT CHANGE UP
-  CIPROFLOXACIN: SIGNIFICANT CHANGE UP
-  ERTAPENEM: SIGNIFICANT CHANGE UP
-  GENTAMICIN: SIGNIFICANT CHANGE UP
-  LEVOFLOXACIN: SIGNIFICANT CHANGE UP
-  MEROPENEM: SIGNIFICANT CHANGE UP
-  PIPERACILLIN/TAZOBACTAM: SIGNIFICANT CHANGE UP
-  TOBRAMYCIN: SIGNIFICANT CHANGE UP
-  TRIMETHOPRIM/SULFAMETHOXAZOLE: SIGNIFICANT CHANGE UP
24R-OH-CALCIDIOL SERPL-MCNC: 40.9 PG/ML
25(OH)D3 SERPL-MCNC: 23.5 NG/ML
A ALTERNATA IGE QN: <0.1 KUA/L
A ALTERNATA IGE QN: <0.1 KUA/L
A FUMIGATUS IGE QN: 0.42 KUA/L
A FUMIGATUS IGE QN: 0.42 KUA/L
A1C WITH ESTIMATED AVERAGE GLUCOSE RESULT: 6.2 % — HIGH (ref 4–5.6)
ALBUMIN SERPL ELPH-MCNC: 0.8 G/DL — LOW (ref 3.3–5)
ALBUMIN SERPL ELPH-MCNC: 1.7 G/DL — LOW (ref 3.3–5)
ALBUMIN SERPL ELPH-MCNC: 1.9 G/DL — LOW (ref 3.3–5)
ALBUMIN SERPL ELPH-MCNC: 2.1 G/DL — LOW (ref 3.3–5)
ALBUMIN SERPL ELPH-MCNC: 2.8 G/DL — LOW (ref 3.3–5)
ALP SERPL-CCNC: 12 U/L — LOW (ref 40–120)
ALP SERPL-CCNC: 145 U/L — HIGH (ref 40–120)
ALP SERPL-CCNC: 36 U/L — LOW (ref 40–120)
ALP SERPL-CCNC: 38 U/L — LOW (ref 40–120)
ALP SERPL-CCNC: 40 U/L — SIGNIFICANT CHANGE UP (ref 40–120)
ALT FLD-CCNC: 2604 U/L — HIGH (ref 12–78)
ALT FLD-CCNC: 27 U/L — SIGNIFICANT CHANGE UP (ref 12–78)
ALT FLD-CCNC: 28 U/L — SIGNIFICANT CHANGE UP (ref 12–78)
ALT FLD-CCNC: 41 U/L — SIGNIFICANT CHANGE UP (ref 12–78)
ALT FLD-CCNC: 8 U/L — LOW (ref 12–78)
ANION GAP SERPL CALC-SCNC: 12 MMOL/L — SIGNIFICANT CHANGE UP (ref 5–17)
ANION GAP SERPL CALC-SCNC: 12 MMOL/L — SIGNIFICANT CHANGE UP (ref 5–17)
ANION GAP SERPL CALC-SCNC: 13 MMOL/L — SIGNIFICANT CHANGE UP (ref 5–17)
ANION GAP SERPL CALC-SCNC: 20 MMOL/L — HIGH (ref 5–17)
ANION GAP SERPL CALC-SCNC: 20 MMOL/L — HIGH (ref 5–17)
ANION GAP SERPL CALC-SCNC: 6 MMOL/L — SIGNIFICANT CHANGE UP (ref 5–17)
APPEARANCE UR: ABNORMAL
APTT BLD: 23.2 SEC — LOW (ref 27.5–35.5)
APTT BLD: 24.6 SEC — LOW (ref 27.5–35.5)
AST SERPL-CCNC: 12 U/L — LOW (ref 15–37)
AST SERPL-CCNC: 22 U/L — SIGNIFICANT CHANGE UP (ref 15–37)
AST SERPL-CCNC: 44 U/L — HIGH (ref 15–37)
AST SERPL-CCNC: 4753 U/L — HIGH (ref 15–37)
AST SERPL-CCNC: 7 U/L — LOW (ref 15–37)
BACTERIA # UR AUTO: ABNORMAL
BASE EXCESS BLDA CALC-SCNC: -18.7 MMOL/L — LOW (ref -2–3)
BASE EXCESS BLDA CALC-SCNC: -7.5 MMOL/L — LOW (ref -2–3)
BASE EXCESS BLDA CALC-SCNC: -8.8 MMOL/L — LOW (ref -2–3)
BASE EXCESS BLDA CALC-SCNC: -9.7 MMOL/L — LOW (ref -2–3)
BASOPHILS # BLD AUTO: 0 K/UL — SIGNIFICANT CHANGE UP (ref 0–0.2)
BASOPHILS # BLD AUTO: 0.05 K/UL — SIGNIFICANT CHANGE UP (ref 0–0.2)
BASOPHILS # BLD AUTO: 0.06 K/UL
BASOPHILS # BLD AUTO: 0.07 K/UL — SIGNIFICANT CHANGE UP (ref 0–0.2)
BASOPHILS # BLD AUTO: 0.15 K/UL — SIGNIFICANT CHANGE UP (ref 0–0.2)
BASOPHILS # BLD AUTO: 0.19 K/UL — SIGNIFICANT CHANGE UP (ref 0–0.2)
BASOPHILS NFR BLD AUTO: 0 % — SIGNIFICANT CHANGE UP (ref 0–2)
BASOPHILS NFR BLD AUTO: 0.4 % — SIGNIFICANT CHANGE UP (ref 0–2)
BASOPHILS NFR BLD AUTO: 0.8 % — SIGNIFICANT CHANGE UP (ref 0–2)
BASOPHILS NFR BLD AUTO: 0.8 % — SIGNIFICANT CHANGE UP (ref 0–2)
BASOPHILS NFR BLD AUTO: 0.9 %
BASOPHILS NFR BLD AUTO: 1.5 % — SIGNIFICANT CHANGE UP (ref 0–2)
BERMUDA GRASS IGE QN: <0.1 KUA/L
BILIRUB SERPL-MCNC: 0.4 MG/DL — SIGNIFICANT CHANGE UP (ref 0.2–1.2)
BILIRUB SERPL-MCNC: 0.8 MG/DL — SIGNIFICANT CHANGE UP (ref 0.2–1.2)
BILIRUB SERPL-MCNC: 0.8 MG/DL — SIGNIFICANT CHANGE UP (ref 0.2–1.2)
BILIRUB SERPL-MCNC: 0.9 MG/DL — SIGNIFICANT CHANGE UP (ref 0.2–1.2)
BILIRUB SERPL-MCNC: 1 MG/DL — SIGNIFICANT CHANGE UP (ref 0.2–1.2)
BILIRUB UR-MCNC: ABNORMAL
BLOOD GAS COMMENTS ARTERIAL: SIGNIFICANT CHANGE UP
BOXELDER IGE QN: <0.1 KUA/L
BUN SERPL-MCNC: 14 MG/DL — SIGNIFICANT CHANGE UP (ref 7–23)
BUN SERPL-MCNC: 18 MG/DL — SIGNIFICANT CHANGE UP (ref 7–23)
BUN SERPL-MCNC: 27 MG/DL — HIGH (ref 7–23)
BUN SERPL-MCNC: 29 MG/DL — HIGH (ref 7–23)
BUN SERPL-MCNC: 29 MG/DL — HIGH (ref 7–23)
BUN SERPL-MCNC: 48 MG/DL — HIGH (ref 7–23)
C ALBICANS IGE QN: <0.1 KUA/L
C HERBARUM IGE QN: <0.1 KUA/L
C HERBARUM IGE QN: <0.1 KUA/L
CALCIUM SERPL-MCNC: 10.1 MG/DL — SIGNIFICANT CHANGE UP (ref 8.5–10.1)
CALCIUM SERPL-MCNC: 10.3 MG/DL — HIGH (ref 8.5–10.1)
CALCIUM SERPL-MCNC: 7 MG/DL — LOW (ref 8.5–10.1)
CALCIUM SERPL-MCNC: 7.2 MG/DL — LOW (ref 8.5–10.1)
CALCIUM SERPL-MCNC: 9.3 MG/DL — SIGNIFICANT CHANGE UP (ref 8.5–10.1)
CALCIUM SERPL-MCNC: 9.4 MG/DL — SIGNIFICANT CHANGE UP (ref 8.5–10.1)
CALIF WALNUT IGE QN: <0.1 KUA/L
CAT DANDER IGE QN: <0.1 KUA/L
CAT DANDER IGE QN: <0.1 KUA/L
CHLORIDE SERPL-SCNC: 106 MMOL/L — SIGNIFICANT CHANGE UP (ref 96–108)
CHLORIDE SERPL-SCNC: 107 MMOL/L — SIGNIFICANT CHANGE UP (ref 96–108)
CHLORIDE SERPL-SCNC: 109 MMOL/L — HIGH (ref 96–108)
CHLORIDE SERPL-SCNC: 109 MMOL/L — HIGH (ref 96–108)
CHLORIDE SERPL-SCNC: 110 MMOL/L — HIGH (ref 96–108)
CHLORIDE SERPL-SCNC: 111 MMOL/L — HIGH (ref 96–108)
CK MB BLD-MCNC: 0.5 % — SIGNIFICANT CHANGE UP (ref 0–3.5)
CK MB CFR SERPL CALC: 11.5 NG/ML — HIGH (ref 0–3.6)
CK SERPL-CCNC: 2200 U/L — HIGH (ref 26–192)
CLAM IGE QN: <0.1 KUA/L
CMN PIGWEED IGE QN: <0.1 KUA/L
CO2 SERPL-SCNC: 10 MMOL/L — CRITICAL LOW (ref 22–31)
CO2 SERPL-SCNC: 11 MMOL/L — LOW (ref 22–31)
CO2 SERPL-SCNC: 20 MMOL/L — LOW (ref 22–31)
CO2 SERPL-SCNC: 21 MMOL/L — LOW (ref 22–31)
CO2 SERPL-SCNC: 24 MMOL/L — SIGNIFICANT CHANGE UP (ref 22–31)
CO2 SERPL-SCNC: 28 MMOL/L — SIGNIFICANT CHANGE UP (ref 22–31)
COD CRY URNS QL: ABNORMAL
CODFISH IGE QN: <0.1 KUA/L
COLOR SPEC: YELLOW — SIGNIFICANT CHANGE UP
COMMON RAGWEED IGE QN: <0.1 KUA/L
COMMON RAGWEED IGE QN: <0.1 KUA/L
CORN IGE QN: <0.1 KUA/L
COTTONWOOD IGE QN: <0.1 KUA/L
COW MILK IGE QN: 0.3 KUA/L
CREAT SERPL-MCNC: 0.3 MG/DL — LOW (ref 0.5–1.3)
CREAT SERPL-MCNC: 0.53 MG/DL — SIGNIFICANT CHANGE UP (ref 0.5–1.3)
CREAT SERPL-MCNC: 1.2 MG/DL — SIGNIFICANT CHANGE UP (ref 0.5–1.3)
CREAT SERPL-MCNC: 1.2 MG/DL — SIGNIFICANT CHANGE UP (ref 0.5–1.3)
CREAT SERPL-MCNC: 1.3 MG/DL — SIGNIFICANT CHANGE UP (ref 0.5–1.3)
CREAT SERPL-MCNC: 2.4 MG/DL — HIGH (ref 0.5–1.3)
CRP SERPL-MCNC: 201 MG/L — HIGH
CULTURE RESULTS: SIGNIFICANT CHANGE UP
CULTURE RESULTS: SIGNIFICANT CHANGE UP
D FARINAE IGE QN: <0.1 KUA/L
D FARINAE IGE QN: <0.1 KUA/L
D PTERONYSS IGE QN: <0.1 KUA/L
D PTERONYSS IGE QN: <0.1 KUA/L
DEPRECATED A ALTERNATA IGE RAST QL: 0
DEPRECATED A ALTERNATA IGE RAST QL: 0
DEPRECATED A FUMIGATUS IGE RAST QL: 1
DEPRECATED A FUMIGATUS IGE RAST QL: 1
DEPRECATED BERMUDA GRASS IGE RAST QL: 0
DEPRECATED BOXELDER IGE RAST QL: 0
DEPRECATED C ALBICANS IGE RAST QL: 0
DEPRECATED C HERBARUM IGE RAST QL: 0
DEPRECATED C HERBARUM IGE RAST QL: 0
DEPRECATED CAT DANDER IGE RAST QL: 0
DEPRECATED CAT DANDER IGE RAST QL: 0
DEPRECATED CLAM IGE RAST QL: 0
DEPRECATED CODFISH IGE RAST QL: 0
DEPRECATED COMMON PIGWEED IGE RAST QL: 0
DEPRECATED COMMON RAGWEED IGE RAST QL: 0
DEPRECATED COMMON RAGWEED IGE RAST QL: 0
DEPRECATED CORN IGE RAST QL: 0
DEPRECATED COTTONWOOD IGE RAST QL: 0
DEPRECATED COW MILK IGE RAST QL: NORMAL
DEPRECATED D FARINAE IGE RAST QL: 0
DEPRECATED D FARINAE IGE RAST QL: 0
DEPRECATED D PTERONYSS IGE RAST QL: 0
DEPRECATED D PTERONYSS IGE RAST QL: 0
DEPRECATED DOG DANDER IGE RAST QL: 0
DEPRECATED DOG DANDER IGE RAST QL: 0
DEPRECATED DUCK FEATHER IGE RAST QL: 0
DEPRECATED EGG WHITE IGE RAST QL: 2
DEPRECATED GOOSE FEATHER IGE RAST QL: 0
DEPRECATED GOOSEFOOT IGE RAST QL: 0
DEPRECATED LONDON PLANE IGE RAST QL: 0
DEPRECATED M RACEMOSUS IGE RAST QL: 0
DEPRECATED MOUSE URINE PROT IGE RAST QL: 0
DEPRECATED MUGWORT IGE RAST QL: 0
DEPRECATED P NOTATUM IGE RAST QL: 0
DEPRECATED PEANUT IGE RAST QL: 0
DEPRECATED RED CEDAR IGE RAST QL: 0
DEPRECATED ROACH IGE RAST QL: 0
DEPRECATED ROACH IGE RAST QL: 0
DEPRECATED SCALLOP IGE RAST QL: <0.1 KUA/L
DEPRECATED SESAME SEED IGE RAST QL: 0
DEPRECATED SHEEP SORREL IGE RAST QL: 0
DEPRECATED SHRIMP IGE RAST QL: 0
DEPRECATED SILVER BIRCH IGE RAST QL: 0
DEPRECATED SOYBEAN IGE RAST QL: 0
DEPRECATED TIMOTHY IGE RAST QL: 0
DEPRECATED TIMOTHY IGE RAST QL: 0
DEPRECATED WALNUT IGE RAST QL: 0
DEPRECATED WHEAT IGE RAST QL: 2
DEPRECATED WHITE ASH IGE RAST QL: 0
DEPRECATED WHITE OAK IGE RAST QL: 0
DEPRECATED WHITE OAK IGE RAST QL: 0
DIFF PNL FLD: ABNORMAL
DOG DANDER IGE QN: <0.1 KUA/L
DOG DANDER IGE QN: <0.1 KUA/L
DUCK FEATHER IGE QN: <0.1 KUA/L
EGFR: 101 ML/MIN/1.73M2 — SIGNIFICANT CHANGE UP
EGFR: 115 ML/MIN/1.73M2 — SIGNIFICANT CHANGE UP
EGFR: 21 ML/MIN/1.73M2 — LOW
EGFR: 45 ML/MIN/1.73M2 — LOW
EGFR: 49 ML/MIN/1.73M2 — LOW
EGFR: 49 ML/MIN/1.73M2 — LOW
EGG WHITE IGE QN: 1.44 KUA/L
EOSINOPHIL # BLD AUTO: 0 K/UL — SIGNIFICANT CHANGE UP (ref 0–0.5)
EOSINOPHIL # BLD AUTO: 0 K/UL — SIGNIFICANT CHANGE UP (ref 0–0.5)
EOSINOPHIL # BLD AUTO: 0.01 K/UL — SIGNIFICANT CHANGE UP (ref 0–0.5)
EOSINOPHIL # BLD AUTO: 0.04 K/UL — SIGNIFICANT CHANGE UP (ref 0–0.5)
EOSINOPHIL # BLD AUTO: 0.09 K/UL — SIGNIFICANT CHANGE UP (ref 0–0.5)
EOSINOPHIL # BLD AUTO: 0.2 K/UL
EOSINOPHIL NFR BLD AUTO: 0 % — SIGNIFICANT CHANGE UP (ref 0–6)
EOSINOPHIL NFR BLD AUTO: 0 % — SIGNIFICANT CHANGE UP (ref 0–6)
EOSINOPHIL NFR BLD AUTO: 0.1 % — SIGNIFICANT CHANGE UP (ref 0–6)
EOSINOPHIL NFR BLD AUTO: 0.3 % — SIGNIFICANT CHANGE UP (ref 0–6)
EOSINOPHIL NFR BLD AUTO: 1 % — SIGNIFICANT CHANGE UP (ref 0–6)
EOSINOPHIL NFR BLD AUTO: 3 %
EPI CELLS # UR: SIGNIFICANT CHANGE UP
ERYTHROCYTE [SEDIMENTATION RATE] IN BLOOD: 2 MM/HR — SIGNIFICANT CHANGE UP (ref 0–20)
ESTIMATED AVERAGE GLUCOSE: 131 MG/DL — HIGH (ref 68–114)
FUNGITELL: 36 PG/ML — SIGNIFICANT CHANGE UP
FUNGITELL: 52 PG/ML — SIGNIFICANT CHANGE UP
GAS PNL BLDA: SIGNIFICANT CHANGE UP
GLUCOSE SERPL-MCNC: 111 MG/DL — HIGH (ref 70–99)
GLUCOSE SERPL-MCNC: 115 MG/DL — HIGH (ref 70–99)
GLUCOSE SERPL-MCNC: 135 MG/DL — HIGH (ref 70–99)
GLUCOSE SERPL-MCNC: 204 MG/DL — HIGH (ref 70–99)
GLUCOSE SERPL-MCNC: 70 MG/DL — SIGNIFICANT CHANGE UP (ref 70–99)
GLUCOSE SERPL-MCNC: 97 MG/DL — SIGNIFICANT CHANGE UP (ref 70–99)
GLUCOSE UR QL: 100 MG/DL
GOOSE FEATHER IGE QN: <0.1 KUA/L
GOOSEFOOT IGE QN: <0.1 KUA/L
GRAM STN FLD: SIGNIFICANT CHANGE UP
HBA1C MFR BLD HPLC: 6.2
HBA1C MFR BLD HPLC: 6.4
HCO3 BLDA-SCNC: 10 MMOL/L — CRITICAL LOW (ref 21–28)
HCO3 BLDA-SCNC: 17 MMOL/L — LOW (ref 21–28)
HCO3 BLDA-SCNC: 17 MMOL/L — LOW (ref 21–28)
HCO3 BLDA-SCNC: 18 MMOL/L — LOW (ref 21–28)
HCT VFR BLD CALC: 37 % — SIGNIFICANT CHANGE UP (ref 34.5–45)
HCT VFR BLD CALC: 38.2 % — SIGNIFICANT CHANGE UP (ref 34.5–45)
HCT VFR BLD CALC: 38.5 % — SIGNIFICANT CHANGE UP (ref 34.5–45)
HCT VFR BLD CALC: 39.4 % — SIGNIFICANT CHANGE UP (ref 34.5–45)
HCT VFR BLD CALC: 39.6 % — SIGNIFICANT CHANGE UP (ref 34.5–45)
HCT VFR BLD CALC: 40.3 % — SIGNIFICANT CHANGE UP (ref 34.5–45)
HCT VFR BLD CALC: 43.9 %
HCV AB S/CO SERPL IA: 0.77 S/CO — SIGNIFICANT CHANGE UP (ref 0–0.99)
HCV AB SERPL-IMP: SIGNIFICANT CHANGE UP
HGB BLD-MCNC: 11.1 G/DL — LOW (ref 11.5–15.5)
HGB BLD-MCNC: 11.1 G/DL — LOW (ref 11.5–15.5)
HGB BLD-MCNC: 11.3 G/DL — LOW (ref 11.5–15.5)
HGB BLD-MCNC: 11.6 G/DL — SIGNIFICANT CHANGE UP (ref 11.5–15.5)
HGB BLD-MCNC: 12.1 G/DL — SIGNIFICANT CHANGE UP (ref 11.5–15.5)
HGB BLD-MCNC: 12.4 G/DL — SIGNIFICANT CHANGE UP (ref 11.5–15.5)
HGB BLD-MCNC: 12.9 G/DL
HOROWITZ INDEX BLDA+IHG-RTO: 100 — SIGNIFICANT CHANGE UP
HOROWITZ INDEX BLDA+IHG-RTO: 32 — SIGNIFICANT CHANGE UP
HOROWITZ INDEX BLDA+IHG-RTO: 50 — SIGNIFICANT CHANGE UP
HOROWITZ INDEX BLDA+IHG-RTO: 80 — SIGNIFICANT CHANGE UP
IMM GRANULOCYTES NFR BLD AUTO: 1.6 % — HIGH (ref 0–1.5)
IMM GRANULOCYTES NFR BLD AUTO: 1.6 % — HIGH (ref 0–1.5)
IMM GRANULOCYTES NFR BLD AUTO: 1.7 % — HIGH (ref 0–1.5)
IMM GRANULOCYTES NFR BLD AUTO: 2.4 %
IMM GRANULOCYTES NFR BLD AUTO: 5.2 % — HIGH (ref 0–1.5)
INR BLD: 0.99 RATIO — SIGNIFICANT CHANGE UP (ref 0.88–1.16)
INR BLD: 1.14 RATIO — SIGNIFICANT CHANGE UP (ref 0.88–1.16)
KETONES UR-MCNC: ABNORMAL
LACTATE SERPL-SCNC: 10.2 MMOL/L — CRITICAL HIGH (ref 0.7–2)
LACTATE SERPL-SCNC: 11.5 MMOL/L — CRITICAL HIGH (ref 0.7–2)
LACTATE SERPL-SCNC: 20.8 MMOL/L — CRITICAL HIGH (ref 0.7–2)
LACTATE SERPL-SCNC: 4.1 MMOL/L — CRITICAL HIGH (ref 0.7–2)
LACTATE SERPL-SCNC: 9.1 MMOL/L — CRITICAL HIGH (ref 0.7–2)
LACTATE SERPL-SCNC: 9.5 MMOL/L — CRITICAL HIGH (ref 0.7–2)
LDH SERPL L TO P-CCNC: 465 U/L — HIGH (ref 50–242)
LDLC SERPL DIRECT ASSAY-MCNC: 93
LEGIONELLA AB SER-ACNC: <0.91 — SIGNIFICANT CHANGE UP (ref 0–0.9)
LEUKOCYTE ESTERASE UR-ACNC: ABNORMAL
LONDON PLANE IGE QN: <0.1 KUA/L
LYMPHOCYTES # BLD AUTO: 0.31 K/UL — LOW (ref 1–3.3)
LYMPHOCYTES # BLD AUTO: 0.53 K/UL — LOW (ref 1–3.3)
LYMPHOCYTES # BLD AUTO: 0.53 K/UL — LOW (ref 1–3.3)
LYMPHOCYTES # BLD AUTO: 0.8 K/UL — LOW (ref 1–3.3)
LYMPHOCYTES # BLD AUTO: 1.32 K/UL — SIGNIFICANT CHANGE UP (ref 1–3.3)
LYMPHOCYTES # BLD AUTO: 1.4 K/UL
LYMPHOCYTES # BLD AUTO: 2.5 % — LOW (ref 13–44)
LYMPHOCYTES # BLD AUTO: 2.8 % — LOW (ref 13–44)
LYMPHOCYTES # BLD AUTO: 4.5 % — LOW (ref 13–44)
LYMPHOCYTES # BLD AUTO: 7 % — LOW (ref 13–44)
LYMPHOCYTES # BLD AUTO: 9.1 % — LOW (ref 13–44)
LYMPHOCYTES NFR BLD AUTO: 21.1 %
M RACEMOSUS IGE QN: <0.1 KUA/L
MAGNESIUM SERPL-MCNC: 1.2 MG/DL — LOW (ref 1.6–2.6)
MAGNESIUM SERPL-MCNC: 1.2 MG/DL — LOW (ref 1.6–2.6)
MAGNESIUM SERPL-MCNC: 2.7 MG/DL — HIGH (ref 1.6–2.6)
MAGNESIUM SERPL-MCNC: <0.6 MG/DL — CRITICAL LOW (ref 1.6–2.6)
MAN DIFF?: NORMAL
MANUAL SMEAR VERIFICATION: SIGNIFICANT CHANGE UP
MCHC RBC-ENTMCNC: 28.5 PG — SIGNIFICANT CHANGE UP (ref 27–34)
MCHC RBC-ENTMCNC: 28.5 PG — SIGNIFICANT CHANGE UP (ref 27–34)
MCHC RBC-ENTMCNC: 28.7 PG — SIGNIFICANT CHANGE UP (ref 27–34)
MCHC RBC-ENTMCNC: 28.8 PG — SIGNIFICANT CHANGE UP (ref 27–34)
MCHC RBC-ENTMCNC: 28.9 PG
MCHC RBC-ENTMCNC: 29.1 GM/DL — LOW (ref 32–36)
MCHC RBC-ENTMCNC: 29.4 GM/DL
MCHC RBC-ENTMCNC: 29.4 GM/DL — LOW (ref 32–36)
MCHC RBC-ENTMCNC: 29.4 GM/DL — LOW (ref 32–36)
MCHC RBC-ENTMCNC: 29.4 PG — SIGNIFICANT CHANGE UP (ref 27–34)
MCHC RBC-ENTMCNC: 29.7 PG — SIGNIFICANT CHANGE UP (ref 27–34)
MCHC RBC-ENTMCNC: 30 GM/DL — LOW (ref 32–36)
MCHC RBC-ENTMCNC: 30.6 GM/DL — LOW (ref 32–36)
MCHC RBC-ENTMCNC: 30.8 GM/DL — LOW (ref 32–36)
MCV RBC AUTO: 95.6 FL — SIGNIFICANT CHANGE UP (ref 80–100)
MCV RBC AUTO: 96.1 FL — SIGNIFICANT CHANGE UP (ref 80–100)
MCV RBC AUTO: 96.6 FL — SIGNIFICANT CHANGE UP (ref 80–100)
MCV RBC AUTO: 96.8 FL — SIGNIFICANT CHANGE UP (ref 80–100)
MCV RBC AUTO: 97.2 FL — SIGNIFICANT CHANGE UP (ref 80–100)
MCV RBC AUTO: 98.4 FL
MCV RBC AUTO: 99.2 FL — SIGNIFICANT CHANGE UP (ref 80–100)
METAMYELOCYTES # FLD: 1 % — HIGH (ref 0–0)
METHOD TYPE: SIGNIFICANT CHANGE UP
METHOD TYPE: SIGNIFICANT CHANGE UP
MONOCYTES # BLD AUTO: 0.26 K/UL — SIGNIFICANT CHANGE UP (ref 0–0.9)
MONOCYTES # BLD AUTO: 0.62 K/UL — SIGNIFICANT CHANGE UP (ref 0–0.9)
MONOCYTES # BLD AUTO: 0.69 K/UL
MONOCYTES # BLD AUTO: 0.74 K/UL — SIGNIFICANT CHANGE UP (ref 0–0.9)
MONOCYTES # BLD AUTO: 0.94 K/UL — HIGH (ref 0–0.9)
MONOCYTES # BLD AUTO: 1.29 K/UL — HIGH (ref 0–0.9)
MONOCYTES NFR BLD AUTO: 10.4 %
MONOCYTES NFR BLD AUTO: 2.2 % — SIGNIFICANT CHANGE UP (ref 2–14)
MONOCYTES NFR BLD AUTO: 5 % — SIGNIFICANT CHANGE UP (ref 2–14)
MONOCYTES NFR BLD AUTO: 5 % — SIGNIFICANT CHANGE UP (ref 2–14)
MONOCYTES NFR BLD AUTO: 6.9 % — SIGNIFICANT CHANGE UP (ref 2–14)
MONOCYTES NFR BLD AUTO: 8.4 % — SIGNIFICANT CHANGE UP (ref 2–14)
MOUSE URINE PROT IGE QN: <0.1 KUA/L
MRSA PCR RESULT.: SIGNIFICANT CHANGE UP
MUGWORT IGE QN: <0.1 KUA/L
MULBERRY (T70) CLASS: 0
MULBERRY (T70) CONC: <0.1 KUA/L
MYELOCYTES NFR BLD: 1 % — HIGH (ref 0–0)
NEUTROPHILS # BLD AUTO: 10.82 K/UL — HIGH (ref 1.8–7.4)
NEUTROPHILS # BLD AUTO: 10.99 K/UL — HIGH (ref 1.8–7.4)
NEUTROPHILS # BLD AUTO: 15.82 K/UL — HIGH (ref 1.8–7.4)
NEUTROPHILS # BLD AUTO: 16.23 K/UL — HIGH (ref 1.8–7.4)
NEUTROPHILS # BLD AUTO: 4.13 K/UL
NEUTROPHILS # BLD AUTO: 6.97 K/UL — SIGNIFICANT CHANGE UP (ref 1.8–7.4)
NEUTROPHILS NFR BLD AUTO: 62.2 %
NEUTROPHILS NFR BLD AUTO: 69 % — SIGNIFICANT CHANGE UP (ref 43–77)
NEUTROPHILS NFR BLD AUTO: 79 % — HIGH (ref 43–77)
NEUTROPHILS NFR BLD AUTO: 84.2 % — HIGH (ref 43–77)
NEUTROPHILS NFR BLD AUTO: 89.4 % — HIGH (ref 43–77)
NEUTROPHILS NFR BLD AUTO: 91 % — HIGH (ref 43–77)
NEUTS BAND # BLD: 17 % — HIGH (ref 0–8)
NITRITE UR-MCNC: NEGATIVE — SIGNIFICANT CHANGE UP
NRBC # BLD: 0 /100 WBCS — SIGNIFICANT CHANGE UP (ref 0–0)
NRBC # BLD: 0 — SIGNIFICANT CHANGE UP
NRBC # BLD: 1 /100 WBCS — HIGH (ref 0–0)
NRBC # BLD: SIGNIFICANT CHANGE UP /100 WBCS (ref 0–0)
ORGANISM # SPEC MICROSCOPIC CNT: SIGNIFICANT CHANGE UP
P NOTATUM IGE QN: <0.1 KUA/L
PCO2 BLDA: 28 MMHG — LOW (ref 32–35)
PCO2 BLDA: 28 MMHG — LOW (ref 32–35)
PCO2 BLDA: 34 MMHG — SIGNIFICANT CHANGE UP (ref 32–35)
PCO2 BLDA: 37 MMHG — HIGH (ref 32–35)
PEANUT IGE QN: <0.1 KUA/L
PH BLDA: 7.16 — CRITICAL LOW (ref 7.35–7.45)
PH BLDA: 7.29 — LOW (ref 7.35–7.45)
PH BLDA: 7.3 — LOW (ref 7.35–7.45)
PH BLDA: 7.4 — SIGNIFICANT CHANGE UP (ref 7.35–7.45)
PH UR: 7 — SIGNIFICANT CHANGE UP (ref 5–8)
PHOSPHATE SERPL-MCNC: 0.8 MG/DL — CRITICAL LOW (ref 2.5–4.5)
PHOSPHATE SERPL-MCNC: 1.8 MG/DL — LOW (ref 2.5–4.5)
PHOSPHATE SERPL-MCNC: 2 MG/DL — LOW (ref 2.5–4.5)
PHOSPHATE SERPL-MCNC: 7.7 MG/DL — SIGNIFICANT CHANGE UP (ref 2.5–4.5)
PLAT MORPH BLD: NORMAL — SIGNIFICANT CHANGE UP
PLATELET # BLD AUTO: 137 K/UL — LOW (ref 150–400)
PLATELET # BLD AUTO: 207 K/UL — SIGNIFICANT CHANGE UP (ref 150–400)
PLATELET # BLD AUTO: 223 K/UL
PLATELET # BLD AUTO: 227 K/UL — SIGNIFICANT CHANGE UP (ref 150–400)
PLATELET # BLD AUTO: 235 K/UL — SIGNIFICANT CHANGE UP (ref 150–400)
PLATELET # BLD AUTO: 247 K/UL — SIGNIFICANT CHANGE UP (ref 150–400)
PLATELET # BLD AUTO: 267 K/UL — SIGNIFICANT CHANGE UP (ref 150–400)
PO2 BLDA: 162 MMHG — HIGH (ref 83–108)
PO2 BLDA: 163 MMHG — HIGH (ref 83–108)
PO2 BLDA: 174 MMHG — HIGH (ref 83–108)
PO2 BLDA: 180 MMHG — HIGH (ref 83–108)
POTASSIUM SERPL-MCNC: 4 MMOL/L — SIGNIFICANT CHANGE UP (ref 3.5–5.3)
POTASSIUM SERPL-MCNC: 4.1 MMOL/L — SIGNIFICANT CHANGE UP (ref 3.5–5.3)
POTASSIUM SERPL-MCNC: 4.1 MMOL/L — SIGNIFICANT CHANGE UP (ref 3.5–5.3)
POTASSIUM SERPL-MCNC: 4.2 MMOL/L — SIGNIFICANT CHANGE UP (ref 3.5–5.3)
POTASSIUM SERPL-MCNC: 4.2 MMOL/L — SIGNIFICANT CHANGE UP (ref 3.5–5.3)
POTASSIUM SERPL-MCNC: 5.8 MMOL/L — HIGH (ref 3.5–5.3)
POTASSIUM SERPL-SCNC: 4 MMOL/L — SIGNIFICANT CHANGE UP (ref 3.5–5.3)
POTASSIUM SERPL-SCNC: 4.1 MMOL/L — SIGNIFICANT CHANGE UP (ref 3.5–5.3)
POTASSIUM SERPL-SCNC: 4.1 MMOL/L — SIGNIFICANT CHANGE UP (ref 3.5–5.3)
POTASSIUM SERPL-SCNC: 4.2 MMOL/L — SIGNIFICANT CHANGE UP (ref 3.5–5.3)
POTASSIUM SERPL-SCNC: 4.2 MMOL/L — SIGNIFICANT CHANGE UP (ref 3.5–5.3)
POTASSIUM SERPL-SCNC: 5.8 MMOL/L — HIGH (ref 3.5–5.3)
PROCALCITONIN SERPL-MCNC: 40.8 NG/ML — HIGH (ref 0–0.04)
PROT SERPL-MCNC: 1.5 G/DL — LOW (ref 6–8.3)
PROT SERPL-MCNC: 4.3 G/DL — LOW (ref 6–8.3)
PROT SERPL-MCNC: 4.4 G/DL — LOW (ref 6–8.3)
PROT SERPL-MCNC: 4.6 G/DL — LOW (ref 6–8.3)
PROT SERPL-MCNC: 5.3 G/DL — LOW (ref 6–8.3)
PROT UR-MCNC: 30 MG/DL
PROTHROM AB SERPL-ACNC: 11.5 SEC — SIGNIFICANT CHANGE UP (ref 10.5–13.4)
PROTHROM AB SERPL-ACNC: 13.3 SEC — SIGNIFICANT CHANGE UP (ref 10.5–13.4)
RAPID RVP RESULT: SIGNIFICANT CHANGE UP
RBC # BLD: 3.85 M/UL — SIGNIFICANT CHANGE UP (ref 3.8–5.2)
RBC # BLD: 3.87 M/UL — SIGNIFICANT CHANGE UP (ref 3.8–5.2)
RBC # BLD: 3.96 M/UL — SIGNIFICANT CHANGE UP (ref 3.8–5.2)
RBC # BLD: 4.07 M/UL — SIGNIFICANT CHANGE UP (ref 3.8–5.2)
RBC # BLD: 4.12 M/UL — SIGNIFICANT CHANGE UP (ref 3.8–5.2)
RBC # BLD: 4.17 M/UL — SIGNIFICANT CHANGE UP (ref 3.8–5.2)
RBC # BLD: 4.46 M/UL
RBC # FLD: 16.3 % — HIGH (ref 10.3–14.5)
RBC # FLD: 16.6 % — HIGH (ref 10.3–14.5)
RBC # FLD: 16.8 %
RBC # FLD: 16.9 % — HIGH (ref 10.3–14.5)
RBC # FLD: 16.9 % — HIGH (ref 10.3–14.5)
RBC # FLD: 17.4 % — HIGH (ref 10.3–14.5)
RBC # FLD: 18.2 % — HIGH (ref 10.3–14.5)
RBC BLD AUTO: SIGNIFICANT CHANGE UP
RBC CASTS # UR COMP ASSIST: ABNORMAL /HPF (ref 0–4)
RED CEDAR IGE QN: <0.1 KUA/L
ROACH IGE QN: <0.1 KUA/L
ROACH IGE QN: <0.1 KUA/L
S AUREUS DNA NOSE QL NAA+PROBE: SIGNIFICANT CHANGE UP
S MARCESCENS DNA BLD POS NAA+NON-PROBE: SIGNIFICANT CHANGE UP
SAO2 % BLDA: 99.5 % — HIGH (ref 94–98)
SAO2 % BLDA: 99.6 % — HIGH (ref 94–98)
SAO2 % BLDA: 99.6 % — HIGH (ref 94–98)
SAO2 % BLDA: 99.8 % — HIGH (ref 94–98)
SARS-COV-2 N GENE NPH QL NAA+PROBE: NOT DETECTED
SARS-COV-2 RNA SPEC QL NAA+PROBE: SIGNIFICANT CHANGE UP
SCALLOP IGE QN: 0
SCALLOP IGE QN: <0.1 KUA/L
SESAME SEED IGE QN: <0.1 KUA/L
SHEEP SORREL IGE QN: <0.1 KUA/L
SILVER BIRCH IGE QN: <0.1 KUA/L
SODIUM SERPL-SCNC: 136 MMOL/L — SIGNIFICANT CHANGE UP (ref 135–145)
SODIUM SERPL-SCNC: 140 MMOL/L — SIGNIFICANT CHANGE UP (ref 135–145)
SODIUM SERPL-SCNC: 142 MMOL/L — SIGNIFICANT CHANGE UP (ref 135–145)
SODIUM SERPL-SCNC: 143 MMOL/L — SIGNIFICANT CHANGE UP (ref 135–145)
SODIUM SERPL-SCNC: 143 MMOL/L — SIGNIFICANT CHANGE UP (ref 135–145)
SODIUM SERPL-SCNC: 145 MMOL/L — SIGNIFICANT CHANGE UP (ref 135–145)
SOYBEAN IGE QN: <0.1 KUA/L
SP GR SPEC: 1.01 — SIGNIFICANT CHANGE UP (ref 1.01–1.02)
SPECIMEN SOURCE: SIGNIFICANT CHANGE UP
TIMOTHY IGE QN: <0.1 KUA/L
TIMOTHY IGE QN: <0.1 KUA/L
TOTAL IGE SMQN RAST: 103 KU/L
TREE ALLERG MIX1 IGE QL: 0
TROPONIN I, HIGH SENSITIVITY RESULT: 16.5 NG/L — SIGNIFICANT CHANGE UP
TROPONIN I, HIGH SENSITIVITY RESULT: 335.7 NG/L — HIGH
TROPONIN I, HIGH SENSITIVITY RESULT: 510.2 NG/L — HIGH
TROPONIN I, HIGH SENSITIVITY RESULT: 676.5 NG/L — HIGH
TROPONIN I, HIGH SENSITIVITY RESULT: 96 NG/L — HIGH
TSH SERPL-MCNC: 0.46 UIU/ML — SIGNIFICANT CHANGE UP (ref 0.36–3.74)
UROBILINOGEN FLD QL: NEGATIVE — SIGNIFICANT CHANGE UP
VANCOMYCIN FLD-MCNC: 15.6 UG/ML — SIGNIFICANT CHANGE UP
VANCOMYCIN TROUGH SERPL-MCNC: 17.8 UG/ML — SIGNIFICANT CHANGE UP (ref 10–20)
WALNUT IGE QN: <0.1 KUA/L
WBC # BLD: 11.89 K/UL — HIGH (ref 3.8–10.5)
WBC # BLD: 12.31 K/UL — HIGH (ref 3.8–10.5)
WBC # BLD: 18.78 K/UL — HIGH (ref 3.8–10.5)
WBC # BLD: 18.87 K/UL — HIGH (ref 3.8–10.5)
WBC # BLD: 7.76 K/UL — SIGNIFICANT CHANGE UP (ref 3.8–10.5)
WBC # BLD: 8.82 K/UL — SIGNIFICANT CHANGE UP (ref 3.8–10.5)
WBC # FLD AUTO: 11.89 K/UL — HIGH (ref 3.8–10.5)
WBC # FLD AUTO: 12.31 K/UL — HIGH (ref 3.8–10.5)
WBC # FLD AUTO: 18.78 K/UL — HIGH (ref 3.8–10.5)
WBC # FLD AUTO: 18.87 K/UL — HIGH (ref 3.8–10.5)
WBC # FLD AUTO: 6.64 K/UL
WBC # FLD AUTO: 7.76 K/UL — SIGNIFICANT CHANGE UP (ref 3.8–10.5)
WBC # FLD AUTO: 8.82 K/UL — SIGNIFICANT CHANGE UP (ref 3.8–10.5)
WBC UR QL: ABNORMAL
WHEAT IGE QN: 1.12 KUA/L
WHITE ASH IGE QN: <0.1 KUA/L
WHITE ELM IGE QN: 0
WHITE ELM IGE QN: <0.1 KUA/L
WHITE OAK IGE QN: <0.1 KUA/L
WHITE OAK IGE QN: <0.1 KUA/L

## 2022-01-01 PROCEDURE — 36600 WITHDRAWAL OF ARTERIAL BLOOD: CPT

## 2022-01-01 PROCEDURE — 93010 ELECTROCARDIOGRAM REPORT: CPT

## 2022-01-01 PROCEDURE — 75710 ARTERY X-RAYS ARM/LEG: CPT | Mod: 26,59

## 2022-01-01 PROCEDURE — 99203 OFFICE O/P NEW LOW 30 MIN: CPT

## 2022-01-01 PROCEDURE — 87449 NOS EACH ORGANISM AG IA: CPT

## 2022-01-01 PROCEDURE — 93005 ELECTROCARDIOGRAM TRACING: CPT

## 2022-01-01 PROCEDURE — 94799 UNLISTED PULMONARY SVC/PX: CPT

## 2022-01-01 PROCEDURE — 80048 BASIC METABOLIC PNL TOTAL CA: CPT

## 2022-01-01 PROCEDURE — 85652 RBC SED RATE AUTOMATED: CPT

## 2022-01-01 PROCEDURE — 75635 CT ANGIO ABDOMINAL ARTERIES: CPT | Mod: 26,MG

## 2022-01-01 PROCEDURE — G1004: CPT

## 2022-01-01 PROCEDURE — 86901 BLOOD TYPING SEROLOGIC RH(D): CPT

## 2022-01-01 PROCEDURE — 86140 C-REACTIVE PROTEIN: CPT

## 2022-01-01 PROCEDURE — 82150 ASSAY OF AMYLASE: CPT

## 2022-01-01 PROCEDURE — 73590 X-RAY EXAM OF LOWER LEG: CPT

## 2022-01-01 PROCEDURE — C2623: CPT

## 2022-01-01 PROCEDURE — 83690 ASSAY OF LIPASE: CPT

## 2022-01-01 PROCEDURE — 87641 MR-STAPH DNA AMP PROBE: CPT

## 2022-01-01 PROCEDURE — 99223 1ST HOSP IP/OBS HIGH 75: CPT

## 2022-01-01 PROCEDURE — 99233 SBSQ HOSP IP/OBS HIGH 50: CPT

## 2022-01-01 PROCEDURE — G0463: CPT | Mod: 25

## 2022-01-01 PROCEDURE — 99291 CRITICAL CARE FIRST HOUR: CPT

## 2022-01-01 PROCEDURE — 94002 VENT MGMT INPAT INIT DAY: CPT

## 2022-01-01 PROCEDURE — 73590 X-RAY EXAM OF LOWER LEG: CPT | Mod: 26,RT

## 2022-01-01 PROCEDURE — 87186 SC STD MICRODIL/AGAR DIL: CPT

## 2022-01-01 PROCEDURE — 99223 1ST HOSP IP/OBS HIGH 75: CPT | Mod: GC,AI

## 2022-01-01 PROCEDURE — 80053 COMPREHEN METABOLIC PANEL: CPT

## 2022-01-01 PROCEDURE — 99214 OFFICE O/P EST MOD 30 MIN: CPT

## 2022-01-01 PROCEDURE — 96375 TX/PRO/DX INJ NEW DRUG ADDON: CPT

## 2022-01-01 PROCEDURE — 76000 FLUOROSCOPY <1 HR PHYS/QHP: CPT

## 2022-01-01 PROCEDURE — 37224: CPT | Mod: RT

## 2022-01-01 PROCEDURE — 93308 TTE F-UP OR LMTD: CPT | Mod: 26

## 2022-01-01 PROCEDURE — C1725: CPT

## 2022-01-01 PROCEDURE — 71046 X-RAY EXAM CHEST 2 VIEWS: CPT

## 2022-01-01 PROCEDURE — 87150 DNA/RNA AMPLIFIED PROBE: CPT

## 2022-01-01 PROCEDURE — C1887: CPT

## 2022-01-01 PROCEDURE — 83735 ASSAY OF MAGNESIUM: CPT

## 2022-01-01 PROCEDURE — 86850 RBC ANTIBODY SCREEN: CPT

## 2022-01-01 PROCEDURE — 0225U NFCT DS DNA&RNA 21 SARSCOV2: CPT

## 2022-01-01 PROCEDURE — 93306 TTE W/DOPPLER COMPLETE: CPT

## 2022-01-01 PROCEDURE — 94727 GAS DIL/WSHOT DETER LNG VOL: CPT

## 2022-01-01 PROCEDURE — 71260 CT THORAX DX C+: CPT | Mod: MA

## 2022-01-01 PROCEDURE — 94003 VENT MGMT INPAT SUBQ DAY: CPT

## 2022-01-01 PROCEDURE — 75635 CT ANGIO ABDOMINAL ARTERIES: CPT | Mod: MA

## 2022-01-01 PROCEDURE — 87305 ASPERGILLUS AG IA: CPT

## 2022-01-01 PROCEDURE — 93000 ELECTROCARDIOGRAM COMPLETE: CPT

## 2022-01-01 PROCEDURE — 83605 ASSAY OF LACTIC ACID: CPT

## 2022-01-01 PROCEDURE — 86900 BLOOD TYPING SEROLOGIC ABO: CPT

## 2022-01-01 PROCEDURE — 87077 CULTURE AEROBIC IDENTIFY: CPT

## 2022-01-01 PROCEDURE — 84443 ASSAY THYROID STIM HORMONE: CPT

## 2022-01-01 PROCEDURE — 93926 LOWER EXTREMITY STUDY: CPT | Mod: 26,LT

## 2022-01-01 PROCEDURE — 71045 X-RAY EXAM CHEST 1 VIEW: CPT

## 2022-01-01 PROCEDURE — 82803 BLOOD GASES ANY COMBINATION: CPT

## 2022-01-01 PROCEDURE — 94760 N-INVAS EAR/PLS OXIMETRY 1: CPT

## 2022-01-01 PROCEDURE — 76604 US EXAM CHEST: CPT | Mod: 26

## 2022-01-01 PROCEDURE — 87086 URINE CULTURE/COLONY COUNT: CPT

## 2022-01-01 PROCEDURE — 71046 X-RAY EXAM CHEST 2 VIEWS: CPT | Mod: 26

## 2022-01-01 PROCEDURE — 75635 CT ANGIO ABDOMINAL ARTERIES: CPT | Mod: 26,MA

## 2022-01-01 PROCEDURE — 71045 X-RAY EXAM CHEST 1 VIEW: CPT | Mod: 26

## 2022-01-01 PROCEDURE — 82962 GLUCOSE BLOOD TEST: CPT

## 2022-01-01 PROCEDURE — 85025 COMPLETE CBC W/AUTO DIFF WBC: CPT

## 2022-01-01 PROCEDURE — C1769: CPT

## 2022-01-01 PROCEDURE — 99285 EMERGENCY DEPT VISIT HI MDM: CPT | Mod: 25

## 2022-01-01 PROCEDURE — 80202 ASSAY OF VANCOMYCIN: CPT

## 2022-01-01 PROCEDURE — 99214 OFFICE O/P EST MOD 30 MIN: CPT | Mod: 25

## 2022-01-01 PROCEDURE — 86803 HEPATITIS C AB TEST: CPT

## 2022-01-01 PROCEDURE — 93923 UPR/LXTR ART STDY 3+ LVLS: CPT

## 2022-01-01 PROCEDURE — 96365 THER/PROPH/DIAG IV INF INIT: CPT

## 2022-01-01 PROCEDURE — 87640 STAPH A DNA AMP PROBE: CPT

## 2022-01-01 PROCEDURE — 85610 PROTHROMBIN TIME: CPT

## 2022-01-01 PROCEDURE — C1894: CPT

## 2022-01-01 PROCEDURE — 83615 LACTATE (LD) (LDH) ENZYME: CPT

## 2022-01-01 PROCEDURE — 84145 PROCALCITONIN (PCT): CPT

## 2022-01-01 PROCEDURE — 85027 COMPLETE CBC AUTOMATED: CPT

## 2022-01-01 PROCEDURE — ZZZZZ: CPT

## 2022-01-01 PROCEDURE — 71260 CT THORAX DX C+: CPT | Mod: 26,MA

## 2022-01-01 PROCEDURE — 81001 URINALYSIS AUTO W/SCOPE: CPT

## 2022-01-01 PROCEDURE — 84484 ASSAY OF TROPONIN QUANT: CPT

## 2022-01-01 PROCEDURE — 82550 ASSAY OF CK (CPK): CPT

## 2022-01-01 PROCEDURE — 36415 COLL VENOUS BLD VENIPUNCTURE: CPT

## 2022-01-01 PROCEDURE — 76700 US EXAM ABDOM COMPLETE: CPT | Mod: 26

## 2022-01-01 PROCEDURE — 82553 CREATINE MB FRACTION: CPT

## 2022-01-01 PROCEDURE — 87040 BLOOD CULTURE FOR BACTERIA: CPT

## 2022-01-01 PROCEDURE — 93926 LOWER EXTREMITY STUDY: CPT

## 2022-01-01 PROCEDURE — 96372 THER/PROPH/DIAG INJ SC/IM: CPT

## 2022-01-01 PROCEDURE — 83036 HEMOGLOBIN GLYCOSYLATED A1C: CPT

## 2022-01-01 PROCEDURE — 99285 EMERGENCY DEPT VISIT HI MDM: CPT | Mod: CS

## 2022-01-01 PROCEDURE — P9047: CPT

## 2022-01-01 PROCEDURE — 75635 CT ANGIO ABDOMINAL ARTERIES: CPT | Mod: MG

## 2022-01-01 PROCEDURE — 86713 LEGIONELLA ANTIBODY: CPT

## 2022-01-01 PROCEDURE — 95012 NITRIC OXIDE EXP GAS DETER: CPT

## 2022-01-01 PROCEDURE — 93306 TTE W/DOPPLER COMPLETE: CPT | Mod: 26

## 2022-01-01 PROCEDURE — 76700 US EXAM ABDOM COMPLETE: CPT

## 2022-01-01 PROCEDURE — 85730 THROMBOPLASTIN TIME PARTIAL: CPT

## 2022-01-01 PROCEDURE — 84100 ASSAY OF PHOSPHORUS: CPT

## 2022-01-01 PROCEDURE — C1760: CPT

## 2022-01-01 PROCEDURE — 76937 US GUIDE VASCULAR ACCESS: CPT | Mod: 26

## 2022-01-01 PROCEDURE — 94729 DIFFUSING CAPACITY: CPT

## 2022-01-01 PROCEDURE — 94010 BREATHING CAPACITY TEST: CPT

## 2022-01-01 PROCEDURE — 99212 OFFICE O/P EST SF 10 MIN: CPT

## 2022-01-01 RX ORDER — ALBUTEROL SULFATE 2.5 MG/3ML
(2.5 MG/3ML) SOLUTION RESPIRATORY (INHALATION)
Qty: 3 | Refills: 2 | Status: ACTIVE | COMMUNITY
Start: 2019-10-04 | End: 1900-01-01

## 2022-01-01 RX ORDER — SODIUM CHLORIDE 9 MG/ML
1000 INJECTION, SOLUTION INTRAVENOUS
Refills: 0 | Status: DISCONTINUED | OUTPATIENT
Start: 2022-01-01 | End: 2022-01-01

## 2022-01-01 RX ORDER — MEROPENEM 1 G/30ML
INJECTION INTRAVENOUS
Refills: 0 | Status: DISCONTINUED | OUTPATIENT
Start: 2022-01-01 | End: 2022-01-01

## 2022-01-01 RX ORDER — AMIODARONE HYDROCHLORIDE 400 MG/1
0.5 TABLET ORAL
Qty: 900 | Refills: 0 | Status: DISCONTINUED | OUTPATIENT
Start: 2022-01-01 | End: 2022-01-01

## 2022-01-01 RX ORDER — MEPERIDINE HYDROCHLORIDE 50 MG/ML
12.5 INJECTION INTRAMUSCULAR; INTRAVENOUS; SUBCUTANEOUS
Refills: 0 | Status: DISCONTINUED | OUTPATIENT
Start: 2022-01-01 | End: 2022-01-01

## 2022-01-01 RX ORDER — VANCOMYCIN HCL 1 G
750 VIAL (EA) INTRAVENOUS ONCE
Refills: 0 | Status: COMPLETED | OUTPATIENT
Start: 2022-01-01 | End: 2022-01-01

## 2022-01-01 RX ORDER — SODIUM CHLORIDE 9 MG/ML
2500 INJECTION INTRAMUSCULAR; INTRAVENOUS; SUBCUTANEOUS ONCE
Refills: 0 | Status: COMPLETED | OUTPATIENT
Start: 2022-01-01 | End: 2022-01-01

## 2022-01-01 RX ORDER — FLUTICASONE PROPIONATE AND SALMETEROL 50; 500 UG/1; UG/1
500-50 POWDER RESPIRATORY (INHALATION)
Qty: 180 | Refills: 0 | Status: DISCONTINUED | COMMUNITY
Start: 2018-04-02 | End: 2022-01-01

## 2022-01-01 RX ORDER — ROBINUL 0.2 MG/ML
0.2 INJECTION INTRAMUSCULAR; INTRAVENOUS ONCE
Refills: 0 | Status: DISCONTINUED | OUTPATIENT
Start: 2022-01-01 | End: 2022-01-01

## 2022-01-01 RX ORDER — OXYCODONE HYDROCHLORIDE 5 MG/1
5 TABLET ORAL ONCE
Refills: 0 | Status: DISCONTINUED | OUTPATIENT
Start: 2022-01-01 | End: 2022-01-01

## 2022-01-01 RX ORDER — LEVOTHYROXINE SODIUM 125 MCG
137 TABLET ORAL DAILY
Refills: 0 | Status: DISCONTINUED | OUTPATIENT
Start: 2022-01-01 | End: 2022-01-01

## 2022-01-01 RX ORDER — SODIUM BICARBONATE 1 MEQ/ML
50 SYRINGE (ML) INTRAVENOUS ONCE
Refills: 0 | Status: COMPLETED | OUTPATIENT
Start: 2022-01-01 | End: 2022-01-01

## 2022-01-01 RX ORDER — MEROPENEM 1 G/30ML
1000 INJECTION INTRAVENOUS EVERY 12 HOURS
Refills: 0 | Status: DISCONTINUED | OUTPATIENT
Start: 2022-01-01 | End: 2022-01-01

## 2022-01-01 RX ORDER — ACETAMINOPHEN 500 MG
1000 TABLET ORAL ONCE
Refills: 0 | Status: COMPLETED | OUTPATIENT
Start: 2022-01-01 | End: 2022-01-01

## 2022-01-01 RX ORDER — CLOPIDOGREL BISULFATE 75 MG/1
1 TABLET, FILM COATED ORAL
Qty: 30 | Refills: 3
Start: 2022-01-01 | End: 2022-07-04

## 2022-01-01 RX ORDER — UBIDECARENONE 100 MG
2 CAPSULE ORAL
Qty: 0 | Refills: 0 | DISCHARGE

## 2022-01-01 RX ORDER — MAGNESIUM SULFATE 500 MG/ML
2 VIAL (ML) INJECTION ONCE
Refills: 0 | Status: COMPLETED | OUTPATIENT
Start: 2022-01-01 | End: 2022-01-01

## 2022-01-01 RX ORDER — DUPILUMAB 300 MG/2ML
300 INJECTION, SOLUTION SUBCUTANEOUS
Qty: 4 | Refills: 1 | Status: ACTIVE | COMMUNITY
Start: 2022-01-01 | End: 1900-01-01

## 2022-01-01 RX ORDER — MAGNESIUM SULFATE 500 MG/ML
4 VIAL (ML) INJECTION ONCE
Refills: 0 | Status: DISCONTINUED | OUTPATIENT
Start: 2022-01-01 | End: 2022-01-01

## 2022-01-01 RX ORDER — CLOPIDOGREL BISULFATE 75 MG/1
1 TABLET, FILM COATED ORAL
Qty: 0 | Refills: 0 | DISCHARGE

## 2022-01-01 RX ORDER — MONTELUKAST 4 MG/1
1 TABLET, CHEWABLE ORAL
Qty: 0 | Refills: 0 | DISCHARGE

## 2022-01-01 RX ORDER — CICLESONIDE 160 UG/1
160 AEROSOL, METERED RESPIRATORY (INHALATION) TWICE DAILY
Qty: 3 | Refills: 1 | Status: ACTIVE | COMMUNITY
Start: 2022-01-01 | End: 1900-01-01

## 2022-01-01 RX ORDER — CICLESONIDE 160 UG/1
1 AEROSOL, METERED RESPIRATORY (INHALATION)
Qty: 0 | Refills: 0 | DISCHARGE

## 2022-01-01 RX ORDER — GLYCOPYRROLATE AND FORMOTEROL FUMARATE 9; 4.8 UG/1; UG/1
2 AEROSOL, METERED RESPIRATORY (INHALATION)
Qty: 0 | Refills: 0 | DISCHARGE

## 2022-01-01 RX ORDER — PREDNISONE 2.5 MG/1
2.5 TABLET ORAL
Qty: 582 | Refills: 0 | Status: DISCONTINUED | COMMUNITY
Start: 2017-11-21 | End: 2022-01-01

## 2022-01-01 RX ORDER — MONTELUKAST 10 MG/1
10 TABLET, FILM COATED ORAL
Qty: 1 | Refills: 1 | Status: ACTIVE | COMMUNITY
Start: 2022-01-01 | End: 1900-01-01

## 2022-01-01 RX ORDER — FLUTICASONE PROPIONATE 50 MCG
1 SPRAY, SUSPENSION NASAL
Qty: 0 | Refills: 0 | DISCHARGE

## 2022-01-01 RX ORDER — OLOPATADINE HYDROCHLORIDE 665 UG/1
0.6 SPRAY, METERED NASAL
Qty: 3 | Refills: 1 | Status: ACTIVE | COMMUNITY
Start: 2022-01-01 | End: 1900-01-01

## 2022-01-01 RX ORDER — CHOLECALCIFEROL (VITAMIN D3) 125 MCG
1 CAPSULE ORAL
Qty: 0 | Refills: 0 | DISCHARGE

## 2022-01-01 RX ORDER — INSULIN LISPRO 100/ML
8 VIAL (ML) SUBCUTANEOUS
Qty: 0 | Refills: 0 | DISCHARGE

## 2022-01-01 RX ORDER — HEPARIN SODIUM 5000 [USP'U]/ML
5000 INJECTION INTRAVENOUS; SUBCUTANEOUS EVERY 12 HOURS
Refills: 0 | Status: DISCONTINUED | OUTPATIENT
Start: 2022-01-01 | End: 2022-01-01

## 2022-01-01 RX ORDER — DICLOFENAC SODIUM 10 MG/G
1 GEL TOPICAL
Qty: 5 | Refills: 5 | Status: DISCONTINUED | COMMUNITY
Start: 2018-11-07 | End: 2022-01-01

## 2022-01-01 RX ORDER — ROBINUL 0.2 MG/ML
0.2 INJECTION INTRAMUSCULAR; INTRAVENOUS EVERY 4 HOURS
Refills: 0 | Status: DISCONTINUED | OUTPATIENT
Start: 2022-01-01 | End: 2022-01-01

## 2022-01-01 RX ORDER — NOREPINEPHRINE BITARTRATE/D5W 8 MG/250ML
0.05 PLASTIC BAG, INJECTION (ML) INTRAVENOUS
Qty: 16 | Refills: 0 | Status: DISCONTINUED | OUTPATIENT
Start: 2022-01-01 | End: 2022-01-01

## 2022-01-01 RX ORDER — PANTOPRAZOLE SODIUM 20 MG/1
40 TABLET, DELAYED RELEASE ORAL DAILY
Refills: 0 | Status: DISCONTINUED | OUTPATIENT
Start: 2022-01-01 | End: 2022-01-01

## 2022-01-01 RX ORDER — SITAGLIPTIN 50 MG/1
1 TABLET, FILM COATED ORAL
Qty: 0 | Refills: 0 | DISCHARGE

## 2022-01-01 RX ORDER — MEROPENEM 1 G/30ML
500 INJECTION INTRAVENOUS ONCE
Refills: 0 | Status: DISCONTINUED | OUTPATIENT
Start: 2022-01-01 | End: 2022-01-01

## 2022-01-01 RX ORDER — SODIUM ZIRCONIUM CYCLOSILICATE 10 G/10G
10 POWDER, FOR SUSPENSION ORAL ONCE
Refills: 0 | Status: DISCONTINUED | OUTPATIENT
Start: 2022-01-01 | End: 2022-01-01

## 2022-01-01 RX ORDER — OLOPATADINE HYDROCHLORIDE 665 UG/1
2 SPRAY, METERED NASAL
Qty: 0 | Refills: 0 | DISCHARGE

## 2022-01-01 RX ORDER — PROPOFOL 10 MG/ML
20 INJECTION, EMULSION INTRAVENOUS
Qty: 1000 | Refills: 0 | Status: DISCONTINUED | OUTPATIENT
Start: 2022-01-01 | End: 2022-01-01

## 2022-01-01 RX ORDER — ACETAMINOPHEN 500 MG
650 TABLET ORAL EVERY 6 HOURS
Refills: 0 | Status: DISCONTINUED | OUTPATIENT
Start: 2022-01-01 | End: 2022-01-01

## 2022-01-01 RX ORDER — PREDNISONE 10 MG/1
10 TABLET ORAL
Qty: 35 | Refills: 0 | Status: ACTIVE | COMMUNITY
Start: 2022-01-01 | End: 1900-01-01

## 2022-01-01 RX ORDER — CHOLECALCIFEROL (VITAMIN D3) 50 MCG
50 MCG TABLET ORAL
Qty: 90 | Refills: 0 | Status: ACTIVE | COMMUNITY
Start: 2021-01-01 | End: 1900-01-01

## 2022-01-01 RX ORDER — ROBINUL 0.2 MG/ML
2 INJECTION INTRAMUSCULAR; INTRAVENOUS ONCE
Refills: 0 | Status: DISCONTINUED | OUTPATIENT
Start: 2022-01-01 | End: 2022-01-01

## 2022-01-01 RX ORDER — FUROSEMIDE 40 MG
10 TABLET ORAL
Qty: 0 | Refills: 0 | DISCHARGE

## 2022-01-01 RX ORDER — GLYCOPYRROLATE AND FORMOTEROL FUMARATE 9; 4.8 UG/1; UG/1
9-4.8 AEROSOL, METERED RESPIRATORY (INHALATION)
Qty: 3 | Refills: 1 | Status: ACTIVE | COMMUNITY
Start: 2022-01-01 | End: 1900-01-01

## 2022-01-01 RX ORDER — FENTANYL CITRATE 50 UG/ML
0.5 INJECTION INTRAVENOUS
Qty: 2500 | Refills: 0 | Status: DISCONTINUED | OUTPATIENT
Start: 2022-01-01 | End: 2022-01-01

## 2022-01-01 RX ORDER — FLUTICASONE PROPIONATE AND SALMETEROL 50; 250 UG/1; UG/1
1 POWDER ORAL; RESPIRATORY (INHALATION)
Qty: 0 | Refills: 0 | DISCHARGE

## 2022-01-01 RX ORDER — PIPERACILLIN AND TAZOBACTAM 4; .5 G/20ML; G/20ML
3.38 INJECTION, POWDER, LYOPHILIZED, FOR SOLUTION INTRAVENOUS ONCE
Refills: 0 | Status: COMPLETED | OUTPATIENT
Start: 2022-01-01 | End: 2022-01-01

## 2022-01-01 RX ORDER — SILVER 2" X 2"
0.9 BANDAGE TOPICAL DAILY
Qty: 1 | Refills: 0 | Status: ACTIVE | COMMUNITY
Start: 2022-01-01

## 2022-01-01 RX ORDER — ROBINUL 0.2 MG/ML
2 INJECTION INTRAMUSCULAR; INTRAVENOUS EVERY 4 HOURS
Refills: 0 | Status: DISCONTINUED | OUTPATIENT
Start: 2022-01-01 | End: 2022-01-01

## 2022-01-01 RX ORDER — METFORMIN HYDROCHLORIDE 850 MG/1
1 TABLET ORAL
Qty: 0 | Refills: 0 | DISCHARGE

## 2022-01-01 RX ORDER — FENTANYL CITRATE 50 UG/ML
50 INJECTION INTRAVENOUS
Refills: 0 | Status: DISCONTINUED | OUTPATIENT
Start: 2022-01-01 | End: 2022-01-01

## 2022-01-01 RX ORDER — CLOPIDOGREL BISULFATE 75 MG/1
75 TABLET, FILM COATED ORAL
Qty: 90 | Refills: 0 | Status: ACTIVE | COMMUNITY
Start: 2022-01-01

## 2022-01-01 RX ORDER — CHLORHEXIDINE GLUCONATE 213 G/1000ML
15 SOLUTION TOPICAL EVERY 12 HOURS
Refills: 0 | Status: DISCONTINUED | OUTPATIENT
Start: 2022-01-01 | End: 2022-01-01

## 2022-01-01 RX ORDER — HYDROMORPHONE HYDROCHLORIDE 2 MG/ML
0.5 INJECTION INTRAMUSCULAR; INTRAVENOUS; SUBCUTANEOUS
Refills: 0 | Status: DISCONTINUED | OUTPATIENT
Start: 2022-01-01 | End: 2022-01-01

## 2022-01-01 RX ORDER — PIPERACILLIN AND TAZOBACTAM 4; .5 G/20ML; G/20ML
3.38 INJECTION, POWDER, LYOPHILIZED, FOR SOLUTION INTRAVENOUS EVERY 12 HOURS
Refills: 0 | Status: DISCONTINUED | OUTPATIENT
Start: 2022-01-01 | End: 2022-01-01

## 2022-01-01 RX ORDER — CLOPIDOGREL BISULFATE 75 MG/1
300 TABLET, FILM COATED ORAL ONCE
Refills: 0 | Status: COMPLETED | OUTPATIENT
Start: 2022-01-01 | End: 2022-01-01

## 2022-01-01 RX ORDER — FAMOTIDINE 10 MG/ML
1 INJECTION INTRAVENOUS
Qty: 0 | Refills: 0 | DISCHARGE

## 2022-01-01 RX ORDER — VASOPRESSIN 20 [USP'U]/ML
0.04 INJECTION INTRAVENOUS
Qty: 50 | Refills: 0 | Status: DISCONTINUED | OUTPATIENT
Start: 2022-01-01 | End: 2022-01-01

## 2022-01-01 RX ORDER — AMIODARONE HYDROCHLORIDE 400 MG/1
150 TABLET ORAL ONCE
Refills: 0 | Status: COMPLETED | OUTPATIENT
Start: 2022-01-01 | End: 2022-01-01

## 2022-01-01 RX ORDER — ALBUMIN HUMAN 25 %
50 VIAL (ML) INTRAVENOUS
Refills: 0 | Status: DISCONTINUED | OUTPATIENT
Start: 2022-01-01 | End: 2022-01-01

## 2022-01-01 RX ORDER — CHLORHEXIDINE GLUCONATE 213 G/1000ML
1 SOLUTION TOPICAL
Refills: 0 | Status: DISCONTINUED | OUTPATIENT
Start: 2022-01-01 | End: 2022-01-01

## 2022-01-01 RX ORDER — INSULIN HUMAN 100 [IU]/ML
5 INJECTION, SOLUTION SUBCUTANEOUS ONCE
Refills: 0 | Status: COMPLETED | OUTPATIENT
Start: 2022-01-01 | End: 2022-01-01

## 2022-01-01 RX ORDER — AMIODARONE HYDROCHLORIDE 400 MG/1
1 TABLET ORAL
Qty: 900 | Refills: 0 | Status: DISCONTINUED | OUTPATIENT
Start: 2022-01-01 | End: 2022-01-01

## 2022-01-01 RX ORDER — SODIUM BICARBONATE 1 MEQ/ML
0.14 SYRINGE (ML) INTRAVENOUS
Qty: 150 | Refills: 0 | Status: DISCONTINUED | OUTPATIENT
Start: 2022-01-01 | End: 2022-01-01

## 2022-01-01 RX ORDER — SODIUM CHLORIDE 9 MG/ML
1000 INJECTION, SOLUTION INTRAVENOUS ONCE
Refills: 0 | Status: COMPLETED | OUTPATIENT
Start: 2022-01-01 | End: 2022-01-01

## 2022-01-01 RX ORDER — PHENYLEPHRINE HYDROCHLORIDE 10 MG/ML
0.4 INJECTION INTRAVENOUS
Qty: 160 | Refills: 0 | Status: DISCONTINUED | OUTPATIENT
Start: 2022-01-01 | End: 2022-01-01

## 2022-01-01 RX ORDER — TRAMADOL HYDROCHLORIDE 50 MG/1
1 TABLET ORAL
Qty: 0 | Refills: 0 | DISCHARGE

## 2022-01-01 RX ORDER — DUPILUMAB 300 MG/2ML
0 INJECTION, SOLUTION SUBCUTANEOUS
Qty: 0 | Refills: 0 | DISCHARGE

## 2022-01-01 RX ORDER — ASPIRIN/CALCIUM CARB/MAGNESIUM 324 MG
81 TABLET ORAL ONCE
Refills: 0 | Status: COMPLETED | OUTPATIENT
Start: 2022-01-01 | End: 2022-01-01

## 2022-01-01 RX ORDER — DENOSUMAB 60 MG/ML
0 INJECTION SUBCUTANEOUS
Qty: 0 | Refills: 0 | DISCHARGE

## 2022-01-01 RX ORDER — DEXTROSE 50 % IN WATER 50 %
50 SYRINGE (ML) INTRAVENOUS ONCE
Refills: 0 | Status: COMPLETED | OUTPATIENT
Start: 2022-01-01 | End: 2022-01-01

## 2022-01-01 RX ORDER — ALBUMIN HUMAN 25 %
50 VIAL (ML) INTRAVENOUS
Refills: 0 | Status: COMPLETED | OUTPATIENT
Start: 2022-01-01 | End: 2022-01-01

## 2022-01-01 RX ORDER — SODIUM CHLORIDE 9 MG/ML
1000 INJECTION INTRAMUSCULAR; INTRAVENOUS; SUBCUTANEOUS
Refills: 0 | Status: DISCONTINUED | OUTPATIENT
Start: 2022-01-01 | End: 2022-01-01

## 2022-01-01 RX ORDER — HEPARIN SODIUM 5000 [USP'U]/ML
5000 INJECTION INTRAVENOUS; SUBCUTANEOUS EVERY 8 HOURS
Refills: 0 | Status: DISCONTINUED | OUTPATIENT
Start: 2022-01-01 | End: 2022-01-01

## 2022-01-01 RX ORDER — DENOSUMAB 60 MG/ML
60 INJECTION SUBCUTANEOUS
Qty: 1 | Refills: 0 | Status: COMPLETED | OUTPATIENT
Start: 2022-01-01

## 2022-01-01 RX ORDER — SODIUM ZIRCONIUM CYCLOSILICATE 10 G/10G
10 POWDER, FOR SUSPENSION ORAL ONCE
Refills: 0 | Status: COMPLETED | OUTPATIENT
Start: 2022-01-01 | End: 2022-01-01

## 2022-01-01 RX ORDER — HYDROCORTISONE 20 MG
50 TABLET ORAL EVERY 8 HOURS
Refills: 0 | Status: DISCONTINUED | OUTPATIENT
Start: 2022-01-01 | End: 2022-01-01

## 2022-01-01 RX ORDER — ONDANSETRON 8 MG/1
4 TABLET, FILM COATED ORAL ONCE
Refills: 0 | Status: DISCONTINUED | OUTPATIENT
Start: 2022-01-01 | End: 2022-01-01

## 2022-01-01 RX ORDER — VANCOMYCIN HCL 1 G
1000 VIAL (EA) INTRAVENOUS EVERY 24 HOURS
Refills: 0 | Status: DISCONTINUED | OUTPATIENT
Start: 2022-01-01 | End: 2022-01-01

## 2022-01-01 RX ORDER — FAMOTIDINE 40 MG/1
40 TABLET, FILM COATED ORAL
Qty: 90 | Refills: 1 | Status: ACTIVE | COMMUNITY
Start: 2022-01-01 | End: 1900-01-01

## 2022-01-01 RX ORDER — TAMSULOSIN HYDROCHLORIDE 0.4 MG/1
0.4 CAPSULE ORAL
Qty: 90 | Refills: 1 | Status: ACTIVE | COMMUNITY
Start: 2019-03-01 | End: 1900-01-01

## 2022-01-01 RX ORDER — AMIODARONE HYDROCHLORIDE 400 MG/1
0.5 TABLET ORAL
Qty: 900 | Refills: 0 | Status: COMPLETED | OUTPATIENT
Start: 2022-01-01 | End: 2022-01-01

## 2022-01-01 RX ORDER — NOREPINEPHRINE BITARTRATE/D5W 8 MG/250ML
0.05 PLASTIC BAG, INJECTION (ML) INTRAVENOUS
Qty: 8 | Refills: 0 | Status: DISCONTINUED | OUTPATIENT
Start: 2022-01-01 | End: 2022-01-01

## 2022-01-01 RX ORDER — VANCOMYCIN HCL 1 G
1000 VIAL (EA) INTRAVENOUS ONCE
Refills: 0 | Status: COMPLETED | OUTPATIENT
Start: 2022-01-01 | End: 2022-01-01

## 2022-01-01 RX ORDER — DENOSUMAB 60 MG/ML
60 INJECTION SUBCUTANEOUS
Qty: 1 | Refills: 0 | Status: ACTIVE | COMMUNITY
Start: 2019-06-26

## 2022-01-01 RX ORDER — MEROPENEM 1 G/30ML
1000 INJECTION INTRAVENOUS ONCE
Refills: 0 | Status: COMPLETED | OUTPATIENT
Start: 2022-01-01 | End: 2022-01-01

## 2022-01-01 RX ORDER — NYSTATIN CREAM 100000 [USP'U]/G
1 CREAM TOPICAL
Refills: 0 | Status: DISCONTINUED | OUTPATIENT
Start: 2022-01-01 | End: 2022-01-01

## 2022-01-01 RX ORDER — MEROPENEM 1 G/30ML
500 INJECTION INTRAVENOUS EVERY 12 HOURS
Refills: 0 | Status: DISCONTINUED | OUTPATIENT
Start: 2022-01-01 | End: 2022-01-01

## 2022-01-01 RX ORDER — GABAPENTIN 300 MG/1
300 CAPSULE ORAL
Qty: 90 | Refills: 3 | Status: ACTIVE | COMMUNITY
Start: 2021-02-18 | End: 1900-01-01

## 2022-01-01 RX ADMIN — Medication 75 MEQ/KG/HR: at 09:06

## 2022-01-01 RX ADMIN — NYSTATIN CREAM 1 APPLICATION(S): 100000 CREAM TOPICAL at 17:48

## 2022-01-01 RX ADMIN — HEPARIN SODIUM 5000 UNIT(S): 5000 INJECTION INTRAVENOUS; SUBCUTANEOUS at 05:43

## 2022-01-01 RX ADMIN — Medication 25 GRAM(S): at 00:56

## 2022-01-01 RX ADMIN — Medication 50 MILLIGRAM(S): at 13:45

## 2022-01-01 RX ADMIN — FENTANYL CITRATE 4.08 MICROGRAM(S)/KG/HR: 50 INJECTION INTRAVENOUS at 12:16

## 2022-01-01 RX ADMIN — HEPARIN SODIUM 5000 UNIT(S): 5000 INJECTION INTRAVENOUS; SUBCUTANEOUS at 21:28

## 2022-01-01 RX ADMIN — PHENYLEPHRINE HYDROCHLORIDE 6.12 MICROGRAM(S)/KG/MIN: 10 INJECTION INTRAVENOUS at 15:58

## 2022-01-01 RX ADMIN — SODIUM ZIRCONIUM CYCLOSILICATE 10 GRAM(S): 10 POWDER, FOR SUSPENSION ORAL at 10:24

## 2022-01-01 RX ADMIN — Medication 100 MILLIGRAM(S): at 10:40

## 2022-01-01 RX ADMIN — PHENYLEPHRINE HYDROCHLORIDE 6.12 MICROGRAM(S)/KG/MIN: 10 INJECTION INTRAVENOUS at 21:27

## 2022-01-01 RX ADMIN — CHLORHEXIDINE GLUCONATE 15 MILLILITER(S): 213 SOLUTION TOPICAL at 05:44

## 2022-01-01 RX ADMIN — Medication 3.83 MICROGRAM(S)/KG/MIN: at 09:12

## 2022-01-01 RX ADMIN — OXYCODONE HYDROCHLORIDE 5 MILLIGRAM(S): 5 TABLET ORAL at 18:47

## 2022-01-01 RX ADMIN — Medication 3.83 MICROGRAM(S)/KG/MIN: at 12:16

## 2022-01-01 RX ADMIN — Medication 250 MILLIGRAM(S): at 15:53

## 2022-01-01 RX ADMIN — INSULIN HUMAN 5 UNIT(S): 100 INJECTION, SOLUTION SUBCUTANEOUS at 09:05

## 2022-01-01 RX ADMIN — Medication 650 MILLIGRAM(S): at 05:15

## 2022-01-01 RX ADMIN — Medication 50 MILLIGRAM(S): at 21:28

## 2022-01-01 RX ADMIN — SODIUM CHLORIDE 100 MILLILITER(S): 9 INJECTION INTRAMUSCULAR; INTRAVENOUS; SUBCUTANEOUS at 19:03

## 2022-01-01 RX ADMIN — Medication 650 MILLIGRAM(S): at 16:36

## 2022-01-01 RX ADMIN — VASOPRESSIN 2.4 UNIT(S)/MIN: 20 INJECTION INTRAVENOUS at 15:54

## 2022-01-01 RX ADMIN — Medication 50 MILLIGRAM(S): at 22:34

## 2022-01-01 RX ADMIN — Medication 650 MILLIGRAM(S): at 02:13

## 2022-01-01 RX ADMIN — Medication 650 MILLIGRAM(S): at 00:24

## 2022-01-01 RX ADMIN — AMIODARONE HYDROCHLORIDE 33.3 MG/MIN: 400 TABLET ORAL at 01:03

## 2022-01-01 RX ADMIN — FENTANYL CITRATE 4.08 MICROGRAM(S)/KG/HR: 50 INJECTION INTRAVENOUS at 21:27

## 2022-01-01 RX ADMIN — Medication 25 GRAM(S): at 08:26

## 2022-01-01 RX ADMIN — Medication 137 MICROGRAM(S): at 05:14

## 2022-01-01 RX ADMIN — MEROPENEM 100 MILLIGRAM(S): 1 INJECTION INTRAVENOUS at 05:15

## 2022-01-01 RX ADMIN — Medication 1000 MILLIGRAM(S): at 22:28

## 2022-01-01 RX ADMIN — Medication 100 MILLIGRAM(S): at 05:14

## 2022-01-01 RX ADMIN — Medication 50 MILLIEQUIVALENT(S): at 02:35

## 2022-01-01 RX ADMIN — CLOPIDOGREL BISULFATE 300 MILLIGRAM(S): 75 TABLET, FILM COATED ORAL at 20:00

## 2022-01-01 RX ADMIN — VASOPRESSIN 2.4 UNIT(S)/MIN: 20 INJECTION INTRAVENOUS at 15:55

## 2022-01-01 RX ADMIN — HEPARIN SODIUM 5000 UNIT(S): 5000 INJECTION INTRAVENOUS; SUBCUTANEOUS at 13:59

## 2022-01-01 RX ADMIN — HEPARIN SODIUM 5000 UNIT(S): 5000 INJECTION INTRAVENOUS; SUBCUTANEOUS at 13:54

## 2022-01-01 RX ADMIN — Medication 7.65 MICROGRAM(S)/KG/MIN: at 00:19

## 2022-01-01 RX ADMIN — PANTOPRAZOLE SODIUM 40 MILLIGRAM(S): 20 TABLET, DELAYED RELEASE ORAL at 11:41

## 2022-01-01 RX ADMIN — PANTOPRAZOLE SODIUM 40 MILLIGRAM(S): 20 TABLET, DELAYED RELEASE ORAL at 12:07

## 2022-01-01 RX ADMIN — CHLORHEXIDINE GLUCONATE 15 MILLILITER(S): 213 SOLUTION TOPICAL at 17:48

## 2022-01-01 RX ADMIN — Medication 250 MILLIGRAM(S): at 22:34

## 2022-01-01 RX ADMIN — Medication 650 MILLIGRAM(S): at 22:34

## 2022-01-01 RX ADMIN — Medication 3.83 MICROGRAM(S)/KG/MIN: at 22:35

## 2022-01-01 RX ADMIN — SODIUM CHLORIDE 150 MILLILITER(S): 9 INJECTION, SOLUTION INTRAVENOUS at 16:29

## 2022-01-01 RX ADMIN — Medication 650 MILLIGRAM(S): at 21:39

## 2022-01-01 RX ADMIN — CHLORHEXIDINE GLUCONATE 15 MILLILITER(S): 213 SOLUTION TOPICAL at 05:15

## 2022-01-01 RX ADMIN — CHLORHEXIDINE GLUCONATE 15 MILLILITER(S): 213 SOLUTION TOPICAL at 18:18

## 2022-01-01 RX ADMIN — PHENYLEPHRINE HYDROCHLORIDE 6.12 MICROGRAM(S)/KG/MIN: 10 INJECTION INTRAVENOUS at 09:48

## 2022-01-01 RX ADMIN — Medication 650 MILLIGRAM(S): at 20:11

## 2022-01-01 RX ADMIN — Medication 137 MICROGRAM(S): at 05:15

## 2022-01-01 RX ADMIN — FENTANYL CITRATE 4.08 MICROGRAM(S)/KG/HR: 50 INJECTION INTRAVENOUS at 05:15

## 2022-01-01 RX ADMIN — Medication 50 MILLIEQUIVALENT(S): at 02:30

## 2022-01-01 RX ADMIN — FENTANYL CITRATE 50 MICROGRAM(S): 50 INJECTION INTRAVENOUS at 18:52

## 2022-01-01 RX ADMIN — Medication 100 MILLIGRAM(S): at 13:54

## 2022-01-01 RX ADMIN — Medication 400 MILLIGRAM(S): at 21:28

## 2022-01-01 RX ADMIN — Medication 7.65 MICROGRAM(S)/KG/MIN: at 02:30

## 2022-01-01 RX ADMIN — PHENYLEPHRINE HYDROCHLORIDE 6.12 MICROGRAM(S)/KG/MIN: 10 INJECTION INTRAVENOUS at 01:44

## 2022-01-01 RX ADMIN — HEPARIN SODIUM 5000 UNIT(S): 5000 INJECTION INTRAVENOUS; SUBCUTANEOUS at 05:15

## 2022-01-01 RX ADMIN — HEPARIN SODIUM 5000 UNIT(S): 5000 INJECTION INTRAVENOUS; SUBCUTANEOUS at 13:45

## 2022-01-01 RX ADMIN — PIPERACILLIN AND TAZOBACTAM 3.38 GRAM(S): 4; .5 INJECTION, POWDER, LYOPHILIZED, FOR SOLUTION INTRAVENOUS at 22:28

## 2022-01-01 RX ADMIN — NYSTATIN CREAM 1 APPLICATION(S): 100000 CREAM TOPICAL at 05:14

## 2022-01-01 RX ADMIN — FENTANYL CITRATE 50 MICROGRAM(S): 50 INJECTION INTRAVENOUS at 19:30

## 2022-01-01 RX ADMIN — Medication 85 MILLIMOLE(S): at 07:52

## 2022-01-01 RX ADMIN — SODIUM CHLORIDE 2500 MILLILITER(S): 9 INJECTION INTRAMUSCULAR; INTRAVENOUS; SUBCUTANEOUS at 21:29

## 2022-01-01 RX ADMIN — Medication 50 MILLIGRAM(S): at 05:45

## 2022-01-01 RX ADMIN — Medication 50 MILLIGRAM(S): at 14:12

## 2022-01-01 RX ADMIN — PANTOPRAZOLE SODIUM 40 MILLIGRAM(S): 20 TABLET, DELAYED RELEASE ORAL at 12:05

## 2022-01-01 RX ADMIN — Medication 50 MILLILITER(S): at 23:54

## 2022-01-01 RX ADMIN — SODIUM CHLORIDE 1000 MILLILITER(S): 9 INJECTION, SOLUTION INTRAVENOUS at 00:19

## 2022-01-01 RX ADMIN — VASOPRESSIN 2.4 UNIT(S)/MIN: 20 INJECTION INTRAVENOUS at 17:51

## 2022-01-01 RX ADMIN — Medication 50 MILLIGRAM(S): at 05:15

## 2022-01-01 RX ADMIN — Medication 137 MICROGRAM(S): at 15:05

## 2022-01-01 RX ADMIN — Medication 50 MILLILITER(S): at 00:10

## 2022-01-01 RX ADMIN — VASOPRESSIN 2.4 UNIT(S)/MIN: 20 INJECTION INTRAVENOUS at 05:38

## 2022-01-01 RX ADMIN — PIPERACILLIN AND TAZOBACTAM 200 GRAM(S): 4; .5 INJECTION, POWDER, LYOPHILIZED, FOR SOLUTION INTRAVENOUS at 21:28

## 2022-01-01 RX ADMIN — Medication 100 MILLIGRAM(S): at 21:27

## 2022-01-01 RX ADMIN — FENTANYL CITRATE 4.08 MICROGRAM(S)/KG/HR: 50 INJECTION INTRAVENOUS at 03:15

## 2022-01-01 RX ADMIN — SODIUM CHLORIDE 150 MILLILITER(S): 9 INJECTION, SOLUTION INTRAVENOUS at 05:39

## 2022-01-01 RX ADMIN — MEROPENEM 100 MILLIGRAM(S): 1 INJECTION INTRAVENOUS at 18:18

## 2022-01-01 RX ADMIN — AMIODARONE HYDROCHLORIDE 618 MILLIGRAM(S): 400 TABLET ORAL at 00:52

## 2022-01-01 RX ADMIN — Medication 3.83 MICROGRAM(S)/KG/MIN: at 16:30

## 2022-01-01 RX ADMIN — HEPARIN SODIUM 5000 UNIT(S): 5000 INJECTION INTRAVENOUS; SUBCUTANEOUS at 22:34

## 2022-01-01 RX ADMIN — Medication 25 GRAM(S): at 05:45

## 2022-01-01 RX ADMIN — PHENYLEPHRINE HYDROCHLORIDE 6.12 MICROGRAM(S)/KG/MIN: 10 INJECTION INTRAVENOUS at 05:14

## 2022-01-01 RX ADMIN — Medication 50 MILLILITER(S): at 09:06

## 2022-01-01 RX ADMIN — DENOSUMAB 1 MG/ML: 60 INJECTION SUBCUTANEOUS at 00:00

## 2022-01-01 RX ADMIN — Medication 250 MILLIGRAM(S): at 23:52

## 2022-01-01 RX ADMIN — NYSTATIN CREAM 1 APPLICATION(S): 100000 CREAM TOPICAL at 05:26

## 2022-01-01 RX ADMIN — MEROPENEM 100 MILLIGRAM(S): 1 INJECTION INTRAVENOUS at 17:48

## 2022-01-01 RX ADMIN — MEROPENEM 100 MILLIGRAM(S): 1 INJECTION INTRAVENOUS at 05:40

## 2022-01-01 RX ADMIN — Medication 650 MILLIGRAM(S): at 06:15

## 2022-01-01 RX ADMIN — Medication 50 MILLIGRAM(S): at 05:14

## 2022-01-01 RX ADMIN — Medication 81 MILLIGRAM(S): at 18:39

## 2022-01-01 RX ADMIN — SODIUM CHLORIDE 150 MILLILITER(S): 9 INJECTION, SOLUTION INTRAVENOUS at 10:10

## 2022-01-01 RX ADMIN — AMIODARONE HYDROCHLORIDE 16.7 MG/MIN: 400 TABLET ORAL at 19:24

## 2022-01-01 RX ADMIN — NYSTATIN CREAM 1 APPLICATION(S): 100000 CREAM TOPICAL at 18:18

## 2022-01-01 RX ADMIN — PHENYLEPHRINE HYDROCHLORIDE 6.12 MICROGRAM(S)/KG/MIN: 10 INJECTION INTRAVENOUS at 11:45

## 2022-01-01 RX ADMIN — Medication 650 MILLIGRAM(S): at 05:45

## 2022-01-01 RX ADMIN — Medication 50 MILLIGRAM(S): at 13:55

## 2022-01-25 PROBLEM — I71.01 DISSECTION OF THORACIC AORTA: Noted: 2019-02-22

## 2022-01-25 PROBLEM — I10 ESSENTIAL HYPERTENSION: Status: ACTIVE | Noted: 2019-03-18

## 2022-01-28 PROBLEM — R06.83 SNORING: Status: ACTIVE | Noted: 2022-01-01

## 2022-01-28 PROBLEM — Z82.49 FAMILY HISTORY OF CONGESTIVE HEART FAILURE: Status: ACTIVE | Noted: 2022-01-01

## 2022-01-28 NOTE — PHYSICAL EXAM
[No Acute Distress] : no acute distress [Normal Oropharynx] : normal oropharynx [Normal Appearance] : normal appearance [No Neck Mass] : no neck mass [Normal Rate/Rhythm] : normal rate/rhythm [Normal S1, S2] : normal s1, s2 [No Murmurs] : no murmurs [No Resp Distress] : no resp distress [Clear to Auscultation Bilaterally] : clear to auscultation bilaterally [No Abnormalities] : no abnormalities [Benign] : benign [Normal Gait] : normal gait [No Clubbing] : no clubbing [No Cyanosis] : no cyanosis [FROM] : FROM [Normal Color/ Pigmentation] : normal color/ pigmentation [No Focal Deficits] : no focal deficits [Oriented x3] : oriented x3 [Normal Affect] : normal affect [III] : Mallampati Class: III [TextBox_2] : wheelchair  [TextBox_68] : I:E 1:3, clear  [TextBox_105] : knee brace, trace pedal edema

## 2022-01-28 NOTE — ADDENDUM
[FreeTextEntry1] : Documented by Justin Hines acting as a scribe for Dr. Dash Plata on 01/28/2022\par \par All medical record entries made by the scribe were at my, Dr. Dash Plata's, direction and personally dictated by me on 01/28/2022. I have reviewed the chart and agree that the record accurately reflects my personal performance of the history, physical exam, assessment, and plan. I have also personally directed, reviewed, and agree with the discharge instructions.

## 2022-01-28 NOTE — PROCEDURE
[FreeTextEntry1] : CXR reveals a normal sized heart; no evidence of infiltrate or effusion--a normal appearing chest radiograph \par \par Patient denied walk because she has an orthopedic knee brace\par \par FENO was 86 ; a normal value being less than 25\par Fractional exhaled nitric oxide (FENO) is regarded as a simple, noninvasive method for assessing eosinophilic airway inflammation. Produced by a variety of cells within the lung, nitric oxide (NO) concentrations are generally low in healthy individuals. However, high concentrations of NO appear to be involved in nonspecific host defense mechanisms and chronic inflammatory diseases such as asthma. The American Thoracic Society (ATS) therefore has recommended using FENO to aid in the diagnosis and monitoring of eosinophilic airway inflammation and asthma, and for identifying steroid responsive individuals whose chronic respiratory symptoms may be caused by airway inflammation. \par \par CXR reveals a moderate cardiomegaly t; no evidence of infiltrate or effusion\par \par Full PFT revealed mild to moderate obstructive and mild obstructive dysfunction, with a FEV1 of 1.57L,which is 60% of predicted,  moderately reduced volumes, and a moderately reduced diffusion of 13.3 , which is 57% of predicted with a normal flow volume loop

## 2022-01-28 NOTE — REASON FOR VISIT
[Initial] : an initial visit [Spouse] : spouse [TextBox_44] :  SOB, sever persistent asthma, allergy asthma, steroid dependent asthma, GERD,  likely LISSETT

## 2022-01-28 NOTE — HISTORY OF PRESENT ILLNESS
[TextBox_4] : Ms. NATARAJAN is a 66 year old female with a history of arthritis, hearing loss, diabetes, dissecting thoracic aortic aneurism Type B, hypo thyroid, lumbosacral, osteoporosis, low vitamin D, asthma, allergies, pulmonary aneurism 2002, IBS, s/p IVC filter presenting to the office today for initial pulmonary evaluation. Her chief complaint is\par -she has had asthma since 1984\par - she has been on steroids for asthma for 40 years \par - she uses proventil, advair, prednisone, albuterol for her asthma\par - SOB on any exerc\par - notes coughing and wheezing (uses nebulizer)\par - triggers are UR tract infection, cold air, odors (perfumes, smoke exposure)\par - her allergies are worst in Spec-Oct\par - notes PND\par - notes she has IBS\par - she is on 15mg for prednisone per day\par - she is going to physical therapy \par - she used to be on amiodarone for Afib\par - her energy level has been poor\par - notes snoring a little \par - she dose not wake up rested\par - concentration has been better than it was after aneurism\par - -she reports being able to fall asleep while watching a boring TV show \par -she reports being able to fall asleep as a passenger in a car for over an hour\par -she notes her bowels are irregular \par - notes a little bit of ankle/leg swelling \par - \par \par -she denies any headaches, nausea, vomiting, fever, chills, sweats, chest pain, chest pressure, diarrhea, constipation, dysphagia, dizziness, leg swelling, leg pain, itchy eyes, itchy ears, heartburn, reflux, sour taste in the mouth, myalgias or arthralgias.

## 2022-01-28 NOTE — ASSESSMENT
[FreeTextEntry1] : Ms. NATARAJAN is a 66 year old female with a history of arthritis, hearing loss, diabetes, dissecting thoracic aortic aneurism Type B, hypo thyroid, lumbosacral, osteoporosis, low vitamin D, asthma, allergies, pulmonary aneurism 2002, IBS, s/p IVC filter presenting to the office today for initial pulmonary evaluation for SOB, sever persistent asthma, allergy asthma, steroid dependent asthma, GERD,  likely LISSETT\par \par Her shortness of breath is multifactorial due to:\par -poor mechanics of breathing \par -out of shape / overweight\par -pulmonary disease\par    - restrictive dysfunction based on body habitus\par    - Severe persistent Asthma \par    - steroid dependent asthma \par -?cardiac disease \par  \par \par problem 1: - restrictive dysfunction based on body habitus\par - recommended  posture strengthening, weight loss\par \par problem 2: Severe persistent Asthma \par - change Advair to Bevespi 2 puffs BID \par - Add Alvesco 160 1 puffs BID \par -Add Albuterol 0.83% via nebulizer Q6H (before inhalers)\par -Add Singulair 10 mg QHS \par -Inhaler technique reviewed as well as oral hygiene technique reviewed with patient. Avoidance of cold air, extremes of temperature, rescue inhaler should be used before exercise. Order of medication reviewed with patient. Recommended use of a cool mist humidifier in the bedroom. \par -Asthma is believed to be caused by inherited (genetic) and environmental factor, but its exact cause is unknown. asthma may be triggered by allergens, lung infections, or irritants in the air. Asthma triggers are different for each person \par \par problem 3: steroid dependent asthma (currently on Prednisone 15 mg a day, split does)\par - initiate dupixent (twice a month); taper steroids once on dupixent\par -Dupixent is a prescription medicine used with other asthma medicines for the maintenance treatment of moderate-to-severe asthma in people aged 12 years and older whose asthma is not controlled with their current asthma medicines. Dupixent helps prevent severe asthma attacks (exacerbations) and can improve your breathing. Dupixent may also help reduce the amount of oral corticosteroids you need while preventing severe asthma attacks and improving your breathing. Dupixent is not used to treat sudden breathing problems. Risks and side effect of Dupixent were discussed and reviewed with patient. \par \par Problem 4: allergies/ sinus\par -Add Olopatadine 0.6% 1 sniff BID \par -Get blood work to include: asthma panel, food IgE panel, IgE level, eosinophil level, vitamin D level \par -Environmental measures for allergies were encouraged including mattress and pillow cover, air purifier, and environmental controls. \par \par Problem 5: GERD\par -Add Pepcid 40 mg QHS \par - Rule of 2s: avoid eating too much, eating too late, eating too spicy, eating two hours before bed.\par - Things to avoid including overeating, spicy foods, tight clothing, eating within two hours of bed, this list is not all inclusive.\par - For treatments of reflux, possible options discussed including diet control, H2 blockers, PPIs, as well as coating motility agents discussed as treatment options. Timing of meals and proximity of last meal to sleep were discussed. If symptoms persist, a formal gastrointestinal evaluation is needed. \par \par Problem 6: Covid-19 \par -s/p COVID-19 vaccine x3\par \par Problem 7: ?LISSETT\par -Complete home sleep study\par -Sleep apnea is associated with adverse clinical consequences which can affect most organ systems. Cardiovascular disease risk includes arrhythmias, atrial fibrillation, hypertension, coronary artery disease, and stroke. Metabolic disorders include diabetes type 2, non-alcoholic fatty liver disease. Mood disorder especially depression; and cognitive decline especially in the elderly. Associations with chronic reflux/Crespo’s esophagus some but not all inclusive. \par \par \par problem 8 : cardiac disease\par -recommended to continue to follow up with Cardiologist (if needed) Dr. Ramos \par \par problem 9: poor breathing mechanics\par -Recommended Wim Hof and Buteyko breathing techniques \par -Proper breathing techniques were reviewed with an emphasis of exhalation. Patient instructed to breath in for 1 second and out for four seconds. Patient was encouraged to not talk while walking. \par \par problem 10: out of shape / overweight\par -Recommend "Muniq" OTC for weight loss, energy, and blood sugar \par -Weight loss, exercise, and diet control were discussed and are highly encouraged. Treatment options were given such as, aqua therapy, and contacting a nutritionist. Recommended to use the elliptical, stationary bike, less use of treadmill. Mindful eating was explained to the patient Obesity is associated with worsening asthma, shortness of breath, and potential for cardiac disease, diabetes, and other underlying medical conditions\par \par problem 11: health maintenance \par - recommended aqua therapy \par -recommended yearly flu shot after October 15\par -recommended strep pneumonia vaccines: Prevnar-13 vaccine, followed by Pneumo vaccine 23 one year following after 65\par -recommended early intervention for Upper Respiratory Infections (URIs)\par -recommended regular osteoporosis evaluations\par -recommended early dermatological evaluations\par -recommended after the age of 50 to the age of 70, colonoscopy every 5 years\par \par F/U in 6-8 weeks.\par She is encouraged to call with any changes, concerns, or questions\par

## 2022-01-29 NOTE — CURRENT MEDS
[Takes medication as prescribed] : takes [None] : Patient does not have any barriers to medication adherence [Yes] : Reviewed medication list for presence of high-risk medications. [Opioids] : opioids [Other____] : [unfilled]

## 2022-01-29 NOTE — HEALTH RISK ASSESSMENT
[Any fall with injury in past year] : Patient reported fall with injury in the past year [Assistive Device] : Patient uses an assistive device [de-identified] : pulmonary, rheumatology - notes reviewed [de-identified] : walker, wheelchair [[facility]] : unable

## 2022-01-29 NOTE — PHYSICAL EXAM
[No Acute Distress] : no acute distress [Normal Sclera/Conjunctiva] : normal sclera/conjunctiva [Normal Outer Ear/Nose] : the outer ears and nose were normal in appearance [No JVD] : no jugular venous distention [No Respiratory Distress] : no respiratory distress  [No Accessory Muscle Use] : no accessory muscle use [Clear to Auscultation] : lungs were clear to auscultation bilaterally [Normal Rate] : normal rate  [Regular Rhythm] : with a regular rhythm [Normal S1, S2] : normal S1 and S2 [No Murmur] : no murmur heard [Pedal Pulses Present] : the pedal pulses are present [Normal Supraclavicular Nodes] : no supraclavicular lymphadenopathy [Normal Anterior Cervical Nodes] : no anterior cervical lymphadenopathy [Normal Affect] : the affect was normal [Alert and Oriented x3] : oriented to person, place, and time [Normal Insight/Judgement] : insight and judgment were intact [None] : no ulcers in either foot were found [] : both feet [de-identified] : overweight  [de-identified] : decreased breath sounds but no wheezes actively today, had recent wheezing [de-identified] : trace edema  [de-identified] : reduced shoulder ROM bilaterally [de-identified] : no active wounds at this time [de-identified] : using wheelchair for transport into the office  [de-identified] : foot exam much improved and nails have healed back to baseline-  [TWNoteComboBox3] : +1 [TWNoteComboBox4] : +1

## 2022-01-29 NOTE — HISTORY OF PRESENT ILLNESS
[Asthma] : Asthma [Diabetes Mellitus] : Diabetes Mellitus [Hyperlipidemia] : Hyperlipidemia [No episodes] : No hypoglycemic episodes since the last visit. [Does not check] : Patient does not check blood glucose regularly [Regular podiatrist visits] : Patient had regular podiatrist visits [Understanding of foot care] : Patient expressed understanding of foot care [Most Recent A1C: ___] : Most recent A1C was [unfilled] [Target A1C:  ___] : Target A1C is [unfilled] [Severe Persistent] : severe persistent [Cough] : cough [Sputum Production] : productive cough [Wheezing] : wheezing [___ x/week] : Patient awakes at night [unfilled] times per week [Other: ___] : [unfilled] [Inhaler Use] : inhaler use [Does not check Peak Flow] : The patient is not checking peak flow [___ x/day] : [unfilled]  times per day [Somewhat controlled] : Condition is somewhat controlled [Stable] : Patient is stable [Managed with medications] : managed with  medication [Bile acid sequestrants] : Patient is on nonstatin therapy - Bile acid sequestrants [FreeTextEntry1] : cholestryramine

## 2022-01-29 NOTE — ASSESSMENT
[FreeTextEntry1] : discussion around her recent asthma exacerbation. Having PFT and evaluation by new pulmonologist to attempt to again get her on reduced dose of steroids. Agree with this plan and hopefully may be able to tolerate one of the newer inhaler combinations with reduced prednisone dose. Also discussed the continuing need for PT and ongoing joint complaints especially of her knee - labs reviewed with the patient and to continue current medication management as prescribed. Follow up pulmonary and rheum as scheduled

## 2022-02-10 NOTE — HISTORY OF PRESENT ILLNESS
[FreeTextEntry1] : 66 year old woman with long standing DM, asthma and previous type B aortic dissection referred for evaluation of a right great toe wound. She states that she developed this wound 1-2 months ago. No preceding history of trauma. She had redness around the wound that has improved with a course of PO Augmentin.\par Patient was recently evaluated by her podiatrist (Dr Bashir) who noted that she had exposed bone in her toe wound.\par Patient has long standing diabetic neuropathy with limited sensation in her feet.\par Her ambulation is limited due to a right knee injury but she uses a walker and denies intermittent claudication

## 2022-02-10 NOTE — PHYSICAL EXAM
[Normal Breath Sounds] : Normal breath sounds [Normal Rate and Rhythm] : normal rate and rhythm [0] : left 0 [Ankle Swelling (On Exam)] : present [Ankle Swelling Bilaterally] : bilaterally  [Ankle Swelling On The Right] : mild [No Rash or Lesion] : No rash or lesion [Alert] : alert [Oriented to Person] : oriented to person [Oriented to Place] : oriented to place [Oriented to Time] : oriented to time [JVD] : no jugular venous distention  [de-identified] : Well appearing [FreeTextEntry1] : Right great toe 1cm x 2 cm ulcer with exposed bone

## 2022-02-10 NOTE — ASSESSMENT
[FreeTextEntry1] : 66 year old woman with DM and a right great toe ulcer. \par Her right great toe ulcer has exposed bone therefore she clinically has osteomyelitis.\par Non invasive arterial evaluation with SHANKAR and PVR was attempted but due to her frequent RLE spasms, the study could not be completed.\par -Will obtain CTA to evaluate right leg perfusion. If she has no significant occlusive disease, she will be cleared for planned podiatric intervention.

## 2022-03-01 PROBLEM — J45.909 ORAL STEROID-DEPENDENT ASTHMA: Status: ACTIVE | Noted: 2022-01-01

## 2022-03-01 PROBLEM — Z01.811 PREOP PULMONARY/RESPIRATORY EXAM: Status: ACTIVE | Noted: 2022-01-01

## 2022-03-01 PROBLEM — R06.02 SOB (SHORTNESS OF BREATH): Status: ACTIVE | Noted: 2022-01-01

## 2022-03-01 PROBLEM — J30.9 ALLERGIC RHINITIS: Status: ACTIVE | Noted: 2022-01-01

## 2022-03-01 PROBLEM — K21.9 GERD (GASTROESOPHAGEAL REFLUX DISEASE): Status: ACTIVE | Noted: 2022-01-01

## 2022-03-01 NOTE — ADDENDUM
[FreeTextEntry1] : Documented by Justin Hines acting as a scribe for Dr. Dash Plata on 03/01/2022\par \par All medical record entries made by the scribe were at my, Dr. Dash Plata's, direction and personally dictated by me on 03/01/2022. I have reviewed the chart and agree that the record accurately reflects my personal performance of the history, physical exam, assessment, and plan. I have also personally directed, reviewed, and agree with the discharge instructions.

## 2022-03-01 NOTE — REASON FOR VISIT
[Follow-Up] : a follow-up visit [Spouse] : spouse [TextBox_44] :  SOB, sever persistent asthma, allergy asthma, steroid dependent asthma, GERD,  likely LISSETT

## 2022-03-01 NOTE — ASSESSMENT
[FreeTextEntry1] : Ms. NATARAJAN is a 66 year old female with a history of arthritis, hearing loss, diabetes, dissecting thoracic aortic aneurism Type B, hypo thyroid, lumbosacral, osteoporosis, low vitamin D, asthma, allergies, pulmonary aneurism 2002, IBS, s/p IVC filter presenting to the office today for a follow-up pulmonary evaluation for SOB, severe persistent asthma, allergy asthma, steroid dependent asthma, GERD,  likely LISSETT - awaiting right foot surgery\par \par Her shortness of breath is multifactorial due to:\par -poor mechanics of breathing \par -out of shape / overweight\par -pulmonary disease\par    - restrictive dysfunction based on body habitus\par    - Severe persistent Asthma \par    - steroid dependent asthma \par -?cardiac disease \par  \par \par problem 1: - restrictive dysfunction based on body habitus\par - recommended  posture strengthening, weight loss\par \par problem 2: Severe persistent Asthma \par - continue Bevespi 2 puffs BID \par - continue Alvesco 160 1 puffs BID (pending)\par -continue Albuterol 0.83% via nebulizer Q6H (before inhalers)\par -continue Singulair 10 mg QHS \par -Inhaler technique reviewed as well as oral hygiene technique reviewed with patient. Avoidance of cold air, extremes of temperature, rescue inhaler should be used before exercise. Order of medication reviewed with patient. Recommended use of a cool mist humidifier in the bedroom. \par -Asthma is believed to be caused by inherited (genetic) and environmental factor, but its exact cause is unknown. asthma may be triggered by allergens, lung infections, or irritants in the air. Asthma triggers are different for each person \par \par Problem 2A: Elevated IgE (102)\par -Xolair candidate\par -Xolair is a recombinant DNA- derived humanized IgG1K monoclonal antibody that selectively binds ot human immunoglobulin E (IgE). Xolair is produced by a Chinese hamster ovary cell suspension culture in nutrient medium containing the antibiotic gentamicin. Gentamicin is not detectable in the final product. Xolair is a sterile, white, preservative free, lyophilized powder contained in a single use vial that is reconstituted with sterile water for suspension. Side effects include: wheezing, tightness of the chest, trouble breathing, hives, skin rash, feeling anxious or light-headed, fainting, warmth or tingling under skin, or swelling of face, lips, or tongue \par \par problem 3: steroid dependent asthma (currently on Prednisone 15 mg a day, split does)\par - initiate dupixent (twice a month); taper steroids once on dupixent (post op)\par -Dupixent is a prescription medicine used with other asthma medicines for the maintenance treatment of moderate-to-severe asthma in people aged 12 years and older whose asthma is not controlled with their current asthma medicines. Dupixent helps prevent severe asthma attacks (exacerbations) and can improve your breathing. Dupixent may also help reduce the amount of oral corticosteroids you need while preventing severe asthma attacks and improving your breathing. Dupixent is not used to treat sudden breathing problems. Risks and side effect of Dupixent were discussed and reviewed with patient. \par \par Problem 4: allergies/ sinus\par -continue Olopatadine 0.6% 1 sniff BID \par -Get blood work to include: asthma panel, food IgE panel, IgE level, eosinophil level, vitamin D level \par -Environmental measures for allergies were encouraged including mattress and pillow cover, air purifier, and environmental controls. \par \par Problem 5: GERD\par -continue Pepcid 40 mg QHS \par - Rule of 2s: avoid eating too much, eating too late, eating too spicy, eating two hours before bed.\par - Things to avoid including overeating, spicy foods, tight clothing, eating within two hours of bed, this list is not all inclusive.\par - For treatments of reflux, possible options discussed including diet control, H2 blockers, PPIs, as well as coating motility agents discussed as treatment options. Timing of meals and proximity of last meal to sleep were discussed. If symptoms persist, a formal gastrointestinal evaluation is needed. \par \par Problem 6: Covid-19 \par -s/p COVID-19 vaccine x3\par \par Problem 7: No LISSETT\par -s/p home sleep study (-)\par -Sleep apnea is associated with adverse clinical consequences which can affect most organ systems. Cardiovascular disease risk includes arrhythmias, atrial fibrillation, hypertension, coronary artery disease, and stroke. Metabolic disorders include diabetes type 2, non-alcoholic fatty liver disease. Mood disorder especially depression; and cognitive decline especially in the elderly. Associations with chronic reflux/Crespo’s esophagus some but not all inclusive. \par \par \par problem 8 : cardiac disease\par -recommended to continue to follow up with Cardiologist (if needed) Dr. Ramos \par \par problem 9: poor breathing mechanics\par -Recommended Wim Hof and Buteyko breathing techniques \par -Proper breathing techniques were reviewed with an emphasis of exhalation. Patient instructed to breath in for 1 second and out for four seconds. Patient was encouraged to not talk while walking. \par \par problem 10: out of shape / overweight\par -Recommend "Muniq" OTC for weight loss, energy, and blood sugar \par -Weight loss, exercise, and diet control were discussed and are highly encouraged. Treatment options were given such as, aqua therapy, and contacting a nutritionist. Recommended to use the elliptical, stationary bike, less use of treadmill. Mindful eating was explained to the patient Obesity is associated with worsening asthma, shortness of breath, and potential for cardiac disease, diabetes, and other underlying medical conditions\par \par problem 11: health maintenance \par - recommended aqua therapy \par -recommended yearly flu shot after October 15\par -recommended strep pneumonia vaccines: Prevnar-13 vaccine, followed by Pneumo vaccine 23 one year following after 65\par -recommended early intervention for Upper Respiratory Infections (URIs)\par -recommended regular osteoporosis evaluations\par -recommended early dermatological evaluations\par -recommended after the age of 50 to the age of 70, colonoscopy every 5 years\par \par F/U in 6-8 weeks.\par She is encouraged to call with any changes, concerns, or questions\par

## 2022-03-01 NOTE — PHYSICAL EXAM
[No Acute Distress] : no acute distress [Normal Oropharynx] : normal oropharynx [II] : Mallampati Class: II [Normal Appearance] : normal appearance [No Neck Mass] : no neck mass [Normal Rate/Rhythm] : normal rate/rhythm [Normal S1, S2] : normal s1, s2 [No Murmurs] : no murmurs [No Resp Distress] : no resp distress [Clear to Auscultation Bilaterally] : clear to auscultation bilaterally [No Abnormalities] : no abnormalities [Benign] : benign [Normal Gait] : normal gait [No Clubbing] : no clubbing [No Cyanosis] : no cyanosis [FROM] : FROM [Normal Color/ Pigmentation] : normal color/ pigmentation [No Focal Deficits] : no focal deficits [Oriented x3] : oriented x3 [Normal Affect] : normal affect [TextBox_2] : wheelchair  [TextBox_68] : I:E 1:3, clear  [TextBox_80] : mild kyphosis [TextBox_105] : swelling right over left open toe

## 2022-03-01 NOTE — HISTORY OF PRESENT ILLNESS
[TextBox_4] : Ms. NATARAJAN is a 66 year old female with a history of arthritis, hearing loss, diabetes, dissecting thoracic aortic aneurism Type B, hypo thyroid, lumbosacral, osteoporosis, low vitamin D, asthma, allergies, pulmonary aneurism 2002, IBS, s/p IVC filter presenting to the office today for a follow-up pulmonary evaluation. Her chief complaint is\par -she notes her medication has increased her heartburn\par -she notes she has not gotten Alvesco because of her insurance\par -she notes she got the Dupixent but has not started it yet because she is unsure if she should start it before or after her surgery\par -she notes she is taking antacid pills almost every day\par -she notes her bowels are regularly irregular\par -she notes she does not eat sometimes because her stomach feels bad from the IBS\par -he notes she is due for surgery 3/7/2022\par -she notes it took a few weeks to get the Dupixent\par \par -patient denies any headaches, nausea, vomiting, fever, chills, sweats, chest pain, chest pressure, palpitations, coughing, wheezing, fatigue, diarrhea, constipation, dysphagia, myalgias, dizziness, leg swelling, leg pain, itchy eyes, itchy ears, heartburn, reflux or sour taste in the mouth

## 2022-03-01 NOTE — PROCEDURE
[FreeTextEntry1] : Sleep study (2/20/2022) revealed sleep apnea with an AHI/VIVI of 2.8, and a low oxygen saturation of 74%\par \par CT angio abdomen aorta (2.16.2022) revealed No CT evidence of aortoiliac stenosis or occlusion.\par \par Full PFT revealed mild restrictive and mild obstructive dysfunction, with a FEV1 of 1.57L,which is 60% of predicted,  mild-moderately reduced lung volumes, and a mild-moderately reduced diffusion of  13.3, which is 57% of predicted with a normal flow volume loop\par \par -Images and procedures reviewed in detail and discussed with patient.

## 2022-03-02 NOTE — H&P PST ADULT - NSANTHOSAYNRD_GEN_A_CORE
No. LISSETT screening performed.  STOP BANG Legend: 0-2 = LOW Risk; 3-4 = INTERMEDIATE Risk; 5-8 = HIGH Risk

## 2022-03-02 NOTE — H&P PST ADULT - HISTORY OF PRESENT ILLNESS
66 y.o female presents to PST with hx of right great toe ulceration.  Patient's hx significant for DM and diabetic neuropathy. She was sent for a vascular evaluation prior to necessary surgery of right great toe  66 y.o female presents to PST with hx of right great toe non healing wound .  Patient's hx significant for DM and diabetic neuropathy. She was sent for a vascular evaluation prior to necessary surgery of right great toe and found to have questionable blockage right leg. She is now scheduled for Right Leg Angiogram possible intervention

## 2022-03-02 NOTE — H&P PST ADULT - ABILITY TO HEAR (WITH HEARING AID OR HEARING APPLIANCE IF NORMALLY USED):
Iron deficiency anemia D50.9    Impaired intestinal absorption K90.9    Neoplasm related pain G89.3    Weight loss R63.4    Abdominal pain R10.9    Chest pain, atypical R07.89    Partial small bowel obstruction (HCC) K56.600    Malignant neoplasm of ovary (HCC) C56.9       Impression/Plan:   1. POD#2  - cont clear liquids. Would be slow to advance given her carcinomatosis and prior tumor ileus and recent small bowel resection. - will need PT/OT    2. KLAUDIA with possible urinary retention  - repeat bladder scan. - Chris Varela is very reluctant to have seymour replaced, however, if unable to void, will need to reinsert seymour  - strict I/Os  - repeat labs in am  - cont IVF hydration    3. Anemia  - cont to monitor. If symptomatic, or worsening anemia, consider PRBCs    4.  Hyponatremia and hypochloremia  - replete lytes prn  - cont IVF    Yevgeniy Mcmullen DO., [facility]  Gynecologic Oncology  [facility]  [phone]  7/25/2020  11:19 AM Hearing aids

## 2022-03-02 NOTE — H&P PST ADULT - GASTROINTESTINAL
Father  Still living? Unknown  Family history of cancer, Age at diagnosis: Age Unknown     Mother  Still living? No  Family history of cancer, Age at diagnosis: Age Unknown details… detailed exam

## 2022-03-02 NOTE — H&P PST ADULT - NSICDXPASTSURGICALHX_GEN_ALL_CORE_FT
PAST SURGICAL HISTORY:  Abdominal hernia 10/18/2010 (multiple)    Uday filter in place 11/1990,  second placed 11/2002    H/O resection of small bowel Perforated --5/2006    History of cataract surgery     History of colon resection & Hysterectomy---colon cancer---12/2002--no chemo or radiation    S/P cholecystectomy 1996

## 2022-03-02 NOTE — H&P PST ADULT - NSICDXPASTMEDICALHX_GEN_ALL_CORE_FT
PAST MEDICAL HISTORY:  Anemia     Arthritis     Asthma     Diabetes mellitus Type II    Dissecting aortic aneurysm, thoracic Type B---no surgery, nerve damage, lower extremity weakness    Essential tremor     H/O osteoporosis     H/O rotator cuff tear Left shoulder    Hyperlipidemia     Hypothyroidism     Irritable bowel syndrome (IBS)     Open wound Right great toe    PAD (peripheral artery disease)     PAF (paroxysmal atrial fibrillation)     PE (pulmonary embolism) 9/2002--anticoagulated, resolved    Shingles 2015    Torn ACL

## 2022-03-02 NOTE — H&P PST ADULT - ASSESSMENT
66 y.o female scheduled for Right Leg Angiogram, possible Intervention  Plan  1. Stop all NSAIDS, herbal supplements and vitamins for 7 days. Continue ASA per surgeon. Hold Metformin 24 hours prior to surgery   2. NPO at midnight.  3. Take the following medications Synthroid, Gabapentin  with small sips of water on the morning of your procedure/surgery.  4. Labs, EKG, CXR  as per surgeon  5. PMD CANDELARIO Smith visit for optimization prior to surgery as per surgeon  6. COVID swab appt: 3/4/2022

## 2022-03-04 PROBLEM — Z01.812 PRE-PROCEDURAL LABORATORY EXAMINATION: Status: RESOLVED | Noted: 2021-03-24 | Resolved: 2022-01-01

## 2022-03-04 PROBLEM — Z12.11 COLON CANCER SCREENING: Status: RESOLVED | Noted: 2020-05-12 | Resolved: 2022-01-01

## 2022-03-04 NOTE — PHYSICAL EXAM
[No Acute Distress] : no acute distress [Well Nourished] : well nourished [Normal Sclera/Conjunctiva] : normal sclera/conjunctiva [PERRL] : pupils equal round and reactive to light [Normal Outer Ear/Nose] : the outer ears and nose were normal in appearance [No JVD] : no jugular venous distention [Thyroid Normal, No Nodules] : the thyroid was normal and there were no nodules present [No Respiratory Distress] : no respiratory distress  [No Accessory Muscle Use] : no accessory muscle use [Clear to Auscultation] : lungs were clear to auscultation bilaterally [Normal Rate] : normal rate  [Regular Rhythm] : with a regular rhythm [Normal S1, S2] : normal S1 and S2 [No Murmur] : no murmur heard [Declined Breast Exam] : declined breast exam  [Soft] : abdomen soft [Non Tender] : non-tender [Non-distended] : non-distended [No HSM] : no HSM [Declined Rectal Exam] : declined rectal exam [Normal Supraclavicular Nodes] : no supraclavicular lymphadenopathy [Normal Posterior Cervical Nodes] : no posterior cervical lymphadenopathy [Normal Anterior Cervical Nodes] : no anterior cervical lymphadenopathy [No Joint Swelling] : no joint swelling [Normal Affect] : the affect was normal [Alert and Oriented x3] : oriented to person, place, and time [Normal Insight/Judgement] : insight and judgment were intact [de-identified] : trace bilateral lower extremity edema stable, vascular per vascular surgery [de-identified] : deferred [de-identified] : skin thin with some minimal ecchymoses of extremitites  [de-identified] : wheelchair today, use of walker when at home, upper extremity tremors

## 2022-03-04 NOTE — CURRENT MEDS
[Takes medication as prescribed] : takes [None] : Patient does not have any barriers to medication adherence [Yes] : Reviewed medication list for presence of high-risk medications. [Opioids] : opioids [FreeTextEntry1] : tramadol fr severe pain related to musculoskeletal issues, not able to be on NSAID's

## 2022-03-04 NOTE — REVIEW OF SYSTEMS
[Hearing Loss] : hearing loss [Postnasal Drip] : postnasal drip [Dyspnea on Exertion] : dyspnea on exertion [Diarrhea] : diarrhea [Joint Pain] : joint pain [Muscle Pain] : muscle pain [Back Pain] : back pain [Unsteady Walking] : ataxia [Easy Bleeding] : easy bleeding [Easy Bruising] : easy bruising [Negative] : Psychiatric [Earache] : no earache [Nosebleed] : no nosebleeds [Hoarseness] : no hoarseness [Sore Throat] : no sore throat [Wheezing] : no wheezing [Cough] : no cough [Abdominal Pain] : no abdominal pain [Nausea] : no nausea [Vomiting] : no vomiting [Heartburn] : no heartburn [Melena] : no melena [Headache] : no headache [Dizziness] : no dizziness [Fainting] : no fainting [Confusion] : no confusion [Swollen Glands] : no swollen glands [FreeTextEntry6] : no active wheezing at this time, no recent rescue inhaler [FreeTextEntry7] : IBS with intermittent diarrhea none today, no bleeding or pain [FreeTextEntry8] : retention chronically post spinal ischemia from aortic anerysm [FreeTextEntry9] : no changes to muscluoskeletal pain, muslce weakness- follows rheumatology [de-identified] : thin skin with easy brusing secondaary to steroids chronically [de-identified] : gait disturbance post spinal ischemia- uses wheelchair or walker for mobility

## 2022-03-04 NOTE — HISTORY OF PRESENT ILLNESS
[No Pertinent Cardiac History] : no history of aortic stenosis, atrial fibrillation, coronary artery disease, recent myocardial infarction, or implantable device/pacemaker [Asthma] : asthma [No Adverse Anesthesia Reaction] : no adverse anesthesia reaction in self or family member [Diabetes] : diabetes [(Patient denies any chest pain, claudication, dyspnea on exertion, orthopnea, palpitations or syncope)] : Patient denies any chest pain, claudication, dyspnea on exertion, orthopnea, palpitations or syncope [COPD] : no COPD [Sleep Apnea] : no sleep apnea [Smoker] : not a smoker [Chronic Anticoagulation] : no chronic anticoagulation [Chronic Kidney Disease] : no chronic kidney disease [FreeTextEntry1] : Angiogram and possible stent of the right lower extremity [FreeTextEntry2] : 3/7/22 [FreeTextEntry3] : Dr. Dailey [FreeTextEntry4] : Patient is a 67 yo with multiple comorbid conditions seen today prior to vascular arteriogram and possible stent for the right lower extremity. She has a recent development of foot ulcer on the right great toe which is an open non-healing wound and is having this study to evaluate next steps in caring for this problem. She will have the procedure in interventional radiology at .  [FreeTextEntry5] : steroid dependent asthma with pulmonary clearance available, previous smoker quit 1977,  DM2 on long term insulin A1C 6.2, no hypoglycemia

## 2022-03-04 NOTE — ASSESSMENT
[High Risk Surgery - Intraperitoneal, Intrathoracic or Supringuinal Vascular Procedures] : High Risk Surgery - Intraperitoneal, Intrathoracic or Supringuinal Vascular Procedures - No (0) [Ischemic Heart Disease] : Ischemic Heart Disease - No (0) [Congestive Heart Failure] : Congestive Heart Failure - No (0) [Prior Cerebrovascular Accident or TIA] : Prior Cerebrovascular Accident or TIA - No (0) [Insulin-dependent Diabetic (1 point)] : Insulin-dependent Diabetic - Yes (1) [Creatinine >= 2mg/dL (1 Point)] : Creatinine >= 2mg/dL - No (0) [1] : 1 , RCRI Class: II, Risk of Post-Op Cardiac Complications: 6.0%, 95% CI for Risk Estimate: 4.9% - 7.4% [Patient Optimized for Surgery] : Patient optimized for surgery [Modify medications prior to procedure] : Modify medications prior to procedure [As per surgery] : as per surgery [FreeTextEntry4] : Patient to have low risk procedure with sedation, optimized for this procedure which is urgent to establish a treatment plan for worsening great toe foot ulcer with exposed bone. Insulin, medications advised per this note.  [FreeTextEntry5] : na [FreeTextEntry6] : to take aspirin as regularly scheduled  [FreeTextEntry7] : lantus 12 units night prio to procedure, no metformin beginning day prior to procedure and for 24 hours post procedure, hold januvia am of surgery, to take gabapentin, all inhalers, synthorid on am of procedure

## 2022-03-07 NOTE — ASU PATIENT PROFILE, ADULT - FALL HARM RISK - HARM RISK INTERVENTIONS

## 2022-03-07 NOTE — DISCHARGE NOTE PROVIDER - NSDCFUADDINST_GEN_ALL_CORE_FT
Please continue taking all regularly scheduled home medications.  An additional antiplatelet medication, plavix, has been sent to your pharmacy. Please take once a day.  Please follow up with Dr. Dailey in his office in 2 weeks.  Please call the office to schedule your appointment.

## 2022-03-07 NOTE — DISCHARGE NOTE PROVIDER - HOSPITAL COURSE
Patient presented with peripheral vascular disease with tissue loss and chronic nonhealing wound right first toe.  Patient underwent right lower extremity angiogram and angioplasty.  Patient tolerated procedure well.

## 2022-03-07 NOTE — DISCHARGE NOTE PROVIDER - CARE PROVIDERS DIRECT ADDRESSES
[email or web address]

## 2022-03-07 NOTE — DISCHARGE NOTE PROVIDER - NSDCFUSCHEDAPPT_GEN_ALL_CORE_FT
KASHIF NATARAJAN ; 03/15/2022 ; NPP Cardio [address] [facility]  KASHIF NATARAJAN ; 04/26/2022 ; NPP Med  [address] [facility]  KASHIF NATARAJAN ; 06/03/2022 ; NPP PulmMed [address] [facility]

## 2022-03-07 NOTE — DISCHARGE NOTE PROVIDER - NSDCMRMEDTOKEN_GEN_ALL_CORE_FT
acetaminophen 325 mg oral tablet: 2 tab(s) orally every 6 hours, As needed, Temp greater or equal to 38C (100.4F), Mild Pain (1 - 3)  albuterol 90 mcg/inh inhalation aerosol: 1 puff(s) inhaled every 4 hours, As Needed  Alvesco  mcg/inh inhalation aerosol: 1 puff(s) inhaled 2 times a day  aspirin 81 mg oral delayed release tablet: 1 tab(s) orally once a day  Bevespi Aerosphere 9 mcg-4.8 mcg/inh inhalation aerosol: 2 puff(s) inhaled once a day  CBD topical: applied to shoulders   CoQ10: 2  orally once a day  gabapentin 100 mg oral capsule: 1 cap(s) orally 2 times a day  gabapentin 300 mg oral capsule: 1 cap(s) orally once a day (at bedtime)  insulin glargine: 15 unit(s) subcutaneous once a day (at bedtime)  Januvia 100 mg oral tablet: 1 tab(s) orally once a day  Lasix: 10 milligram(s) orally--1-2 x a week   levothyroxine 137 mcg (0.137 mg) oral tablet: 1 tab(s) orally once a day  melatonin 3 mg oral tablet: 1 tab(s) orally   metFORMIN 500 mg oral tablet: 1 tab(s) orally 2 times a day--with breakfast and dinner-- to hold 24 hrs prior to procedure   montelukast 10 mg oral tablet: 1 tab(s) orally once a day  olopatadine 665 mcg/inh nasal spray: 2 spray(s) nasal 2 times a day  Pepcid 40 mg oral tablet: 1 tab(s) orally once a day (at bedtime)  predniSONE: 15 milligram(s) orally 2 times a day--divided dose (7.5 mg am &amp; 7.5 mg pm) on for over 35 yrs   Prolia 60 mg/mL subcutaneous solution: every 6 months --last 10/1/2021--next due 2/21/2022  tamsulosin 0.4 mg oral capsule: 1 cap(s) orally once a day (at bedtime)  traMADol 50 mg oral tablet: 1 tab(s) orally every 8 hours, As Needed  Vitamin D3 25 mcg (1000 intl units) oral tablet: 1 tab(s) orally once a day   acetaminophen 325 mg oral tablet: 2 tab(s) orally every 6 hours, As needed, Temp greater or equal to 38C (100.4F), Mild Pain (1 - 3)  albuterol 90 mcg/inh inhalation aerosol: 1 puff(s) inhaled every 4 hours, As Needed  Alvesco  mcg/inh inhalation aerosol: 1 puff(s) inhaled 2 times a day  aspirin 81 mg oral delayed release tablet: 1 tab(s) orally once a day  Bevespi Aerosphere 9 mcg-4.8 mcg/inh inhalation aerosol: 2 puff(s) inhaled once a day  CBD topical: applied to shoulders   CoQ10: 2  orally once a day  gabapentin 100 mg oral capsule: 1 cap(s) orally 2 times a day  gabapentin 300 mg oral capsule: 1 cap(s) orally once a day (at bedtime)  insulin glargine: 15 unit(s) subcutaneous once a day (at bedtime)  Januvia 100 mg oral tablet: 1 tab(s) orally once a day  Lasix: 10 milligram(s) orally--1-2 x a week   levothyroxine 137 mcg (0.137 mg) oral tablet: 1 tab(s) orally once a day  melatonin 3 mg oral tablet: 1 tab(s) orally   metFORMIN 500 mg oral tablet: 1 tab(s) orally 2 times a day--with breakfast and dinner-- to hold 24 hrs prior to procedure   montelukast 10 mg oral tablet: 1 tab(s) orally once a day  olopatadine 665 mcg/inh nasal spray: 2 spray(s) nasal 2 times a day  Pepcid 40 mg oral tablet: 1 tab(s) orally once a day (at bedtime)  Plavix 75 mg oral tablet: 1 tab(s) orally once a day   predniSONE: 15 milligram(s) orally 2 times a day--divided dose (7.5 mg am &amp; 7.5 mg pm) on for over 35 yrs   Prolia 60 mg/mL subcutaneous solution: every 6 months --last 10/1/2021--next due 2/21/2022  tamsulosin 0.4 mg oral capsule: 1 cap(s) orally once a day (at bedtime)  traMADol 50 mg oral tablet: 1 tab(s) orally every 8 hours, As Needed  Vitamin D3 25 mcg (1000 intl units) oral tablet: 1 tab(s) orally once a day

## 2022-03-07 NOTE — DISCHARGE NOTE PROVIDER - CARE PROVIDER_API CALL
James Dailey)  Surgery  [address] [facility], [address] [place] [address]  Phone: [phone]  Fax: [phone]  Follow Up Time: 2 weeks

## 2022-03-07 NOTE — BRIEF OPERATIVE NOTE - OPERATION/FINDINGS
Diffuse calcifications throughout.  3 vessel disease below the knee with reconsitution of the peroneal artery.  Angioplasty of focal stenoses at SFA and popliteal artery.

## 2022-03-07 NOTE — ASU DISCHARGE PLAN (ADULT/PEDIATRIC) - CARE PROVIDER_API CALL
James Dailey)  Surgery  [address] [facility], [address]nd [place] [address]  Phone: [phone]  Fax: [phone]  Follow Up Time: 2 weeks

## 2022-03-07 NOTE — ASU DISCHARGE PLAN (ADULT/PEDIATRIC) - ASU DC SPECIAL INSTRUCTIONSFT
Please continue taking regularly scheduled medications.  An additional blood thinner, plavix, has also been sent to your pharmacy, please begin taking daily.  Please call Dr. Dailey's office to schedule a follow up appointment in 2 weeks.

## 2022-03-14 PROBLEM — Z87.39 PERSONAL HISTORY OF OTHER DISEASES OF THE MUSCULOSKELETAL SYSTEM AND CONNECTIVE TISSUE: Chronic | Status: ACTIVE | Noted: 2022-01-01

## 2022-03-14 PROBLEM — G25.0 ESSENTIAL TREMOR: Chronic | Status: ACTIVE | Noted: 2022-01-01

## 2022-03-14 PROBLEM — I71.2 THORACIC AORTIC ANEURYSM, WITHOUT RUPTURE: Chronic | Status: ACTIVE | Noted: 2021-01-01

## 2022-03-14 PROBLEM — S83.519A SPRAIN OF ANTERIOR CRUCIATE LIGAMENT OF UNSPECIFIED KNEE, INITIAL ENCOUNTER: Chronic | Status: ACTIVE | Noted: 2022-01-01

## 2022-03-14 PROBLEM — I73.9 PERIPHERAL VASCULAR DISEASE, UNSPECIFIED: Chronic | Status: ACTIVE | Noted: 2022-01-01

## 2022-03-14 PROBLEM — T14.8XXA OTHER INJURY OF UNSPECIFIED BODY REGION, INITIAL ENCOUNTER: Chronic | Status: ACTIVE | Noted: 2022-01-01

## 2022-03-14 PROBLEM — B02.9 ZOSTER WITHOUT COMPLICATIONS: Chronic | Status: ACTIVE | Noted: 2018-12-07

## 2022-03-14 PROBLEM — D64.9 ANEMIA, UNSPECIFIED: Chronic | Status: ACTIVE | Noted: 2022-01-01

## 2022-03-14 PROBLEM — I48.0 PAROXYSMAL ATRIAL FIBRILLATION: Chronic | Status: ACTIVE | Noted: 2022-01-01

## 2022-03-14 PROBLEM — K58.9 IRRITABLE BOWEL SYNDROME WITHOUT DIARRHEA: Chronic | Status: ACTIVE | Noted: 2022-01-01

## 2022-03-14 NOTE — DIETITIAN INITIAL EVALUATION ADULT. - NS AS NUTRI INTERV MEALS SNACK3
[address] [place], [address]  Tel.  [phone]  Fax. [address] [facility] [address]  [place], [address] [facility]  Tel.  [phone]  Fax. [phone] [address] [place] [address]  Tel.  [phone]  Fax. [phone]       S>Scott Bolton is a 66 y.o. male seen today to receive a home sleep testing unit (HST). · Patient was educated on proper hookup and operation of the HST. · Instruction forms and documentation were reviewed and signed. · The patient demonstrated good understanding of the HST.    O>    There were no vitals taken for this visit. A>  No diagnosis found. P>  · General information regarding operations and maintenance of the device was provided. · He was provided information on sleep apnea including coresponding risk factors and the importance of proper treatment. · Follow-up appointment was made to return the HST. He will be contacted once the results have been reviewed. · He was asked to contact our office for any problems regarding his home sleep test study.     · [facility]
on Consistent Carbohydrate Diet

## 2022-03-15 NOTE — PHYSICAL EXAM
[General Appearance - Well Developed] : well developed [Normal Appearance] : normal appearance [Well Groomed] : well groomed [General Appearance - Well Nourished] : well nourished [No Deformities] : no deformities [General Appearance - In No Acute Distress] : no acute distress [Normal Oral Mucosa] : normal oral mucosa [No Oral Pallor] : no oral pallor [No Oral Cyanosis] : no oral cyanosis [Normal Jugular Venous A Waves Present] : normal jugular venous A waves present [Normal Jugular Venous V Waves Present] : normal jugular venous V waves present [No Jugular Venous Soria A Waves] : no jugular venous soria A waves [Respiration, Rhythm And Depth] : normal respiratory rhythm and effort [Auscultation Breath Sounds / Voice Sounds] : lungs were clear to auscultation bilaterally [Exaggerated Use Of Accessory Muscles For Inspiration] : no accessory muscle use [Abdomen Soft] : soft [Abdomen Tenderness] : non-tender [Abdomen Mass (___ Cm)] : no abdominal mass palpated [FreeTextEntry1] : in wheelchair [Nail Clubbing] : no clubbing of the fingernails [Cyanosis, Localized] : no localized cyanosis [Petechial Hemorrhages (___cm)] : no petechial hemorrhages [Skin Color & Pigmentation] : normal skin color and pigmentation [] : no rash [No Venous Stasis] : no venous stasis [Skin Lesions] : no skin lesions [No Skin Ulcers] : no skin ulcer [No Xanthoma] : no  xanthoma was observed [Oriented To Time, Place, And Person] : oriented to person, place, and time [Affect] : the affect was normal [Mood] : the mood was normal [No Anxiety] : not feeling anxious [Normal Rate] : normal [Normal S1] : normal S1 [Normal S2] : normal S2 [S3] : no S3 [S4] : no S4 [No Murmur] : no murmurs heard [Right Carotid Bruit] : no bruit heard over the right carotid [Left Carotid Bruit] : no bruit heard over the left carotid [Right Femoral Bruit] : no bruit heard over the right femoral artery [Left Femoral Bruit] : no bruit heard over the left femoral artery [2+] : left 2+ [No Abnormalities] : the abdominal aorta was not enlarged and no bruit was heard [No Pitting Edema] : no pitting edema present

## 2022-03-21 NOTE — HISTORY OF PRESENT ILLNESS
[FreeTextEntry1] : 66 year old woman with long standing DM, asthma and previous type B aortic dissection referred for evaluation of a right great toe wound. She states that she developed this wound 1-2 months ago. No preceding history of trauma. 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

## 2022-03-21 NOTE — PHYSICAL EXAM
[JVD] : no jugular venous distention  [Normal Breath Sounds] : Normal breath sounds [Normal Rate and Rhythm] : normal rate and rhythm [0] : left 0 [Ankle Swelling (On Exam)] : present [Ankle Swelling Bilaterally] : bilaterally  [Ankle Swelling On The Right] : mild [No Rash or Lesion] : No rash or lesion [Alert] : alert [Oriented to Person] : oriented to person [Oriented to Place] : oriented to place [Oriented to Time] : oriented to time [de-identified] : Well appearing [FreeTextEntry1] : No groin access hematoma\par Clean right great toe wound dressing

## 2022-03-21 NOTE — ASSESSMENT
[FreeTextEntry1] : 66 year old woman with DM and a right great toe ulcer. \par Her right great toe ulcer has exposed bone therefore she clinically has osteomyelitis.\par Patient underwent right leg angiogram and drug-coated balloon angioplasty of the right SFA and popliteal arteries on 3/7/22. 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 Patient advised to return sooner if her great toe wound worsens or fails to progress. All questions answered

## 2022-04-05 PROBLEM — M81.0 OSTEOPOROSIS: Status: ACTIVE | Noted: 2019-06-26

## 2022-04-30 PROBLEM — I71.01 DISSECTING ANEURYSM OF THORACIC AORTA, STANFORD TYPE B: Status: ACTIVE | Noted: 2019-02-28

## 2022-04-30 PROBLEM — M86.9 OSTEOMYELITIS OF TOE: Status: ACTIVE | Noted: 2022-01-01

## 2022-04-30 PROBLEM — I73.9 PAD (PERIPHERAL ARTERY DISEASE): Status: ACTIVE | Noted: 2022-01-01

## 2022-04-30 PROBLEM — I48.0 AF (PAROXYSMAL ATRIAL FIBRILLATION): Status: ACTIVE | Noted: 2019-02-22

## 2022-04-30 PROBLEM — G95.11: Status: ACTIVE | Noted: 2019-04-12

## 2022-04-30 PROBLEM — E11.9 CONTROLLED TYPE 2 DIABETES MELLITUS WITH INSULIN THERAPY: Status: ACTIVE | Noted: 2017-10-20

## 2022-04-30 NOTE — CURRENT MEDS
[Takes medication as prescribed] : takes [Yes] : Reviewed medication list for presence of high-risk medications. 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

## 2022-04-30 NOTE — HISTORY OF PRESENT ILLNESS
[Diabetes Mellitus] : Diabetes Mellitus [Hyperlipidemia] : Hyperlipidemia [No episodes] : No hypoglycemic episodes since the last visit. [Check glucose ___ x/week] : Patient checks blood glucose [unfilled]  times a week [Regular podiatrist visits] : Patient had regular podiatrist visits [Understanding of foot care] : Patient expressed understanding of foot care [No Retinopathy] : No retinopathy [Most Recent A1C: ___] : Most recent A1C was [unfilled] [Neuropathy] :  neuropathy [Does not check BP] : The patient is not checking blood pressure [<140/90] : Target blood pressure is <140/90 [Target goal met] : BP target goal met [Severe Persistent] : severe persistent [Cough] : cough [Wheezing] : wheezing [___ x/week] : Patient awakes at night [unfilled] times per week [Allergies] : allergies [Inhaler Use] : inhaler use [Other: ___] : [unfilled] [Does not check Peak Flow] : The patient is not checking peak flow [___ x/day] : [unfilled]  times per day [Somewhat controlled] : Condition is somewhat controlled [Contraindication to therapy ___] : Contraindications to statin  therapy: [unfilled] [EyeExamDate] : 12/21 [de-identified] : consider rapatha for reduction of PAD??  [FreeTextEntry1] : currently on tapering steroids again not able to come off the steroids as she had recurrent sx

## 2022-04-30 NOTE — ASSESSMENT
[FreeTextEntry1] : DM2, hyperlipidemia and steroid dependent asthma trying to transition to immunomodulator, \par cardiology, vascular surgery and podiatry notes reviewed as well as review of recent labs\par discussed 4th dose for covid questions answered\par continue current diabetes medications\par for vascular and podiatry follow up\par high risk for loss of toe or other imb loss due to severe PAD distally

## 2022-04-30 NOTE — REVIEW OF SYSTEMS
[Hearing Loss] : hearing loss [Postnasal Drip] : postnasal drip [Wheezing] : wheezing [Cough] : cough [Dyspnea on Exertion] : dyspnea on exertion [Diarrhea] : diarrhea [Joint Pain] : joint pain [Muscle Pain] : muscle pain [Back Pain] : back pain [Unsteady Walking] : ataxia [Easy Bleeding] : easy bleeding [Easy Bruising] : easy bruising [Negative] : Psychiatric [Earache] : no earache [Nosebleed] : no nosebleeds [Hoarseness] : no hoarseness [Sore Throat] : no sore throat [Abdominal Pain] : no abdominal pain [Nausea] : no nausea [Vomiting] : no vomiting [Heartburn] : no heartburn [Melena] : no melena [Headache] : no headache [Dizziness] : no dizziness [Fainting] : no fainting [Confusion] : no confusion [Swollen Glands] : no swollen glands [FreeTextEntry6] : had uri covid neg, with worsening of asthma during allergy season as well contributory. also with reduction fof steroids [FreeTextEntry7] : IBS with intermittent diarrhea none today, no bleeding or pain [FreeTextEntry8] : retention chronically post spinal ischemia from aortic anerysm [FreeTextEntry9] : no changes to muscluoskeletal pain, muslce weakness- follows rheumatology [de-identified] : thin skin with easy brusing secondaary to steroids chronically [de-identified] : gait disturbance post spinal ischemia- uses wheelchair or walker for mobility

## 2022-05-03 NOTE — ED PROVIDER NOTE - NS ED ROS FT
Constitutional: + fever.  Neurological: No reported acute headache.  Eyes: No reported new vision changes.   Ears, Nose, Mouth, Throat: No reported acute sore throat.  Cardiovascular: No reported current chest pain.  Respiratory: No reported new shortness of breath.  Gastrointestinal: No reported vomiting.  Genitourinary: No reported new urinary problems.  Musculoskeletal: + right leg pain.  Integumentary (skin and/or breast): worsening leg redness.

## 2022-05-03 NOTE — ED PROVIDER NOTE - CLINICAL SUMMARY MEDICAL DECISION MAKING FREE TEXT BOX
here septic, possible sources are abdominal, urine and from cellulitis of leg. treat empirically with antibiotics, give fluids, get imaging for infectious source.

## 2022-05-03 NOTE — ED ADULT NURSE NOTE - OBJECTIVE STATEMENT
Sent from Home with Generalized weakness. Fever. No nausea or vomiting Sent from Home with Generalized weakness. Fever. No nausea or vomiting.  stated she just completed Doxycline 4/7-4/29 for toe infection

## 2022-05-03 NOTE — ED PROVIDER NOTE - PHYSICAL EXAMINATION
Vital signs as available reviewed.  General:  No acute distress.  Head:  Normocephalic, atraumatic.  Eyes:  Conjunctiva pink, no icterus.  Cardiovascular:  tachycardic.  Respiratory:  Clear to auscultation, good air entry bilaterally.  Abdomen:  diffuse tenderness to palpation, worse in RUQ.  Musculoskeletal:  right leg with erythema to knee and with associated tenderness to palpation. 1+ DP. right great toe with superficial ulcer, no erythema.  Neurologic: Alert and oriented, moving all extremities.  Skin:  Feels hot.

## 2022-05-03 NOTE — ED PROVIDER NOTE - OBJECTIVE STATEMENT
weakness. diarrhea, right lower extremity erythema and tenderness to palpation, + abdominal pain., worse in RUQ/ tachy. 67 F history of DM, HLD, hypothyroid, PAD, PAF, type B thoracic dissection, spinal cord infarction, VTE, Angioplasty to right femoropopliteal artery here complaining of weakness, worsening for the past couple of days. also with diarrhea, abdominal pain, right leg pain. patient recently on antibiotics as outpatient for leg redness and toe ulcer. patient denies chest pain, shortness of breath, nausea, vomiting. PMD Dr. Smith.

## 2022-05-04 NOTE — PHARMACOTHERAPY INTERVENTION NOTE - COMMENTS
Patient is ordered for heparin 5000units SQ q12 for DVT prophylaxis. Discussed dosing with Dr. Winston and inquired if MD would like to increase dose to 5000units q8hrs, MD agreed. Patient also ordered for vasopressin with titration parameters, suggested re-entering order with removal of titration instructions, MD agreed.

## 2022-05-04 NOTE — H&P ADULT - NSHPLABSRESULTS_GEN_ALL_CORE
.  < from: CT Chest w/ IV Cont (05.04.22 @ 00:10) >    Exam: CT Chest With Contrast; Diagnostic  Exam date and time: 5/3/2022 10:50 PM  Age: 67 years old  Clinical indication: Pain and condition or disease; Other: Sepsis,   diarrhea;  Abdominal pain; Epigastric    TECHNIQUE:  Imaging protocol: Diagnostic computed tomography of the chest with   contrast.    COMPARISON:  CT ANGIO CHEST WITHOUT AND OR WITH IV CONTRAST 4/1/2021 10:57 AM    FINDINGS:  Lungs: Bronchial calcifications. Mild atelectasis within the posterior and  posterolateral right lung base. No parenchymal infiltrate.  Pleural spaces: No pneumothorax.  Heart: Multichamber cardiomegaly. Pericardial effusion with thickness of   up to  1.67 cm.  Lymph nodes: Unremarkable. No enlarged lymph nodes.  Vasculature: Main pulmonary artery has a diameter of 3 cm. No pulmonary  embolism.    Bones/joints: Unremarkable. No acute fracture.  Soft tissues: Unremarkable.    Other findings: Ascending thoracic aortic diameter is 4.42 x 4.33 cm.    IMPRESSION:  1. No infiltrate or pneumonia.  2. Mild multichamber cardiomegaly and associated pericardial effusion.  3. No abnormal dilatation of the main pulmonary artery, and no pulmonary  embolism.  4. 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    ******PRELIMINARY REPORT******      < end of copied text >    Labs, Imaging and EKG all personally reviewed by the attending physician.

## 2022-05-04 NOTE — H&P ADULT - NSHPREVIEWOFSYSTEMS_GEN_ALL_CORE
CONSTITUTIONAL: denies fever, chills, admits fatigue and weakness  HEENT: denies blurred vision, sore throat  SKIN: denies new lesions, rash  CARDIOVASCULAR: denies chest pain, chest pressure, palpitations  RESPIRATORY: denies shortness of breath, cough, sputum production  GASTROINTESTINAL: denies nausea, vomiting, admits diarrhea and abdominal pain  GENITOURINARY: denies dysuria, discharge  NEUROLOGICAL: denies numbness, headache, focal weakness  MUSCULOSKELETAL: admits RLE pain   HEMATOLOGIC: denies gross bleeding, bruising Limited as pt is currently intubated unable to obtain given mental status and intubation    saw patient in ED prior to her becoming obtunded, complained of RUQ pain and RLE pain. states that she "felt ok" at that time. she denied loss of sensation on feet. history was limited at that time due to her critical illness.

## 2022-05-04 NOTE — PROCEDURE NOTE - NSPOSTPRCRAD_GEN_A_CORE
chest radiograph/feeding tube in correct gastric position
central line located in the superior vena cava

## 2022-05-04 NOTE — CONSULT NOTE ADULT - ASSESSMENT
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 Tonight she presented to [facility] from home w/ a complains of increased weakness/lethargy, abd pain w/ n/v/d and increased RLE pain/swelling and worsening erythema. Pt admitted to the MICU in septic shock 2/2 to RLE wound +/- UTI, severe lactic acidosis, STARR, and acute hypoxic resp failure requiring emergent intubation.      -Neuro: Intubated and sedated on fentanyl gtt  -Cardaic: Septic Shock started on levophed and vaso added will titrate to maintain MAP >65, start stress dose steroids given chronic hx of prednisone use, trend CE to r/o NSTEMI, check ECHO for possible pericardial effusion  -Resp: Acute Hypoxic resp failure now intubated, start lung protective ventilation, will titrate vent setting as needed to maintain o2 >90%  -GI: NPO for now, GI ppx w/ protonix, Per [facility] radiologist no acute finding for bowel ischemia, surgery consulted input noted  -Renal: STARR in the setting of septic shock likely 2/2 to ATN, epps placed monitor I&O q1h, hypoMag/hypoPHOS will replete w/ mag and kphos, Severe Lactic and metabolic acidosis pt was given x2 amp of bicarb during intubation will cont LR at 150cc/hr given IVC fully collapsable on bedside POCUS  -ID: Septic Shock given rapid deterioration escalate IVAB up from IV zosyn to IV merro, cont IV vanco per vanco troughs, Covid19/RVP neg, check UCX, blood cx, Urine legionella, RUQ US to r/o biliary infectious process given RUQ abd pain during interview, check ESR/CRP/LDS given worsening for RLE infection, check Fungitell and aspergillus galactomannam  -Endo: no acute issues, monitor FS q6h gievn NPO status, Hypothyroid cont synthroid  -Heme: DVT ppx w/ heparin  -Vascular: Severe PAD concern for LE ischemia seen by vascular input noted  -Dispo: Admit to MICU, pt is currently full code, discussed GOC w/  Simone Bryant in person given the acute illness and rapid deterioration, he has agreed to all aggressive care including all invasive procedure, written consent obtain for Central and A-line, NGT and intubation, overnight events discussed at great length and all questions answered, case d/w eicu dr. 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 Tonight she presented to [facility] from home w/ a complains of increased weakness/lethargy, abd pain w/ n/v/d and increased RLE pain/swelling and worsening erythema. Pt admitted to the MICU in septic shock 2/2 to RLE wound +/- UTI, severe lactic acidosis, STARR, and acute hypoxic resp failure requiring emergent intubation.      -Neuro: Intubated and sedated on fentanyl gtt  -Cardaic: Septic Shock started on levophed and vaso added will titrate to maintain MAP >65, start stress dose steroids given chronic hx of prednisone use, trend CE to r/o NSTEMI, check ECHO for possible pericardial effusion  -Resp: Acute Hypoxic resp failure now intubated, start lung protective ventilation, will titrate vent setting as needed to maintain o2 >90%  -GI: NPO for now, GI ppx w/ protonix, Per [facility] radiologist no acute finding for bowel ischemia, surgery consulted input noted  -Renal: STARR in the setting of septic shock likely 2/2 to ATN, epps placed monitor I&O q1h, hypoMag/hypoPHOS will replete w/ mag and kphos, Severe Lactic and metabolic acidosis pt was given x2 amp of bicarb during intubation will cont LR at 150cc/hr given IVC fully collapsable on bedside POCUS  -ID: Septic Shock given rapid deterioration escalate IVAB up from IV zosyn to IV merro, cont IV vanco per vanco troughs, Covid19/RVP neg, check UCX, blood cx, Urine legionella, RUQ US to r/o biliary infectious process given RUQ abd pain during interview, check ESR/CRP/LDH given worsening for RLE infection, check Fungitell and aspergillus galactomannam  -Endo: no acute issues, monitor FS q6h gievn NPO status, Hypothyroid cont synthroid  -Heme: DVT ppx w/ heparin  -Vascular: Severe PAD concern for LE ischemia seen by vascular input noted  -Dispo: Admit to MICU, pt is currently full code, discussed GOC w/  Simone Bryant in person given the acute illness and rapid deterioration, he has agreed to all aggressive care including all invasive procedure, written consent obtain for Central and A-line, NGT and intubation, overnight events discussed at great length and all questions answered, case d/w eicu dr. la

## 2022-05-04 NOTE — CONSULT NOTE ADULT - NS ATTEND AMEND GEN_ALL_CORE FT
Patient evaluated at the bedside. CTA was reviewed. There is presence of contrast at the BK segments.  Patient is septic and with generalized vasoconstriction. No evidence of RLE ischemia but does have shin cellulitis. Not clear if source of sepsis is RLE cellulitis vs UTI.  R foot skin is preserved and not ischemic. Will continue to follow.

## 2022-05-04 NOTE — DIETITIAN INITIAL EVALUATION ADULT - PERTINENT MEDS FT
MEDICATIONS  (STANDING):  chlorhexidine 0.12% Liquid 15 milliLiter(s) Oral Mucosa every 12 hours  chlorhexidine 4% Liquid 1 Application(s) Topical <User Schedule>  fentaNYL   Infusion. 0.5 MICROgram(s)/kG/Hr (4.08 mL/Hr) IV Continuous <Continuous>  heparin   Injectable 5000 Unit(s) SubCutaneous every 12 hours  hydrocortisone sodium succinate Injectable 50 milliGRAM(s) IV Push every 8 hours  lactated ringers. 1000 milliLiter(s) (150 mL/Hr) IV Continuous <Continuous>  meropenem  IVPB      meropenem  IVPB 500 milliGRAM(s) IV Intermittent every 12 hours  norepinephrine Infusion 0.05 MICROgram(s)/kG/Min (3.83 mL/Hr) IV Continuous <Continuous>  nystatin Powder 1 Application(s) Topical two times a day  pantoprazole  Injectable 40 milliGRAM(s) IV Push daily  vasopressin Infusion 0.04 Unit(s)/Min (2.4 mL/Hr) IV Continuous <Continuous>    MEDICATIONS  (PRN):  acetaminophen    Suspension .. 650 milliGRAM(s) Oral every 6 hours PRN Temp greater or equal to 38C (100.4F), Mild Pain (1 - 3)

## 2022-05-04 NOTE — DIETITIAN INITIAL EVALUATION ADULT - PERTINENT LABORATORY DATA
05-04    143  |  109<H>  |  29<H>  ----------------------------<  97  4.2   |  21<L>  |  1.30    Ca    9.4      04 May 2022 05:13  Phos  2.0     05-04  Mg     1.2     05-04    TPro  4.6<L>  /  Alb  2.1<L>  /  TBili  0.8  /  DBili  x   /  AST  44<H>  /  ALT  41  /  AlkPhos  40  05-04  POCT Blood Glucose.: 142 mg/dL (05-03-22 @ 23:13)  A1C with Estimated Average Glucose Result: 6.2 % (03-02-22 @ 15:34)

## 2022-05-04 NOTE — H&P ADULT - NSHPPHYSICALEXAM_GEN_ALL_CORE
T(C): 37.6 (05-03-22 @ 23:53), Max: 38.7 (05-03-22 @ 20:47)  HR: 130 (05-04-22 @ 00:50) (74 - 130)  BP: 113/81 (05-04-22 @ 00:50) (46/28 - 113/81)  RR: 18 (05-03-22 @ 23:53) (16 - 22)  SpO2: 99% (05-03-22 @ 23:53) (95% - 99%)    GENERAL: patient appears well, no acute distress, appropriately interactive  EYES: sclera clear, no exudates  ENMT: oropharynx clear without erythema, no exudates, moist mucous membranes  NECK: supple, soft  LUNGS: good air entry bilaterally, clear to auscultation, symmetric breath sounds, no wheezing or rhonchi appreciated  HEART: soft S1/S2, regular rate and rhythm, no murmurs noted, no lower extremity edema  GASTROINTESTINAL: abdomen is soft, nontender, nondistended, normoactive bowel sounds  INTEGUMENT: good skin turgor, warm skin, appears well perfused  MUSCULOSKELETAL: no clubbing or cyanosis, no obvious deformity  NEUROLOGIC: awake, alert, oriented x3, good muscle tone in all 4 extremities  HEME/LYMPH: no obvious ecchymosis or petechiae T(C): 37.6 (05-03-22 @ 23:53), Max: 38.7 (05-03-22 @ 20:47)  HR: 130 (05-04-22 @ 00:50) (74 - 130)  BP: 113/81 (05-04-22 @ 00:50) (46/28 - 113/81)  RR: 18 (05-03-22 @ 23:53) (16 - 22)  SpO2: 99% (05-03-22 @ 23:53) (95% - 99%)    GENERAL: patient appears ill, intubated   EYES: sclera clear, no exudates  ENMT: oropharynx clear without erythema, moist mucous membranes  NECK: supple, soft  LUNGS: clear to auscultation, wheezing or rhonchi appreciated  HEART: soft S1/S2, regular rate and rhythm, no murmurs noted, no lower extremity edema  GASTROINTESTINAL: abdomen is soft, mild tenderness to palpation, nondistended, normoactive bowel sounds, obese   INTEGUMENT: peripheral artery disease BLE, erythema RT>LT, wounds to the RLE, mottled skin    MUSCULOSKELETAL: no clubbing or cyanosis  NEUROLOGIC: intubated   HEME/LYMPH: no obvious ecchymosis or petechiae T(C): 37.6 (05-03-22 @ 23:53), Max: 38.7 (05-03-22 @ 20:47)  HR: 130 (05-04-22 @ 00:50) (74 - 130)  BP: 113/81 (05-04-22 @ 00:50) (46/28 - 113/81)  RR: 18 (05-03-22 @ 23:53) (16 - 22)  SpO2: 99% (05-03-22 @ 23:53) (95% - 99%)    GENERAL: patient appears ill, intubated   EYES: sclera clear, no exudates  ENMT: oropharynx clear without erythema, moist mucous membranes  NECK: supple, soft  LUNGS: clear to auscultation, wheezing or rhonchi appreciated  HEART: soft S1/S2, regular rate and rhythm, no murmurs noted, no lower extremity edema  GASTROINTESTINAL: abdomen is soft, nontender, nondistended, normoactive bowel sounds, obese   INTEGUMENT: peripheral artery disease BLE, erythema RT>LT, wounds to the RLE, mottled skin    MUSCULOSKELETAL: no clubbing or cyanosis, faint pedal pulses palpated with doppler   NEUROLOGIC: intubated   HEME/LYMPH: no obvious ecchymosis or petechiae T(C): 37.6 (05-03-22 @ 23:53), Max: 38.7 (05-03-22 @ 20:47)  HR: 130 (05-04-22 @ 00:50) (74 - 130)  BP: 113/81 (05-04-22 @ 00:50) (46/28 - 113/81)  RR: 18 (05-03-22 @ 23:53) (16 - 22)  SpO2: 99% (05-03-22 @ 23:53) (95% - 99%)    GENERAL: patient appears ill, intubated   EYES: sclera clear, no exudates  ENMT: oropharynx clear without erythema, moist mucous membranes  NECK: supple, soft  LUNGS: clear to auscultation, wheezing or rhonchi appreciated  HEART: soft S1/S2, regular rate and rhythm, no murmurs noted, no lower extremity edema  GASTROINTESTINAL: abdomen is soft, tender to palpation on RUQ, nondistended, normoactive bowel sounds, obese; surgical scar noted   INTEGUMENT: peripheral artery disease BLE, erythema RT>LT, wounds to the RLE, with crepitus on palpation, mottled skin    MUSCULOSKELETAL: no clubbing or cyanosis  VASCULAR: unable to palpate DP/PT pulses bilateral LE, unable to get pulses on doppler.  NEUROLOGIC: intubated, unresponsive, pupils reactive  HEME/LYMPH: no obvious ecchymosis or petechiae

## 2022-05-04 NOTE — DIETITIAN INITIAL EVALUATION ADULT - ENTERAL
1) Upon initiation of EN, recommend Vital 1.5 @ 65mL x 20hrs providing 1950kcal, 88g protein/day   -Monitor pressors before initiating feeds; titrate slowly towards goal

## 2022-05-04 NOTE — ED ADULT NURSE REASSESSMENT NOTE - NS ED NURSE REASSESS COMMENT FT1
Late entry Patient's blood pressure began to drop despite 2 liters NS. RL bolus administered, and she was seen by ICU MD, started on LEVO at 0.01, increased to 0.1 prior to ICU move. Labs were repeated including lactate and type and screen. Respiratory attempted to draw an ABG with no success. Panda #16 was inserted. #20 right hand Second) line placed. Perineal hygiene performed. Report endorsed to Nurse Whit. She was transported to bed #11 on stretcher with monitor, and NR> see flow sheets for v/s

## 2022-05-04 NOTE — CONSULT NOTE ADULT - ASSESSMENT
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 Dailey, [facility]) d/t non-healing wounds to the first digit p/w worsening weakness, R leg pain, abdominal pain & diarrhea. CT angio prelim report negative for abdominal pathology. Lactate & WBC elevated, patient intubated in ICU

## 2022-05-04 NOTE — H&P ADULT - NSHPSOCIALHISTORY_GEN_ALL_CORE
Tobacco:   EtOH:   Recreational drug use:  Lives with:  Ambulates:  ADLs:  Occupation:  Vaccinations: Tobacco: Former smoker, quit in 1978   EtOH: Denies   Recreational drug use: Denies   Lives with:   Ambulates: with platform walker and wheelchair   ADLs: with 's help   Vaccinations: Moderna 4/4

## 2022-05-04 NOTE — PROVIDER CONTACT NOTE (EICU) - SITUATION
requesting orders for ABG and Propofol gtt
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 Pt reports right leg pain and states that she was recently seen by podiatry and treated for a toe ulcer and leg redness with Doxycycline 100 mg which was completed on 5/1/22.   Pt also reports abdominal pain, diarrhea.  Case discussed with the ICU PA.

## 2022-05-04 NOTE — CONSULT NOTE ADULT - SUBJECTIVE AND OBJECTIVE BOX
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 Pt reports right leg pain and states that she was recently seen by podiatry and treated for a toe ulcer and leg redness with Doxycycline 100 mg which was completed on 22.   Pt also reports abdominal pain, diarrhea  Denies fever, chills, chest pain, palpitations, SOB, cough, abdominal pain, nausea, vomiting, diarrhea, constipation, urinary frequency, urgency, or dysuria, headaches, changes in vision, dizziness, numbness, tingling.  Denies recent travel, or sick contacts.    Interval HPI:  Surgery consulted to r/o ischemic bowel. Patient seen and examined in ICU. ROS limited as patient was less responsive at the time of exam.     PAST MEDICAL & SURGICAL HISTORY:  Asthma    Diabetes mellitus  Type II    Hyperlipidemia    Hypothyroidism    PE (pulmonary embolism)  2002--anticoagulated, resolved    Shingles  2015    Arthritis    Dissecting aortic aneurysm, thoracic  Type B---no surgery, nerve damage, lower extremity weakness    Torn ACL    PAD (peripheral artery disease)    H/O rotator cuff tear  Left shoulder    Open wound  Right great toe    PAF (paroxysmal atrial fibrillation)    Irritable bowel syndrome (IBS)    H/O osteoporosis    Anemia    Essential tremor    S/P cholecystectomy      History of cataract surgery    History of colon resection  &amp; Hysterectomy---colon cancer---2002--no chemo or radiation    H/O resection of small bowel  Perforated --2006    Abdominal hernia  10/18/2010 (multiple)    Uday filter in place  1990,  second placed 2002    CONSTITUTIONAL: No weakness, fevers or chills  EYES/ENT: No visual changes;  No vertigo or throat pain   NECK: No pain or stiffness  RESPIRATORY: No cough, wheezing, hemoptysis; No shortness of breath  CARDIOVASCULAR: No chest pain or palpitations  GASTROINTESTINAL: See HPI  GENITOURINARY: No dysuria, frequency or hematuria  NEUROLOGICAL: No numbness or weakness  SKIN: No itching, burning, rashes, or lesions   All other review of systems is negative unless indicated above.    MEDICATIONS  (STANDING):  chlorhexidine 4% Liquid 1 Application(s) Topical <User Schedule>  lactated ringers. 1000 milliLiter(s) (150 mL/Hr) IV Continuous <Continuous>  magnesium sulfate  IVPB 4 Gram(s) IV Intermittent once  norepinephrine Infusion 0.05 MICROgram(s)/kG/Min (7.65 mL/Hr) IV Continuous <Continuous>  pantoprazole  Injectable 40 milliGRAM(s) IV Push daily  sodium bicarbonate  Injectable 50 milliEquivalent(s) IV Push once  sodium bicarbonate  Injectable 50 milliEquivalent(s) IV Push once    MEDICATIONS  (PRN):    Allergies    adhesives (Blisters)  Ceftin (Rash)  erythromycin (Rash)  fish (Vomiting; Nausea)  Levaquin (Rash)    Intolerances    FAMILY HISTORY:  FH: CHF (congestive heart failure)  Father, age 90,     Vital Signs Last 24 Hrs  T(C): 37.6 (03 May 2022 23:53), Max: 38.7 (03 May 2022 20:47)  T(F): 99.7 (03 May 2022 23:53), Max: 101.7 (03 May 2022 20:47)  HR: 130 (04 May 2022 00:50) (74 - 130)  BP: 113/81 (04 May 2022 00:50) (46/28 - 113/81)  BP(mean): 94 (04 May 2022 00:50) (56 - 98)  RR: 18 (03 May 2022 23:53) (16 - 22)  SpO2: 99% (03 May 2022 23:53) (95% - 99%)    GENERAL:  Well-developed, obese Female lying in bed  HEENT:  Sclera white. Mucous membranes moist.  ABDOMEN:  Soft, protuberant, tenderness to deep palpation of RUQ & RLQ. Well healed vertical midline scar noted on abdomen.   NEURO: responsive to painful stimuli    LABS:                        11.1   7.76  )-----------( 227      ( 04 May 2022 02:12 )             38.2     05-04    140  |  109<H>  |  14  ----------------------------<  70  4.1   |  11<L>  |  0.30<L>    Ca    7.0<L>      04 May 2022 01:03  Phos  0.8     05-04  Mg     <0.6     05-04    TPro  1.5<L>  /  Alb  0.8<L>  /  TBili  0.4  /  DBili  x   /  AST  7<L>  /  ALT  8<L>  /  AlkPhos  12<L>  -    PT/INR - ( 03 May 2022 21:01 )   PT: 13.3 sec;   INR: 1.14 ratio      PTT - ( 03 May 2022 21:01 )  PTT:23.2 sec  Urinalysis Basic - ( 03 May 2022 23:46 )    Color: Yellow / Appearance: Turbid / S.010 / pH: x  Gluc: x / Ketone: Trace  / Bili: Small / Urobili: Negative   Blood: x / Protein: 30 mg/dL / Nitrite: Negative   Leuk Esterase: Moderate / RBC: 3-5 /HPF / WBC 26-50   Sq Epi: x / Non Sq Epi: Occasional / Bacteria: Many    RADIOLOGY & ADDITIONAL STUDIES:  PRELIM REPORT  < from: CT Chest w/ IV Cont (22 @ 00:10) >  FINDINGS:  Lungs: Bronchial calcifications. Mild atelectasis within the posterior and  posterolateral right lung base. No parenchymal infiltrate.  Pleural spaces: No pneumothorax.  Heart: Multichamber cardiomegaly. Pericardial effusion with thickness of   up to  1.67 cm.  Lymph nodes: Unremarkable. No enlarged lymph nodes.  Vasculature: Main pulmonary artery has a diameter of 3 cm. No pulmonary  embolism.    Bones/joints: Unremarkable. No acute fracture.  Soft tissues: Unremarkable.    Other findings: Ascending thoracic aortic diameter is 4.42 x 4.33 cm.    IMPRESSION:  1. No infiltrate or pneumonia.  2. Mild multichamber cardiomegaly and associated pericardial effusion.  3. No abnormal dilatation of the main pulmonary artery, and no pulmonary  embolism.  4. Mild aneurysm of the ascending thoracic aorta with diameter of 4.33 cm,  without aortic dissection.    < end of copied text >

## 2022-05-04 NOTE — CONSULT NOTE ADULT - PROBLEM SELECTOR RECOMMENDATION 9
- As per prelim report and review of CT angio, no bowel ischemia or abdominal source of sepsis noted. Recommend further work-up per primary team to find sepsis source- UTI vs leg ischemia  - Case discussed with Dr. Hernandez
- Discussed results of CTA with Dr. Robin. Contrast noted in runoff vessels bilaterally. No air/soft tissue changes noted in either extremity, besides bilateral edema. At this time, there is a low suspicion for LE ischemia as cause of sepsis. Recommend further work-up and care per ICU team.   Lack of signals via Doppler likely 2/2 vasoconstriction from sepsis  - Case discussed with Dr. Robin

## 2022-05-04 NOTE — PROVIDER CONTACT NOTE (EICU) - ACTION/TREATMENT ORDERED:
-have placed order for chest x-ray post central line placement/intubation as requested by bedsdie team. Bedside team to follow up images once x-ray complete.
Orders placed for ABG and Propofol 20mcg/kg/min as requested. Further plan as per primary team.

## 2022-05-04 NOTE — H&P ADULT - ASSESSMENT
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 Admitted to ICU for septic shock.      #Septic shock  Likely urosepsis, possible RT Leg infection vs RT hallux OM, concern for abdominal pathology but prelim CT scan reading negative for ischemia   - BP: 46//28-->70s/50s--->Levophed started-->113/81   - wbc: 18.87-->7.76, neut: 16.23, bands: 17%, lactate 9.5-->10.2, procal: 40.8 on admission   - UA: turbid, small bilirubin, trace ketones, 30 protein, moderate LE, small blood, 26-50 WBC, 3-5 RBC, many bacteria,100 glucose    - CT Angio Chest (PRELIM): No infiltrate or pneumonia. Mild multichamber cardiomegaly and associated pericardial effusion. No abnormal dilatation of the main pulmonary artery, and no pulmonary embolism. Mild aneurysm of the ascending thoracic aorta with diameter of 4.33 cm, without aortic dissection.   - F/u official CT read   - Bedside POCUS: no pleural effusion, mild pericardial effusion   - s/p Ofirmev x1, Vanco, Zosyn, 2.5 L NS bolus x1, 1L LR bolus x1 and started on LR @150 cc/hr in the ED   - Will start Meropenam (renally dosed) pending culture results   - Tylenol prn   - Continue LR @150 cc/hr   - Panda catheter to monitor urine output (critically ill)  - F/u BCx x2, UCx, fungitell, aspergillus galactomannan, LDH, ESR CRP, legionella, repeat lactate, repeat procal  - Monitor leukocytosis and fevers  - Vascular surgery following, recs appreciated   - Would recommend ID consult   - Management as per ICU     #ATN   2/2 sepsis   BUN/Cr: 27/1.20 on admission   - s/p 2.5 L NS bolus x1, 1L LR bolus x1 and started on LR @150 cc/hr in the ED   - Continue LR @150 cc/hr   - Panda catheter to monitor urine output (critically ill)  - Avoid nephrotoxic agents   - Monitor CMP     #Lactic acidosis   likely component of sepsis   - Lactate 9.5-->10.2 on admission s/p - s/p 2.5 L NS bolus x1, 1L LR bolus x1 and started on LR @150 cc/hr in the ED   - Continue LR @150 cc/hr   - F/u repeat lactate        68 yo F with PMH T2DM, HLD, hyperparathyroidism, asthma, hypothyroidism, PAF, 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 Admitted to ICU for septic shock.      #Septic shock  Likely urosepsis, possible RT Leg infection vs RT hallux OM, concern for abdominal pathology but prelim CT scan reading negative for ischemia   - BP: 46//28-->70s/50s--->Levophed started-->113/81   - wbc: 18.87-->7.76, neut: 16.23, bands: 17%, lactate 9.5-->10.2, procal: 40.8 on admission   - UA: turbid, small bilirubin, trace ketones, 30 protein, moderate LE, small blood, 26-50 WBC, 3-5 RBC, many bacteria,100 glucose    - CT Angio Chest (PRELIM): No infiltrate or pneumonia. Mild multichamber cardiomegaly and associated pericardial effusion. No abnormal dilatation of the main pulmonary artery, and no pulmonary embolism. Mild aneurysm of the ascending thoracic aorta with diameter of 4.33 cm, without aortic dissection.   - F/u official CT read   - Bedside POCUS: no pleural effusion, mild pericardial effusion   - s/p Ofirmev x1, Vanco, Zosyn, 2.5 L NS bolus x1, 1L LR bolus x1 and started on LR @150 cc/hr in the ED   - Will start Meropenam (renally dosed) pending culture results   - Tylenol prn   - Continue LR @150 cc/hr   - Panda catheter to monitor urine output (critically ill)  - F/u BCx x2, UCx, fungitell, aspergillus galactomannan, LDH, ESR CRP, legionella, repeat lactate, repeat procal  - Monitor leukocytosis and fevers  - Vascular surgery and Surgery following, recs appreciated   - Would recommend ID consult   - Management as per ICU     #ATN   2/2 sepsis   BUN/Cr: 27/1.20 on admission   - s/p 2.5 L NS bolus x1, 1L LR bolus x1 and started on LR @150 cc/hr in the ED   - Continue LR @150 cc/hr   - Panda catheter to monitor urine output (critically ill)  - Avoid nephrotoxic agents   - Monitor CMP   - Management as per ICU     #Lactic acidosis   likely component of sepsis   - Lactate 9.5-->10.2 on admission s/p - s/p 2.5 L NS bolus x1, 1L LR bolus x1 and started on LR @150 cc/hr in the ED   - Continue LR @150 cc/hr   - F/u repeat lactate   - Management as per ICU     #Hypoxic respiratory failure   - Pt is intubated and on a ventilator    #T2DM  Chronic, History of DM2  - HOLD home meds   - Low dose insulin corrective scale   - Hypoglycemia protocol, fingerstick glucose   - F/u AM HbA1c    #HLD  Chronic   - HOLD off on starting home statin     #Asthma  Chronic   - Will HOLD off on starting home meds    #Hypothyroidism  Chronic   - Continue home Levothyroxine once pt is stable     #PAD  s/p angioplasty to right femoropopliteal artery (3/7/22) due to non-healing wounds to the first digit   - Will HOLD off starting home ASA and Plavix until pt is stable        66 yo F with PMHx of T2DM, HLD, hyperparathyroidism, asthma, hypothyroidism, PAF (not currently on AC), 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 Admitted to ICU for septic shock.    #Septic shock  Admit to the medical ICU  With multiple possible sources of infection - UA positive for UTI, however with profound sepsis and lactic acidosis - concern for ischemia or deep infection  - possible RT Leg infection vs RT hallux OM, concern for abdominal pathology but prelim CT scan reading negative for ischemia  - on exam her abdomen was tender in RUQ but soft, her right leg is erythematous and with crepitus to palpation (no bowel pathology or LE subcutaneous gas seen on CT imaging)  - BP: 46//28-->70s/50s-->Levophed started-->113/81   - wbc: 18.87-->7.76, neut: 16.23, bands: 17%, lactate 9.5-->10.2, procal: 40.8 on admission   - UA: turbid, small bilirubin, trace ketones, 30 protein, moderate LE, small blood, 26-50 WBC, 3-5 RBC, many bacteria,100 glucose    - CT Angio Chest (PRELIM): No infiltrate or pneumonia. Mild multichamber cardiomegaly and associated pericardial effusion. No abnormal dilatation of the main pulmonary artery, and no pulmonary embolism. Mild aneurysm of the ascending thoracic aorta with diameter of 4.33 cm, without aortic dissection.   - F/u official CT read   - Bedside POCUS per ICU PA: no pleural effusion, mild pericardial effusion   - s/p Ofirmev x1, Vanco, Zosyn, 2.5 L NS bolus x1, 1L LR bolus x1 and started on LR @150 cc/hr in the ED   - Will start broad spectrum coverage with Meropenem (renally dosed) pending culture results  - Would add vancomycin as well pending MRSA PCR  - Tylenol prn   - Continue LR @150 cc/hr   - Panda catheter to monitor urine output (critically ill)  - F/u BCx x2, UCx, fungitell, aspergillus galactomannan, LDH, ESR CRP, legionella, repeat lactate, repeat procal  - Monitor leukocytosis and fevers  - Vascular surgery and Surgery following, recs appreciated   - Would recommend ID consult   - Management as per ICU     #ATN   2/2 sepsis with shock   BUN/Cr: 27/1.20 on admission   - s/p 2.5 L NS bolus x1, 1L LR bolus x1 and started on LR @150 cc/hr in the ED   - Continue LR @150 cc/hr   - Panda catheter to monitor urine output (critically ill)  - Avoid nephrotoxic agents   - Monitor CMP   - Management as per ICU     #Lactic acidosis   likely component of sepsis vs ischemia  - Lactate 9.5-->10.2 on admission s/p - s/p 2.5 L NS bolus x1, 1L LR bolus x1 and started on LR @150 cc/hr in the ED   - Continue LR @150 cc/hr   - F/u repeat lactate   - Management as per ICU     #Acute hypoxic respiratory failure   No lung pathology however with impending resp failure due to profound shock  - Pt is intubated and on a ventilator - c/w lung protective ventilation    #T2DM  Chronic, History of DM2  - HOLD home meds   - Low dose insulin corrective scale  - Continue basal insulin  - Hypoglycemia protocol, fingerstick glucose   - F/u AM HbA1c    #HLD  Chronic   - continue home statin  - Watch LFTs, suspect to see a rise given shock state    #Asthma  Chronic   - Will HOLD off on starting home meds    #Hypothyroidism  Chronic   - Continue home Levothyroxine    #PAD  s/p angioplasty to right femoropopliteal artery (3/7/22) due to non-healing wounds to the first digit   - C/w home ASA and Plavix until pt is stable    #Need for prophylactic measure  - VTE ppx per ICU. Would start LMWH in this critically ill patient with prior history of VTE.    Disposition: Full Code, Inpatient. Critically ill at risk of abrupt decompensation. Requires ICU level of care.

## 2022-05-04 NOTE — PROVIDER CONTACT NOTE (CRITICAL VALUE NOTIFICATION) - SITUATION
Generalized weakness
Troponin 335.7
Troponin 676.6, uptrend from previous lab draw
blood culture preliminary result from May 3rd- growth in aerobic and anaerobic bottle gram negative rods

## 2022-05-04 NOTE — PHARMACOTHERAPY INTERVENTION NOTE - COMMENTS
Patient is in septic shock and is s/p x1 vancomycin and x1 Zosyn. Patient was escalated from Zosyn and is currently ordered for Meropenem 500mg q12 pending culture data. Patient is in STARR but with current eGFR of 45. Discussed with Dr. Winston and suggested dose increase to 1000mg BID, MD agreed. Entered order to reflect change in therapy and will continue to trend renal function.     Informed team that patient has no active order for vancomycin, MD would like to initiate therapy. Entered order for vancomycin 1000mg q24 with pre-2nd dose trough to account for prior evening's 1x dose. Will adjust dose based on trough and renal function as needed.

## 2022-05-04 NOTE — PROVIDER CONTACT NOTE (EICU) - BACKGROUND
Lactate- 20.9  +peritoneal    CT- non contributory
67 year old female w/ PMhx of T2DM, HLD, hypothyroidism, severe PAD, PE, OM, admitted w/ septic shock 2/2 to RLE wound/ UTI, STARR, and hypoxic resp failure.

## 2022-05-04 NOTE — H&P ADULT - NSHPOUTPATIENTPROVIDERS_GEN_ALL_CORE
PCP: Dr. Smith PCP: Dr. Smith  Rheum: Dr. Steven  Cardio: Dr. Tian   Cardiothoracic Surgery: Dr. Kumar  Neuro: Dr. Caldwell  Vascular: Dr. Dailey  Pulm: Dr. Plata

## 2022-05-04 NOTE — CONSULT NOTE ADULT - NS ATTEND AMEND GEN_ALL_CORE FT
Case discussed with PA overnight.  Labs and imaging reviewed at that time.  Apparently the patient was in septic shock with marked lactic acidosis and leukocytosis, +UTI.  Transferred to ICU for intubation, pressors and further resuscitation.    Seen by me this afternoon and case discussed with ICU attendings.  CT scan abdomen was otherwise unremarkable without significant pathology or signs of ischemia.  CTA of lower extremity shows adequate distal flow as per [facility] consultation.  THe patient howeve remains sedated and therefore unresponsive on ventilatory support.  Tachycardic, Febrile with cooling blanket in place.  Abdomen soft without rebound guarding or rigidity.  No hernia or palpable masses.  Extremities cold to touch and mottled.  RLE appears worse than LLE with mild edema and ecchymosis.  Ulcerated right great toe.    67y woman in severe septic shock.  Source remains unknown.  Presume urosepsis given positive UTI.  Questionable viability of lower extremity given PVD and recent revascularization procedure - recommend Vascular Surgery follow up.  No acute findings within abdomen to attribute to current clinical condition therefore no acute general surgical indications.    Continue aggressive resuscitation and ICU care.  Prognosis certainly guarded.

## 2022-05-04 NOTE — CHART NOTE - NSCHARTNOTEFT_GEN_A_CORE
: [place]    INDICATION: acute hypoxic respiratory failure    PROCEDURE:  [x] LIMITED ECHO  [x] LIMITED CHEST  [ ] LIMITED RETROPERITONEAL  [ ] LIMITED ABDOMINAL  [ ] LIMITED DVT  [ ] NEEDLE GUIDANCE VASCULAR  [ ] NEEDLE GUIDANCE THORACENTESIS  [ ] NEEDLE GUIDANCE PARACENTESIS  [ ] NEEDLE GUIDANCE PERICARDIOCENTESIS  [ ] OTHER    FINDINGS:  - A-line predominance without consolidation or effusions  - poor cardiac windows, apparent small pericardial effusion  - IVC fully collapsing with respirations    INTERPRETATION:  - unable to comment on cardiac function  - continue ongoing fluid resuscitation : [place]    INDICATION: acute hypoxic respiratory failure    PROCEDURE:  [x] LIMITED ECHO  [x] LIMITED CHEST  [ ] LIMITED RETROPERITONEAL  [ ] LIMITED ABDOMINAL  [ ] LIMITED DVT  [ ] NEEDLE GUIDANCE VASCULAR  [ ] NEEDLE GUIDANCE THORACENTESIS  [ ] NEEDLE GUIDANCE PARACENTESIS  [ ] NEEDLE GUIDANCE PERICARDIOCENTESIS  [ ] OTHER    FINDINGS:  - A-line predominance without consolidation or effusions  - poor cardiac windows, apparent small pericardial effusion  - IVC ~1.6cm with respiratory variation    INTERPRETATION:  - unable to comment on cardiac function  - continue ongoing fluid resuscitation

## 2022-05-04 NOTE — CONSULT NOTE ADULT - SUBJECTIVE AND OBJECTIVE BOX
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, PE/VTE, recently underwent an angioplasty of the RLE 2/2 to non-healing wounds to the first digit cornering for OM, was seen by podiatry and completed a course of oral Doxy that was just compled on 2022    24 hour events:     Review of Systems:  Constitutional: No fever, chills, fatigue  Neuro: No headache, numbness, weakness  Resp: No cough, wheezing, shortness of breath  CVS: No chest pain, palpitations, leg swelling  GI: No abdominal pain, nausea, vomiting, diarrhea   : No dysuria, frequency, incontinence  Skin: No itching, burning, rashes, or lesions   Msk: No joint pain or swelling  Psych: No depression, anxiety, mood swings    T(F): 99.7 (22 @ 23:53), Max: 101.7 (22 @ 20:47)  HR: 130 (22 @ 00:50) (74 - 130)  BP: 113/81 (22 @ 00:50) (46/28 - 113/81)  RR: 18 (22 @ 23:53) (16 - 22)  SpO2: 99% (22 @ 23:53) (95% - 99%)  Wt(kg): --        CAPILLARY BLOOD GLUCOSE      POCT Blood Glucose.: 142 mg/dL (03 May 2022 23:13)      I&O's Summary      Physical Exam:   Gen:  Neuro:  HEENT:  Resp:  CVS:  Abd:  Ext:  Skin:    Meds:    norepinephrine Infusion IV Continuous                  lactated ringers. IV Continuous                                  11.6   18.87 )-----------( 247      ( 03 May 2022 21:01 )             39.4     Bands 17.0        143  |  107  |  27<H>  ----------------------------<  204<H>  4.2   |  24  |  1.20    Ca    10.3<H>      03 May 2022 21:01    TPro  5.3<L>  /  Alb  2.8<L>  /  TBili  1.0  /  DBili  x   /  AST  12<L>  /  ALT  27  /  AlkPhos  38<L>      Lactate 9.5            @ 21:01          PT/INR - ( 03 May 2022 21:01 )   PT: 13.3 sec;   INR: 1.14 ratio         PTT - ( 03 May 2022 21:01 )  PTT:23.2 sec  Urinalysis Basic - ( 03 May 2022 23:46 )    Color: Yellow / Appearance: Turbid / S.010 / pH: x  Gluc: x / Ketone: Trace  / Bili: Small / Urobili: Negative   Blood: x / Protein: 30 mg/dL / Nitrite: Negative   Leuk Esterase: Moderate / RBC: 3-5 /HPF / WBC 26-50   Sq Epi: x / Non Sq Epi: Occasional / Bacteria: Many        Rapid RVP Result: NotDetec ( @ 21:01)      Radiology: ***  Bedside ultrasound: ***    CENTRAL LINE: N/Y          DATE INSERTED:              REMOVE: Y/N  MCCARTY: N/Y                       DATE INSERTED:              REMOVE: Y/N  A-LINE: N/Y                       DATE INSERTED:              REMOVE: Y/N    GLOBAL ISSUE/BEST PRACTICE:  Analgesia:  Sedation:  CAM-ICU:   HOB elevation: yes  Stress ulcer prophylaxis:  VTE prophylaxis:  Glycemic control:  Nutrition:    CODE STATUS: ***    CRITICAL CARE TIME SPENT:  (Assessing presenting problems of acute illness, which pose high probability of life threatening deterioration or end organ damage/dysfunction, as well as medical decision making including initiating plan of care, reviewing data, reviewing radiologic exams, discussing with multidisciplinary team,  discussing goals of care with patient/family, and writing this note.  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 Tonight she presented to [facility] from home w/ a complains of increased weakness/lethargy, abd pain w/ n/v/d and increased RLE pain/swelling and worsening erythema. In the ED she was found to be hypotensive with SBP of 60, she was started on sepsis protocol was given appx 2.5L of NS w/ initially improvement in the SBP but then developed hypotension again w/ SBP of 70s, LA of 9.5 and MICU consult requested for Septic shock. Pt seen and examined at bedside appeared very lethagy and ill on exam reports having RUQ abd pain and RLE pain, per  at bedside states pt was doing well up until yesterday she started to have chills and other symptoms mentioned above and tonight became very weak.  reports pt received Covid19 booster shot on 2022. Pt was transferred to the ICU after d/w [facility]ology regarding CT abd w/ aortia run off to r/o bowel ischemia and reported no such finding. [facility] also reported unable to visualize vessels below the knee on the CT due to severe PAD, vascular and general surgical consulted and recommend no acute surgical intervention. In the ICU pt started to become more hypotensive while on low dose levophed and more AMS, thus R. IJ TLC placed and pt was intubated for airway protection.        Review of Systems: Limited  to acute illness    T(F): 99.7 (22 @ 23:53), Max: 101.7 (22 @ 20:47)  HR: 130 (22 @ 00:50) (74 - 130)  BP: 113/81 (22 @ 00:50) (46/28 - 113/81)  RR: 18 (22 @ 23:53) (16 - 22)  SpO2: 99% (22 @ 23:53) (95% - 99%)  Wt(kg): --        CAPILLARY BLOOD GLUCOSE      POCT Blood Glucose.: 142 mg/dL (03 May 2022 23:13)      I&O's Summary      Physical Exam:   Gen: ill appearing in acute distress  Neuro: lethargy but woken to verbal commands  HEENT: PERRL  Resp: good air entry b/l  CVS: +tacycardia   Abd: BSx4, soft, +RUQ tenderness, non-distended  Ext: no edema   Skin: warm/dry    Meds:    norepinephrine Infusion IV Continuous          lactated ringers. IV Continuous                                  11.6   18.87 )-----------( 247      ( 03 May 2022 21:01 )             39.4     Bands 17.0    -    143  |  107  |  27<H>  ----------------------------<  204<H>  4.2   |  24  |  1.20    Ca    10.3<H>      03 May 2022 21:01    TPro  5.3<L>  /  Alb  2.8<L>  /  TBili  1.0  /  DBili  x   /  AST  12<L>  /  ALT  27  /  AlkPhos  38<L>  -    Lactate 9.5           - @ 21:01          PT/INR - ( 03 May 2022 21:01 )   PT: 13.3 sec;   INR: 1.14 ratio         PTT - ( 03 May 2022 21:01 )  PTT:23.2 sec  Urinalysis Basic - ( 03 May 2022 23:46 )    Color: Yellow / Appearance: Turbid / S.010 / pH: x  Gluc: x / Ketone: Trace  / Bili: Small / Urobili: Negative   Blood: x / Protein: 30 mg/dL / Nitrite: Negative   Leuk Esterase: Moderate / RBC: 3-5 /HPF / WBC 26-50   Sq Epi: x / Non Sq Epi: Occasional / Bacteria: Many        Rapid RVP Result: NotDetec ( @ 21:01)      Radiology:     < from: CT Chest w/ IV Cont (22 @ 00:10) >    ******PRELIMINARY REPORT******      ******PRELIMINARY REPORT******       ACC: [id number] EXAM:  CT CHEST IC                          PROCEDURE DATE:  2022    ******PRELIMINARY REPORT******      ******PRELIMINARY REPORT******           INTERPRETATION:  [facility] RADIOLOGIST PRELIMINARY REPORT    PROCEDURE INFORMATION:  Exam: CT Chest With Contrast; Diagnostic  Exam date and time: 5/3/2022 10:50 PM  Age: 67 years old  Clinical indication: Pain and condition or disease; Other: Sepsis,   diarrhea;  Abdominal pain; Epigastric    TECHNIQUE:  Imaging protocol: Diagnostic computed tomography of the chest with   contrast.    COMPARISON:  CT ANGIO CHEST WITHOUT AND OR WITH IV CONTRAST 2021 10:57 AM    FINDINGS:  Lungs: Bronchial calcifications. Mild atelectasis within the posterior and  posterolateral right lung base. No parenchymal infiltrate.  Pleural spaces: No pneumothorax.  Heart: Multichamber cardiomegaly. Pericardial effusion with thickness of   up to  1.67 cm.  Lymph nodes: Unremarkable. No enlarged lymph nodes.  Vasculature: Main pulmonary artery has a diameter of 3 cm. No pulmonary  embolism.    Bones/joints: Unremarkable. No acute fracture.  Soft tissues: Unremarkable.    Other findings: Ascending thoracic aortic diameter is 4.42 x 4.33 cm.    IMPRESSION:  1. No infiltrate or pneumonia.  2. Mild multichamber cardiomegaly and associated pericardial effusion.  3. No abnormal dilatation of the main pulmonary artery, and no pulmonary  embolism.  4. 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        ******PRELIMINARY REPORT******      ******PRELIMINARY REPORT******         SALOMÓN BERMUDEZ M.D.;[facility] RADIOLOGIST    < end of copied text >      CENTRAL LINE: Y  MCCARTY: Y  A-LINE: N    GLOBAL ISSUE/BEST PRACTICE:  Analgesia: Y  Sedation: Y  CAM-ICU: n/a  HOB elevation: Y  Stress ulcer prophylaxis: Y  VTE prophylaxis: Y  Glycemic control: Y  Nutrition: N    CODE STATUS: FULL CODE    CRITICAL CARE TIME SPENT: 100 mins  (Assessing presenting problems of acute illness, which pose high probability of life threatening deterioration or end organ damage/dysfunction, as well as medical decision making including initiating plan of care, reviewing data, reviewing radiologic exams, discussing with multidisciplinary team,  discussing goals of care with patient/family, and writing this note.  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 Tonight she presented to \[facility]\"" from home w/ a complains of increased weakness/lethargy, abd pain w/ n/v/d and increased RLE pain/swelling and worsening erythema. In the ED she was found to be hypotensive with SBP of 60, she was started on sepsis protocol was given appx 2.5L of NS w/ initially improvement in the SBP but then developed hypotension again w/ SBP of 70s, LA of 9.5 and MICU consult requested for Septic shock. Pt seen and examined at bedside appeared very lethagy and ill on exam reports having RUQ abd pain and RLE pain, per  at bedside states pt was doing well up until yesterday she started to have chills and other symptoms mentioned above and tonight became very weak.  reports pt received Covid19 booster shot on 2022. Pt was transferred to the ICU after d/w [facility] radiologist regarding CT abd w/ aorta run off to r/o bowel ischemia and reported no such finding. [facility] also reported unable to visualize vessels below the knee on the CT due to severe PAD, vascular and general surgical consulted and recommend no acute surgical intervention were recommended. In the ICU pt started to become more hypotensive while on low dose levophed and more AMS, thus R. IJ TLC placed and pt was intubated for airway protection.        Review of Systems: Limited  to acute illness    T(F): 99.7 (22 @ 23:53), Max: 101.7 (22 @ 20:47)  HR: 130 (22 @ 00:50) (74 - 130)  BP: 113/81 (22 @ 00:50) (46/28 - 113/81)  RR: 18 (22 @ 23:53) (16 - 22)  SpO2: 99% (22 @ 23:53) (95% - 99%)  Wt(kg): --        CAPILLARY BLOOD GLUCOSE      POCT Blood Glucose.: 142 mg/dL (03 May 2022 23:13)      I&O's Summary      Physical Exam:   Gen: ill appearing in acute distress  Neuro: lethargy but woken to verbal commands  HEENT: PERRL  Resp: good air entry b/l  CVS: +tacycardia   Abd: BSx4, soft, +RUQ tenderness, non-distended  Ext: no edema   Skin: warm/dry    Meds:    norepinephrine Infusion IV Continuous          lactated ringers. IV Continuous                                  11.6   18.87 )-----------( 247      ( 03 May 2022 21:01 )             39.4     Bands 17.0    -    143  |  107  |  27<H>  ----------------------------<  204<H>  4.2   |  24  |  1.20    Ca    10.3<H>      03 May 2022 21:01    TPro  5.3<L>  /  Alb  2.8<L>  /  TBili  1.0  /  DBili  x   /  AST  12<L>  /  ALT  27  /  AlkPhos  38<L>  -    Lactate 9.5           - @ 21:01          PT/INR - ( 03 May 2022 21:01 )   PT: 13.3 sec;   INR: 1.14 ratio         PTT - ( 03 May 2022 21:01 )  PTT:23.2 sec  Urinalysis Basic - ( 03 May 2022 23:46 )    Color: Yellow / Appearance: Turbid / S.010 / pH: x  Gluc: x / Ketone: Trace  / Bili: Small / Urobili: Negative   Blood: x / Protein: 30 mg/dL / Nitrite: Negative   Leuk Esterase: Moderate / RBC: 3-5 /HPF / WBC 26-50   Sq Epi: x / Non Sq Epi: Occasional / Bacteria: Many        Rapid RVP Result: NotDetec ( @ 21:01)      Radiology:     < from: CT Chest w/ IV Cont (22 @ 00:10) >    ******PRELIMINARY REPORT******      ******PRELIMINARY REPORT******       ACC: [id number] EXAM:  CT CHEST IC                          PROCEDURE DATE:  2022    ******PRELIMINARY REPORT******      ******PRELIMINARY REPORT******           INTERPRETATION:  [facility] RADIOLOGIST PRELIMINARY REPORT    PROCEDURE INFORMATION:  Exam: CT Chest With Contrast; Diagnostic  Exam date and time: 5/3/2022 10:50 PM  Age: 67 years old  Clinical indication: Pain and condition or disease; Other: Sepsis,   diarrhea;  Abdominal pain; Epigastric    TECHNIQUE:  Imaging protocol: Diagnostic computed tomography of the chest with   contrast.    COMPARISON:  CT ANGIO CHEST WITHOUT AND OR WITH IV CONTRAST 2021 10:57 AM    FINDINGS:  Lungs: Bronchial calcifications. Mild atelectasis within the posterior and  posterolateral right lung base. No parenchymal infiltrate.  Pleural spaces: No pneumothorax.  Heart: Multichamber cardiomegaly. Pericardial effusion with thickness of   up to  1.67 cm.  Lymph nodes: Unremarkable. No enlarged lymph nodes.  Vasculature: Main pulmonary artery has a diameter of 3 cm. No pulmonary  embolism.    Bones/joints: Unremarkable. No acute fracture.  Soft tissues: Unremarkable.    Other findings: Ascending thoracic aortic diameter is 4.42 x 4.33 cm.    IMPRESSION:  1. No infiltrate or pneumonia.  2. Mild multichamber cardiomegaly and associated pericardial effusion.  3. No abnormal dilatation of the main pulmonary artery, and no pulmonary  embolism.  4. 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        ******PRELIMINARY REPORT******      ******PRELIMINARY REPORT******         SALOMÓN BERMUDEZ M.D.;[facility] RADIOLOGIST    < end of copied text >      CENTRAL LINE: Y  MCCARTY: Y  A-LINE: N    GLOBAL ISSUE/BEST PRACTICE:  Analgesia: Y  Sedation: Y  CAM-ICU: n/a  HOB elevation: Y  Stress ulcer prophylaxis: Y  VTE prophylaxis: Y  Glycemic control: Y  Nutrition: N    CODE STATUS: FULL CODE    CRITICAL CARE TIME SPENT: 100 mins  (Assessing presenting problems of acute illness, which pose high probability of life threatening deterioration or end organ damage/dysfunction, as well as medical decision making including initiating plan of care, reviewing data, reviewing radiologic exams, discussing with multidisciplinary team,  discussing goals of care with patient/family, and writing this note.  Non-inclusive of procedures performed)

## 2022-05-04 NOTE — PATIENT PROFILE ADULT - FALL HARM RISK - HARM RISK INTERVENTIONS

## 2022-05-04 NOTE — H&P ADULT - HISTORY OF PRESENT ILLNESS
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 Pt reports right leg pain and states that she was recently seen by podiatry and treated for a toe ulcer and leg redness with Doxycycline 100 mg which was completed on 22.   Pt also reports abdominal pain, diarrhea    Denies fever, chills, chest pain, palpitations, SOB, cough, abdominal pain, nausea, vomiting, diarrhea, constipation, urinary frequency, urgency, or dysuria, headaches, changes in vision, dizziness, numbness, tingling.  Denies recent travel, or sick contacts.    ED Course:   Vitals: BP: 46//28-->70s/50s--->Levophed started-->113/81, HR: 74-->130, Temp: 97.2 F-->101.7 F-->99.7 F, RR: 22-->16, SpO2: 95% on NRB   Labs: wbc: 18.87, neut: 16.23, bands: 17%, lactate 9.5, BUN/Cr: 27/1.20-->14/.30, GFR: 49-->115, gluc: 204, CO2: 11, A     ABG: pH: 7.40, PO2: 162, PCO2: 28, HCO3: 17, SpO2: 99.6%  UA: turbid, small bilirubin, trace ketones, 30 protein, moderate LE, small blood, 26-50 WBC, 3-5 RBC, many bacteria,100 glucose   CXR:   CT Chest:   CT Angio Abd Aorta:   CT Angio A/P:   Xray RT tib/fib:   EKG:   Received Ofirmev x1, Vanco, Zosyn, 2.5 L NS bolus x1, 1L LR bolus x1 and started on Levophed and LR @150 cc/hr in the ED  68 yo F with PMH  T2DM, HLD, hyperparathyroidism, asthma, hypothyroidism, PAF, 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 History was obtained from /HCP Simone Bryant ([phone]) as pt was lethargic/altered when examined. As per , pt was recently seen by podiatry Dr. Bashir and was treated for a right big toe ulcer for possible osteomyelitis with a one month course of Doxycycline 100 mg which was completed on 22. Pt saw PCP Dr. Smith and Podiatry Dr. Bashir last week who said that infection seemed to have improved. As per  her RLE leg looks worse and pt was complaining of pain. Yesterday pt was fine, she received her second Moderna Booster (full dose). This morning she had a fever, chills, and was tired which the  attributed to the booster shot. Pt was using her platform walker to get to the bathroom;  heard her yell and when he saw her she was on the floor. Pt had told her  that she felt weak, her legs gave out and she slid onto the floor, but denies any head trauma. Pt has had decreased PO intake today and  gave her multiple cans of Ginger ale. Pt had complained of abdominal pain and as per  she had one large BM around noon time. Around 6 pm  noticed that she was not herself and very lethargic, he wanted to check her blood sugar but pt could not tell him where the glucometer was.  then called EMS who brought her to the hospital.     ED Course:   Vitals: BP: 46//28-->70s/50s--->Levophed started-->113/81, HR: 74-->130, Temp: 97.2 F-->101.7 F-->99.7 F, RR: 22-->16, SpO2: 95% on NRB   Labs: wbc: 18.87-->7.76, neut: 16.23, bands: 17%, lactate 9.5-->10.2, procal: 40.8, BUN/Cr: 27/1.20, GFR: 49, gluc: 204-->111, CO2: 24-->20, AST: 12-->22, M.2, Ph: 1.8     ABG: pH: 7.40, PO2: 162, PCO2: 28, HCO3: 17, SpO2: 99.6%  UA: turbid, small bilirubin, trace ketones, 30 protein, moderate LE, small blood, 26-50 WBC, 3-5 RBC, many bacteria,100 glucose    CT Angio Chest: No infiltrate or pneumonia. Mild multichamber cardiomegaly and associated pericardial effusion. No abnormal dilatation of the main pulmonary artery, and no pulmonary embolism. Mild aneurysm of the ascending thoracic aorta with diameter of 4.33 cm, without aortic dissection.  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 History was obtained from /HCP Simone Bryant ([phone]) as pt was lethargic/altered when examined. Pt was then intubated in the ICU due to her deteriorating condition.    As per , pt was recently seen by podiatry Dr. Bashir and was treated for a right big toe ulcer for possible osteomyelitis with a one month course of Doxycycline 100 mg which was completed on 22. Pt saw PCP Dr. Smith and Podiatry Dr. Bashir last week who said that infection seemed to have improved. As per  her RLE leg looks worse and pt was complaining of pain. Yesterday pt was fine, she received her second Moderna Booster (full dose). This morning she had a fever, chills, and was tired which the  attributed to the booster shot. Pt was using her platform walker to get to the bathroom;  heard her yell and when he saw her she was on the floor. Pt had told her  that she felt weak, her legs gave out and she slid onto the floor, but denies any head trauma. Pt has had decreased PO intake today and  gave her multiple cans of Ginger ale. Pt had complained of abdominal pain and as per  she had one large BM around noon time. Around 6 pm  noticed that she was not herself and very lethargic, he wanted to check her blood sugar but pt could not tell him where the glucometer was.  then called EMS who brought her to the hospital.     ED Course:   Vitals: BP: 46//28-->70s/50s--->Levophed started-->113/81, HR: 74-->130, Temp: 97.2 F-->101.7 F-->99.7 F, RR: 22-->16, SpO2: 95% on NRB   Labs: wbc: 18.87-->7.76, neut: 16.23, bands: 17%, lactate 9.5-->10.2, procal: 40.8, BUN/Cr: 27/1.20, GFR: 49, gluc: 204-->111, CO2: 24-->20, AST: 12-->22, M.2, Ph: 1.8     ABG: pH: 7.40, PO2: 162, PCO2: 28, HCO3: 17, SpO2: 99.6%  UA: turbid, small bilirubin, trace ketones, 30 protein, moderate LE, small blood, 26-50 WBC, 3-5 RBC, many bacteria,100 glucose    CT Angio Chest: No infiltrate or pneumonia. Mild multichamber cardiomegaly and associated pericardial effusion. No abnormal dilatation of the main pulmonary artery, and no pulmonary embolism. Mild aneurysm of the ascending thoracic aorta with diameter of 4.33 cm, without aortic dissection.  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 History was obtained from /HCP Simone Bryant ([phone]) as pt was lethargic/altered when examined. Pt was then intubated in the ICU due to her deteriorating condition.    As per , pt was recently seen by podiatry Dr. Bashir and was treated for a right big toe ulcer for possible osteomyelitis with a one month course of Doxycycline 100 mg which was completed on 22. Pt saw PCP Dr. Smith and Podiatry Dr. Bashir last week who said that infection seemed to have improved. As per  her RLE leg looks worse and pt was complaining of pain. Yesterday pt was fine, she received her second Moderna Booster (full dose). This morning she had a fever, chills, and was tired which the  attributed to the booster shot. Pt was using her platform walker to get to the bathroom;  heard her yell and when he saw her she was on the floor. Pt had told her  that she felt weak, her legs gave out and she slid onto the floor, but denies any head trauma. Pt has had decreased PO intake today and  gave her multiple cans of Ginger ale. Pt had complained of abdominal pain and as per  she had one large BM around noon time. Around 6 pm  noticed that she was not herself and very lethargic, he wanted to check her blood sugar but pt could not tell him where the glucometer was.  then called EMS who brought her to the hospital.     ED Course:   Vitals: BP: 46//28-->70s/50s--->Levophed started-->113/81, HR: 74-->130, Temp: 97.2 F-->101.7 F-->99.7 F, RR: 22-->16, SpO2: 95% on NRB   Labs: wbc: 18.87-->7.76, neut: 16.23, bands: 17%, lactate 9.5-->10.2, procal: 40.8, BUN/Cr: 27/1.20, GFR: 49, gluc: 204-->111, CO2: 24-->20, AST: 12-->22, M.2, Ph: 1.8     ABG: pH: 7.40, PO2: 162, PCO2: 28, HCO3: 17, SpO2: 99.6%  UA: turbid, small bilirubin, trace ketones, 30 protein, moderate LE, small blood, 26-50 WBC, 3-5 RBC, many bacteria,100 glucose    CT Angio Chest (PRELIM): No infiltrate or pneumonia. Mild multichamber cardiomegaly and associated pericardial effusion. No abnormal dilatation of the main pulmonary artery, and no pulmonary embolism. Mild aneurysm of the ascending thoracic aorta with diameter of 4.33 cm, without aortic dissection.  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 History was obtained from /HCP Simone Annette ([phone]) as pt was lethargic/altered when examined. Pt was then intubated in the ICU due to her deteriorating condition.    As per , pt was recently seen by podiatry Dr. Bashir and was treated for a right big toe ulcer for possible osteomyelitis with a one month course of Doxycycline 100 mg which was completed on 22. Pt saw PCP Dr. Smith and Podiatry Dr. Bashir last week who said that infection seemed to have improved. As per  her RLE leg looks worse and pt was complaining of pain. Yesterday pt was fine, she received her second Moderna Booster (full dose). This morning she had a fever, chills, and was tired which the  attributed to the booster shot. Pt was using her platform walker to get to the bathroom;  heard her yell and when he saw her she was on the floor. Pt had told her  that she felt weak, her legs gave out and she slid onto the floor, but denies any head trauma. Pt has had decreased PO intake today and  gave her multiple cans of Ginger ale. Pt had complained of abdominal pain and as per  she had one large BM around noon time. Around 6 pm  noticed that she was not herself and very lethargic, he wanted to check her blood sugar but pt could not tell him where the glucometer was.  then called EMS who brought her to the hospital.     ED Course:   Vitals: BP: 46//28-->70s/50s--->Levophed started-->113/81, HR: 74-->130, Temp: 97.2 F-->101.7 F-->99.7 F, RR: 22-->16, SpO2: 95% on NRB   Labs: wbc: 18.87-->7.76, neut: 16.23, bands: 17%, lactate 9.5-->10.2, procal: 40.8, BUN/Cr: 27/1.20, GFR: 49, gluc: 204-->111, CO2: 24-->20, AST: 12-->22, M.2, Ph: 1.8     ABG: pH: 7.40, PO2: 162, PCO2: 28, HCO3: 17, SpO2: 99.6%  UA: turbid, small bilirubin, trace ketones, 30 protein, moderate LE, small blood, 26-50 WBC, 3-5 RBC, many bacteria,100 glucose    CT Angio Chest (PRELIM): No infiltrate or pneumonia. Mild multichamber cardiomegaly and associated pericardial effusion. No abnormal dilatation of the main pulmonary artery, and no pulmonary embolism. Mild aneurysm of the ascending thoracic aorta with diameter of 4.33 cm, without aortic dissection.  Received Ofirmev x1, Vanco, Zosyn, 2.5 L NS bolus x1, 1L LR bolus x1 and started on Levophed and LR @150 cc/hr in the ED

## 2022-05-04 NOTE — PROGRESS NOTE ADULT - SUBJECTIVE AND OBJECTIVE BOX
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 She presented to [facility] from home w/  complaints of increased weakness/lethargy, abd pain w/ n/v/d and increased RLE pain/swelling and worsening erythema. Of note pt received second Moderna booster shot on .  In the ED she was found to be hypotensive with SBP of 60, she was started on sepsis protocol was given appx 2.5L of NS w/ initially improvement in the SBP but then developed hypotension again w/ SBP of 70s, LA of 9.5. She had CT imaging which showed no evidence of bowel ischemia and showed poor visualization of vessels below the knee due to severe PAD. General and vascular surgery consulted and no acute surgical intervention recommended. Pt started to become more hypotensive while on low dose levophed and more altered and pt was intubated for airway protection.    This morning, patient seen and examined at bedside. Febrile to 102.6 this morning.        REVIEW OF SYSTEMS  Unable to obtain 2/2 pt being intubated and sedated     T(F): 102.6 (22 @ 06:00), Max: 102.6 (22 @ 06:00)  HR: 139 (22 @ 06:00) (74 - 140)  BP: 137/61 (22 @ 06:00) (46/28 - 162/82)  RR: 25 (22 @ 06:00) (16 - 37)  SpO2: 52% (22 @ 06:00) (52% - 100%)  Wt(kg): --    Mode: AC/ CMV (Assist Control/ Continuous Mandatory Ventilation), RR (machine): 24, TV (machine): 400, FiO2: 80, PEEP: 5        I&O's Summary     @ 07:01  -   @ 07:00  --------------------------------------------------------  IN: 1396 mL / OUT: 300 mL / NET: 1096 mL      PHYSICAL EXAM  General: in NAD   CNS: sedated   HEENT: PERRL  Resp: good air entry b/l   CVS: tachycardic, no M/R/G   Abd: +BS, no---   Ext: no edema  Skin: warm and dry     MEDICATIONS  meropenem  IVPB   meropenem  IVPB IV Intermittent    norepinephrine Infusion IV Continuous    hydrocortisone sodium succinate Injectable IV Push  vasopressin Infusion IV Continuous      acetaminophen    Suspension .. Oral PRN  fentaNYL   Infusion. IV Continuous      heparin   Injectable SubCutaneous    pantoprazole  Injectable IV Push      lactated ringers. IV Continuous  magnesium sulfate  IVPB IV Intermittent  sodium phosphate IVPB IV Intermittent      chlorhexidine 0.12% Liquid Oral Mucosa  chlorhexidine 4% Liquid Topical                            12.1   11.89 )-----------( 267      ( 04 May 2022 05:13 )             39.6     Bands 17.0    05-04    143  |  109<H>  |  29<H>  ----------------------------<  97  4.2   |  21<L>  |  1.30    Ca    9.4      04 May 2022 05:13  Phos  2.0     05-04  Mg     1.2     -    TPro  4.6<L>  /  Alb  2.1<L>  /  TBili  0.8  /  DBili  x   /  AST  44<H>  /  ALT  41  /  AlkPhos  40  05-04    Lactate 4.1           05-04 @ 05:13    Lactate 10.2           - @ 02:11    Lactate 20.8           - @ 01:04    Lactate 9.5           - @ 21:01          PT/INR - ( 03 May 2022 21:01 )   PT: 13.3 sec;   INR: 1.14 ratio         PTT - ( 03 May 2022 21:01 )  PTT:23.2 sec  Urinalysis Basic - ( 03 May 2022 23:46 )    Color: Yellow / Appearance: Turbid / S.010 / pH: x  Gluc: x / Ketone: Trace  / Bili: Small / Urobili: Negative   Blood: x / Protein: 30 mg/dL / Nitrite: Negative   Leuk Esterase: Moderate / RBC: 3-5 /HPF / WBC 26-50   Sq Epi: x / Non Sq Epi: Occasional / Bacteria: Many          Rapid RVP Result: NotDetec ( @ 21:01)    Radiology:   < from: CT Chest w/ IV Cont (22 @ 00:10) >  ******PRELIMINARY REPORT******      ******PRELIMINARY REPORT******       ACC: [id number] EXAM:  CT CHEST IC                          PROCEDURE DATE:  2022    ******PRELIMINARY REPORT******      ******PRELIMINARY REPORT******           INTERPRETATION:  [facility] RADIOLOGIST PRELIMINARY REPORT    PROCEDURE INFORMATION:  Exam: CT Chest With Contrast; Diagnostic  Exam date and time: 5/3/2022 10:50 PM  Age: 67 years old  Clinical indication: Pain and condition or disease; Other: Sepsis,   diarrhea;  Abdominal pain; Epigastric    TECHNIQUE:  Imaging protocol: Diagnostic computed tomography of the chest with   contrast.    COMPARISON:  CT ANGIO CHEST WITHOUT AND OR WITH IV CONTRAST 2021 10:57 AM    FINDINGS:  Lungs: Bronchial calcifications. Mild atelectasis within the posterior and  posterolateral right lung base. No parenchymal infiltrate.  Pleural spaces: No pneumothorax.  Heart: Multichamber cardiomegaly. Pericardial effusion with thickness of   up to  1.67 cm.  Lymph nodes: Unremarkable. No enlarged lymph nodes.  Vasculature: Main pulmonary artery has a diameter of 3 cm. No pulmonary  embolism.    Bones/joints: Unremarkable. No acute fracture.  Soft tissues: Unremarkable.    Other findings: Ascending thoracic aortic diameter is 4.42 x 4.33 cm.    IMPRESSION:  1. No infiltrate or pneumonia.  2. Mild multichamber cardiomegaly and associated pericardial effusion.  3. No abnormal dilatation of the main pulmonary artery, and no pulmonary  embolism.  4. 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        ******PRELIMINARY REPORT******      < end of copied text >  < from: CT Angio Abd Aorta w/run-off w/ IV Cont (22 @ 11:40) >  ACC: [id number] EXAM:  CT ANGIO ABD AOR W RUN(W)AW IC                          PROCEDURE DATE:  2022          INTERPRETATION:  CLINICAL INFORMATION: Nonhealing ulcer right first toe.    COMPARISON: CT abdomen pelvis dated 2021.    CONTRAST/COMPLICATIONS:  IV Contrast: Omnipaque 350  90 cc administered   10 cc discarded  Oral Contrast: NONE  Complications: None reported at time of study completion    CT ANGIOGRAM ABDOMEN, PELVIS, AND LOWER EXTREMITIES:    PROCEDURE:  Initially, nonenhanced CT was obtained through the calves. Then,   following the rapid administration of intravenous contrast, CT   angiography was performed through the abdomen, pelvis, and lower   extremities down to the toes.  Delayed images through the calves were   also obtained. Sagittal and coronal reformats as well as 3D   reconstructions were performed.    FINDINGS:    CENTRAL ARTERIAL SYSTEM:    Minimal atheromatous plaque in the abdominal aorta without luminal   narrowing or aneurysmal dilatation. Mesenteric and renal arteries are   widely patent.    RIGHT LOWER EXTREMITY:    The iliac arteries demonstrate moderate calcification but are widely   patent. The femoral and popliteal arteries are heavily calcified but   appear patent. Evaluation of the infrapopliteal arteries is compromised   by severe calcifications.    LEFT LOWER EXTREMITY:    The iliac arteries demonstrate moderate calcification but are widely   patent. The femoral and popliteal arteries are heavily calcified but   appear patent. Evaluation of the infrapopliteal arteries is compromised   by severe calcifications.    ADDITIONAL FINDINGS:  Status post cholecystectomy.  Multiple parenchymal cysts of the pancreatic body and tail, unchanged.  Status post hysterectomy.  4 cm dermoid cyst right ovary, unchanged.  Sigmoid diverticulosis.  Two IVC filters. The posterior struts of the more cephalad filter appear   extruded outside the vessel, unchanged.  Degenerative changes lumbar spine.  Rectus diastases with ventral hernia repair.    IMPRESSION:  No CT evidence of aortoiliac stenosis or occlusion.    Severe long segment calcifications of the femoral and popliteal arteries   with diffuse luminal narrowing.    Luminal patency of the infrapopliteal arteries cannot be determined due  to severe calcification.        --- End of Report ---            ALEJANDRINA AREVALO MD; Attending Radiologist  This document has been electronically signed. 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 She presented to [facility] from home w/  complaints of increased weakness/lethargy, abd pain w/ n/v/d and increased RLE pain/swelling and worsening erythema. Of note pt received second Moderna booster shot on .  In the ED she was found to be hypotensive with SBP of 60, she was started on sepsis protocol was given appx 2.5L of NS w/ initially improvement in the SBP but then developed hypotension again w/ SBP of 70s, LA of 9.5. She had CT imaging which showed no evidence of bowel ischemia and showed poor visualization of vessels below the knee due to severe PAD. General and vascular surgery consulted and no acute surgical intervention recommended. Pt started to become more hypotensive while on low dose levophed and more altered and pt was intubated for airway protection.    This morning, patient seen and examined at bedside. Febrile to 102.6 this morning, Tylenol and cooling blanket provided. Unable to obtain meaningful information at this time as patient is intubated and sedated.        REVIEW OF SYSTEMS  Unable to obtain 2/2 pt being intubated and sedated     T(F): 102.6 (22 @ 06:00), Max: 102.6 (22 @ 06:00)  HR: 139 (22 @ 06:00) (74 - 140)  BP: 137/61 (22 @ 06:00) (46/28 - 162/82)  RR: 25 (22 @ 06:00) (16 - 37)  SpO2: 52% (22 @ 06:00) (52% - 100%)  Wt(kg): --    Mode: AC/ CMV (Assist Control/ Continuous Mandatory Ventilation), RR (machine): 24, TV (machine): 400, FiO2: 80, PEEP: 5        I&O's Summary     @ 07:01  -   @ 07:00  --------------------------------------------------------  IN: 1396 mL / OUT: 300 mL / NET: 1096 mL      PHYSICAL EXAM  General: in NAD   CNS: sedated   HEENT: PERRL  Resp: good air entry b/l   CVS: tachycardic, no M/R/G   Abd: +BS, no TTP  Ext: + edema to RLE, cool feet bilaterally, unable to palpate DP/PT pulses.   Skin: mottled, cool skin to RLE with surrounding erythema      MEDICATIONS  meropenem  IVPB   meropenem  IVPB IV Intermittent    norepinephrine Infusion IV Continuous    hydrocortisone sodium succinate Injectable IV Push  vasopressin Infusion IV Continuous      acetaminophen    Suspension .. Oral PRN  fentaNYL   Infusion. IV Continuous      heparin   Injectable SubCutaneous    pantoprazole  Injectable IV Push      lactated ringers. IV Continuous  magnesium sulfate  IVPB IV Intermittent  sodium phosphate IVPB IV Intermittent      chlorhexidine 0.12% Liquid Oral Mucosa  chlorhexidine 4% Liquid Topical                            12.1   11.89 )-----------( 267      ( 04 May 2022 05:13 )             39.6     Bands 17.0    05-04    143  |  109<H>  |  29<H>  ----------------------------<  97  4.2   |  21<L>  |  1.30    Ca    9.4      04 May 2022 05:13  Phos  2.0     05-04  Mg     1.2     05-04    TPro  4.6<L>  /  Alb  2.1<L>  /  TBili  0.8  /  DBili  x   /  AST  44<H>  /  ALT  41  /  AlkPhos  40  05-04    Lactate 4.1           05-04 @ 05:13    Lactate 10.2           05-04 @ 02:11    Lactate 20.8           05-04 @ 01:04    Lactate 9.5           05-03 @ 21:01          PT/INR - ( 03 May 2022 21:01 )   PT: 13.3 sec;   INR: 1.14 ratio         PTT - ( 03 May 2022 21:01 )  PTT:23.2 sec  Urinalysis Basic - ( 03 May 2022 23:46 )    Color: Yellow / Appearance: Turbid / S.010 / pH: x  Gluc: x / Ketone: Trace  / Bili: Small / Urobili: Negative   Blood: x / Protein: 30 mg/dL / Nitrite: Negative   Leuk Esterase: Moderate / RBC: 3-5 /HPF / WBC 26-50   Sq Epi: x / Non Sq Epi: Occasional / Bacteria: Many          Rapid RVP Result: NotDetec ( @ 21:01)    Radiology:   < from: CT Chest w/ IV Cont (22 @ 00:10) >  ******PRELIMINARY REPORT******      ******PRELIMINARY REPORT******       ACC: [id number] EXAM:  CT CHEST IC                          PROCEDURE DATE:  2022    ******PRELIMINARY REPORT******      ******PRELIMINARY REPORT******           INTERPRETATION:  [facility] RADIOLOGIST PRELIMINARY REPORT    PROCEDURE INFORMATION:  Exam: CT Chest With Contrast; Diagnostic  Exam date and time: 5/3/2022 10:50 PM  Age: 67 years old  Clinical indication: Pain and condition or disease; Other: Sepsis,   diarrhea;  Abdominal pain; Epigastric    TECHNIQUE:  Imaging protocol: Diagnostic computed tomography of the chest with   contrast.    COMPARISON:  CT ANGIO CHEST WITHOUT AND OR WITH IV CONTRAST 2021 10:57 AM    FINDINGS:  Lungs: Bronchial calcifications. Mild atelectasis within the posterior and  posterolateral right lung base. No parenchymal infiltrate.  Pleural spaces: No pneumothorax.  Heart: Multichamber cardiomegaly. Pericardial effusion with thickness of   up to  1.67 cm.  Lymph nodes: Unremarkable. No enlarged lymph nodes.  Vasculature: Main pulmonary artery has a diameter of 3 cm. No pulmonary  embolism.    Bones/joints: Unremarkable. No acute fracture.  Soft tissues: Unremarkable.    Other findings: Ascending thoracic aortic diameter is 4.42 x 4.33 cm.    IMPRESSION:  1. No infiltrate or pneumonia.  2. Mild multichamber cardiomegaly and associated pericardial effusion.  3. No abnormal dilatation of the main pulmonary artery, and no pulmonary  embolism.  4. 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        ******PRELIMINARY REPORT******      < end of copied text >  < from: CT Angio Abd Aorta w/run-off w/ IV Cont (22 @ 11:40) >  ACC: [id number] EXAM:  CT ANGIO ABD AOR W RUN(W)AW IC                          PROCEDURE DATE:  2022          INTERPRETATION:  CLINICAL INFORMATION: Nonhealing ulcer right first toe.    COMPARISON: CT abdomen pelvis dated 2021.    CONTRAST/COMPLICATIONS:  IV Contrast: Omnipaque 350  90 cc administered   10 cc discarded  Oral Contrast: NONE  Complications: None reported at time of study completion    CT ANGIOGRAM ABDOMEN, PELVIS, AND LOWER EXTREMITIES:    PROCEDURE:  Initially, nonenhanced CT was obtained through the calves. Then,   following the rapid administration of intravenous contrast, CT   angiography was performed through the abdomen, pelvis, and lower   extremities down to the toes.  Delayed images through the calves were   also obtained. Sagittal and coronal reformats as well as 3D   reconstructions were performed.    FINDINGS:    CENTRAL ARTERIAL SYSTEM:    Minimal atheromatous plaque in the abdominal aorta without luminal   narrowing or aneurysmal dilatation. Mesenteric and renal arteries are   widely patent.    RIGHT LOWER EXTREMITY:    The iliac arteries demonstrate moderate calcification but are widely   patent. The femoral and popliteal arteries are heavily calcified but   appear patent. Evaluation of the infrapopliteal arteries is compromised   by severe calcifications.    LEFT LOWER EXTREMITY:    The iliac arteries demonstrate moderate calcification but are widely   patent. The femoral and popliteal arteries are heavily calcified but   appear patent. Evaluation of the infrapopliteal arteries is compromised   by severe calcifications.    ADDITIONAL FINDINGS:  Status post cholecystectomy.  Multiple parenchymal cysts of the pancreatic body and tail, unchanged.  Status post hysterectomy.  4 cm dermoid cyst right ovary, unchanged.  Sigmoid diverticulosis.  Two IVC filters. The posterior struts of the more cephalad filter appear   extruded outside the vessel, unchanged.  Degenerative changes lumbar spine.  Rectus diastases with ventral hernia repair.    IMPRESSION:  No CT evidence of aortoiliac stenosis or occlusion.    Severe long segment calcifications of the femoral and popliteal arteries   with diffuse luminal narrowing.    Luminal patency of the infrapopliteal arteries cannot be determined due  to severe calcification.        --- End of Report ---            ALEJANDRINA AREVALO MD; Attending Radiologist  This document has been electronically signed. 2022 11:48AM    < end of copied text >        CENTRAL LINE: Y  MCCARTY: Y  A-LINE: N    GLOBAL ISSUE/BEST PRACTICE:  Analgesia: Y  Sedation: Y  CAM-ICU: n/a  HOB elevation: Y  Stress ulcer prophylaxis: Y  VTE prophylaxis: Y  Glycemic control: Y  Nutrition: N    CODE STATUS: FULL CODE

## 2022-05-04 NOTE — PROGRESS NOTE ADULT - ATTENDING COMMENTS
67F h/o DM, HLD, hypothyroidism, severe PAD, Type B aortic dissection c/b spinal cord infarction, PE/DVT, s/p recent RLE angioplasty 2/2 to non-healing wounds to R first digit cornering for OM s/p recent course of Doxycycline now a/w septic shock likely 2/2 UTI and bacteremia c/b severe lacticemia, STARR, transaminitis, demand ischemia and acute hypoxic respiratory failure requiring intubation.   Neuro: continue fentanyl for sedation and analgesia  CV: septic shock on dual pressor support with levophed and vasopressin, wean levo as tolerated  - started on stress dose steroids overnight  - elevated troponin likely from demand ischemia - trend to peak  Pulm: acute hypoxic respiratory failure requiring intubation, ABG post-intubation with appropriate gas exchange  - continue lung protective ventilation  GI: NPO, no evidence of bowel ischemia on CT; continue ppx with protonix  Renal: STARR from ATN 2/2 shock state  - lactate continues to downtrend with ongoing resuscitation  - IVC collapsable, continue LR @ 150cc/hr  ID: blood cultures growing GNR - continue vanc and meropenem, f/u speciation and sensitivities  Endo: continue home synthoid  Vasc: full blood flow noted on CT run-off of LE with L groin suggestive of pseudoaneurysm, likely from recent instrumentation for angio performed last month - obtain duplex to further evaluate  Heme: dvt ppx with HSQ

## 2022-05-04 NOTE — DIETITIAN INITIAL EVALUATION ADULT - REASON FOR ADMISSION
66yo Female with PMH of IBS, Colon CA, HLD, Hypothyroidism, aortic aneurysm, DM. Pt admitted on 5/4 with Sepsis likely 2/2 RLE wound, hypoxic respiratory failure requiring intubation.

## 2022-05-04 NOTE — CONSULT NOTE ADULT - ASSESSMENT
68 YO Female w/ PMHx of DMII, HLD, hyperparathyroidism, asthma, hypothyroidism, PAF, type B thoracic dissection tx medically c/b spinal cord infarction, PE/VTE s/p IVC filter, PAD s/p angioplasty to right femoropopliteal artery (3/7/22, Dr. Dailey, [facility]) d/t non-healing wounds to the first digit p/w worsening weakness, R leg pain, abdominal pain & diarrhea. CT angio negative for bowel ischemia. Lactate & WBC elevated, patient being intubated in ICU

## 2022-05-04 NOTE — PROVIDER CONTACT NOTE (EICU) - RECOMMENDATIONS
continue resuscitation  correct lytes  Surgical consultation  ICU intervention and intubation as required.

## 2022-05-04 NOTE — DIETITIAN INITIAL EVALUATION ADULT - OTHER INFO
GI/Intake:  -NGT in place; no feeds initiated at this time   -Last BM documented 5/4; no bowel regimen noted at this time   -C/o abdominal pain and diarrhea     Resp:   -Presenting with acute hypoxic respiratory failure   -Intubated on vent support    Neuro:   -Sedated  -Fentanyl running and Propofol d/c'ed     CV:   -Pressor support noted   -Vasopressin and Levophed ordered     Renal:   -Presenting with STARR; lactated ringers ordered for maintenance   -Hypophosphatemic, Hypomagnesemic (Mg sulfate ordered for repletion)     Endo:   -Hx of T2DM   -Latest A1c: 6.2% - controlled   -Noted on Lantus and Metformin PTA   -No insulin ordered in house thus far    Integumentary:  -S/p angioplasty to right femoropopliteal artery (3/7/22); non-healing wounds   -Presenting with sepsis likely suspicious for LE ischemia     Weight Hx:  -Current: 179 pounds  -No additional recent weights noted in EMR GI/Intake:  -NGT in place; no feeds initiated at this time   -Last BM documented 5/4; no bowel regimen noted at this time   -C/o abdominal pain and diarrhea     Resp:   -Presenting with acute hypoxic respiratory failure   -Intubated on vent support    Neuro:   -Sedated  -Fentanyl running and Propofol d/c'ed     CV:   -Pressor support noted   -Vasopressin and Levophed ordered     Renal:   -Presenting with STARR; lactated ringers ordered for maintenance   -Hypophosphatemic, Hypomagnesemic (Mg sulfate ordered for repletion)     Endo:   -Hx of T2DM   -Latest A1c: 6.2% - controlled   -Noted on Lantus, Januvia and Metformin PTA   -No insulin ordered in house thus far    Integumentary:  -S/p angioplasty to right femoropopliteal artery (3/7/22); non-healing wounds   -Presenting with sepsis likely suspicious for LE ischemia     Weight Hx:  -Current: 179 pounds  -No additional recent weights noted in EMR

## 2022-05-04 NOTE — PROGRESS NOTE ADULT - ASSESSMENT
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 Tonight she presented to [facility] from home w/ a complains of increased weakness/lethargy, abd pain w/ n/v/d and increased RLE pain/swelling and worsening erythema. Pt admitted to the MICU in septic shock 2/2 to RLE wound +/- UTI, severe lactic acidosis, STARR, and acute hypoxic resp failure requiring emergent intubation.      -Neuro: Intubated and sedated on fentanyl gtt  -Cardaic: Septic Shock started on levophed and vaso added will titrate to maintain MAP >65, start stress dose steroids given chronic hx of prednisone use, trend CE to r/o NSTEMI, check ECHO for possible pericardial effusion  -Resp: Acute Hypoxic resp failure now intubated, start lung protective ventilation, will titrate vent setting as needed to maintain o2 >90%  -GI: NPO for now, GI ppx w/ protonix, Per [facility] radiologist no acute finding for bowel ischemia, surgery consulted input noted  -Renal: STARR in the setting of septic shock likely 2/2 to ATN, epps placed monitor I&O q1h, hypoMag/hypoPHOS will replete w/ mag and phos, Severe Lactic and metabolic acidosis pt was given x2 amp of bicarb during intubation will cont LR at 150cc/hr given IVC fully collapsable on bedside POCUS  -ID: Septic Shock due to RLE  wound +/- UTI  given rapid deterioration escalate IVAB up from IV zosyn to IV merro, cont IV vanco per vanco troughs, Covid19/RVP neg, check UCX, blood cx, Urine legionella, RUQ US to r/o biliary infectious process given RUQ abd pain during interview, check ESR/CRP/LDH given worsening for RLE infection, check Fungitell and aspergillus galactomannam  -Endo: no acute issues, monitor FS q6h gievn NPO status, Hypothyroid cont synthroid  -Heme: DVT ppx w/ heparin  -Vascular: Severe PAD concern for LE ischemia seen by vascular input noted  -Dispo: Admit to MICU, pt is currently full code,   66 y/o F w/ pmh of dm, hld, hypothyroid, severe PAD, 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 Tonight she presented to [facility] from home w/ a complains of increased weakness/lethargy, abd pain w/ n/v/d and increased RLE pain/swelling and worsening erythema. Pt admitted to the MICU in septic shock 2/2 to RLE wound +/- UTI, severe lactic acidosis, STARR, and acute hypoxic resp failure requiring emergent intubation.      -Neuro: Intubated and sedated on fentanyl gtt  -Cardic: Septic Shock started on levophed and vaso added will titrate to maintain MAP >65, continue stress dose steroids given chronic hx of prednisone use. Elevated trops noted 16--->96, may be secondary to demand continue to trend to peak. F/u TTE.   -Resp: Acute Hypoxic resp failure now intubated for airway protection, continue lung protective ventilation, will titrate vent setting as needed to maintain o2 >90%. History of COPD/asthma, hold home inhalers for now.  -GI: NPO for now, GI ppx w/ protonix, No evidence of bowel ischemia noted on imaging.   -Renal: STARR in the setting of septic shock likely 2/2 to ATN, now improved. epps placed monitor I&O q1h, hypoMag/hypophos, repleted this morning.Severe Lactic and metabolic acidosis, improving, continue LR at 150cc/hr given collapsible IVC on bedside POCUS  -ID: Septic Shock due to RLE  wound/cellulitis +/- UTI. Continue broad spectrum abx with vancomycin and meropenem. Procal elevated to 40.8, , , ESR negative. Covid19/RVP neg, F/u Blood Cx and urine cx, Urine legionella, f/u CRP/LDH given worsening for RLE infection, f/u Fungitell and aspergillus galactomannan  -Endo: History of type 2 DM, blood glucose controlled, monitor off ISS for now, monitor FS q6h gievn NPO status. Hypothyroidism-continue synthroid  -Heme: DVT ppx w/ heparin  -Vascular: History of severe PAD s/p right fem/pop angioplasty in 3/2022 , no concern for ischemia per vascular surgery.   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 Tonight she presented to [facility] from home w/ a complains of increased weakness/lethargy, abd pain w/ n/v/d and increased RLE pain/swelling and worsening erythema. Pt admitted to the MICU in septic shock 2/2 to RLE wound +/- UTI, severe lactic acidosis, STARR, and acute hypoxic resp failure requiring emergent intubation.      -Neuro: Intubated and sedated on fentanyl gtt  -Cardic: Septic Shock started on levophed and vaso added will titrate to maintain MAP >65, continue stress dose steroids given chronic hx of prednisone use. Elevated trops noted 16--->96, may be secondary to demand continue to trend to peak. F/u TTE.   -Resp: Acute Hypoxic resp failure now intubated for airway protection, continue lung protective ventilation, will titrate vent setting as needed to maintain o2 >90%. History of COPD/asthma, hold home inhalers for now.  -GI: NPO for now, GI ppx w/ protonix, No evidence of bowel ischemia noted on imaging.   -Renal: STARR in the setting of septic shock likely 2/2 to ATN, now improved. epps placed monitor I&O q1h, hypoMag/hypophos, repleted this morning.Severe Lactic and metabolic acidosis, improving, continue LR at 150cc/hr given collapsible IVC on bedside POCUS  -ID: Septic Shock due to RLE  wound/cellulitis +/- UTI. Continue broad spectrum abx with vancomycin and meropenem. Procal elevated to 40.8, , , ESR negative. Covid19/RVP neg, F/u blood cx, F/u urine cx, Urine legionella, f/u CRP/LDH given worsening for RLE infection, f/u Fungitell and aspergillus galactomannan  -Endo: History of type 2 DM, blood glucose controlled, monitor off ISS for now, monitor FS q6h gievn NPO status. Hypothyroidism-continue synthroid  -Heme: DVT ppx w/ heparin  -Vascular: History of severe PAD s/p right fem/pop angioplasty in 3/2022 , no concern for ischemia per vascular surgery.   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 Tonight she presented to [facility] from home w/ a complains of increased weakness/lethargy, abd pain w/ n/v/d and increased RLE pain/swelling and worsening erythema. Pt admitted to the MICU in septic shock 2/2 to RLE wound +/- UTI, severe lactic acidosis, STARR, and acute hypoxic resp failure requiring emergent intubation.      -Neuro: Intubated and sedated on fentanyl gtt  -Cardic: Septic Shock started on levophed and vaso added will titrate to maintain MAP >65, continue stress dose steroids given chronic hx of prednisone use. Elevated trops noted 16--->96, may be secondary to demand continue to trend to peak. F/u TTE.   -Resp: Acute Hypoxic resp failure now intubated for airway protection, continue lung protective ventilation, will titrate vent setting as needed to maintain o2 >90%. History of COPD/asthma, hold home inhalers for now.  -GI: NPO for now, GI ppx w/ protonix, No evidence of bowel ischemia noted on imaging.   -Renal: STARR in the setting of septic shock likely 2/2 to ATN, now improved. epps placed monitor I&O q1h, hypoMag/hypophos, repleted this morning.Severe Lactic and metabolic acidosis, improving, continue LR at 150cc/hr given collapsible IVC on bedside POCUS  -ID: Septic Shock due to RLE  wound/cellulitis +/- UTI. Continue broad spectrum abx with vancomycin and meropenem. Procal elevated to 40.8, , , ESR negative. Covid19/RVP neg, Blood cx with growth in aerobic and anaerobic bottles of gram negative rods. F/u urine cx, Urine legionella, f/u CRP/LDH given worsening for RLE infection, f/u Fungitell and aspergillus galactomannan  -Endo: History of type 2 DM, blood glucose controlled, monitor off ISS for now, monitor FS q6h gievn NPO status. Hypothyroidism-continue synthroid  -Heme: DVT ppx w/ heparin  -Vascular: History of severe PAD s/p right fem/pop angioplasty in 3/2022 , no concern for ischemia per vascular surgery.   -Dispo: Admit to MICU, pt is currently full code,

## 2022-05-04 NOTE — CONSULT NOTE ADULT - SUBJECTIVE AND OBJECTIVE BOX
HPI:  66 yo F with PMH  T2DM, HLD, hyperparathyroidism, asthma, hypothyroidism, PAF, 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 History was obtained from /HCP Simone Estesnkson ([phone]) as pt was lethargic/altered when examined. As per , pt was recently seen by podiatry Dr. Bashir and was treated for a right big toe ulcer for possible osteomyelitis with a one month course of Doxycycline 100 mg which was completed on 22. Pt saw PCP Dr. Smith and Podiatry Dr. Bashir last week who said that infection seemed to have improved. As per  her RLE leg looks worse and pt was complaining of pain. Yesterday pt was fine, she received her second Moderna Booster (full dose). This morning she had a fever, chills, and was tired which the  attributed to the booster shot. Pt was using her platform walker to get to the bathroom;  heard her yell and when he saw her she was on the floor. Pt had told her  that she felt weak, her legs gave out and she slid onto the floor, but denies any head trauma. Pt has had decreased PO intake today and  gave her multiple cans of Ginger ale. Pt had complained of abdominal pain and as per  she had one large BM around noon time. Around 6 pm  noticed that she was not herself and very lethargic, he wanted to check her blood sugar but pt could not tell him where the glucometer was.  then called EMS who brought her to the hospital.     Interval HPI:  Vascular surgery consulted to r/o LE ischemia. Patient seen and examined at bedside. ROS limited at time of exam due to limited responsiveness.     PAST MEDICAL & SURGICAL HISTORY:  Asthma    Diabetes mellitus  Type II    Hyperlipidemia    Hypothyroidism    PE (pulmonary embolism)  2002--anticoagulated, resolved    Shingles  2015    Arthritis    Dissecting aortic aneurysm, thoracic  Type B---no surgery, nerve damage, lower extremity weakness    Torn ACL    PAD (peripheral artery disease)    H/O rotator cuff tear  Left shoulder    Open wound  Right great toe    PAF (paroxysmal atrial fibrillation)    Irritable bowel syndrome (IBS)    H/O osteoporosis    Anemia    Essential tremor    S/P cholecystectomy      History of cataract surgery    History of colon resection  &amp; Hysterectomy---colon cancer---2002--no chemo or radiation    H/O resection of small bowel  Perforated --2006    Abdominal hernia  10/18/2010 (multiple)    Uday filter in place  1990,  second placed 2002    CONSTITUTIONAL: No weakness, fevers or chills  EYES/ENT: No visual changes;  No vertigo or throat pain   NECK: No pain or stiffness  RESPIRATORY: No cough, wheezing, hemoptysis; No shortness of breath  CARDIOVASCULAR: No chest pain or palpitations  GASTROINTESTINAL: See HPI  GENITOURINARY: No dysuria, frequency or hematuria  NEUROLOGICAL: No numbness or weakness  SKIN: No itching, burning, rashes, or lesions   All other review of systems is negative unless indicated above.    MEDICATIONS  (STANDING):  chlorhexidine 0.12% Liquid 15 milliLiter(s) Oral Mucosa every 12 hours  chlorhexidine 4% Liquid 1 Application(s) Topical <User Schedule>  fentaNYL   Infusion. 0.5 MICROgram(s)/kG/Hr (4.08 mL/Hr) IV Continuous <Continuous>  lactated ringers. 1000 milliLiter(s) (150 mL/Hr) IV Continuous <Continuous>  magnesium sulfate  IVPB 2 Gram(s) IV Intermittent once  norepinephrine Infusion 0.05 MICROgram(s)/kG/Min (7.65 mL/Hr) IV Continuous <Continuous>  pantoprazole  Injectable 40 milliGRAM(s) IV Push daily  sodium bicarbonate  Injectable 50 milliEquivalent(s) IV Push once  sodium bicarbonate  Injectable 50 milliEquivalent(s) IV Push once    MEDICATIONS  (PRN)    Allergies    adhesives (Blisters)  Ceftin (Rash)  erythromycin (Rash)  fish (Vomiting; Nausea)  Levaquin (Rash)    Intolerances    FAMILY HISTORY:  FH: CHF (congestive heart failure)  Father, age 90,     Vital Signs Last 24 Hrs  T(C): 37.6 (03 May 2022 23:53), Max: 38.7 (03 May 2022 20:47)  T(F): 99.7 (03 May 2022 23:53), Max: 101.7 (03 May 2022 20:47)  HR: 133 (04 May 2022 02:37) (74 - 133)  BP: 113/81 (04 May 2022 00:50) (46/28 - 113/81)  BP(mean): 94 (04 May 2022 00:50) (56 - 98)  RR: 18 (03 May 2022 23:53) (16 - 22)  SpO2: 99% (03 May 2022 23:53) (95% - 99%)    GENERAL:  Well-developed obese Female lying in ICU bed.  HEENT:  Sclera white. Mucous membranes moist.  EXTREMITIES: Distal pulses nonpalpable, no signals revealed via doppler. +R calf ttp. Bilateral calf edema present, erythema noted on R calf. Hypertrophic toenails. Calves warm, bilateral feet cool to the touch.   NEURO:  Responsive to painful stimuli    LABS:                        11.1   7.76  )-----------( 227      ( 04 May 2022 02:12 )             38.2     05-04    142  |  110<H>  |  29<H>  ----------------------------<  111<H>  4.1   |  20<L>  |  1.20    Ca    9.3      04 May 2022 02:11  Phos  1.8     05-04  Mg     1.2     05-04    TPro  4.4<L>  /  Alb  1.9<L>  /  TBili  0.8  /  DBili  x   /  AST  22  /  ALT  28  /  AlkPhos  36<L>  05-04    PT/INR - ( 03 May 2022 21:01 )   PT: 13.3 sec;   INR: 1.14 ratio         PTT - ( 03 May 2022 21:01 )  PTT:23.2 sec  Urinalysis Basic - ( 03 May 2022 23:46 )    Color: Yellow / Appearance: Turbid / S.010 / pH: x  Gluc: x / Ketone: Trace  / Bili: Small / Urobili: Negative   Blood: x / Protein: 30 mg/dL / Nitrite: Negative   Leuk Esterase: Moderate / RBC: 3-5 /HPF / WBC 26-50   Sq Epi: x / Non Sq Epi: Occasional / Bacteria: Many    RADIOLOGY & ADDITIONAL STUDIES:  < from: CT Chest w/ IV Cont (22 @ 00:10) >    FINDINGS:  Lungs: Bronchial calcifications. Mild atelectasis within the posterior and  posterolateral right lung base. No parenchymal infiltrate.  Pleural spaces: No pneumothorax.  Heart: Multichamber cardiomegaly. Pericardial effusion with thickness of   up to  1.67 cm.  Lymph nodes: Unremarkable. No enlarged lymph nodes.  Vasculature: Main pulmonary artery has a diameter of 3 cm. No pulmonary  embolism.    Bones/joints: Unremarkable. No acute fracture.  Soft tissues: Unremarkable.    Other findings: Ascending thoracic aortic diameter is 4.42 x 4.33 cm.    IMPRESSION:  1. No infiltrate or pneumonia.  2. Mild multichamber cardiomegaly and associated pericardial effusion.  3. No abnormal dilatation of the main pulmonary artery, and no pulmonary  embolism.  4. 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    < end of copied text >

## 2022-05-04 NOTE — H&P ADULT - ATTENDING COMMENTS
68 yo F with PMHx of T2DM, HLD, hyperparathyroidism, asthma, hypothyroidism, PAF (not on AC), 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 Admitted to ICU for septic shock. With multiple possible sources of infection - UA positive for UTI, however with profound sepsis and lactic acidosis - concern for ischemia or deep infection. Possible RT Leg infection vs RT hallux OM, concern for abdominal pathology but prelim CT scan reading negative for ischemia. Vascular Surgery and General surgery consulted - recs appreciated - they do not feel as though her sepsis is related to the abdomen or the leg. Will treat as sepsis secondary to UTI. However, we shall empirically cover her with broad spectrum antibiotics (merrem and vancomycin) and follow up culture data. She has STARR which is likely septic ATN in the setting of shock - currently oliguric, closely monitor UOP. She is critically ill and at risk of abrupt decompensation and death. I spoke to her  about this and he understands the gravity of her illness. He states that she is full code and would want all resuscitative efforts. Prognosis guarded. Discussed with ICU MAXIMINO Cho, E-ICU attending Dr. Reyes and surgery/vascular surgery MAXIMINO Jain (for Dr. Hernandez and Korey).    Agree with H&P as outlined above, edited where appropriate. 66 yo F with PMHx of T2DM, HLD, hyperparathyroidism, asthma, hypothyroidism, PAF (not on AC), 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 Admitted to ICU for septic shock. With multiple possible sources of infection - UA positive for UTI, however with profound sepsis and lactic acidosis - concern for ischemia or deep infection. Possible RT Leg infection vs RT hallux OM, concern for abdominal pathology but prelim CT scan reading negative for ischemia. Vascular Surgery and General surgery consulted - recs appreciated - they do not feel as though her sepsis is related to the abdomen or the leg. Will treat as sepsis secondary to UTI. However, we shall empirically cover her with broad spectrum antibiotics (merrem and vancomycin) and follow up culture data. She has STARR which is likely septic ATN in the setting of shock - currently oliguric, closely monitor UOP. Continue IV fluids and pressor support as need. Wean pressors as tolerated by blood pressure. She is critically ill and at risk of abrupt decompensation and death. I spoke to her  about this and he understands the gravity of her illness. He states that she is full code and would want all resuscitative efforts. Prognosis guarded. Discussed with ICU MAXIMINO Cho, E-ICU attending Dr. Reyes and surgery/vascular surgery MAXIMINO Jain (for Dr. Hernandez and Korey).    Agree with H&P as outlined above, edited where appropriate. 66 yo F with PMHx of T2DM, HLD, hyperparathyroidism, asthma, hypothyroidism, PAF (not on AC), 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 Admitted to ICU for septic shock. With multiple possible sources of infection - UA positive for UTI, however with profound sepsis and lactic acidosis - concern for ischemia or deep infection. Possible RT Leg infection vs RT hallux OM, concern for abdominal pathology but prelim CT scan reading negative for ischemia. Vascular Surgery and General surgery consulted - recs appreciated - they do not feel as though her sepsis is related to the abdomen or leg ischemia. Will treat as sepsis secondary to UTI and RLE cellulitis. However, we shall empirically cover her with broad spectrum antibiotics (merrem and vancomycin) and follow up culture data. She has STARR which is likely septic ATN in the setting of shock - currently oliguric, closely monitor UOP. Continue IV fluids and pressor support as needed. Wean pressors as tolerated by blood pressure. She is critically ill and at risk of abrupt decompensation and death. I spoke to her  about this and he understands the gravity of her illness. He states that she is full code and would want all resuscitative efforts. Prognosis guarded. Discussed with ICU MAXIMINO Cho, E-ICU attending Dr. Reyes and surgery/vascular surgery MAXIMINO Jain (for Dr. Hernandez and Korey).    Agree with H&P as outlined above, edited where appropriate.

## 2022-05-05 NOTE — CONSULT NOTE ADULT - SUBJECTIVE AND OBJECTIVE BOX
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   History was obtained from /HCP Simone Annette ([phone]) as pt was lethargic/altered when examined. Pt was then intubated in the ICU due to her deteriorating condition.    As per , pt was recently seen by podiatry Dr. Bashir and was treated for a right big toe ulcer for possible osteomyelitis with a one month course of Doxycycline 100 mg which was completed on 22. Pt saw PCP Dr. Smith and Podiatry Dr. Bashir last week who said that infection seemed to have improved. As per  her RLE leg looks worse and pt was complaining of pain. Yesterday pt was fine, she received her second Moderna Booster (full dose). This morning she had a fever, chills, and was tired which the  attributed to the booster shot. Pt was using her platform walker to get to the bathroom;  heard her yell and when he saw her she was on the floor. Pt had told her  that she felt weak, her legs gave out and she slid onto the floor, but denies any head trauma. Pt has had decreased PO intake today and  gave her multiple cans of Ginger ale. Pt had complained of abdominal pain and as per  she had one large BM around noon time. Around 6 pm  noticed that she was not herself and very lethargic, he wanted to check her blood sugar but pt could not tell him where the glucometer was.  then called EMS who brought her to the hospital.     ED Course:   Vitals: BP: 46//28-->70s/50s--->Levophed started-->113/81, HR: 74-->130, Temp: 97.2 F-->101.7 F-->99.7 F, RR: 22-->16, SpO2: 95% on NRB   Labs: wbc: 18.87-->7.76, neut: 16.23, bands: 17%, lactate 9.5-->10.2, procal: 40.8, BUN/Cr: 27/1.20, GFR: 49, gluc: 204-->111, CO2: 24-->20, AST: 12-->22, M.2, Ph: 1.8     ABG: pH: 7.40, PO2: 162, PCO2: 28, HCO3: 17, SpO2: 99.6%  UA: turbid, small bilirubin, trace ketones, 30 protein, moderate LE, small blood, 26-50 WBC, 3-5 RBC, many bacteria,100 glucose    CT Angio Chest (PRELIM): No infiltrate or pneumonia. Mild multichamber cardiomegaly and associated pericardial effusion. No abnormal dilatation of the main pulmonary artery, and no pulmonary embolism. Mild aneurysm of the ascending thoracic aorta with diameter of 4.33 cm, without aortic dissection.  Received Ofirmev x1, Vanco, Zosyn, 2.5 L NS bolus x1, 1L LR bolus x1 and started on Levophed and LR @150 cc/hr in the ED  (04 May 2022 01:34)    Renal consult called for STARR. History obtained from chart.       PAST MEDICAL HISTORY:  Asthma  Diabetes mellitus  Hyperlipidemia  Hypothyroidism  PE (pulmonary embolism)  Shingles  Arthritis  Dissecting aortic aneurysm, thoracic  Torn ACL  PAD (peripheral artery disease)  H/O rotator cuff tear  Open wound  PAF (paroxysmal atrial fibrillation)  Irritable bowel syndrome (IBS)  H/O osteoporosis  Anemia    Essential tremor        PAST SURGICAL HISTORY:  S/P cholecystectomy    History of cataract surgery    History of colon resection    H/O resection of small bowel    Abdominal hernia    Uday filter in place        FAMILY HISTORY:  FH: CHF (congestive heart failure)  Father, age 90,         SOCIAL HISTORY: No smoking or alcohol use     Allergies    adhesives (Blisters)  Ceftin (Rash)  erythromycin (Rash)  fish (Vomiting; Nausea)  Levaquin (Rash)    Intolerances      Home Medications:  albuterol 90 mcg/inh inhalation aerosol: 1 puff(s) inhaled every 6 hours, As Needed (04 May 2022 02:29)  Alvesco 80 mcg/inh inhalation aerosol: 1 puff(s) inhaled 2 times a day (04 May 2022 02:29)  aspirin 81 mg oral delayed release tablet: 1 tab(s) orally once a day (04 May 2022 02:)  Bevespi Aerosphere 9 mcg-4.8 mcg/inh inhalation aerosol: 2 puff(s) inhaled once a day (04 May 2022 02:)  CoQ10: 2  orally once a day (04 May 2022 02:)  Dupixent 100 mg/0.67 mL subcutaneous solution: every 2 weeks (04 May 2022 02:29)  gabapentin 100 mg oral capsule: 1 cap(s) orally 2 times a day in the morning and afternoon  (04 May 2022 02:29)  gabapentin 300 mg oral capsule: 1 cap(s) orally once a day (at bedtime) (04 May 2022 02:29)  Januvia 100 mg oral tablet: 1 tab(s) orally once a day (04 May 2022 02:29)  Lantus 100 units/mL subcutaneous solution: 15 unit(s) subcutaneous once a day (at bedtime) (04 May 2022 02:)  Lasix: 10 milligram(s) orally--1-2 x a week  (04 May 2022 02:29)  levothyroxine 137 mcg (0.137 mg) oral tablet: 1 tab(s) orally once a day (04 May 2022 02:)  melatonin 3 mg oral tablet: 1 tab(s) orally once a day (at bedtime) (04 May 2022 02:29)  metFORMIN 500 mg oral tablet: 1 tab(s) orally 2 times a day--with breakast and dinner (04 May 2022 02:)  olopatadine 665 mcg/inh nasal spray: 2 spray(s) nasal 2 times a day (04 May 2022 02:)  Plavix 75 mg oral tablet: 1 tab(s) orally once a day (04 May 2022 02:)  predniSONE: 15 milligram(s) orally 2 times a day--divided dose (7.5 mg am &amp; 7.5 mg pm) on for over 35 yrs  (04 May 2022 02:)  Prolia 60 mg/mL subcutaneous solution: every 6 months  (04 May 2022 02:)  Singulair 10 mg oral tablet: 1 tab(s) orally once a day (04 May 2022 02:29)  tamsulosin 0.4 mg oral capsule: 1 cap(s) orally once a day (at bedtime) (04 May 2022 02:29)  traMADol 50 mg oral tablet: 1 tab(s) orally every 8 hours, As Needed (04 May 2022 02:29)  Vitamin D3 25 mcg (1000 intl units) oral tablet: 1 tab(s) orally 3 times a day (04 May 2022 02:29)    MEDICATIONS  (STANDING):  aMIOdarone Infusion 0.5 mG/Min (16.7 mL/Hr) IV Continuous <Continuous>  chlorhexidine 0.12% Liquid 15 milliLiter(s) Oral Mucosa every 12 hours  chlorhexidine 4% Liquid 1 Application(s) Topical <User Schedule>  clindamycin IVPB      clindamycin IVPB 600 milliGRAM(s) IV Intermittent every 8 hours  fentaNYL   Infusion. 0.5 MICROgram(s)/kG/Hr (4.08 mL/Hr) IV Continuous <Continuous>  heparin   Injectable 5000 Unit(s) SubCutaneous every 8 hours  hydrocortisone sodium succinate Injectable 50 milliGRAM(s) IV Push every 8 hours  levothyroxine 137 MICROGram(s) Oral daily  meropenem  IVPB 1000 milliGRAM(s) IV Intermittent every 12 hours  norepinephrine Infusion 0.05 MICROgram(s)/kG/Min (3.83 mL/Hr) IV Continuous <Continuous>  nystatin Powder 1 Application(s) Topical two times a day  pantoprazole  Injectable 40 milliGRAM(s) IV Push daily  phenylephrine    Infusion 0.4 MICROgram(s)/kG/Min (6.12 mL/Hr) IV Continuous <Continuous>  vancomycin  IVPB 1000 milliGRAM(s) IV Intermittent every 24 hours  vasopressin Infusion 0.04 Unit(s)/Min (2.4 mL/Hr) IV Continuous <Continuous>    MEDICATIONS  (PRN):  acetaminophen    Suspension .. 650 milliGRAM(s) Oral every 6 hours PRN Temp greater or equal to 38C (100.4F), Mild Pain (1 - 3)      REVIEW OF SYSTEMS:  On vent. unable to obtain    T(F): 100.9 (22 @ 11:00), Max: 101.8 (22 @ 04:00)  HR: 74 (22 @ 11:00) (74 - 160)  BP: 105/44 (05-04-22 @ 18:00) (105/44 - 114/59)  RR: 24 (22 @ 11:00) (24 - 25)  SpO2: 100% (22 @ 08:40) (92% - 100%)  Wt(kg): --    PHYSICAL EXAM:  General: on vent  Respiratory: b/l air entry  Cardiovascular: S1 S2  Gastrointestinal: soft  Extremities: ischemic changes Rt >> lt    Mode: AC/ CMV (Assist Control/ Continuous Mandatory Ventilation)  RR (machine): 24  TV (machine): 400  FiO2: 50  PEEP: 5  ITime: 1  MAP: 10  PIP: 23          138  |  106  |  37<H>  ----------------------------<  161<H>  5.3   |  18<L>  |  1.90<H>    Ca    8.3<L>      05 May 2022 06:31  Phos  6.7       Mg     2.9         TPro  4.6<L>  /  Alb  1.9<L>  /  TBili  0.7  /  DBili  x   /  AST  125<H>  /  ALT  92<H>  /  AlkPhos  77                            11.3   18.78 )-----------( 207      ( 05 May 2022 06:31 )             38.5       Hemoglobin: 11.3 g/dL ( @ 06:31)  Hematocrit: 38.5 % ( @ 06:31)  Potassium, Serum: 5.3 mmol/L ( @ 06:31)  Blood Urea Nitrogen, Serum: 37 mg/dL ( @ 06:31)      Creatinine, Serum: 1.90 ( @ 06:31)  Creatinine, Serum: 1.30 ( @ 05:13)  Creatinine, Serum: 1.20 ( @ 02:11)  Creatinine, Serum: 0.30 ( @ 01:03)  Creatinine, Serum: 1.20 ( @ 21:01)      Urinalysis Basic - ( 03 May 2022 23:46 )    Color: Yellow / Appearance: Turbid / S.010 / pH: x  Gluc: x / Ketone: Trace  / Bili: Small / Urobili: Negative   Blood: x / Protein: 30 mg/dL / Nitrite: Negative   Leuk Esterase: Moderate / RBC: 3-5 /HPF / WBC 26-50   Sq Epi: x / Non Sq Epi: Occasional / Bacteria: Many      LIVER FUNCTIONS - ( 05 May 2022 06:31 )  Alb: 1.9 g/dL / Pro: 4.6 g/dL / ALK PHOS: 77 U/L / ALT: 92 U/L / AST: 125 U/L / GGT: x           CARDIAC MARKERS ( 05 May 2022 06:31 )  x     / x     / 2200 U/L / x     / 11.5 ng/mL      Creatine Kinase, Serum: 2200 U/L (22 @ 06:31)        ABG - ( 05 May 2022 08:50 )  pH, Arterial: 7.16  pH, Blood: x     /  pCO2: 28    /  pO2: 174   / HCO3: 10    / Base Excess: -18.7 /  SaO2: 99.6              I&O's Detail    04 May 2022 07:01  -  05 May 2022 07:00  --------------------------------------------------------  IN:    Amiodarone: 231 mL    Enteral Tube Flush: 200 mL    FentaNYL: 196.8 mL    IV PiggyBack: 50 mL    IV PiggyBack: 588 mL    IV PiggyBack: 250 mL    IV PiggyBack: 50 mL    IV PiggyBack: 100 mL    Lactated Ringers: 3600 mL    Norepinephrine: 640.8 mL    Norepinephrine: 64.3 mL    Phenylephrine: 331.8 mL    Vasopressin: 52.8 mL    Vasopressin: 4.8 mL  Total IN: 6360.3 mL    OUT:    Indwelling Catheter - Urethral (mL): 435 mL  Total OUT: 435 mL    Total NET: 5925.3 mL      05 May 2022 07:01  -  05 May 2022 11:28  --------------------------------------------------------  IN:    Amiodarone: 50 mL    Amiodarone: 16.7 mL    FentaNYL: 40.8 mL    Norepinephrine: 11.4 mL    Phenylephrine: 229.5 mL    Vasopressin: 7.2 mL  Total IN: 355.6 mL    OUT:    Indwelling Catheter - Urethral (mL): 30 mL  Total OUT: 30 mL    Total NET: 325.6 mL            Culture - Blood (collected 04 May 2022 00:34)  Source: .Blood Blood-Peripheral  Gram Stain (04 May 2022 11:24):    Growth in aerobic and anaerobic bottles: Gram Negative Rods  Preliminary Report (05 May 2022 10:34):    Growth in aerobic and anaerobic bottles: Serratia marcescens    ***Blood Panel PCR results on this specimen are available    approximately 3 hours after the Gram stain result.***    Gram stain, PCR, and/or culture results may not always    correspond due to difference in methodologies.    ************************************************************    This PCR assay was performed by multiplex PCR. This    Assay tests for 66 bacterial and resistance gene targets.    Please refer to the [facility] test directory    at [email or web address] for details.  Organism: Blood Culture PCR (04 May 2022 14:01)  Organism: Blood Culture PCR (04 May 2022 14:01)    Sensitivities:      -  Serratia marcescens: Detec      Method Type: PCR    Culture - Blood (collected 04 May 2022 00:34)  Source: .Blood Blood-Peripheral  Gram Stain (04 May 2022 14:26):    Growth in anaerobic bottle: Gram Negative Rods    Growth in aerobic bottle: Gram Negative Rods  Preliminary Report (05 May 2022 10:36):    Growth in aerobic and anaerobic bottles: Serratia marcescens    See previous culture [id number]    < from: CT Angio Abd Aorta w/run-off w/ IV Cont (22 @ 00:11) >    ACC: [id number] EXAM:  CT ANGIO ABD AOR W RUN(W)AW IC                          PROCEDURE DATE:  2022          INTERPRETATION:  CLINICAL INFORMATION: Sepsis. Diarrhea.    COMPARISON: Multiple prior studies, most recently CTA runoff 2022    CONTRAST/COMPLICATIONS:  IV Contrast: Omnipaque 350  90 cc administered   10 cc discarded.   Administered in conjunction with a chest CT  Oral Contrast: NONE  Complications: None reported at time of study completion    CT ANGIOGRAM ABDOMEN, PELVIS, AND LOWER EXTREMITIES:    PROCEDURE:  Initially, nonenhanced CT was obtained through the calves. Then,   following the rapid administration of intravenous contrast, CT   angiography was performed through the abdomen, pelvis, and lower   extremities downto the toes.  Delayed images through the calves were   also obtained. Sagittal and coronal reformats as well as 3D   reconstructions were performed.    FINDINGS:    CENTRAL ARTERIAL SYSTEM:    Abdominal aorta normal in caliber with mild calcified plaque. Moderate   narrowing at the origin of the celiac artery with poststenotic   dilatation. Patent superior mesenteric arteries no significant narrowing.   Mild plaque at the origin of the right renal artery and at least moderate   narrowing at the origin of the left renal artery. Inferior mesenteric   artery is patent.    Mild to moderate plaque in the common and external iliac arteries with no   significant narrowing.    RIGHT LOWER EXTREMITY:    Moderate plaque in the right common femoral artery. Heavily calcified   profunda femoral artery which is patent.    Diffusely calcified and narrowed right superficial femoral artery which   is tortuous distally with focal severe narrowing distally.    Heavily calcified and narrowed popliteal arterywith more severe   narrowing distally.    Heavily calcified calf arteries which cannot be assessed secondary to the   calcifications.    LEFT LOWER EXTREMITY:    Mild to moderate plaque in the common femoral artery. Patent heavily   calcified profunda femoral artery. There is contrast outside the lumen of   the left common femoral artery (series 3 image 140), possibly a   pseudoaneurysm.    Diffusely heavy calcified and narrowed superficial femoral artery with   areas of severe narrowing in the mid and distal portion (for example   series 4 image 459 and 650).    Heavily calcified and diffusely narrowed popliteal artery with more   severe narrowing distally.    Calcified calf calf arteries which cannot be assessed secondary to the   calcifications.    ADDITIONAL FINDINGS:    LOWER CHEST: Dependent changes/atelectasis at the lung bases.    LIVER: Within normal limits.  BILE DUCTS: Normal caliber.  GALLBLADDER: Cholecystectomy.  SPLEEN: Within normal limits.  PANCREAS: Multiple cystic lesions again seen in the pancreas including   2.5 cm in the pancreatic tail (series 2 image 73), not significant   changed. Fatty infiltration of the pancreas.  ADRENALS: Within normal limits.  KIDNEYS/URETERS: No hydronephrosis.    BLADDER: Unremarkable.  REPRODUCTIVE ORGANS: Hysterectomy.    BOWEL: No bowel obstruction. There appears to be a small bowel   anastomosis in the right upper quadrant; mild dilatation of the small   bowel most region which may be postsurgical in etiology. Surgical clips   in the right hemiabdomen.  PERITONEUM: No ascites.  VESSELS: Also see above. 2 IVC filters again seen, unchanged in   appearance since 2022.  RETROPERITONEUM/LYMPH NODES: 4.0 x 3.4 cm lipomatous lesion at the   lateral aspect of the right hemipelvis (series 3 image 132), previously   1.2 x 1.0 cm on 2010. Additional fat-containing lesion at the   lateral aspect of the left hemipelvis measuring 3.5 x 2.0 cm (series 3   image 124), not significantly changed since 2022, possibly a single   or multiple adjacent lymph nodes.  ABDOMINAL WALL: Diastases of the anterior abdominal wall.  BONES: No aggressive osseous lesion. Subcutaneous edema in the lower   extremities.    Hip musculature. Degenerative changes in the spine. Mild anterolisthesis   of L4 on L5.    IMPRESSION:    Contrast anterior to the lumen of the left common femoral artery, new   since 2022, possibly a pseudoaneurysm; a left groin ultrasound is   recommended for further evaluation.    Extensive calcified plaquein the femoral and popliteal arteries with   diffuse luminal narrowing with areas of more focal severe narrowing in   the distal superficial femoral and distal popliteal arteries.    Heavily calcified calf arteries which cannot be assessed secondary to the   extensive plaque.    Slowly growing lipomatous lesion at the lateral aspect of the right   hemipelvis measuring 4.0 x 3.4 cm; a low-grade liposarcoma cannot be   excluded; in the absence of planned intervention, continued follow-up is   recommended.    3.4 x 2.0 cm fat-containing lesion at the lateral aspect of left   hemipelvis, possibly a single or multiple adjacent lymph nodes; follow-up   is recommended.    No CT evidence of colitis.    The findings were discussed with Dr. Navarrete 12:48 PM on 2022.    --- End of Report ---            KARRI POST MD; Attending Radiologist  This document has been electronically signed. May  4 2022 12:59PM    < end of copied text >  < from: Xray Chest 1 View AP/PA (22 @ 21:17) >    ACC: [id number] EXAM:  XR CHEST AP OR PA 1V                          PROCEDURE DATE:  2022          INTERPRETATION:  Sepsis    AP chest. Prior 3/2/2022    IMPRESSION: Low lung volumes. No change heart mediastinum. There is new   consolidation/atelectasis at the medial right base. Correlate clinically   for infection. No significant pleural effusion.    --- End of Report ---            SALINA FROST MD; Attending Radiologist  This document has been electronically signed. May  4 2022 11:27AM    < end of copied text >              HAKAN CHO MD; Attending Radiologist  This document has been electronically signed. May  4 2022  7:50AM

## 2022-05-05 NOTE — PROGRESS NOTE ADULT - SUBJECTIVE AND OBJECTIVE BOX
***CHARTING IN PROGRESS***   Patient is a 67y old  Female who presents with a chief complaint of septic shock (05 May 2022 05:44)    24 hour events: Overnight, patient developed SVT and afib/flutter with RVR; started on amio infusion, however heart rates and pressor requirements continued to increase and patient needed to be cardioverted x 1. Phenylephrine added for persistent hypotension. Patient seen and examined at bedside this morning, unable to obtain meaningful history as patient is intubated and sedated. She is febrile to 101.6.     REVIEW OF SYSTEMS  Unable to obtain 2/2 patient being intubated and sedated     T(F): 101.6 (22 @ 06:00), Max: 102.6 (22 @ 07:58)  HR: 78 (22 @ 07:00) (77 - 160)  BP: 105/44 (22 @ 18:00) (105/44 - 117/43)  RR: 24 (22 @ 07:00) (24 - 25)  SpO2: 100% (22 @ 07:00) (92% - 100%)      Mode: AC/ CMV (Assist Control/ Continuous Mandatory Ventilation), RR (machine): 24, TV (machine): 400, FiO2: 50, PEEP: 5        I&O's Summary     @ 07:01  -   @ 07:00  --------------------------------------------------------  IN: 6360.3 mL / OUT: 435 mL / NET: 5925.3 mL      PHYSICAL EXAM  General: in NAD   CNS: sedated   HEENT: PERRL  Resp: good air entry b/l   CVS: tachycardic, no M/R/G   Abd: +BS, no TTP  Ext: + edema to RLE, cool feet bilaterally, unable to palpate DP/PT pulses.   Skin: mottled, cool skin to RLE with surrounding erythema      MEDICATIONS  meropenem  IVPB IV Intermittent  vancomycin  IVPB IV Intermittent    aMIOdarone Infusion IV Continuous  aMIOdarone Infusion IV Continuous  norepinephrine Infusion IV Continuous  phenylephrine    Infusion IV Continuous    hydrocortisone sodium succinate Injectable IV Push  levothyroxine Oral  vasopressin Infusion IV Continuous      acetaminophen    Suspension .. Oral PRN  fentaNYL   Infusion. IV Continuous      heparin   Injectable SubCutaneous    pantoprazole  Injectable IV Push          chlorhexidine 0.12% Liquid Oral Mucosa  chlorhexidine 4% Liquid Topical  nystatin Powder Topical                            11.3   18.78 )-----------( 207      ( 05 May 2022 06:31 )             38.5       05-05    138  |  106  |  37<H>  ----------------------------<  161<H>  5.3   |  18<L>  |  1.90<H>    Ca    8.3<L>      05 May 2022 06:31  Phos  6.7     05-  Mg     2.9     -    TPro  4.6<L>  /  Alb  1.9<L>  /  TBili  0.7  /  DBili  x   /  AST  125<H>  /  ALT  92<H>  /  AlkPhos  77  05-05      CARDIAC MARKERS ( 05 May 2022 06:31 )  x     / x     / 2200 U/L / x     / 11.5 ng/mL      PT/INR - ( 03 May 2022 21:01 )   PT: 13.3 sec;   INR: 1.14 ratio         PTT - ( 03 May 2022 21:01 )  PTT:23.2 sec  Urinalysis Basic - ( 03 May 2022 23:46 )    Color: Yellow / Appearance: Turbid / S.010 / pH: x  Gluc: x / Ketone: Trace  / Bili: Small / Urobili: Negative   Blood: x / Protein: 30 mg/dL / Nitrite: Negative   Leuk Esterase: Moderate / RBC: 3-5 /HPF / WBC 26-50   Sq Epi: x / Non Sq Epi: Occasional / Bacteria: Many      .Blood Blood-Peripheral   Growth in anaerobic bottle: Gram Negative Rods  Growth in aerobic bottle: Gram Negative Rods   Growth in anaerobic bottle: Gram Negative Rods  Growth in aerobic bottle: Gram Negative Rods  @ 00:34      Rapid RVP Result: NotDetec ( @ 21:01)    Radiology:    < from: US Abdomen Complete (US Abdomen Complete .) (22 @ 05:08) >  ACC: [id number] EXAM:  US ABDOMEN COMPLETE                          PROCEDURE DATE:  2022          INTERPRETATION:  CLINICAL INFORMATION: 67 years  Female with abd pain.    COMPARISON: CTA 5/3/2022    TECHNIQUE: Sonography of the abdomen.    FINDINGS:  Liver: Right lobe 20.4 cm. Heterogeneous parenchyma. Indeterminate 2.6 x   2.1 x 2.2 cm right lobe lesion.  Bile ducts: Normal caliber. Common bile duct measures 6 mm.  Gallbladder: Cholecystectomy.  Pancreas: Obscured by bowel gas.  Spleen: 8.8 cm. Within normal limits.  Right kidney: 11.5 cm. No hydronephrosis.  Left kidney: 11.7 cm. No hydronephrosis.  Ascites: None.  Aorta and IVC: Obscured by bowel gas.    IMPRESSION:    Indeterminate 2.6 cm liver lesion. Recommend MRI with contrast for   characterization.    Hepatomegaly. Heterogeneous liver, likely fatty infiltration however   other infiltrative processes are not excluded.        --- End of Report ---    < end of copied text >  < from: CT Chest w/ IV Cont (22 @ 00:10) >    ACC: [id number] EXAM:  CT CHEST IC                          PROCEDURE DATE:  2022          INTERPRETATION:  PROCEDURE INFORMATION:  Exam: CT Chest With Contrast; Diagnostic  Exam date and time: 5/3/2022 10:50 PM  Age: 67 years old  Clinical indication: Pain and condition or disease; Other: Sepsis,   diarrhea;  Abdominal pain; Epigastric    TECHNIQUE:  Imaging protocol: Diagnostic computed tomography of the chest with   contrast.    COMPARISON:  CT ANGIO CHEST WITHOUT AND OR WITH IV CONTRAST2021 10:57 AM    FINDINGS:  Lungs: Bronchial calcifications. Mild atelectasis within the posterior and  posterolateral right lung base. No parenchymal infiltrate.  Pleural spaces: No pneumothorax.  Heart: Multichamber cardiomegaly. Pericardial effusion with thickness of   up to  1.67 cm.  Lymph nodes: Unremarkable. No enlarged lymph nodes.  Vasculature: Main pulmonary artery has a diameter of 3 cm. No pulmonary  embolism.    Bones/joints: Unremarkable. No acute fracture.  Soft tissues: Unremarkable.    Other findings: Ascending thoracic aortic diameter is 4.42 x 4.33 cm.    IMPRESSION:  1. No infiltrate or pneumonia.  2. Mild multichamber cardiomegaly and associated pericardial effusion.  3. No abnormal dilatation of the main pulmonary artery, and no pulmonary  embolism.  4. Mild aneurysm of the ascending thoracic aorta with diameter of 4.33 cm,  without aortic dissection.    See final report for CT scan chest below:    CLINICAL INFORMATION:   Sepsis. Acute respiratory failure. History of type B aortic dissection.      PROCEDURE:  CT of the Chest was performed.  Sagittal and coronal reformats were performed.  MIP reformatted images.  90 cc of Omnipaque 350 contrast.    FINDINGS:    LUNGS AND AIRWAYS: PLEURA:  Minimal dependent atelectasis right lung base.  No lobar lung consolidation or pleural effusion.    The central airways remain patent.    MEDIASTINUM AND RODERICK:  No enlarged lymphadenopathy.    VESSELS:  Atherosclerotic changes thoracic aorta.  Aneurysmal dilatation of themid thoracic aorta measuring up to 4.4 cm.  No acute aortic dissection demonstrated.  The origins of the great vessels are patent.  Coronary artery calcification.    HEART: The heart is enlarged.  Small pericardial effusion.    CHEST WALL AND LOWER NECK:  Surgical clips soft tissues right neck.    VISUALIZED UPPER ABDOMEN: Within normal limits.    BONES: Degenerative changes.    IMPRESSION:    No acute thoracic aortic dissection demonstrated.    Aneurysmal dilatation of the ascending aorta measuring up to 4.4 cm.    Other findings as discussed above.    This study was preliminary reported by [facility].    --- End of Report ---            DREW MCKINNON MD; Attending Radiologist  This document has been electronically signed. May  4 2022 11:55AM    < end of copied text >      CENTRAL LINE: Y  MCCARTY: Y  A-LINE: N    GLOBAL ISSUE/BEST PRACTICE:  Analgesia: Y  Sedation: Y  CAM-ICU: n/a  HOB elevation: Y  Stress ulcer prophylaxis: Y  VTE prophylaxis: Y  Glycemic control: Y  Nutrition: N    CODE STATUS: FULL CODE       ***CHARTING IN PROGRESS***   Patient is a 67y old  Female who presents with a chief complaint of septic shock (05 May 2022 05:44)    24 hour events: Overnight, patient developed SVT and afib/flutter with RVR; started on amio infusion, however heart rates and pressor requirements continued to increase and patient needed to be cardioverted x 1. Phenylephrine added for persistent hypotension. Patient seen and examined at bedside this morning, unable to obtain meaningful history as patient is intubated and sedated. She is febrile to 101.6, remains on cooling blanket.     REVIEW OF SYSTEMS  Unable to obtain 2/2 patient being intubated and sedated     T(F): 101.6 (22 @ 06:00), Max: 102.6 (22 @ 07:58)  HR: 78 (22 @ 07:00) (77 - 160)  BP: 105/44 (22 @ 18:00) (105/44 - 117/43)  RR: 24 (22 @ 07:00) (24 - 25)  SpO2: 100% (22 @ 07:00) (92% - 100%)      Mode: AC/ CMV (Assist Control/ Continuous Mandatory Ventilation), RR (machine): 24, TV (machine): 400, FiO2: 50, PEEP: 5        I&O's Summary     @ 07:01  -   @ 07:00  --------------------------------------------------------  IN: 6360.3 mL / OUT: 435 mL / NET: 5925.3 mL      PHYSICAL EXAM  General: in NAD   CNS: sedated   HEENT: PERRL  Resp: good air entry b/l   CVS: tachycardic, no M/R/G   Abd: +BS, no TTP  Ext: + edema to RLE, cool feet bilaterally, unable to palpate DP/PT pulses.   Skin: mottled, cool skin to RLE with surrounding erythema      MEDICATIONS  meropenem  IVPB IV Intermittent  vancomycin  IVPB IV Intermittent    aMIOdarone Infusion IV Continuous  aMIOdarone Infusion IV Continuous  norepinephrine Infusion IV Continuous  phenylephrine    Infusion IV Continuous    hydrocortisone sodium succinate Injectable IV Push  levothyroxine Oral  vasopressin Infusion IV Continuous      acetaminophen    Suspension .. Oral PRN  fentaNYL   Infusion. IV Continuous      heparin   Injectable SubCutaneous    pantoprazole  Injectable IV Push          chlorhexidine 0.12% Liquid Oral Mucosa  chlorhexidine 4% Liquid Topical  nystatin Powder Topical                            11.3   18.78 )-----------( 207      ( 05 May 2022 06:31 )             38.5       05-05    138  |  106  |  37<H>  ----------------------------<  161<H>  5.3   |  18<L>  |  1.90<H>    Ca    8.3<L>      05 May 2022 06:31  Phos  6.7     05-05  Mg     2.9     05-    TPro  4.6<L>  /  Alb  1.9<L>  /  TBili  0.7  /  DBili  x   /  AST  125<H>  /  ALT  92<H>  /  AlkPhos  77  05-05      CARDIAC MARKERS ( 05 May 2022 06:31 )  x     / x     / 2200 U/L / x     / 11.5 ng/mL      PT/INR - ( 03 May 2022 21:01 )   PT: 13.3 sec;   INR: 1.14 ratio         PTT - ( 03 May 2022 21:01 )  PTT:23.2 sec  Urinalysis Basic - ( 03 May 2022 23:46 )    Color: Yellow / Appearance: Turbid / S.010 / pH: x  Gluc: x / Ketone: Trace  / Bili: Small / Urobili: Negative   Blood: x / Protein: 30 mg/dL / Nitrite: Negative   Leuk Esterase: Moderate / RBC: 3-5 /HPF / WBC 26-50   Sq Epi: x / Non Sq Epi: Occasional / Bacteria: Many      .Blood Blood-Peripheral   Growth in anaerobic bottle: Gram Negative Rods  Growth in aerobic bottle: Gram Negative Rods   Growth in anaerobic bottle: Gram Negative Rods  Growth in aerobic bottle: Gram Negative Rods  @ 00:34      Rapid RVP Result: NotDetec ( @ 21:01)    Radiology:    < from: US Abdomen Complete (US Abdomen Complete .) (22 @ 05:08) >  ACC: [id number] EXAM:  US ABDOMEN COMPLETE                          PROCEDURE DATE:  2022          INTERPRETATION:  CLINICAL INFORMATION: 67 years  Female with abd pain.    COMPARISON: CTA 5/3/2022    TECHNIQUE: Sonography of the abdomen.    FINDINGS:  Liver: Right lobe 20.4 cm. Heterogeneous parenchyma. Indeterminate 2.6 x   2.1 x 2.2 cm right lobe lesion.  Bile ducts: Normal caliber. Common bile duct measures 6 mm.  Gallbladder: Cholecystectomy.  Pancreas: Obscured by bowel gas.  Spleen: 8.8 cm. Within normal limits.  Right kidney: 11.5 cm. No hydronephrosis.  Left kidney: 11.7 cm. No hydronephrosis.  Ascites: None.  Aorta and IVC: Obscured by bowel gas.    IMPRESSION:    Indeterminate 2.6 cm liver lesion. Recommend MRI with contrast for   characterization.    Hepatomegaly. Heterogeneous liver, likely fatty infiltration however   other infiltrative processes are not excluded.        --- End of Report ---    < end of copied text >  < from: CT Chest w/ IV Cont (22 @ 00:10) >    ACC: [id number] EXAM:  CT CHEST IC                          PROCEDURE DATE:  2022          INTERPRETATION:  PROCEDURE INFORMATION:  Exam: CT Chest With Contrast; Diagnostic  Exam date and time: 5/3/2022 10:50 PM  Age: 67 years old  Clinical indication: Pain and condition or disease; Other: Sepsis,   diarrhea;  Abdominal pain; Epigastric    TECHNIQUE:  Imaging protocol: Diagnostic computed tomography of the chest with   contrast.    COMPARISON:  CT ANGIO CHEST WITHOUT AND OR WITH IV CONTRAST2021 10:57 AM    FINDINGS:  Lungs: Bronchial calcifications. Mild atelectasis within the posterior and  posterolateral right lung base. No parenchymal infiltrate.  Pleural spaces: No pneumothorax.  Heart: Multichamber cardiomegaly. Pericardial effusion with thickness of   up to  1.67 cm.  Lymph nodes: Unremarkable. No enlarged lymph nodes.  Vasculature: Main pulmonary artery has a diameter of 3 cm. No pulmonary  embolism.    Bones/joints: Unremarkable. No acute fracture.  Soft tissues: Unremarkable.    Other findings: Ascending thoracic aortic diameter is 4.42 x 4.33 cm.    IMPRESSION:  1. No infiltrate or pneumonia.  2. Mild multichamber cardiomegaly and associated pericardial effusion.  3. No abnormal dilatation of the main pulmonary artery, and no pulmonary  embolism.  4. Mild aneurysm of the ascending thoracic aorta with diameter of 4.33 cm,  without aortic dissection.    See final report for CT scan chest below:    CLINICAL INFORMATION:   Sepsis. Acute respiratory failure. History of type B aortic dissection.      PROCEDURE:  CT of the Chest was performed.  Sagittal and coronal reformats were performed.  MIP reformatted images.  90 cc of Omnipaque 350 contrast.    FINDINGS:    LUNGS AND AIRWAYS: PLEURA:  Minimal dependent atelectasis right lung base.  No lobar lung consolidation or pleural effusion.    The central airways remain patent.    MEDIASTINUM AND RODERICK:  No enlarged lymphadenopathy.    VESSELS:  Atherosclerotic changes thoracic aorta.  Aneurysmal dilatation of themid thoracic aorta measuring up to 4.4 cm.  No acute aortic dissection demonstrated.  The origins of the great vessels are patent.  Coronary artery calcification.    HEART: The heart is enlarged.  Small pericardial effusion.    CHEST WALL AND LOWER NECK:  Surgical clips soft tissues right neck.    VISUALIZED UPPER ABDOMEN: Within normal limits.    BONES: Degenerative changes.    IMPRESSION:    No acute thoracic aortic dissection demonstrated.    Aneurysmal dilatation of the ascending aorta measuring up to 4.4 cm.    Other findings as discussed above.    This study was preliminary reported by [facility].    --- End of Report ---            DREW MCKINNON MD; Attending Radiologist  This document has been electronically signed. May  4 2022 11:55AM    < end of copied text >      CENTRAL LINE: Y  MCCARTY: Y  A-LINE: N    GLOBAL ISSUE/BEST PRACTICE:  Analgesia: Y  Sedation: Y  CAM-ICU: n/a  HOB elevation: Y  Stress ulcer prophylaxis: Y  VTE prophylaxis: Y  Glycemic control: Y  Nutrition: N    CODE STATUS: FULL CODE       Patient is a 67y old  Female who presents with a chief complaint of septic shock (05 May 2022 05:44)    24 hour events: Overnight, patient developed SVT and afib/flutter with RVR; started on amio infusion, however heart rates and pressor requirements continued to increase and patient needed to be cardioverted x 1. Phenylephrine added for persistent hypotension. Patient seen and examined at bedside this morning, unable to obtain meaningful history as patient is intubated and sedated. She is febrile to 101.6, remains on cooling blanket.     REVIEW OF SYSTEMS  Unable to obtain 2/2 patient being intubated and sedated     T(F): 101.6 (22 @ 06:00), Max: 102.6 (22 @ 07:58)  HR: 78 (22 @ 07:00) (77 - 160)  BP: 105/44 (22 @ 18:00) (105/44 - 117/43)  RR: 24 (22 @ 07:00) (24 - 25)  SpO2: 100% (22 @ 07:00) (92% - 100%)      Mode: AC/ CMV (Assist Control/ Continuous Mandatory Ventilation), RR (machine): 24, TV (machine): 400, FiO2: 50, PEEP: 5        I&O's Summary     @ 07:01  -   @ 07:00  --------------------------------------------------------  IN: 6360.3 mL / OUT: 435 mL / NET: 5925.3 mL      PHYSICAL EXAM  General: in NAD   CNS: sedated   HEENT: PERRL  Resp: good air entry b/l   CVS: tachycardic, no M/R/G   Abd: +BS, no TTP  Ext: + edema to RLE, cool extremities and feet bilaterally, R>L, unable to palpate DP/PT pulses.   Skin: mottled, cool skin to RLE with surrounding erythema, hemorrhagic bullae noted.     MEDICATIONS  meropenem  IVPB IV Intermittent  vancomycin  IVPB IV Intermittent    aMIOdarone Infusion IV Continuous  aMIOdarone Infusion IV Continuous  norepinephrine Infusion IV Continuous  phenylephrine    Infusion IV Continuous    hydrocortisone sodium succinate Injectable IV Push  levothyroxine Oral  vasopressin Infusion IV Continuous      acetaminophen    Suspension .. Oral PRN  fentaNYL   Infusion. IV Continuous      heparin   Injectable SubCutaneous    pantoprazole  Injectable IV Push          chlorhexidine 0.12% Liquid Oral Mucosa  chlorhexidine 4% Liquid Topical  nystatin Powder Topical                            11.3   18.78 )-----------( 207      ( 05 May 2022 06:31 )             38.5       05-05    138  |  106  |  37<H>  ----------------------------<  161<H>  5.3   |  18<L>  |  1.90<H>    Ca    8.3<L>      05 May 2022 06:31  Phos  6.7     05-  Mg     2.9     05-    TPro  4.6<L>  /  Alb  1.9<L>  /  TBili  0.7  /  DBili  x   /  AST  125<H>  /  ALT  92<H>  /  AlkPhos  77  05-05      CARDIAC MARKERS ( 05 May 2022 06:31 )  x     / x     / 2200 U/L / x     / 11.5 ng/mL      PT/INR - ( 03 May 2022 21:01 )   PT: 13.3 sec;   INR: 1.14 ratio         PTT - ( 03 May 2022 21:01 )  PTT:23.2 sec  Urinalysis Basic - ( 03 May 2022 23:46 )    Color: Yellow / Appearance: Turbid / S.010 / pH: x  Gluc: x / Ketone: Trace  / Bili: Small / Urobili: Negative   Blood: x / Protein: 30 mg/dL / Nitrite: Negative   Leuk Esterase: Moderate / RBC: 3-5 /HPF / WBC 26-50   Sq Epi: x / Non Sq Epi: Occasional / Bacteria: Many      .Blood Blood-Peripheral   Growth in anaerobic bottle: Gram Negative Rods  Growth in aerobic bottle: Gram Negative Rods   Growth in anaerobic bottle: Gram Negative Rods  Growth in aerobic bottle: Gram Negative Rods  @ 00:34      Rapid RVP Result: NotDetec ( @ 21:01)    Radiology:    < from: US Abdomen Complete (US Abdomen Complete .) (22 @ 05:08) >  ACC: [id number] EXAM:  US ABDOMEN COMPLETE                          PROCEDURE DATE:  2022          INTERPRETATION:  CLINICAL INFORMATION: 67 years  Female with abd pain.    COMPARISON: CTA 5/3/2022    TECHNIQUE: Sonography of the abdomen.    FINDINGS:  Liver: Right lobe 20.4 cm. Heterogeneous parenchyma. Indeterminate 2.6 x   2.1 x 2.2 cm right lobe lesion.  Bile ducts: Normal caliber. Common bile duct measures 6 mm.  Gallbladder: Cholecystectomy.  Pancreas: Obscured by bowel gas.  Spleen: 8.8 cm. Within normal limits.  Right kidney: 11.5 cm. No hydronephrosis.  Left kidney: 11.7 cm. No hydronephrosis.  Ascites: None.  Aorta and IVC: Obscured by bowel gas.    IMPRESSION:    Indeterminate 2.6 cm liver lesion. Recommend MRI with contrast for   characterization.    Hepatomegaly. Heterogeneous liver, likely fatty infiltration however   other infiltrative processes are not excluded.        --- End of Report ---    < end of copied text >  < from: CT Chest w/ IV Cont (22 @ 00:10) >    ACC: [id number] EXAM:  CT CHEST IC                          PROCEDURE DATE:  2022          INTERPRETATION:  PROCEDURE INFORMATION:  Exam: CT Chest With Contrast; Diagnostic  Exam date and time: 5/3/2022 10:50 PM  Age: 67 years old  Clinical indication: Pain and condition or disease; Other: Sepsis,   diarrhea;  Abdominal pain; Epigastric    TECHNIQUE:  Imaging protocol: Diagnostic computed tomography of the chest with   contrast.    COMPARISON:  CT ANGIO CHEST WITHOUT AND OR WITH IV CONTRAST2021 10:57 AM    FINDINGS:  Lungs: Bronchial calcifications. Mild atelectasis within the posterior and  posterolateral right lung base. No parenchymal infiltrate.  Pleural spaces: No pneumothorax.  Heart: Multichamber cardiomegaly. Pericardial effusion with thickness of   up to  1.67 cm.  Lymph nodes: Unremarkable. No enlarged lymph nodes.  Vasculature: Main pulmonary artery has a diameter of 3 cm. No pulmonary  embolism.    Bones/joints: Unremarkable. No acute fracture.  Soft tissues: Unremarkable.    Other findings: Ascending thoracic aortic diameter is 4.42 x 4.33 cm.    IMPRESSION:  1. No infiltrate or pneumonia.  2. Mild multichamber cardiomegaly and associated pericardial effusion.  3. No abnormal dilatation of the main pulmonary artery, and no pulmonary  embolism.  4. Mild aneurysm of the ascending thoracic aorta with diameter of 4.33 cm,  without aortic dissection.    See final report for CT scan chest below:    CLINICAL INFORMATION:   Sepsis. Acute respiratory failure. History of type B aortic dissection.      PROCEDURE:  CT of the Chest was performed.  Sagittal and coronal reformats were performed.  MIP reformatted images.  90 cc of Omnipaque 350 contrast.    FINDINGS:    LUNGS AND AIRWAYS: PLEURA:  Minimal dependent atelectasis right lung base.  No lobar lung consolidation or pleural effusion.    The central airways remain patent.    MEDIASTINUM AND RODERICK:  No enlarged lymphadenopathy.    VESSELS:  Atherosclerotic changes thoracic aorta.  Aneurysmal dilatation of themid thoracic aorta measuring up to 4.4 cm.  No acute aortic dissection demonstrated.  The origins of the great vessels are patent.  Coronary artery calcification.    HEART: The heart is enlarged.  Small pericardial effusion.    CHEST WALL AND LOWER NECK:  Surgical clips soft tissues right neck.    VISUALIZED UPPER ABDOMEN: Within normal limits.    BONES: Degenerative changes.    IMPRESSION:    No acute thoracic aortic dissection demonstrated.    Aneurysmal dilatation of the ascending aorta measuring up to 4.4 cm.    Other findings as discussed above.    This study was preliminary reported by [facility].    --- End of Report ---            DREW MCKINNON MD; Attending Radiologist  This document has been electronically signed. May  4 2022 11:55AM    < end of copied text >      CENTRAL LINE: Y  MCCARTY: Y  A-LINE: N    GLOBAL ISSUE/BEST PRACTICE:  Analgesia: Y  Sedation: Y  CAM-ICU: n/a  HOB elevation: Y  Stress ulcer prophylaxis: Y  VTE prophylaxis: Y  Glycemic control: Y  Nutrition: N    CODE STATUS: FULL CODE       Patient is a 67y old  Female who presents with a chief complaint of septic shock (05 May 2022 05:44)    24 hour events: Overnight, patient developed SVT and afib/flutter with RVR; started on amio infusion, however heart rates and pressor requirements continued to increase and patient needed to be cardioverted x 1. Phenylephrine added for persistent hypotension. Patient seen and examined at bedside this morning, unable to obtain meaningful history as patient is intubated and sedated. She is febrile to 101.6, remains on cooling blanket.     REVIEW OF SYSTEMS  Unable to obtain 2/2 patient being intubated and sedated     T(F): 101.6 (22 @ 06:00), Max: 102.6 (22 @ 07:58)  HR: 78 (22 @ 07:00) (77 - 160)  BP: 105/44 (22 @ 18:00) (105/44 - 117/43)  RR: 24 (22 @ 07:00) (24 - 25)  SpO2: 100% (22 @ 07:00) (92% - 100%)      Mode: AC/ CMV (Assist Control/ Continuous Mandatory Ventilation), RR (machine): 24, TV (machine): 400, FiO2: 50, PEEP: 5        I&O's Summary     @ 07:01  -   @ 07:00  --------------------------------------------------------  IN: 6360.3 mL / OUT: 435 mL / NET: 5925.3 mL      PHYSICAL EXAM  General: in NAD   CNS: sedated   HEENT: PERRL  Resp: good air entry b/l   CVS: tachycardic, no M/R/G   Abd: +BS, no TTP  Ext: + edema to RLE, cool extremities and feet bilaterally, R>L, unable to palpate DP/PT pulses.   Skin: mottled, cool skin to RLE with surrounding erythema, extending to the thigh. hemorrhagic bullae noted.     MEDICATIONS  meropenem  IVPB IV Intermittent  vancomycin  IVPB IV Intermittent    aMIOdarone Infusion IV Continuous  aMIOdarone Infusion IV Continuous  norepinephrine Infusion IV Continuous  phenylephrine    Infusion IV Continuous    hydrocortisone sodium succinate Injectable IV Push  levothyroxine Oral  vasopressin Infusion IV Continuous      acetaminophen    Suspension .. Oral PRN  fentaNYL   Infusion. IV Continuous      heparin   Injectable SubCutaneous    pantoprazole  Injectable IV Push          chlorhexidine 0.12% Liquid Oral Mucosa  chlorhexidine 4% Liquid Topical  nystatin Powder Topical                            11.3   18.78 )-----------( 207      ( 05 May 2022 06:31 )             38.5       05-    138  |  106  |  37<H>  ----------------------------<  161<H>  5.3   |  18<L>  |  1.90<H>    Ca    8.3<L>      05 May 2022 06:31  Phos  6.7     05-  Mg     2.9     05-    TPro  4.6<L>  /  Alb  1.9<L>  /  TBili  0.7  /  DBili  x   /  AST  125<H>  /  ALT  92<H>  /  AlkPhos  77  05-05      CARDIAC MARKERS ( 05 May 2022 06:31 )  x     / x     / 2200 U/L / x     / 11.5 ng/mL      PT/INR - ( 03 May 2022 21:01 )   PT: 13.3 sec;   INR: 1.14 ratio         PTT - ( 03 May 2022 21:01 )  PTT:23.2 sec  Urinalysis Basic - ( 03 May 2022 23:46 )    Color: Yellow / Appearance: Turbid / S.010 / pH: x  Gluc: x / Ketone: Trace  / Bili: Small / Urobili: Negative   Blood: x / Protein: 30 mg/dL / Nitrite: Negative   Leuk Esterase: Moderate / RBC: 3-5 /HPF / WBC 26-50   Sq Epi: x / Non Sq Epi: Occasional / Bacteria: Many      .Blood Blood-Peripheral   Growth in anaerobic bottle: Gram Negative Rods  Growth in aerobic bottle: Gram Negative Rods   Growth in anaerobic bottle: Gram Negative Rods  Growth in aerobic bottle: Gram Negative Rods  @ 00:34      Rapid RVP Result: NotDetec ( @ 21:01)    Radiology:    < from: US Abdomen Complete (US Abdomen Complete .) (22 @ 05:08) >  ACC: [id number] EXAM:  US ABDOMEN COMPLETE                          PROCEDURE DATE:  2022          INTERPRETATION:  CLINICAL INFORMATION: 67 years  Female with abd pain.    COMPARISON: CTA 5/3/2022    TECHNIQUE: Sonography of the abdomen.    FINDINGS:  Liver: Right lobe 20.4 cm. Heterogeneous parenchyma. Indeterminate 2.6 x   2.1 x 2.2 cm right lobe lesion.  Bile ducts: Normal caliber. Common bile duct measures 6 mm.  Gallbladder: Cholecystectomy.  Pancreas: Obscured by bowel gas.  Spleen: 8.8 cm. Within normal limits.  Right kidney: 11.5 cm. No hydronephrosis.  Left kidney: 11.7 cm. No hydronephrosis.  Ascites: None.  Aorta and IVC: Obscured by bowel gas.    IMPRESSION:    Indeterminate 2.6 cm liver lesion. Recommend MRI with contrast for   characterization.    Hepatomegaly. Heterogeneous liver, likely fatty infiltration however   other infiltrative processes are not excluded.        --- End of Report ---    < end of copied text >  < from: CT Chest w/ IV Cont (22 @ 00:10) >    ACC: [id number] EXAM:  CT CHEST IC                          PROCEDURE DATE:  2022          INTERPRETATION:  PROCEDURE INFORMATION:  Exam: CT Chest With Contrast; Diagnostic  Exam date and time: 5/3/2022 10:50 PM  Age: 67 years old  Clinical indication: Pain and condition or disease; Other: Sepsis,   diarrhea;  Abdominal pain; Epigastric    TECHNIQUE:  Imaging protocol: Diagnostic computed tomography of the chest with   contrast.    COMPARISON:  CT ANGIO CHEST WITHOUT AND OR WITH IV CONTRAST2021 10:57 AM    FINDINGS:  Lungs: Bronchial calcifications. Mild atelectasis within the posterior and  posterolateral right lung base. No parenchymal infiltrate.  Pleural spaces: No pneumothorax.  Heart: Multichamber cardiomegaly. Pericardial effusion with thickness of   up to  1.67 cm.  Lymph nodes: Unremarkable. No enlarged lymph nodes.  Vasculature: Main pulmonary artery has a diameter of 3 cm. No pulmonary  embolism.    Bones/joints: Unremarkable. No acute fracture.  Soft tissues: Unremarkable.    Other findings: Ascending thoracic aortic diameter is 4.42 x 4.33 cm.    IMPRESSION:  1. No infiltrate or pneumonia.  2. Mild multichamber cardiomegaly and associated pericardial effusion.  3. No abnormal dilatation of the main pulmonary artery, and no pulmonary  embolism.  4. Mild aneurysm of the ascending thoracic aorta with diameter of 4.33 cm,  without aortic dissection.    See final report for CT scan chest below:    CLINICAL INFORMATION:   Sepsis. Acute respiratory failure. History of type B aortic dissection.      PROCEDURE:  CT of the Chest was performed.  Sagittal and coronal reformats were performed.  MIP reformatted images.  90 cc of Omnipaque 350 contrast.    FINDINGS:    LUNGS AND AIRWAYS: PLEURA:  Minimal dependent atelectasis right lung base.  No lobar lung consolidation or pleural effusion.    The central airways remain patent.    MEDIASTINUM AND RODERICK:  No enlarged lymphadenopathy.    VESSELS:  Atherosclerotic changes thoracic aorta.  Aneurysmal dilatation of themid thoracic aorta measuring up to 4.4 cm.  No acute aortic dissection demonstrated.  The origins of the great vessels are patent.  Coronary artery calcification.    HEART: The heart is enlarged.  Small pericardial effusion.    CHEST WALL AND LOWER NECK:  Surgical clips soft tissues right neck.    VISUALIZED UPPER ABDOMEN: Within normal limits.    BONES: Degenerative changes.    IMPRESSION:    No acute thoracic aortic dissection demonstrated.    Aneurysmal dilatation of the ascending aorta measuring up to 4.4 cm.    Other findings as discussed above.    This study was preliminary reported by [facility].    --- End of Report ---            DREW MCKINNON MD; Attending Radiologist  This document has been electronically signed. May  4 2022 11:55AM    < end of copied text >      CENTRAL LINE: Y  MCCARTY: Y  A-LINE: N    GLOBAL ISSUE/BEST PRACTICE:  Analgesia: Y  Sedation: Y  CAM-ICU: n/a  HOB elevation: Y  Stress ulcer prophylaxis: Y  VTE prophylaxis: Y  Glycemic control: Y  Nutrition: N    CODE STATUS: FULL CODE

## 2022-05-05 NOTE — PROGRESS NOTE ADULT - ATTENDING COMMENTS
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     Neuro: continue fentanyl for sedation and analgesia  CV: episode of SVT overnight s/p cardioversion, now on amio gtt and remains in NSR  - septic shock on pressor support, levophed transitioning to Moy 2/2 episode of SVT, remains on low dose levo and vaso; continue stress dose steroids   - elevated troponin likely from demand ischemia, now peaked   Pulm: continue lung protective ventilation  GI: NPO, ppx with protonix  Renal: STARR from ATN 2/2 shock state with worsening metabolic acidosis, lactic acidosis and oliguric renal failure - Renal consulted   - dc IVF   ID: blood cultures with Serratia in all 4 bottles - continue vanc and meropenem, add clindamycin given concern for developing deep tissue infection/necrotizing fascitis given worsening of RLE findings, progression from erythema of shin to extension to foot and thigh with development of hemorrhagic bullae - Vascular reassessed for possible AKA though pt high risk for surgical intervention  Endo: continue home synthoid  Vasc: f/u duplex to r/o L fem pseudoaneurysm  Heme: dvt ppx with HSQ     Overall prognosis poor. D/w vascular surgery, ID and family. While RLE very like source of overall worsening condition, pt is high risk for any surgical procedure, she also has high mortality risk without intervention. Ongoing discussions with multidisciplinary team and family regarding best next course of action.

## 2022-05-05 NOTE — PROGRESS NOTE ADULT - ASSESSMENT
66 YO Female with history of previous vascular interventions d/t non-healing wounds, presenting in septic shock, CTA noting contrast in runoff vessels b/l; patient intubated and sedated in ICU

## 2022-05-05 NOTE — PROVIDER CONTACT NOTE (CRITICAL VALUE NOTIFICATION) - ACTION/TREATMENT ORDERED:
md rounds with hospitalist intensivist and ID PHYSICIAN
Cont. current treatment.
No actions/treatments ordered at this time.
repeat troponin at 1700
ongoing
nothing to do will conitnue to monitor
Iv antibiotics in progress

## 2022-05-05 NOTE — CONSULT NOTE ADULT - CONSULT REASON
STARR, metabolic acidosis
Septic shock, severe lactic acidosis
Septic shock
r/o leg ischemia
Sepsis source

## 2022-05-05 NOTE — CHART NOTE - NSCHARTNOTEFT_GEN_A_CORE
: Catrachito    INDICATION: worsening shock state, oliguric renal failure    PROCEDURE:  [x] LIMITED ECHO  [x] LIMITED CHEST  [ ] LIMITED RETROPERITONEAL  [ ] LIMITED ABDOMINAL  [ ] LIMITED DVT  [ ] NEEDLE GUIDANCE VASCULAR  [ ] NEEDLE GUIDANCE THORACENTESIS  [ ] NEEDLE GUIDANCE PARACENTESIS  [ ] NEEDLE GUIDANCE PERICARDIOCENTESIS  [ ] OTHER    FINDINGS:  - moderate LV systolic dysfuntion  - small pericardial effusion without evidence of tamponade physiology  - A-line predominance anteriorly b/l  - trace b/l pleural effusions  - no LL consolidations  - IVC ~3cm    INTERPRETATION:  - no benefit to continued IVF

## 2022-05-05 NOTE — PROGRESS NOTE ADULT - SUBJECTIVE AND OBJECTIVE BOX
SUBJECTIVE:  Patient seen and examined at bedside last night and this AM. Overnight events noted. Patient intubated and sedated. On pressors.     Vital Signs Last 24 Hrs  T(C): 38.3 (05 May 2022 04:00), Max: 39.3 (04 May 2022 07:00)  T(F): 101 (05 May 2022 04:00), Max: 102.8 (04 May 2022 07:00)  HR: 85 (05 May 2022 05:23) (85 - 160)  BP: 105/44 (04 May 2022 18:00) (105/44 - 137/61)  BP(mean): 63 (04 May 2022 18:00) (60 - 88)  RR: 24 (05 May 2022 04:00) (24 - 25)  SpO2: 97% (05 May 2022 05:23) (52% - 100%)    I&O's Summary    03 May 2022 07:01  -  04 May 2022 07:00  --------------------------------------------------------  IN: 1461 mL / OUT: 300 mL / NET: 1161 mL    04 May 2022 07:01  -  05 May 2022 05:45  --------------------------------------------------------  IN: 5456.9 mL / OUT: 405 mL / NET: 5051.9 mL      PHYSICAL EXAM:  GENERAL: Intubated sedated.  HEAD:  Atraumatic, Normocephalic  EXT: B/L feet cool to touch. Distal pulses nonpalpable, nondopplerable. RLE with foot mottling, worsening edema and erythema. Hypertrophic toenails b/l.       MEDICATIONS  (STANDING):  aMIOdarone Infusion 1 mG/Min (33.3 mL/Hr) IV Continuous <Continuous>  aMIOdarone Infusion 0.5 mG/Min (16.7 mL/Hr) IV Continuous <Continuous>  chlorhexidine 0.12% Liquid 15 milliLiter(s) Oral Mucosa every 12 hours  chlorhexidine 4% Liquid 1 Application(s) Topical <User Schedule>  fentaNYL   Infusion. 0.5 MICROgram(s)/kG/Hr (4.08 mL/Hr) IV Continuous <Continuous>  heparin   Injectable 5000 Unit(s) SubCutaneous every 8 hours  hydrocortisone sodium succinate Injectable 50 milliGRAM(s) IV Push every 8 hours  lactated ringers. 1000 milliLiter(s) (150 mL/Hr) IV Continuous <Continuous>  levothyroxine 137 MICROGram(s) Oral daily  meropenem  IVPB 1000 milliGRAM(s) IV Intermittent every 12 hours  norepinephrine Infusion 0.05 MICROgram(s)/kG/Min (3.83 mL/Hr) IV Continuous <Continuous>  nystatin Powder 1 Application(s) Topical two times a day  pantoprazole  Injectable 40 milliGRAM(s) IV Push daily  phenylephrine    Infusion 0.4 MICROgram(s)/kG/Min (6.12 mL/Hr) IV Continuous <Continuous>  vancomycin  IVPB 1000 milliGRAM(s) IV Intermittent every 24 hours  vasopressin Infusion 0.04 Unit(s)/Min (2.4 mL/Hr) IV Continuous <Continuous>    MEDICATIONS  (PRN):  acetaminophen    Suspension .. 650 milliGRAM(s) Oral every 6 hours PRN Temp greater or equal to 38C (100.4F), Mild Pain (1 - 3)    LABS: 5/5 AM Labs pending

## 2022-05-05 NOTE — PROVIDER CONTACT NOTE (CRITICAL VALUE NOTIFICATION) - PERSON GIVING RESULT:
Lab Kenzie Jones
Amandeep
May Dick-lab
lab
Natacha, RT
 Margaret Jones
dru east
Michelle Joy-core lab

## 2022-05-05 NOTE — CHART NOTE - NSCHARTNOTEFT_GEN_A_CORE
Spoke to patients  and gave him an update on clinical condition and RLE changes. We discussed the potential R AKA and the real risks of surgery and conservative management. He is aware mortality risk is high with or without surgery. He will discuss situation with his kids and call me back. He is concerned about quality of life if she survives. I answered all questions.

## 2022-05-05 NOTE — PROGRESS NOTE ADULT - ASSESSMENT
***CHARTING IN PROGRESS***  66 y/o F w/ pmh of dm, hld, hypothyroid, hyperparathyroidism pAF not on AC, severe PAD, type B thoracic dissection tx medically resulting in spinal cord infraction, PE/VTE, recently underwent an angioplasty of the RLE 2/2 to non-healing wounds to the first digit cornering for OM, was seen by podiatry and completed a course of oral Doxy that was just completed on 05/01/2022 who presented to [facility] from home w/ a complains of increased weakness/lethargy, abd pain w/ n/v/d and increased RLE pain/swelling and worsening erythema. Pt admitted to the MICU in septic shock 2/2 to RLE wound and UTI, serratia marcescens bacteremia, severe lactic acidosis, STARR, and acute hypoxic resp failure requiring emergent intubation. Course c/b development of afib with RVR requiring amio gtt and cardioversion. Remains with increasing pressor requirements.     -Neuro: on fentanyl gtt for sedation and analgesia  -Cardaic: History of pAF, developed SVT/afib/flutter with RVR overnight requiring cardioversion x 1, started on amio gtt. Converted to sinus this morning. Hold off on AC for now. Septic Shock started on levophed and vaso, added phenylephrine overnight. will titrate to maintain MAP >65.  Continue stress dose steroids given chronic hx of prednisone use Cardiac enzymes downtrending, likely elevated in setting of demand ischemia,  f/u TTE  -Resp: Acute Hypoxic resp failure now intubated for airway protection, continue lung protective ventilation, will titrate vent setting as needed to maintain o2 >90%. History of COPD/asthma, hold home inhalers for now.  -GI: NPO for now, NGT in place, start enteral feeds? GI ppx w/ protonix, No evidence of bowel ischemia noted on imaging.   -Renal: STARR in the setting of septic shock likely 2/2 to ATN. epps placed monitor I&O q1h. .Severe Lactic and metabolic acidosis, improving, continue LR at 150cc/hr given collapsible IVC on bedside POCUS  -ID: Septic Shock due to RLE  wound/cellulitis and UTI. Cultures growing serratia marcescens. Continue broad spectrum abx with vancomycin and meropenem. Procal elevated to 40.8, , , ESR negative. Covid19/RVP neg, F/u urine cx, Urine legionella, f/u Fungitell and aspergillus galactomannan  -Endo: History of type 2 DM, blood glucose controlled, monitor off ISS for now, monitor FS q6h gievn NPO status. Hypothyroidism-continue synthroid.   -Heme: DVT ppx w/ heparin  -Vascular: History of severe PAD s/p right fem/pop angioplasty in 3/2022., CT imaging of lower extremity showing run off showing possible pseudoaneurysm in left common femoral artery, f/u duplex. R foot notably more mottled in appearance/cool to touch--- vascular   -Dispo: pt is critically ill, currently full code   ***CHARTING IN PROGRESS***  68 y/o F w/ pmh of dm, hld, hypothyroid, hyperparathyroidism pAF not on AC, severe PAD, type B thoracic dissection tx medically resulting in spinal cord infraction, PE/VTE, recently underwent an angioplasty of the RLE 2/2 to non-healing wounds to the first digit cornering for OM, was seen by podiatry and completed a course of oral Doxy that was just completed on 05/01/2022 who presented to [facility] from home w/ a complains of increased weakness/lethargy, abd pain w/ n/v/d and increased RLE pain/swelling and worsening erythema. Pt admitted to the MICU in septic shock 2/2 to UTI vs RLE wound and serratia marcescens bacteremia, severe lactic acidosis, STARR, and acute hypoxic resp failure requiring emergent intubation. Course c/b development of afib with RVR requiring amio gtt and cardioversion. Remains with increasing pressor requirements.     -Neuro: on fentanyl gtt for sedation and analgesia  -Cardaic: History of pAF, developed SVT/afib/flutter with RVR overnight requiring cardioversion x 1, started on amio gtt. Converted to sinus this morning. Hold off on AC for now. Septic Shock started on levophed and vaso, added phenylephrine overnight. will titrate to maintain MAP >65.  Continue stress dose steroids given chronic hx of prednisone use Cardiac enzymes downtrending, likely elevated in setting of demand ischemia,  f/u TTE  -Resp: Acute Hypoxic resp failure now intubated for airway protection, continue lung protective ventilation, will titrate vent setting as needed to maintain o2 >90%. History of COPD/asthma, hold home inhalers for now.  -GI: NPO for now, NGT in place, start enteral feeds? GI ppx w/ protonix, No evidence of bowel ischemia noted on imaging.   -Renal: STARR in the setting of septic shock likely 2/2 to ATN. epps placed monitor I&O q1h. .Severe Lactic and metabolic acidosis, improving, continue LR at 150cc/hr given collapsible IVC on bedside POCUS  -ID: Septic Shock due to UTI vs RLE wound, Cultures growing serratia marcescens. Continue broad spectrum abx with vancomycin and meropenem. Procal elevated to 40.8, , , ESR negative. Covid19/RVP neg, F/u urine cx, Urine legionella, f/u Fungitell and aspergillus galactomannan  -Endo: History of type 2 DM, blood glucose controlled, monitor off ISS for now, monitor FS q6h gievn NPO status. Hypothyroidism-continue synthroid.   -Heme: DVT ppx w/ heparin  -Vascular: History of severe PAD s/p right fem/pop angioplasty in 3/2022., CT imaging of lower extremity showing run off showing possible pseudoaneurysm in left common femoral artery, f/u duplex. R foot notably more mottled in appearance/cool to touch--- vascular   -Dispo: pt is critically ill, currently full code   66 y/o F w/ pmh of dm, hld, hypothyroid, hyperparathyroidism pAF not on AC, severe PAD, type B thoracic dissection tx medically resulting in spinal cord infraction, PE/VTE, recently underwent an angioplasty of the RLE 2/2 to non-healing wounds to the first digit cornering for OM, was seen by podiatry and completed a course of oral Doxy that was just completed on 05/01/2022 who presented to [facility] from home w/ a complains of increased weakness/lethargy, abd pain w/ n/v/d and increased RLE pain/swelling and worsening erythema. Pt admitted to the MICU in septic shock 2/2 serratia marcescens bacteremia, severe lactic acidosis, STARR, and acute hypoxic resp failure requiring emergent intubation. Course c/b development of afib with RVR requiring amio gtt and cardioversion. Remains with increasing pressor requirements.     -Neuro: on fentanyl gtt for sedation and analgesia  -Cardaic: History of pAF, developed SVT overnight requiring cardioversion x 1, started on amio gtt. Converted to sinus this morning. Hold off on AC for now. Septic Shock started on levophed and vaso, added phenylephrine overnight. will titrate to maintain MAP >65.  Continue stress dose steroids given chronic hx of prednisone use Cardiac enzymes downtrending, likely elevated in setting of demand ischemia,  f/u TTE  -Resp: Acute Hypoxic resp failure now intubated for airway protection, continue lung protective ventilation, will titrate vent setting as needed to maintain o2 >90%. History of COPD/asthma, hold home inhalers for now.  -GI: NPO for now, NGT in place, will hold off on feeds. GI ppx w/ protonix, No evidence of bowel ischemia noted on imaging.   -Renal: STARR in the setting of septic shock likely 2/2 to ATN, renal function worsening. Oliguric this morning. Lactic and metabolic acidosis worsening this morning. IVF discontinued. Renal (Dr. Hays) consulted, pt may require dialysis.  -ID: Septic Shock 2/2 serratia bacteremia, source may be secondary to RLE ?nec fasc vs wound vs UTI. Continue broad spectrum abx with vancomycin, meropenem. Clindamycin added for additional coverage.  Procal elevated to 40.8, , , ESR negative. Covid19/RVP neg, F/u urine cx, Urine legionella, f/u Fungitell and aspergillus galactomannan  -Endo: History of type 2 DM, blood glucose controlled, monitor off ISS for now, monitor FS q6h gievn NPO status. Hypothyroidism-continue synthroid.   -Heme: DVT ppx w/ heparin  -Vascular: History of severe PAD s/p right fem/pop angioplasty in 3/2022., CT imaging of lower extremity showing run off showing possible pseudoaneurysm in left common femoral artery, f/u duplex. R foot noticeably more mottled in appearance/cool to touch. Vascular (Dr. Garcia) consulted as patient may require amputation   -Dispo: pt is critically ill, currently full code

## 2022-05-05 NOTE — PROCEDURE NOTE - NSPROCNAME_GEN_A_CORE
Cardioversion
Arterial Puncture/Cannulation
Tracheal Intubation
Feeding Tube Placement
Central Line Insertion

## 2022-05-05 NOTE — CONSULT NOTE ADULT - ASSESSMENT
Oliguric STARR: Ischemic/Septic ATN, Contrast Nephropathy  PAD, Ischemic LE  Metabolic acidosis, Lactate  Gram negative bacteremia  Shock, Sepsis on pressors    Pt with low UO. Continue pressor support. Surgery evaluation in progress. Avoid nephrotoxic meds as possible.   Overall prognosis poor. No emergent need for hemodialysis but may need in the next 24 hours.  D/w pt's  over the phone. Discussed prognosis. Pt will be a poor candidate for hemodialysis unless hemodynamics improve.   Will follow electrolytes and renal function trend. Avoid nephrotoxic meds as possible. D/w ICU team.   Further recommendations pending clinical course. Thank you for the courtesy of this referral.

## 2022-05-05 NOTE — PROGRESS NOTE ADULT - SUBJECTIVE AND OBJECTIVE BOX
67F DM (2) , HLD, hypothyroidism, PAD recent RLE angioplasty, ascending aortic aneurysm Type B aortic dissection with  spinal cord infarction, PE/DVT IVCF ,    Admit yesterday with septic shock bandemia lactic acidosis and respiratory failure.      She has a complicated UTI with serratia Marcescens bacteremia  without obstruction on imaging    She has since developed STARR due to 2 separate contrast loads and septic ATN  LR at 150/hr     Remains on a hefty pressor dose  on vaso 0.04u/min and nor-ept 0.45 mics/kg/min    Febrile tonight    Despite CT run off with flow to the foot, her R foot which has been pulseless is now mottled and ice cold cold.  Vascular sx was called to eval and Dr Garcia advised to continue current rx.  This is a threatened limb.     ABX changed to Merrum and vanco >  random Vanco trough ordered in the setting of STARR     Stress steroid owing to chronic steroids use    With low serum proteins, > try o    Repeat blood cultures today     CCT 33 min       67F DM (2) , HLD, hypothyroidism, PAD recent RLE angioplasty, ascending aortic aneurysm Type B aortic dissection with  spinal cord infarction, PE/DVT IVCF ,    Admit yesterday with septic shock bandemia lactic acidosis and respiratory failure.      She has a complicated UTI with serratia Marcescens bacteremia  without obstruction on imaging    She has since developed STARR due to 2 separate contrast loads and septic ATN  LR at 150/hr     Remains on a hefty pressor dose  on vaso 0.04u/min and nor-ept 0.45 mics/kg/min    Febrile tonight    Despite CT run off with flow to the foot, her R foot which has been pulseless is now mottled and ice cold cold.  Vascular sx was called to [facility] and Dr Garcia advised to continue current rx.  This is a threatened limb.     ABX changed to Merrum and vanco >  random Vanco trough ordered in the setting of STARR     Stress steroid owing to chronic steroids use    With low serum proteins, > try o    Repeat blood cultures today     CCT 33 min          Addendum a fib flutter with RVR   Rx amio infusion, hopefully she converts if not full AC with UFH.    If tachycardia is still and issue will think about changing nor-epi to phenylephrine.

## 2022-05-05 NOTE — CONSULT NOTE ADULT - SUBJECTIVE AND OBJECTIVE BOX
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 Work up was done, Blood cultures came back positive for Serratia. Also since yesterday the skin changes with possible ischemia in R leg started progressing. He was intubated and started on pressors and transferred to MICU.    Seen by vascular as well, RLE with ascending ischemic changes and a large bulla on lower leg, causing high lactate and shock. s pt was lethargic/altered when examined. Pt was then intubated in the ICU due to her deteriorating condition.  She has been started on Vancomycin and meropenem and ID was called for further recommendations.    PAST MEDICAL & SURGICAL HISTORY:  Asthma  Diabetes mellitus  Type II  Hyperlipidemia  Hypothyroidism  PE (pulmonary embolism)  2002--anticoagulated, resolved  Shingles  2015  Arthritis  Dissecting aortic aneurysm, thoracic  Type B---no surgery, nerve damage, lower extremity weakness  Torn ACL  PAD (peripheral artery disease)  H/O rotator cuff tear  Left shoulder  Open wound  Right great toe  PAF (paroxysmal atrial fibrillation)  Irritable bowel syndrome (IBS)  H/O osteoporosis  Anemia  Essential tremor  S/P cholecystectomy    History of cataract surgery  History of colon resection  &amp; Hysterectomy---colon cancer---2002--no chemo or radiation  H/O resection of small bowel  Perforated --2006  Abdominal hernia  10/18/2010 (multiple)  Pima filter in place  1990,  second placed 2002    Social Hx: No current smoking, ETOH or drugs     FAMILY HISTORY:  FH: CHF (congestive heart failure)  Father, age 90,     Allergies  adhesives (Blisters)  Ceftin (Rash)  erythromycin (Rash)  fish (Vomiting; Nausea)  Levaquin (Rash)    Antibiotics:  MEDICATIONS  (STANDING):  aMIOdarone Infusion 0.5 mG/Min (16.7 mL/Hr) IV Continuous <Continuous>  chlorhexidine 0.12% Liquid 15 milliLiter(s) Oral Mucosa every 12 hours  chlorhexidine 4% Liquid 1 Application(s) Topical <User Schedule>  clindamycin IVPB      clindamycin IVPB 600 milliGRAM(s) IV Intermittent every 8 hours  fentaNYL   Infusion. 0.5 MICROgram(s)/kG/Hr (4.08 mL/Hr) IV Continuous <Continuous>  heparin   Injectable 5000 Unit(s) SubCutaneous every 8 hours  hydrocortisone sodium succinate Injectable 50 milliGRAM(s) IV Push every 8 hours  levothyroxine 137 MICROGram(s) Oral daily  meropenem  IVPB 1000 milliGRAM(s) IV Intermittent every 12 hours  norepinephrine Infusion 0.05 MICROgram(s)/kG/Min (3.83 mL/Hr) IV Continuous <Continuous>  nystatin Powder 1 Application(s) Topical two times a day  pantoprazole  Injectable 40 milliGRAM(s) IV Push daily  phenylephrine    Infusion 0.4 MICROgram(s)/kG/Min (6.12 mL/Hr) IV Continuous <Continuous>  vancomycin  IVPB 1000 milliGRAM(s) IV Intermittent every 24 hours  vasopressin Infusion 0.04 Unit(s)/Min (2.4 mL/Hr) IV Continuous <Continuous>    REVIEW OF SYSTEMS:  Intubated     Physical Exam:  Vital Signs Last 24 Hrs  T(C): 38.3 (05 May 2022 10:00), Max: 38.8 (05 May 2022 04:00)  T(F): 101 (05 May 2022 10:00), Max: 101.8 (05 May 2022 04:00)  HR: 75 (05 May 2022 10:00) (75 - 160)  BP: 105/44 (04 May 2022 18:00) (105/44 - 114/59)  BP(mean): 63 (04 May 2022 18:00) (60 - 80)  RR: 24 (05 May 2022 10:00) (24 - 25)  SpO2: 100% (05 May 2022 08:40) (92% - 100%)  GEN: NAD, obese intubated  HEENT: normocephalic and atraumatic.   NECK: Supple.  No lymphadenopathy   LUNGS: Clear to auscultation.  HEART: Regular rate and rhythm   ABDOMEN: Soft, nontender, and nondistended.  Positive bowel sounds. scars +  : No CVA tenderness  EXTREMITIES: RLE with mottling and black discoloration going up to upper thigh and gluteal area  Large bulla surrounding lower leg mainly in medial and posterior side, R hallux with a dry ulcer on tip  Both legs with cold feet, pulses non-palpable, edema in R leg worse.  NEUROLOGIC: intubated   PSYCHIATRIC: sedated   SKIN: as above for legs     Labs:      138  |  106  |  37<H>  ----------------------------<  161<H>  5.3   |  18<L>  |  1.90<H>    Ca    8.3<L>      05 May 2022 06:31  Phos  6.7     05  Mg     2.9     -    TPro  4.6<L>  /  Alb  1.9<L>  /  TBili  0.7  /  DBili  x   /  AST  125<H>  /  ALT  92<H>  /  AlkPhos  77                          11.3   18.78 )-----------( 207      ( 05 May 2022 06:31 )             38.5     PT/INR - ( 03 May 2022 21:01 )   PT: 13.3 sec;   INR: 1.14 ratio    PTT - ( 03 May 2022 21:01 )  PTT:23.2 sec  Urinalysis Basic - ( 03 May 2022 23:46 )    Color: Yellow / Appearance: Turbid / S.010 / pH: x  Gluc: x / Ketone: Trace  / Bili: Small / Urobili: Negative   Blood: x / Protein: 30 mg/dL / Nitrite: Negative   Leuk Esterase: Moderate / RBC: 3-5 /HPF / WBC 26-50   Sq Epi: x / Non Sq Epi: Occasional / Bacteria: Many    LIVER FUNCTIONS - ( 05 May 2022 06:31 )  Alb: 1.9 g/dL / Pro: 4.6 g/dL / ALK PHOS: 77 U/L / ALT: 92 U/L / AST: 125 U/L / GGT: x           CARDIAC MARKERS ( 05 May 2022 06:31 )  x     / x     / 2200 U/L / x     / 11.5 ng/mL    ABG - ( 05 May 2022 08:50 )  pH, Arterial: 7.16  pH, Blood: x     /  pCO2: 28    /  pO2: 174   / HCO3: 10    / Base Excess: -18.7 /  SaO2: 99.6      Procalcitonin, Serum: 40.80 ng/mL (22 @ 01:03)    C-Reactive Protein, Serum: 201 mg/L (22 @ 08:57)    Sedimentation Rate, Erythrocyte: 2 mm/hr (22 @ 05:13)    SARS-CoV-2: NotDetec (22 @ 21:01)    RECENT CULTURES:   @ 00:34 .Blood Blood-Peripheral Blood Culture PCR    Growth in aerobic and anaerobic bottles: Serratia marcescens  See previous culture [id number]    Growth in anaerobic bottle: Gram Negative Rods  Growth in aerobic bottle: Gram Negative Rods     @ 21:01      NotDetec    All imaging and other data have been reviewed.  < from: Xray Tibia + Fibula 2 Views, Right (22 @ 21:12) >  IMPRESSION: No plain film evidence of osteomyelitis at this time.No soft   tissue gas.    < from: CT Angio Abd Aorta w/run-off w/ IV Cont (22 @ 00:11) >  IMPRESSION:  Contrast anterior to the lumen of the left common femoral artery, new   since 2022, possibly a pseudoaneurysm; a left groin ultrasound is   recommended for further evaluation.  Extensive calcified plaquein the femoral and popliteal arteries with   diffuse luminal narrowing with areas of more focal severe narrowing in   the distal superficial femoral and distal popliteal arteries.  Heavily calcified calf arteries which cannot be assessed secondary to the   extensive plaque.  Slowly growing lipomatous lesion at the lateral aspect of the right   hemipelvis measuring 4.0 x 3.4 cm; a low-grade liposarcoma cannot be   excluded; in the absence of planned intervention, continued follow-up is   recommended.  3.4 x 2.0 cm fat-containing lesion at the lateral aspect of left   hemipelvis, possibly a single or multiple adjacent lymph nodes; follow-up   is recommended.  No CT evidence of colitis.    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   Etiology of septic shock most likely R leg, I don't believe UTI or any other infectious process is the reason. Serratia is not a very common cause for cellulitis or necrotizing fasciitis but in some chronic wounds gram negatives become colonized and later cause   infection.   The mottling and skin discoloration (ischemia and necrosis) are rapidly progressing so possibly best option would be surgical with high RLE amputation. At this point I would cover broadly and add clindamycin to suppress toxin production.      Procalcitonin 40.80    Septic shock and RLE cellulitis/ischemia   - Blood culture with serratia will follow sensitivity  - Repeat cultures are done will follow resutls   - UA with high WBC, will follow UC  - Vascular surgery consult noted, benefits and risks of amputation in this critically ill patient is being weighed   - Continue Meropenem, will watch renal function to adjust the dose   - Continue Vancomycin, dose by level, keep trough 15-20   - Add clindamycin 600mg q8h for now     Thank you for courtesy of this consult.     Will follow.  Discussed with the primary team.     Jay Alex MD  Division of Infectious Diseases   Please call ID service at [phone] with any question.      55 minutes spent on total encounter assessing patient, examination, chart reivew, counseling and coordinating care by the attending physician/nurse/care manager.

## 2022-05-05 NOTE — PROGRESS NOTE ADULT - SUBJECTIVE AND OBJECTIVE BOX
Andrés Ng MD  [phone]    SUBJECTIVE:  Intubated, sedated.  Seen with ID and ICU attendings.  Overnight events noted, patient developed afib/flutter, underwent synchronized cardioversion, now in NSR.  ROS unobtainable.    Tele: NSR, HR 75.    MEDICATIONS  (STANDING):  aMIOdarone Infusion 0.5 mG/Min (16.7 mL/Hr) IV Continuous <Continuous>  chlorhexidine 0.12% Liquid 15 milliLiter(s) Oral Mucosa every 12 hours  chlorhexidine 4% Liquid 1 Application(s) Topical <User Schedule>  clindamycin IVPB      clindamycin IVPB 600 milliGRAM(s) IV Intermittent once  clindamycin IVPB 600 milliGRAM(s) IV Intermittent every 8 hours  fentaNYL   Infusion. 0.5 MICROgram(s)/kG/Hr (4.08 mL/Hr) IV Continuous <Continuous>  heparin   Injectable 5000 Unit(s) SubCutaneous every 8 hours  hydrocortisone sodium succinate Injectable 50 milliGRAM(s) IV Push every 8 hours  levothyroxine 137 MICROGram(s) Oral daily  meropenem  IVPB 1000 milliGRAM(s) IV Intermittent every 12 hours  norepinephrine Infusion 0.05 MICROgram(s)/kG/Min (3.83 mL/Hr) IV Continuous <Continuous>  nystatin Powder 1 Application(s) Topical two times a day  pantoprazole  Injectable 40 milliGRAM(s) IV Push daily  phenylephrine    Infusion 0.4 MICROgram(s)/kG/Min (6.12 mL/Hr) IV Continuous <Continuous>  vancomycin  IVPB 1000 milliGRAM(s) IV Intermittent every 24 hours  vasopressin Infusion 0.04 Unit(s)/Min (2.4 mL/Hr) IV Continuous <Continuous>    MEDICATIONS  (PRN):  acetaminophen    Suspension .. 650 milliGRAM(s) Oral every 6 hours PRN Temp greater or equal to 38C (100.4F), Mild Pain (1 - 3)      Vital Signs Last 24 Hrs  T(C): 38.3 (05 May 2022 10:00), Max: 38.8 (05 May 2022 04:00)  T(F): 101 (05 May 2022 10:00), Max: 101.8 (05 May 2022 04:00)  HR: 75 (05 May 2022 10:00) (75 - 160)  BP: 105/44 (04 May 2022 18:00) (105/44 - 114/59)  BP(mean): 63 (04 May 2022 18:00) (60 - 80)  RR: 24 (05 May 2022 10:00) (24 - 25)  SpO2: 100% (05 May 2022 08:40) (92% - 100%)    CAPILLARY BLOOD GLUCOSE      POCT Blood Glucose.: 140 mg/dL (05 May 2022 05:07)  POCT Blood Glucose.: 100 mg/dL (04 May 2022 22:31)  POCT Blood Glucose.: 130 mg/dL (04 May 2022 17:36)  POCT Blood Glucose.: 130 mg/dL (04 May 2022 11:32)    I&O's Summary    04 May 2022 07:01  -  05 May 2022 07:00  --------------------------------------------------------  IN: 6360.3 mL / OUT: 435 mL / NET: 5925.3 mL    05 May 2022 07:01  -  05 May 2022 10:17  --------------------------------------------------------  IN: 355.6 mL / OUT: 30 mL / NET: 325.6 mL        PHYSICAL EXAM:  GENERAL: Looks stated age, NAD  CARDIOVASCULAR: Normal S1, S2  PULMONARY: Lungs clear to auscultation bilaterally with some scattered slightly coarse upper airway sounds. No wheezes/rales/rhonchi  GI: Abdomen non-distended, soft, Nontender.  Bowel sounds present  MSK/Ext:  Both legs cool to touch L>R.  Ulceration on tip of right hallux.  Purplish discoloration with extensive bullae.  One large bulla running along the entire length right medial/posterior leg.  Upper right thigh warm to tough, lower thigh cool to touch with additional bulla along the right lateral posterior thigh.  Purplish discoloration of the medial dorsum of the right foot with what appears to be a developing bulla.  NEUROLOGY: Sedated.  Does withdraw to pain during LE exam.    LABS:                        11.3   18.78 )-----------( 207      ( 05 May 2022 06:31 )             38.5     05-    138  |  106  |  37<H>  ----------------------------<  161<H>  5.3   |  18<L>  |  1.90<H>    Ca    8.3<L>      05 May 2022 06:31  Phos  6.7     05-  Mg     2.9     05-    TPro  4.6<L>  /  Alb  1.9<L>  /  TBili  0.7  /  DBili  x   /  AST  125<H>  /  ALT  92<H>  /  AlkPhos  77  -    PT/INR - ( 03 May 2022 21:01 )   PT: 13.3 sec;   INR: 1.14 ratio         PTT - ( 03 May 2022 21:01 )  PTT:23.2 sec  CARDIAC MARKERS ( 05 May 2022 06:31 )  x     / x     / 2200 U/L / x     / 11.5 ng/mL      Urinalysis Basic - ( 03 May 2022 23:46 )    Color: Yellow / Appearance: Turbid / S.010 / pH: x  Gluc: x / Ketone: Trace  / Bili: Small / Urobili: Negative   Blood: x / Protein: 30 mg/dL / Nitrite: Negative   Leuk Esterase: Moderate / RBC: 3-5 /HPF / WBC 26-50   Sq Epi: x / Non Sq Epi: Occasional / Bacteria: Many          RADIOLOGY & ADDITIONAL TESTS:    < from: US Abdomen Complete (US Abdomen Complete .) (22 @ 05:08) >  FINDINGS:  Liver: Right lobe 20.4 cm. Heterogeneous parenchyma. Indeterminate 2.6 x   2.1 x 2.2 cm right lobe lesion.  Bile ducts: Normal caliber. Common bile duct measures 6 mm.  Gallbladder: Cholecystectomy.  Pancreas: Obscured by bowel gas.  Spleen: 8.8 cm. Within normal limits.  Right kidney: 11.5 cm. No hydronephrosis.  Left kidney: 11.7 cm. No hydronephrosis.  Ascites: None.  Aorta and IVC: Obscured by bowel gas.    IMPRESSION:    Indeterminate 2.6 cm liver lesion. Recommend MRI with contrast for   characterization.    Hepatomegaly. Heterogeneous liver, likely fatty infiltration however   other infiltrative processes are not excluded.        --- End of Report ---    < end of copied text >

## 2022-05-05 NOTE — PROCEDURE NOTE - ADDITIONAL PROCEDURE DETAILS
Was in SR went into  a constellation a fib/flutter/SVT  with HR to 175 with drop in BP requiring escalation of pressors       She was on a fentanyl drip and 30mg of propofol were pushed Prior to procedure.       Synchronized CV performed with 150 Joules  converted to SR   Amio infusion started and to continue
Pt admitted for septic shock possible form RLE wound, severe lactic acidosis requiring emergent intubation for worsening MS/airway protection
Pt admitted for septic shock possible form RLE wound, severe lactic acidosis requiring emergent intubation and pressors
Septic Shock
Pt admitted for septic shock possible form RLE wound, severe lactic acidosis requiring emergent intubation for worsening MS/airway protection, NGT placed for medication admistrations and tube feeds

## 2022-05-05 NOTE — PROVIDER CONTACT NOTE (CRITICAL VALUE NOTIFICATION) - NAME OF MD/NP/PA/DO NOTIFIED:
Dr. De La Rosa
dr. Winston
Dr. Beltrán
ANDRES Benjamin
Dionna STANTON
Dr Camelia Winston
MAXIMINO Castro
dr jose guadalupe green

## 2022-05-05 NOTE — PROGRESS NOTE ADULT - ASSESSMENT
The patient is a 67-year-old woman with PMH of Type 2 DM, HLD, hyperparathyroidism, asthma, hypothyroidism, PAF (not currently on AC), type B thoracic dissection, treated medically, c/b spinal cord infarction, PE/VTE, colon cancer s/p colon resection, history of bowel perforation, PAD s/p angioplasty to right femoropopliteal artery (3/7/22) due to non-healing wounds to the first digit, recently completed a course of antibiotics on 5/1 for suspected osteomyelitis, presented to the ED with increasing weakness.  The patient was admitted to the ICU for septic shock, now intubated, on three pressors.        #T2DM  Chronic, History of DM2  - HOLD home meds   - Low dose insulin corrective scale  - Continue basal insulin  - Hypoglycemia protocol, fingerstick glucose   - F/u AM HbA1c    #HLD  Chronic   - continue home statin  - Watch LFTs, suspect to see a rise given shock state    #Asthma  Chronic   - Will HOLD off on starting home meds    #Hypothyroidism  Chronic   - Continue home Levothyroxine    #PAD  s/p angioplasty to right femoropopliteal artery (3/7/22) due to non-healing wounds to the first digit   - C/w home ASA and Plavix until pt is stable    #Need for prophylactic measure  - VTE ppx per ICU. Would start LMWH in this critically ill patient with prior history of VTE.    Disposition: Full Code, Inpatient. Critically ill at risk of abrupt decompensation. Requires ICU level of care.   The patient is a 67-year-old woman with PMH of Type 2 DM, HLD, hyperparathyroidism, asthma, hypothyroidism, PAF (not currently on AC), type B thoracic dissection, treated medically, c/b spinal cord infarction, PE/VTE, colon cancer s/p colon resection, history of bowel perforation, PAD s/p angioplasty to right femoropopliteal artery (3/7/22) due to non-healing wounds to the first digit, recently completed a course of antibiotics on 5/1 for suspected osteomyelitis, presented to the ED with increasing weakness.  The patient was admitted to the ICU for septic shock, now intubated, on three pressors.

## 2022-05-05 NOTE — PROVIDER CONTACT NOTE (CRITICAL VALUE NOTIFICATION) - TEST AND RESULT REPORTED:
blood culture preliminary result from from 5/3
ABG-PH & HCO3
lactate 11.5
trop
Bands 17%
Phos 0.8, Mag <0.6, Lactate 20.8
Lactate 4.1, troponin 96
troponin result

## 2022-05-05 NOTE — PROGRESS NOTE ADULT - NS ATTEND AMEND GEN_ALL_CORE FT
Patient evaluated at the bedside. Critical condition and multi organ failure. Bacteriemia without clear source (RLE vs UTI). RLE clinically changed. From erythema around shin to hemorrhagic bullae affecting shin and tracking back her leg. BLE are very cold and she is still requiring cooling blanket for hyperthermia. Patient to be seen by ID today.  Will discuss prognosis with ICU-ID team after evaluation.

## 2022-05-06 NOTE — PROGRESS NOTE ADULT - SUBJECTIVE AND OBJECTIVE BOX
Patient is a 67y old  Female who presents with a chief complaint of septic shock (06 May 2022 11:23)    Patient seen in follow up for STARR.        PAST MEDICAL HISTORY:  Asthma    Diabetes mellitus    Hyperlipidemia    Hypothyroidism    PE (pulmonary embolism)    Shingles    Arthritis    Dissecting aortic aneurysm, thoracic    Torn ACL    PAD (peripheral artery disease)    H/O rotator cuff tear    Open wound    PAF (paroxysmal atrial fibrillation)    Irritable bowel syndrome (IBS)    H/O osteoporosis    Anemia    Essential tremor      MEDICATIONS  (STANDING):  chlorhexidine 0.12% Liquid 15 milliLiter(s) Oral Mucosa every 12 hours  chlorhexidine 4% Liquid 1 Application(s) Topical <User Schedule>  clindamycin IVPB      clindamycin IVPB 600 milliGRAM(s) IV Intermittent every 8 hours  fentaNYL   Infusion. 0.5 MICROgram(s)/kG/Hr (4.08 mL/Hr) IV Continuous <Continuous>  heparin   Injectable 5000 Unit(s) SubCutaneous every 8 hours  hydrocortisone sodium succinate Injectable 50 milliGRAM(s) IV Push every 8 hours  levothyroxine 137 MICROGram(s) Oral daily  meropenem  IVPB 1000 milliGRAM(s) IV Intermittent every 12 hours  norepinephrine Infusion 0.05 MICROgram(s)/kG/Min (3.83 mL/Hr) IV Continuous <Continuous>  nystatin Powder 1 Application(s) Topical two times a day  pantoprazole  Injectable 40 milliGRAM(s) IV Push daily  phenylephrine    Infusion 0.4 MICROgram(s)/kG/Min (6.12 mL/Hr) IV Continuous <Continuous>  sodium bicarbonate  Infusion 0.138 mEq/kG/Hr (75 mL/Hr) IV Continuous <Continuous>  vasopressin Infusion 0.04 Unit(s)/Min (2.4 mL/Hr) IV Continuous <Continuous>    MEDICATIONS  (PRN):  acetaminophen    Suspension .. 650 milliGRAM(s) Oral every 6 hours PRN Temp greater or equal to 38C (100.4F), Mild Pain (1 - 3)    T(C): 37.9 (05-06-22 @ 12:00), Max: 38.8 (05-05-22 @ 04:00)  HR: 68 (05-06-22 @ 11:27) (65 - 160)  BP: --  RR: 24 (05-06-22 @ 10:00) (24 - 26)  SpO2: 100% (05-06-22 @ 11:27) (93% - 100%)  Wt(kg): --  I&O's Detail    05 May 2022 07:01  -  06 May 2022 07:00  --------------------------------------------------------  IN:    Amiodarone: 200.4 mL    Amiodarone: 167 mL    Amiodarone: 50 mL    FentaNYL: 383.1 mL    IV PiggyBack: 250 mL    IV PiggyBack: 100 mL    IV PiggyBack: 150 mL    Norepinephrine: 91.2 mL    Phenylephrine: 1836 mL    Vasopressin: 57.6 mL  Total IN: 3285.3 mL    OUT:    Indwelling Catheter - Urethral (mL): 120 mL  Total OUT: 120 mL    Total NET: 3165.3 mL      06 May 2022 07:01  -  06 May 2022 12:16  --------------------------------------------------------  IN:    Amiodarone: 16.7 mL    Enteral Tube Flush: 60 mL    FentaNYL: 48.9 mL    Norepinephrine: 11.4 mL    Phenylephrine: 229.5 mL    Sodium Bicarbonate: 150 mL    Vasopressin: 7.2 mL  Total IN: 523.7 mL    OUT:    Indwelling Catheter - Urethral (mL): 12 mL  Total OUT: 12 mL    Total NET: 511.7 mL          PHYSICAL EXAM:  General: on vent  Respiratory: b/l air entry  Cardiovascular: S1 S2  Gastrointestinal: soft  Extremities: ischemic changes Rt >> lt  Mode: AC/ CMV (Assist Control/ Continuous Mandatory Ventilation), RR (machine): 24, TV (machine): 400, FiO2: 40, PEEP: 5, ITime: 1, MAP: 10, PIP: 22                          11.1   12.31 )-----------( 137      ( 06 May 2022 06:28 )             37.0     05-06    136  |  106  |  48<H>  ----------------------------<  115<H>  5.8<H>   |  10<LL>  |  2.40<H>    Ca    7.2<L>      06 May 2022 06:28  Phos  7.7     05-06  Mg     2.7     05-06    TPro  4.3<L>  /  Alb  1.7<L>  /  TBili  0.9  /  DBili  x   /  AST  4753<H>  /  ALT  2604<H>  /  AlkPhos  145<H>  05-06    CARDIAC MARKERS ( 05 May 2022 06:31 )  x     / x     / 2200 U/L / x     / 11.5 ng/mL      LIVER FUNCTIONS - ( 06 May 2022 06:28 )  Alb: 1.7 g/dL / Pro: 4.3 g/dL / ALK PHOS: 145 U/L / ALT: 2604 U/L / AST: 4753 U/L / GGT: x             ABG - ( 05 May 2022 08:50 )  pH, Arterial: 7.16  pH, Blood: x     /  pCO2: 28    /  pO2: 174   / HCO3: 10    / Base Excess: -18.7 /  SaO2: 99.6              Sodium, Serum: 136 (05-06 @ 06:28)  Sodium, Serum: 138 (05-05 @ 06:31)  Sodium, Serum: 143 (05-04 @ 05:13)  Sodium, Serum: 142 (05-04 @ 02:11)    Creatinine, Serum: 2.40 (05-06 @ 06:28)  Creatinine, Serum: 1.90 (05-05 @ 06:31)  Creatinine, Serum: 1.30 (05-04 @ 05:13)  Creatinine, Serum: 1.20 (05-04 @ 02:11)  Creatinine, Serum: 0.30 (05-04 @ 01:03)  Creatinine, Serum: 1.20 (05-03 @ 21:01)    Potassium, Serum: 5.8 (05-06 @ 06:28)  Potassium, Serum: 5.3 (05-05 @ 06:31)  Potassium, Serum: 4.2 (05-04 @ 05:13)  Potassium, Serum: 4.1 (05-04 @ 02:11)    Hemoglobin: 11.1 (05-06 @ 06:28)  Hemoglobin: 11.3 (05-05 @ 06:31)  Hemoglobin: 12.1 (05-04 @ 05:13)  Hemoglobin: 11.1 (05-04 @ 02:12)

## 2022-05-06 NOTE — PROGRESS NOTE ADULT - SUBJECTIVE AND OBJECTIVE BOX
67F DM (2) , HLD, hypothyroidism, PAD recent RLE angioplasty, ascending aortic aneurysm Type B aortic dissection with  spinal cord infarction, PE/DVT IVCF ,    Admit 5/4 with septic shock bandemia lactic acidosis and respiratory failure.      Not doing well    Septic shock MSOF  3 pressors  STARR width metabolic acidosis  Serratia bactermia   UTI vs leg source.  Clinda added to Merrum and Vanco per level     The R leg is looking worse by the hour  The family is not agreeable to amputation.  Now DNR.    Survival is not possible.      Will discuss withdrawal of care with family presence.

## 2022-05-06 NOTE — PROGRESS NOTE ADULT - PROVIDER SPECIALTY LIST ADULT
Critical Care
Nephrology
Vascular Surgery
Critical Care
Infectious Disease
Critical Care
Internal Medicine
Internal Medicine

## 2022-05-06 NOTE — PROGRESS NOTE ADULT - REASON FOR ADMISSION
septic shock

## 2022-05-06 NOTE — PROGRESS NOTE ADULT - SUBJECTIVE AND OBJECTIVE BOX
**CHARTING IN PROGRESS***   Patient is a 67y old  Female who presents with a chief complaint of septic shock (06 May 2022 01:21)    24 hour events: Patient made DNR yesterday per [facility] discussion with family. No overnight events. Remains febrile this morning to 100.9. Now in multisystem organ failure, on 3 pressors. Patient seen and examined this morning, unable to obtain meaningful history 2/2 pt being intubated and sedated     REVIEW OF SYSTEMS  Unable to obtain 2/2 pt being intubated and sedated     T(F): 100.9 (05-06-22 @ 07:31), Max: 101.3 (05-05-22 @ 08:20)  HR: 66 (05-06-22 @ 07:00) (66 - 80)  BP: --  RR: 24 (05-06-22 @ 07:00) (24 - 26)  SpO2: 100% (05-06-22 @ 07:00) (98% - 100%)  Wt(kg): --    Mode: AC/ CMV (Assist Control/ Continuous Mandatory Ventilation), RR (machine): 24, TV (machine): 400, FiO2: 40, PEEP: 5        I&O's Summary    05-05 @ 07:01  -  05-06 @ 07:00  --------------------------------------------------------  IN: 3285.3 mL / OUT: 120 mL / NET: 3165.3 mL      PHYSICAL EXAM  General: in NAD   CNS: sedated   HEENT: PERRL  Resp: good air entry b/l   CVS: tachycardic, no M/R/G   Abd: +BS, no TTP  Ext: + edema to RLE, cool extremities and feet bilaterally, R>L, unable to palpate DP/PT pulses.   Skin: mottled, cool skin to RLE with surrounding erythema, extending to the thigh. hemorrhagic bullae noted.     MEDICATIONS  clindamycin IVPB   clindamycin IVPB IV Intermittent  meropenem  IVPB IV Intermittent    aMIOdarone Infusion IV Continuous  norepinephrine Infusion IV Continuous  phenylephrine    Infusion IV Continuous    hydrocortisone sodium succinate Injectable IV Push  levothyroxine Oral  vasopressin Infusion IV Continuous      acetaminophen    Suspension .. Oral PRN  fentaNYL   Infusion. IV Continuous      heparin   Injectable SubCutaneous    pantoprazole  Injectable IV Push      sodium bicarbonate  Infusion IV Continuous      chlorhexidine 0.12% Liquid Oral Mucosa  chlorhexidine 4% Liquid Topical  nystatin Powder Topical                            11.1   12.31 )-----------( 137      ( 06 May 2022 06:28 )             37.0       05-06    136  |  106  |  48<H>  ----------------------------<  115<H>  5.8<H>   |  10<LL>  |  2.40<H>    Ca    7.2<L>      06 May 2022 06:28  Phos  6.7     05-05  Mg     2.7     05-06    TPro  4.3<L>  /  Alb  1.7<L>  /  TBili  0.9  /  DBili  x   /  AST  4753<H>  /  ALT  2604<H>  /  AlkPhos  145<H>  05-06    Lactate 9.1           05-05 @ 12:48    Lactate 11.5           05-05 @ 09:32      CARDIAC MARKERS ( 05 May 2022 06:31 )  x     / x     / 2200 U/L / x     / 11.5 ng/mL          Clean Catch Clean Catch (Midstream)   >100,000 CFU/ml Gram Negative Rods -- 05-04 @ 03:43  .Blood Blood-Peripheral   Growth in aerobic and anaerobic bottles: Serratia marcescens  See previous culture [id number]   Growth in anaerobic bottle: Gram Negative Rods  Growth in aerobic bottle: Gram Negative Rods 05-04 @ 00:34      Rapid RVP Result: NotDetec (05-03 @ 21:01)    CENTRAL LINE: Y  MCCARTY: Y  A-LINE: Y    GLOBAL ISSUE/BEST PRACTICE:  Analgesia: Y  Sedation: Y  CAM-ICU: n/a  HOB elevation: Y  Stress ulcer prophylaxis: Y  VTE prophylaxis: Y  Glycemic control: Y  Nutrition: N    CODE STATUS: FULL CODE     Patient is a 67y old  Female who presents with a chief complaint of septic shock (06 May 2022 01:21)    24 hour events: Patient made DNR yesterday per [facility] discussion with family. No overnight events. Remains febrile this morning to 100.9. Now in multisystem organ failure, on 3 pressors. Patient seen and examined this morning, unable to obtain meaningful history 2/2 pt being intubated and sedated     REVIEW OF SYSTEMS  Unable to obtain 2/2 pt being intubated and sedated     T(F): 100.9 (05-06-22 @ 07:31), Max: 101.3 (05-05-22 @ 08:20)  HR: 66 (05-06-22 @ 07:00) (66 - 80)  BP: --  RR: 24 (05-06-22 @ 07:00) (24 - 26)  SpO2: 100% (05-06-22 @ 07:00) (98% - 100%)  Wt(kg): --    Mode: AC/ CMV (Assist Control/ Continuous Mandatory Ventilation), RR (machine): 24, TV (machine): 400, FiO2: 40, PEEP: 5        I&O's Summary    05-05 @ 07:01  -  05-06 @ 07:00  --------------------------------------------------------  IN: 3285.3 mL / OUT: 120 mL / NET: 3165.3 mL      PHYSICAL EXAM  General: in NAD   CNS: sedated   HEENT: PERRL  Resp: good air entry b/l   CVS: tachycardic, no M/R/G   Abd: +BS, no TTP  Ext: + edema to RLE, cool extremities and feet bilaterally, R>L, unable to palpate DP/PT pulses.   Skin: mottled, cool skin to RLE with surrounding erythema, extending to the thigh. hemorrhagic bullae extending to thigh.     MEDICATIONS  clindamycin IVPB   clindamycin IVPB IV Intermittent  meropenem  IVPB IV Intermittent    aMIOdarone Infusion IV Continuous  norepinephrine Infusion IV Continuous  phenylephrine    Infusion IV Continuous    hydrocortisone sodium succinate Injectable IV Push  levothyroxine Oral  vasopressin Infusion IV Continuous      acetaminophen    Suspension .. Oral PRN  fentaNYL   Infusion. IV Continuous      heparin   Injectable SubCutaneous    pantoprazole  Injectable IV Push      sodium bicarbonate  Infusion IV Continuous      chlorhexidine 0.12% Liquid Oral Mucosa  chlorhexidine 4% Liquid Topical  nystatin Powder Topical                            11.1   12.31 )-----------( 137      ( 06 May 2022 06:28 )             37.0       05-06    136  |  106  |  48<H>  ----------------------------<  115<H>  5.8<H>   |  10<LL>  |  2.40<H>    Ca    7.2<L>      06 May 2022 06:28  Phos  6.7     05-05  Mg     2.7     05-06    TPro  4.3<L>  /  Alb  1.7<L>  /  TBili  0.9  /  DBili  x   /  AST  4753<H>  /  ALT  2604<H>  /  AlkPhos  145<H>  05-06    Lactate 9.1           05-05 @ 12:48    Lactate 11.5           05-05 @ 09:32      CARDIAC MARKERS ( 05 May 2022 06:31 )  x     / x     / 2200 U/L / x     / 11.5 ng/mL          Clean Catch Clean Catch (Midstream)   >100,000 CFU/ml Gram Negative Rods -- 05-04 @ 03:43  .Blood Blood-Peripheral   Growth in aerobic and anaerobic bottles: Serratia marcescens  See previous culture [id number]   Growth in anaerobic bottle: Gram Negative Rods  Growth in aerobic bottle: Gram Negative Rods 05-04 @ 00:34      Rapid RVP Result: NotDetec (05-03 @ 21:01)    CENTRAL LINE: Y  MCCARTY: Y  A-LINE: Y    GLOBAL ISSUE/BEST PRACTICE:  Analgesia: Y  Sedation: Y  CAM-ICU: n/a  HOB elevation: Y  Stress ulcer prophylaxis: Y  VTE prophylaxis: Y  Glycemic control: Y  Nutrition: N    CODE STATUS: FULL CODE

## 2022-05-06 NOTE — PROGRESS NOTE ADULT - PROBLEM SELECTOR PLAN 1
Continue current care as per ICU  Supportive care  If patient improves, will most likely need amputation of R limb  Discussed with Dr. Nair last night, day team to f/u today
Suspect septic shock secondary to right lower extremity cellulitis with necrosis with Serratia bacteremia.  Case d/w Dr. Winston, family does not want to pursue surgical intervention and understands that source control is needed to prevent further clinical deterioration.  Continue Meropenem, Vancomycin, and Clindamycin per ID.  Continue pressor support per ICU team.  Continue stress dose Hydrocortisone per ICU team.  Vent management per ICU team.
Septic shock secondary to Serratia bacteremia.  Case d/w Dr. Winston and Dr. Alex.  Suspect the right LE is the source of infection.  UTI a possibility (with worsening leg findings on exam due to septic shock) but felt to be less likely.  Continue Meropenem and Vancomycin.  Clindamycin added.  Await Vascular Surgery follow-up recommendations.  Follow-up pending repeat blood cultures, urine culture, fungitell, aspergillus galactomannan antigen, legionella antibody.  Continue pressor support per ICU team.  Continue stress dose Hydrocortisone per ICU team.  Vent management per ICU team.

## 2022-05-06 NOTE — PROGRESS NOTE ADULT - PROBLEM SELECTOR PLAN 3
AST/ALT markedly elevated over the past 24 hours.  Suspect acute rise secondary to LE necrosis with component of transaminitis due to septic shock with hypotension on presentation.  Amiodarone could also potentially cause transaminitis, now being held by ICU team.  Could check CPK, but would likely not alter management if family declining surgical intervention.  Abdominal ultrasound noted to show hepatomegaly with a heterogeneous liver with likely fatty infiltration, but other infiltrative processes could not be excluded.  Ultrasound also showed an indeterminate 2.6 cm liver lesion. Patient should be recommended for an MRI with contrast for further characterization if she survives this acute illness.
Now in NSR.  Continue Amiodarone per ICU team.

## 2022-05-06 NOTE — PROGRESS NOTE ADULT - SUBJECTIVE AND OBJECTIVE BOX
Andrés Ng MD  [phone]    SUBJECTIVE:  Resting in bed, intubated, sedated.  ROS unobtainable.  NAD.    Tele: NSR, HR 65.    MEDICATIONS  (STANDING):  chlorhexidine 0.12% Liquid 15 milliLiter(s) Oral Mucosa every 12 hours  chlorhexidine 4% Liquid 1 Application(s) Topical <User Schedule>  clindamycin IVPB      clindamycin IVPB 600 milliGRAM(s) IV Intermittent every 8 hours  dextrose 50% Injectable 50 milliLiter(s) IV Push once  fentaNYL   Infusion. 0.5 MICROgram(s)/kG/Hr (4.08 mL/Hr) IV Continuous <Continuous>  heparin   Injectable 5000 Unit(s) SubCutaneous every 8 hours  hydrocortisone sodium succinate Injectable 50 milliGRAM(s) IV Push every 8 hours  insulin regular  human recombinant. 5 Unit(s) IV Push once  levothyroxine 137 MICROGram(s) Oral daily  meropenem  IVPB 1000 milliGRAM(s) IV Intermittent every 12 hours  norepinephrine Infusion 0.05 MICROgram(s)/kG/Min (3.83 mL/Hr) IV Continuous <Continuous>  nystatin Powder 1 Application(s) Topical two times a day  pantoprazole  Injectable 40 milliGRAM(s) IV Push daily  phenylephrine    Infusion 0.4 MICROgram(s)/kG/Min (6.12 mL/Hr) IV Continuous <Continuous>  sodium bicarbonate  Infusion 0.138 mEq/kG/Hr (75 mL/Hr) IV Continuous <Continuous>  sodium zirconium cyclosilicate 10 Gram(s) Oral once  vasopressin Infusion 0.04 Unit(s)/Min (2.4 mL/Hr) IV Continuous <Continuous>    MEDICATIONS  (PRN):  acetaminophen    Suspension .. 650 milliGRAM(s) Oral every 6 hours PRN Temp greater or equal to 38C (100.4F), Mild Pain (1 - 3)      Vital Signs Last 24 Hrs  T(C): 38.3 (06 May 2022 07:31), Max: 38.3 (05 May 2022 10:00)  T(F): 100.9 (06 May 2022 07:31), Max: 101 (05 May 2022 10:00)  HR: 66 (06 May 2022 08:18) (65 - 77)  BP: --  BP(mean): --  RR: 24 (06 May 2022 08:00) (24 - 26)  SpO2: 100% (06 May 2022 08:18) (99% - 100%)    CAPILLARY BLOOD GLUCOSE      POCT Blood Glucose.: 115 mg/dL (06 May 2022 05:39)  POCT Blood Glucose.: 147 mg/dL (05 May 2022 23:12)  POCT Blood Glucose.: 142 mg/dL (05 May 2022 18:40)  POCT Blood Glucose.: 130 mg/dL (05 May 2022 11:55)    I&O's Summary    05 May 2022 07:01  -  06 May 2022 07:00  --------------------------------------------------------  IN: 3285.3 mL / OUT: 120 mL / NET: 3165.3 mL    06 May 2022 07:01  -  06 May 2022 08:57  --------------------------------------------------------  IN: 115.7 mL / OUT: 0 mL / NET: 115.7 mL        PHYSICAL EXAM:  GENERAL: Looks stated age, NAD  CARDIOVASCULAR: Normal S1, S2  PULMONARY: (examined anteriorly) Lungs clear to auscultation bilaterally. No wheezes/rales/rhonchi  GI: Abdomen non-distended, soft, Nontender.  Bowel sounds present  MSK/Ext:  Bilateral purplish discoloration of the lower extremities R>L.  Multiple bullae on the right LE, along the right posterior leg, right lateral leg extending past the knee to the distal thigh, and along the dorsum of the foot.  NEUROLOGY: Sedated.    LABS:                        11.1   12.31 )-----------( 137      ( 06 May 2022 06:28 )             37.0     05-06    136  |  106  |  48<H>  ----------------------------<  115<H>  5.8<H>   |  10<LL>  |  2.40<H>    Ca    7.2<L>      06 May 2022 06:28  Phos  7.7     05-06  Mg     2.7     05-06    TPro  4.3<L>  /  Alb  1.7<L>  /  TBili  0.9  /  DBili  x   /  AST  4753<H>  /  ALT  2604<H>  /  AlkPhos  145<H>  05-06      CARDIAC MARKERS ( 05 May 2022 06:31 )  x     / x     / 2200 U/L / x     / 11.5 ng/mL            RADIOLOGY & ADDITIONAL TESTS:

## 2022-05-06 NOTE — DISCHARGE NOTE PROVIDER - HOSPITAL COURSE
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 History was obtained from /HCP Simone Annette ([phone]) as pt was lethargic/altered when examined. Pt was then intubated in the ICU due to her deteriorating condition.    As per , pt was recently seen by podiatry Dr. Bashir and was treated for a right big toe ulcer for possible osteomyelitis with a one month course of Doxycycline 100 mg which was completed on 22. Pt saw PCP Dr. Smith and Podiatry Dr. Bashir last week who said that infection seemed to have improved. As per  her RLE leg looks worse and pt was complaining of pain. Yesterday pt was fine, she received her second Moderna Booster (full dose). This morning she had a fever, chills, and was tired which the  attributed to the booster shot. Pt was using her platform walker to get to the bathroom;  heard her yell and when he saw her she was on the floor. Pt had told her  that she felt weak, her legs gave out and she slid onto the floor, but denies any head trauma. Pt has had decreased PO intake today and  gave her multiple cans of Ginger ale. Pt had complained of abdominal pain and as per  she had one large BM around noon time. Around 6 pm  noticed that she was not herself and very lethargic, he wanted to check her blood sugar but pt could not tell him where the glucometer was.  then called EMS who brought her to the hospital.     ED Course:   Vitals: BP: 46//28-->70s/50s--->Levophed started-->113/81, HR: 74-->130, Temp: 97.2 F-->101.7 F-->99.7 F, RR: 22-->16, SpO2: 95% on NRB   Labs: wbc: 18.87-->7.76, neut: 16.23, bands: 17%, lactate 9.5-->10.2, procal: 40.8, BUN/Cr: 27/1.20, GFR: 49, gluc: 204-->111, CO2: 24-->20, AST: 12-->22, M.2, Ph: 1.8     ABG: pH: 7.40, PO2: 162, PCO2: 28, HCO3: 17, SpO2: 99.6%  UA: turbid, small bilirubin, trace ketones, 30 protein, moderate LE, small blood, 26-50 WBC, 3-5 RBC, many bacteria,100 glucose    CT Angio Chest (PRELIM): No infiltrate or pneumonia. Mild multichamber cardiomegaly and associated pericardial effusion. No abnormal dilatation of the main pulmonary artery, and no pulmonary embolism. Mild aneurysm of the ascending thoracic aorta with diameter of 4.33 cm, without aortic dissection.  Received Ofirmev x1, Vanco, Zosyn, 2.5 L NS bolus x1, 1L LR bolus x1 and started on Levophed and LR @150 cc/hr in the ED  (04 May 2022 01:34)      ---  HOSPITAL COURSE/PERTINENT LABS/PROCEDURES PERFORMED/PENDING TESTS:  Patient was admitted to ICU with  septic shock. She was intubated for airway protection and started on levophed and vasopressin. She was found to have acute renal failure and severe lacticemia. She was started on empiric vancomycin and meropenem. Blood cultures grew serratia in all 4 bottles, and her right leg was found to be extremely cool to the touch, pulseless with erythema and development of hemorrhagic bullae. There were no signs of ischemia in her leg or bowel. Clindamycin was added to antibiotic regimen out of concern for deep tissue infection. Vascular surgery discussed possibility of AKA for source control, however family not amenable to AKA. Urine culture grew proteus mirabilis. Patient had one episode of SVT which required cardioversion x 1, started on amio gtt. Phenylephrine was added to pressor requirements. Her kidney function continued to worsen as well her metabolic and lactic acidosis. She was started on a bicarb drip, given D50, INSULIN, and lokelma for her hyperkalemia. Nephrology was consulted for possibility of need for dialysis; however, patient found to be poor dialysis candidate. She also was found to have severe transaminitis likely due to shock state as well as likely rhabdomyolysis.     GOC discussion occurred with family. Patient was made DNR/DNI.     (updated )     ---  PATIENT CONDITION:  - stable    ---  PHYSICAL EXAM ON DAY OF DISCHARGE:    ---  CONSULTANTS:     ---  ADVANCED CARE PLANNING:  - Code status:      - [facility] completed:      [  ] NO     [  ] YES    ---  TIME SPENT:  I, the attending physician, was physically present for the key portions of the evaluation and management (E/M) service provided. The total amount of time spent reviewing the hospital notes, laboratory values, imaging findings, assessing/counseling the patient, discussing with consultant physicians, social work, nursing staff was -- minutes

## 2022-05-06 NOTE — PROGRESS NOTE ADULT - PROBLEM SELECTOR PLAN 4
Now in NSR.  Amiodarone held in setting of rising liver enzyme levels.
Continue to monitor finger sticks.

## 2022-05-06 NOTE — PROGRESS NOTE ADULT - PROBLEM SELECTOR PLAN 2
Suspect ATN in setting of septic shock.  Patient remains oliguric, creatinine rising.  Now with worsening acidosis with rising anion gap and hyperkalemia.  Bicarb gtt added by ICU team.  Lokelma ordered by ICU team.  Agree that patient is currently a poor candidate for dialysis.
Suspect ATN in setting of septic shock.  Patient oliguric, renal consult requested.  Would consider dosing by Vancomycin by levels with worsening renal function.  Await renal recommendations.

## 2022-05-06 NOTE — PROGRESS NOTE ADULT - ASSESSMENT
***CHARTING IN PROGRESS***   66 y/o F w/ pmh of dm, hld, hypothyroid, hyperparathyroidism pAF not on AC, severe PAD, type B thoracic dissection tx medically resulting in spinal cord infraction, PE/VTE, recently underwent an angioplasty of the RLE 2/2 to non-healing wounds to the first digit cornering for OM, was seen by podiatry and completed a course of oral Doxy that was just completed on 05/01/2022 who presented to [facility] from home w/ a complains of increased weakness/lethargy, abd pain w/ n/v/d and increased RLE pain/swelling and worsening erythema. Pt admitted to the MICU in septic shock 2/2 to UTI vs RLE wound and serratia marcescens bacteremia, severe lactic acidosis, STARR, and acute hypoxic resp failure requiring emergent intubation. Course c/b development of afib with RVR requiring amio gtt and cardioversion. Remains with increasing pressor requirements    -Neuro: on fentanyl gtt for sedation and analgesia  -Cardaic: History of pAF, developed SVT requiring cardioversion x 1, remains o amio gtt. Hold off on AC for now. Septic Shock on vaso and phenylephrine, on low dose levo?? will titrate to maintain MAP >65.  Continue stress dose steroids given chronic hx of prednisone use Cardiac enzymes downtrending, likely elevated in setting of demand ischemia,  TTE with normal systolic function, small pericardial effusion   -Resp: Acute Hypoxic resp failure now intubated for airway protection, continue lung protective ventilation, will titrate vent setting as needed to maintain o2 >90%. History of COPD/asthma, hold home inhalers for now.  -GI: NPO for now, NGT in place, start enteral feeds? GI ppx w/ protonix, No evidence of bowel ischemia noted on imaging. Transaminitis worsening, likely secondary to multisystem organ failure.   -Renal: STARR in the setting of septic shock likely 2/2 to ATN, renal function worsening, Oliguric this morning. Lactic and metabolic acidosis worsening, low bicarb, started on bicarb gtt. hold off on IVF.   -ID: Septic Shock due to serratia bactermia may be secondary to deep tissue infection, nec fasc, UTI. Cultures growing serratia marcescens. Continue broad spectrum abx with vancomycin, clindamycin, meropenem.  Procal elevated to 40.8, , , ESR negative. Covid19/RVP neg, Urine cx growing GNR, Urine legionella, Fungitell negative   -Endo: History of type 2 DM, blood glucose controlled, monitor off ISS for now, monitor FS q6h gievn NPO status. Hypothyroidism-continue synthroid.   -Heme: DVT ppx w/ heparin  -Vascular: History of severe PAD s/p right fem/pop angioplasty in 3/2022., CT imaging of lower extremity showing run off showing possible pseudoaneurysm in left common femoral artery, f/u duplex. R foot noticeably more mottled in appearance/cool to touch. Vascular (Dr. Garcia) consulted, patient is considered high risk for AKA, family not amenable to AKA at this time.    -Dispo: pt is critically ill, currently DNR, prognosis is poor.    68 y/o F w/ pmh of dm, hld, hypothyroid, hyperparathyroidism pAF not on AC, severe PAD, type B thoracic dissection tx medically resulting in spinal cord infraction, PE/VTE, recently underwent an angioplasty of the RLE 2/2 to non-healing wounds to the first digit cornering for OM, was seen by podiatry and completed a course of oral Doxy that was just completed on 05/01/2022 who presented to [facility] from home w/ a complains of increased weakness/lethargy, abd pain w/ n/v/d and increased RLE pain/swelling and worsening erythema. Pt admitted to the MICU in septic shock with serratia marcescens bacteremia, urine culture growing proteus mirabilis, severe lactic and metabolic acidosis, STARR, and acute hypoxic resp failure requiring emergent intubation. Course c/b development of afib with RVR requiring amio gtt and cardioversion. Remains with increasing pressor requirements, now in multisystem organ failure.     -Neuro: on fentanyl gtt for sedation and analgesia  -Cardaic: History of pAF, developed SVT requiring cardioversion x 1 started on amio gtt. Discontinued amio this morning as patient in sinus rhythm with rates in the 60s this morning as well as worsening transaminitis. Septic Shock on vaso and phenylephrine with low dose vas will titrate to maintain MAP >65.  Continue stress dose steroids given chronic hx of prednisone use. Cardiac enzymes downtrending, likely elevated in setting of demand ischemia, TTE with normal systolic function, small pericardial effusion   -Resp: Acute Hypoxic resp failure now intubated for airway protection, continue lung protective ventilation, will titrate vent setting as needed to maintain o2 >90%. History of COPD/asthma, hold home inhalers for now.  -GI: NPO for now, NGT in place. GI ppx w/ protonix, No evidence of bowel ischemia noted on imaging. Transaminitis worsening, likely secondary to multisystem organ failure and muscle breakdown/rhabdo from R leg.   -Renal: STARR in the setting of septic shock likely 2/2 to ATN, renal function worsening, Oliguric this morning. Lactic and metabolic acidosis worsening, low bicarb, started on bicarb gtt. hold off on IVF.   -ID: Septic Shock due to serratia bactermia may be secondary to deep tissue infection, nec fasc, UTI. Blood Cultures growing serratia marcescens in 4 bottles. Urine culture growing proteus mirabilis. Continue broad spectrum abx with vancomycin, clindamycin, meropenem.  Procal elevated to 40.8, , , ESR negative. Covid19/RVP neg. Fungitell negative.   -Endo: History of type 2 DM, blood glucose controlled, monitor off ISS for now, monitor FS q6h gievn NPO status. Hypothyroidism-continue synthroid.   -Heme: DVT ppx w/ heparin  -Vascular: History of severe PAD s/p right fem/pop angioplasty in 3/2022., CT imaging of lower extremity showing run off showing possible pseudoaneurysm in left common femoral artery, f/u duplex. R foot increasingly mottled in appearance/cool to touch, with extensive hemorrhagic bullae. Vascular (Dr. Garcia) consulted, family not amenable to AKA at this time.    -Dispo: pt is critically ill, currently DNR, prognosis is poor. GOC discussion ongoing with family.

## 2022-05-06 NOTE — PROGRESS NOTE ADULT - SUBJECTIVE AND OBJECTIVE BOX
[facility]  INFECTIOUS DISEASES   [address] [place] [address]  Tel: [phone]     Fax: [phone]  ======================================================  MD Karen Ross Kaushal, MD Cho, Michelle, MD   ======================================================    [id number]  KASHIF ROSA ISELA     Follow up: R leg ischemic changes, bacteremia, septic shock    Still on 3 pressors barely holding systolic BP over 100, fever 101.  Worsening of R leg with extension of ischemic changes in upper thigh and gluteal area.     PAST MEDICAL & SURGICAL HISTORY:  Asthma  Diabetes mellitus  Type II  Hyperlipidemia  Hypothyroidism  PE (pulmonary embolism)  2002--anticoagulated, resolved  Shingles    Arthritis  Dissecting aortic aneurysm, thoracic  Type B---no surgery, nerve damage, lower extremity weakness  Torn ACL  PAD (peripheral artery disease)  H/O rotator cuff tear  Left shoulder  Open wound  Right great toe  PAF (paroxysmal atrial fibrillation)  Irritable bowel syndrome (IBS)  H/O osteoporosis  Anemia  Essential tremor  S/P cholecystectomy    History of cataract surgery  History of colon resection  &amp; Hysterectomy---colon cancer---2002--no chemo or radiation  H/O resection of small bowel  Perforated --2006  Abdominal hernia  10/18/2010 (multiple)  [place] filter in place  1990,  second placed 2002    Social Hx: No current smoking, ETOH or drugs     FAMILY HISTORY:  FH: CHF (congestive heart failure)  Father, age 90,     Allergies  adhesives (Blisters)  Ceftin (Rash)  erythromycin (Rash)  fish (Vomiting; Nausea)  Levaquin (Rash)    Antibiotics:  Meropenem  Vancomycin  Clindamycin     REVIEW OF SYSTEMS:  Intubated     Physical Exam:  Vital Signs Last 24 Hrs  T(C): 38.3 (06 May 2022 07:31), Max: 38.3 (05 May 2022 14:00)  T(F): 100.9 (06 May 2022 07:31), Max: 101 (05 May 2022 21:39)  HR: 69 (06 May 2022 10:00) (65 - 74)  RR: 24 (06 May 2022 10:00) (24 - 26)  SpO2: 100% (06 May 2022 10:00) (99% - 100%)  GEN: NAD, obese intubated  HEENT: normocephalic and atraumatic.   NECK: Supple.  No lymphadenopathy   LUNGS: Clear to auscultation.  HEART: Regular rate and rhythm   ABDOMEN: Soft, nontender, and nondistended.  Positive bowel sounds. scars +  : No CVA tenderness  EXTREMITIES: RLE with mottling and black discoloration going up to upper thigh and gluteal area  Large bulla surrounding lower leg mainly in medial and posterior side, R hallux with a dry ulcer on tip  Both legs with cold feet, pulses non-palpable, edema in R leg worse.  NEUROLOGIC: intubated   PSYCHIATRIC: sedated   SKIN: as above for legs     Labs:                        11.1   12.31 )-----------( 137      ( 06 May 2022 06:28 )             37.0     05-06    136  |  106  |  48<H>  ----------------------------<  115<H>  5.8<H>   |  10<LL>  |  2.40<H>    Ca    7.2<L>      06 May 2022 06:28  Phos  7.7     05-  Mg     2.7     -    TPro  4.3<L>  /  Alb  1.7<L>  /  TBili  0.9  /  DBili  x   /  AST  4753<H>  /  ALT  2604<H>  /  AlkPhos  145<H>      Culture - Blood (collected 22 @ 09:18)  Source: .Blood Blood-Peripheral    Culture - Blood (collected 22 @ 09:18)  Source: .Blood Blood    Culture - Urine (collected 22 @ 03:43)  Source: Clean Catch Clean Catch (Midstream)    Culture - Blood (collected 22 @ 00:34)  Source: .Blood Blood-Peripheral  Gram Stain (22 @ 11:24):    Growth in aerobic and anaerobic bottles: Gram Negative Rods  Final Report (22 @ 11:19):    Growth in aerobic and anaerobic bottles: Serratia marcescens    ***Blood Panel PCR results on this specimen are available    approximately 3 hours after the Gram stain result.***    Gram stain, PCR, and/or culture results may not always    correspond due to difference in methodologies.    ************************************************************    This PCR assay was performed by multiplex PCR. This    Assay tests for 66 bacterial and resistance gene targets.    Please refer to the [facility] test directory    at [email or web address] for details.  Organism: Blood Culture PCR  Serratia marcescens (22 @ 11:19)  Organism: Serratia marcescens (22 @ 11:19)    Sensitivities:      -  Amikacin: S <=16      -  Ampicillin: R >16 These ampicillin results predict results for amoxicillin      -  Ampicillin/Sulbactam: R >16/8 Enterobacter, Klebsiella aerogenes, Citrobacter, and Serratia may develop resistance during prolonged therapy (3-4 days)      -  Aztreonam: S <=4      -  Cefazolin: R >16 Enterobacter, Klebsiella aerogenes, Citrobacter, and Serratia may develop resistance during prolonged therapy (3-4 days)      -  Cefepime: S <=2      -  Cefoxitin: R 16      -  Ceftriaxone: S <=1 Enterobacter, Klebsiella aerogenes, Citrobacter, and Serratia may develop resistance during prolonged therapy      -  Ciprofloxacin: S <=0.25      -  Ertapenem: S <=0.5      -  Gentamicin: S <=2      -  Levofloxacin: S <=0.5      -  Meropenem: S <=1      -  Piperacillin/Tazobactam: S <=8      -  Tobramycin: S <=2      -  Trimethoprim/Sulfamethoxazole: S <=0.5/9.5      Method Type: MELCHOR  Organism: Blood Culture PCR (22 @ 11:19)    Sensitivities:      -  Serratia marcescens: Detec      Method Type: PCR    Culture - Blood (collected 22 @ 00:34)  Source: .Blood Blood-Peripheral  Gram Stain (22 @ 14:26):    Growth in anaerobic bottle: Gram Negative Rods    Growth in aerobic bottle: Gram Negative Rods  Final Report (22 @ 11:23):    Growth in aerobic and anaerobic bottles: Serratia marcescens    See previous culture [id number]    WBC Count: 12.31 K/uL (22 @ 06:28)  WBC Count: 18.78 K/uL (22 @ 06:31)  WBC Count: 11.89 K/uL (22 @ 05:13)  WBC Count: 7.76 K/uL (22 @ 02:12)  WBC Count: 18.87 K/uL (22 @ 21:01)    Creatinine, Serum: 2.40 mg/dL (22 @ 06:28)  Creatinine, Serum: 1.90 mg/dL (22 @ :31)  Creatinine, Serum: 1.30 mg/dL (22 @ 05:13)  Creatinine, Serum: 1.20 mg/dL (22 @ 02:11)  Creatinine, Serum: 0.30 mg/dL (22 @ 01:03)  Creatinine, Serum: 1.20 mg/dL (22 @ 21:01)    C-Reactive Protein, Serum: 201 mg/L (22 @ 08:57)    Sedimentation Rate, Erythrocyte: 2 mm/hr (22 @ 05:13)    Procalcitonin, Serum: 40.80 ng/mL (22 @ 01:03)     SARS-CoV-2: NotDetec (22 @ 21:01)    All imaging and other data have been reviewed.  < from: Xray Tibia + Fibula 2 Views, Right (22 @ 21:12) >  IMPRESSION: No plain film evidence of osteomyelitis at this time.No soft   tissue gas.    < from: CT Angio Abd Aorta w/run-off w/ IV Cont (22 @ 00:11) >  IMPRESSION:  Contrast anterior to the lumen of the left common femoral artery, new   since 2022, possibly a pseudoaneurysm; a left groin ultrasound is   recommended for further evaluation.  Extensive calcified plaquein the femoral and popliteal arteries with   diffuse luminal narrowing with areas of more focal severe narrowing in   the distal superficial femoral and distal popliteal arteries.  Heavily calcified calf arteries which cannot be assessed secondary to the   extensive plaque.  Slowly growing lipomatous lesion at the lateral aspect of the right   hemipelvis measuring 4.0 x 3.4 cm; a low-grade liposarcoma cannot be   excluded; in the absence of planned intervention, continued follow-up is   recommended.  3.4 x 2.0 cm fat-containing lesion at the lateral aspect of left   hemipelvis, possibly a single or multiple adjacent lymph nodes; follow-up   is recommended.  No CT evidence of colitis.    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   Etiology of septic shock most likely R leg, I don't believe UTI or any other infectious process is the reason. Serratia is not a very common cause for cellulitis or necrotizing fasciitis but in some chronic wounds gram negatives become colonized and later cause   infection.   The mottling and skin discoloration (ischemia and necrosis) are rapidly progressing, family doesn't want any surgical intervention at this time. Since source is not controlled, antibiotics woulnd't be very helpful for a long time.   Even though blood culture is negative and leukocytosis is trending down but she is still in septic shock with multiorgan failure. Shock liver with a very high transaminitis and worsening of renal function.   I wouldn't withdraw any treatment at this time until family decides about comfort care.      Procalcitonin 40.80    Septic shock and RLE cellulitis/ischemia   - Blood culture with serratia will follow sensitivity  - Repeat cultures are NGTD  - UA with high WBC, UC with proteus  - Vascular surgery follow up noted, no surgical intervention as per family wish  - Continue Meropenem, will watch renal function to adjust the dose   - Continue Vancomycin, dose by level, keep trough 15-20   - Can continue clindamycin 600mg q8h for now     Will follow.  Discussed with the primary team.     Jay Alex MD  Division of Infectious Diseases   Please call ID service at [phone] with any question.      35 minutes spent on total encounter assessing patient, examination, chart reivew, counseling and coordinating care by the attending physician/nurse/care manager.     [facility]  INFECTIOUS DISEASES   [address] [place] [address]  Tel: [phone]     Fax: [phone]  ======================================================  MD Karen Ross Kaushal, MD Cho, Michelle, MD   ======================================================    [id number]  KASHIF ROSA ISELA     Follow up: R leg ischemic changes, bacteremia, septic shock    Still on 3 pressors barely holding systolic BP over 100, fever 101.  Worsening of R leg with extension of ischemic changes in upper thigh and gluteal area.     PAST MEDICAL & SURGICAL HISTORY:  Asthma  Diabetes mellitus  Type II  Hyperlipidemia  Hypothyroidism  PE (pulmonary embolism)  2002--anticoagulated, resolved  Shingles    Arthritis  Dissecting aortic aneurysm, thoracic  Type B---no surgery, nerve damage, lower extremity weakness  Torn ACL  PAD (peripheral artery disease)  H/O rotator cuff tear  Left shoulder  Open wound  Right great toe  PAF (paroxysmal atrial fibrillation)  Irritable bowel syndrome (IBS)  H/O osteoporosis  Anemia  Essential tremor  S/P cholecystectomy    History of cataract surgery  History of colon resection  &amp; Hysterectomy---colon cancer---2002--no chemo or radiation  H/O resection of small bowel  Perforated --2006  Abdominal hernia  10/18/2010 (multiple)  [place] filter in place  1990,  second placed 2002    Social Hx: No current smoking, ETOH or drugs     FAMILY HISTORY:  FH: CHF (congestive heart failure)  Father, age 90,     Allergies  adhesives (Blisters)  Ceftin (Rash)  erythromycin (Rash)  fish (Vomiting; Nausea)  Levaquin (Rash)    Antibiotics:  Meropenem  Vancomycin  Clindamycin     REVIEW OF SYSTEMS:  Intubated     Physical Exam:  Vital Signs Last 24 Hrs  T(C): 38.3 (06 May 2022 07:31), Max: 38.3 (05 May 2022 14:00)  T(F): 100.9 (06 May 2022 07:31), Max: 101 (05 May 2022 21:39)  HR: 69 (06 May 2022 10:00) (65 - 74)  RR: 24 (06 May 2022 10:00) (24 - 26)  SpO2: 100% (06 May 2022 10:00) (99% - 100%)  GEN: NAD, obese intubated  HEENT: normocephalic and atraumatic.   NECK: Supple.  No lymphadenopathy   LUNGS: Clear to auscultation.  HEART: Regular rate and rhythm   ABDOMEN: Soft, nontender, and nondistended.  Positive bowel sounds. scars +  : No CVA tenderness  EXTREMITIES: RLE with mottling and black discoloration going up to upper thigh and gluteal area  Large bulla surrounding lower leg mainly in medial and posterior side, R hallux with a dry ulcer on tip  Both legs with cold feet, pulses non-palpable, edema in R leg worse.  NEUROLOGIC: intubated   PSYCHIATRIC: sedated   SKIN: as above for legs     Labs:                        11.1   12.31 )-----------( 137      ( 06 May 2022 06:28 )             37.0     05-06    136  |  106  |  48<H>  ----------------------------<  115<H>  5.8<H>   |  10<LL>  |  2.40<H>    Ca    7.2<L>      06 May 2022 06:28  Phos  7.7     05-  Mg     2.7     -    TPro  4.3<L>  /  Alb  1.7<L>  /  TBili  0.9  /  DBili  x   /  AST  4753<H>  /  ALT  2604<H>  /  AlkPhos  145<H>      Culture - Blood (collected 22 @ 09:18)  Source: .Blood Blood-Peripheral    Culture - Blood (collected 22 @ 09:18)  Source: .Blood Blood    Culture - Urine (collected 22 @ 03:43)  Source: Clean Catch Clean Catch (Midstream)    Culture - Blood (collected 22 @ 00:34)  Source: .Blood Blood-Peripheral  Gram Stain (22 @ 11:24):    Growth in aerobic and anaerobic bottles: Gram Negative Rods  Final Report (22 @ 11:19):    Growth in aerobic and anaerobic bottles: Serratia marcescens    ***Blood Panel PCR results on this specimen are available    approximately 3 hours after the Gram stain result.***    Gram stain, PCR, and/or culture results may not always    correspond due to difference in methodologies.    ************************************************************    This PCR assay was performed by multiplex PCR. This    Assay tests for 66 bacterial and resistance gene targets.    Please refer to the [facility] Labs test directory    at [email or web address] for details.  Organism: Blood Culture PCR  Serratia marcescens (22 @ 11:19)  Organism: Serratia marcescens (22 @ 11:19)    Sensitivities:      -  Amikacin: S <=16      -  Ampicillin: R >16 These ampicillin results predict results for amoxicillin      -  Ampicillin/Sulbactam: R >16/8 Enterobacter, Klebsiella aerogenes, Citrobacter, and Serratia may develop resistance during prolonged therapy (3-4 days)      -  Aztreonam: S <=4      -  Cefazolin: R >16 Enterobacter, Klebsiella aerogenes, Citrobacter, and Serratia may develop resistance during prolonged therapy (3-4 days)      -  Cefepime: S <=2      -  Cefoxitin: R 16      -  Ceftriaxone: S <=1 Enterobacter, Klebsiella aerogenes, Citrobacter, and Serratia may develop resistance during prolonged therapy      -  Ciprofloxacin: S <=0.25      -  Ertapenem: S <=0.5      -  Gentamicin: S <=2      -  Levofloxacin: S <=0.5      -  Meropenem: S <=1      -  Piperacillin/Tazobactam: S <=8      -  Tobramycin: S <=2      -  Trimethoprim/Sulfamethoxazole: S <=0.5/9.5      Method Type: MELCHOR  Organism: Blood Culture PCR (22 @ 11:19)    Sensitivities:      -  Serratia marcescens: Detec      Method Type: PCR    Culture - Blood (collected 22 @ 00:34)  Source: .Blood Blood-Peripheral  Gram Stain (22 @ 14:26):    Growth in anaerobic bottle: Gram Negative Rods    Growth in aerobic bottle: Gram Negative Rods  Final Report (22 @ 11:23):    Growth in aerobic and anaerobic bottles: Serratia marcescens    See previous culture [id number]    WBC Count: 12.31 K/uL (22 @ 06:28)  WBC Count: 18.78 K/uL (22 @ 06:31)  WBC Count: 11.89 K/uL (22 @ 05:13)  WBC Count: 7.76 K/uL (22 @ 02:12)  WBC Count: 18.87 K/uL (22 @ 21:01)    Creatinine, Serum: 2.40 mg/dL (22 @ 06:28)  Creatinine, Serum: 1.90 mg/dL (22 @ :31)  Creatinine, Serum: 1.30 mg/dL (22 @ 05:13)  Creatinine, Serum: 1.20 mg/dL (22 @ 02:11)  Creatinine, Serum: 0.30 mg/dL (22 @ 01:03)  Creatinine, Serum: 1.20 mg/dL (22 @ 21:01)    C-Reactive Protein, Serum: 201 mg/L (22 @ 08:57)    Sedimentation Rate, Erythrocyte: 2 mm/hr (22 @ 05:13)    Procalcitonin, Serum: 40.80 ng/mL (22 @ 01:03)     SARS-CoV-2: NotDetec (22 @ 21:01)    All imaging and other data have been reviewed.  < from: Xray Tibia + Fibula 2 Views, Right (22 @ 21:12) >  IMPRESSION: No plain film evidence of osteomyelitis at this time.No soft   tissue gas.    < from: CT Angio Abd Aorta w/run-off w/ IV Cont (22 @ 00:11) >  IMPRESSION:  Contrast anterior to the lumen of the left common femoral artery, new   since 2022, possibly a pseudoaneurysm; a left groin ultrasound is   recommended for further evaluation.  Extensive calcified plaquein the femoral and popliteal arteries with   diffuse luminal narrowing with areas of more focal severe narrowing in   the distal superficial femoral and distal popliteal arteries.  Heavily calcified calf arteries which cannot be assessed secondary to the   extensive plaque.  Slowly growing lipomatous lesion at the lateral aspect of the right   hemipelvis measuring 4.0 x 3.4 cm; a low-grade liposarcoma cannot be   excluded; in the absence of planned intervention, continued follow-up is   recommended.  3.4 x 2.0 cm fat-containing lesion at the lateral aspect of left   hemipelvis, possibly a single or multiple adjacent lymph nodes; follow-up   is recommended.  No CT evidence of colitis.    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   Etiology of septic shock most likely R leg, I don't believe UTI or any other infectious process is the reason. Serratia is not a very common cause for cellulitis or necrotizing fasciitis but in some chronic wounds gram negatives become colonized and later cause   infection.   The mottling and skin discoloration (ischemia and necrosis) are rapidly progressing, family doesn't want any surgical intervention at this time. Since source is not controlled, antibiotics woulnd't be very helpful for a long time.   Even though blood culture is negative and leukocytosis is trending down but she is still in septic shock with multiorgan failure. Shock liver with a very high transaminitis and worsening of renal function.   I wouldn't withdraw any treatment at this time until family decides about comfort care.      Procalcitonin 40.80    Septic shock and RLE cellulitis/ischemia   - Blood culture with serratia will follow sensitivity  - Repeat cultures are NGTD  - UA with high WBC, UC with proteus  - Vascular surgery follow up noted, no surgical intervention as per family wish  - Continue Meropenem, will watch renal function to adjust the dose   - Continue Vancomycin, dose by level, keep trough 15-20   - Can continue clindamycin 600mg q8h for now     Addendum:  Serratia sensitive, so tolerated zosyn, can switch gladys to zosyn.     Will follow.  Discussed with the primary team.     Jay Alex MD  Division of Infectious Diseases   Please call ID service at [phone] with any question.      35 minutes spent on total encounter assessing patient, examination, chart reivew, counseling and coordinating care by the attending physician/nurse/care manager.

## 2022-05-06 NOTE — PROGRESS NOTE ADULT - PROBLEM SELECTOR PLAN 6
"Chief Complaint   Patient presents with     Thyroid Problem     Follow up on thyroid medication      Edema     follow up on diuretic medication        Initial /66 (BP Location: Left arm, Patient Position: Chair, Cuff Size: Adult Large)  Pulse 76  Temp 98.4  F (36.9  C) (Tympanic)  Wt 250 lb (113.4 kg)  SpO2 95%  BMI 44.29 kg/m2 Estimated body mass index is 44.29 kg/(m^2) as calculated from the following:    Height as of 6/8/17: 5' 3\" (1.6 m).    Weight as of this encounter: 250 lb (113.4 kg).  Medication Reconciliation: complete   Annette Araiza CMA(AAMA)   "
Continue Synthroid.
Mild, suspect secondary to septic shock, would continue to trend.  Abdominal ultrasound noted to show hepatomegaly with a heterogeneous liver with likely fatty infiltration, but other infiltrative processes could not be excluded.  Ultrasound also showed an indeterminate 2.6 cm liver lesion. Patient should be recommended for an MRI with contrast for further characterization after acute illness resolved.

## 2022-05-06 NOTE — GOALS OF CARE CONVERSATION - ADVANCED CARE PLANNING - CONVERSATION DETAILS
Family updated with overall worsening condition. Now with worsening renal failure, poor candidate for HD due to overall hemodynamics. After discussion between pt's  and daughters, family has opted for comfort measures and compassionate extubation. All questions answered. Emotional support provided.
D/w  regarding overnight events, worsening condition and overall prognosis. States that he had discussed with his children and they do not wish to pursue surgical intervention. Explained worsening renal function and potential need for HD. At this time, he wishes to discuss with his daughters coming down from [place] regarding next steps in management, expressing understanding that if we do not have source control her condition will continue to deteriorate despite all of our efforts. Emotional support provided. Ongoing GOC discussions once his daughters arrive at bedside.

## 2022-05-06 NOTE — PROGRESS NOTE ADULT - ATTENDING COMMENTS
67F h/o DM, HLD, hypothyroidism, severe PAD, Type B aortic dissection c/b spinal cord infarction, PE/DVT, s/p recent RLE angioplasty 2/2 to non-healing wounds to R first digit/OM s/p recent course of PO doxycycline now a/w septic shock likely 2/2 Serratia bacteremia, Proteus UTI and presumed deep tissue infection of RLE c/b severe lacticemia, acute remal failure, worsening transaminitis likely in setting of rhabdo from RLE, demand ischemia and episode of SVT requiring cardioversion, intubated for acute hypoxic respiratory failure    Neuro: continue fentanyl for sedation and analgesia  CV: s/p cardioversion and amio, now in NSR, can dc gtt for borderline bradycardia  - septic shock on pressor support - continue Moy, Vaso and low dose levophed with stress dose steroids   Pulm: continue lung protective ventilation  GI: NPO, ppx with protonix  Renal: worsening renal failure - start bicarb gtt, poor candidate for HD as pt unlikely to tolerate and without source control of infection, will not have any likelihood of overall improvement  - hyperkalemia - give insulin, D50 and lokelma  ID: Serratia bacteremia, repeat BCx now NGTD; Proteus UTI - continue vanc by level, gladys and clindamycin for now  - RLE concerning for  deep tissue infection/necrotizing fascitis, family has opted not to pursue amputation, understanding that without source control pt likely to continue to worsen    Endo: continue home synthoid  Heme: dvt ppx with HSQ 67F h/o DM, HLD, hypothyroidism, severe PAD, Type B aortic dissection c/b spinal cord infarction, PE/DVT, s/p recent RLE angioplasty 2/2 to non-healing wounds to R first digit/OM s/p recent course of PO doxycycline now a/w septic shock likely 2/2 Serratia bacteremia, Proteus UTI and presumed deep tissue infection of RLE c/b severe lacticemia, acute remal failure, worsening transaminitis likely in setting of rhabdo from RLE, demand ischemia and episode of SVT requiring cardioversion, intubated for acute hypoxic respiratory failure    Neuro: continue fentanyl for sedation and analgesia  CV: s/p cardioversion and amio, now in NSR, can dc gtt for borderline bradycardia  - septic shock on pressor support - continue Moy, Vaso and low dose levophed with stress dose steroids   Pulm: continue lung protective ventilation  GI: NPO, ppx with protonix  - woresning transmainitis likely multifactorial in setting of shock liver and likely rhabdo from RLE; CT and RUQ sono without acute pathology  Renal: worsening renal failure - start bicarb gtt, poor candidate for HD as pt unlikely to tolerate and without source control of infection, will not have any likelihood of overall improvement  - hyperkalemia - give insulin, D50 and lokelma  ID: Serratia bacteremia, repeat BCx now NGTD; Proteus UTI - continue vanc by level, gladys and clindamycin for now  - RLE concerning for  deep tissue infection/necrotizing fascitis, family has opted not to pursue amputation, understanding that without source control pt likely to continue to worsen    Endo: continue home synthoid  Heme: dvt ppx with HSQ

## 2022-05-06 NOTE — DISCHARGE NOTE FOR THE EXPIRED PATIENT - HOSPITAL COURSE
Patient was admitted to ICU with  septic shock. She was intubated for airway protection and started on levophed and vasopressin. She was found to have acute renal failure and severe lacticemia. She was started on empiric vancomycin and meropenem. Blood cultures grew serratia in all 4 bottles, and her right leg was found to be extremely cool to the touch, pulseless with erythema and development of hemorrhagic bullae. There were no signs of ischemia in her leg or bowel. Clindamycin was added to antibiotic regimen out of concern for deep tissue infection. Vascular surgery discussed possibility of AKA for source control, however family not amenable to AKA. Urine culture grew proteus mirabilis. Patient had one episode of SVT which required cardioversion x 1, started on amio gtt. Phenylephrine was added to pressor requirements. Her kidney function continued to worsen as well her metabolic and lactic acidosis. She was started on a bicarb drip, given D50, INSULIN, and lokelma for her hyperkalemia. Nephrology was consulted for possibility of need for dialysis; however, patient found to be poor dialysis candidate. She also was found to have severe transaminitis likely due to shock state as well as likely rhabdomyolysis. After discussions with family, ultimately decided on DNR/DNI, comfort measures and compassionate extubation. Pt remained on fentanyl gtt, given ativan and glycopyrrolate and extubated. TOD 17:03. No autopsy requested.

## 2022-05-06 NOTE — PHARMACOTHERAPY INTERVENTION NOTE - COMMENTS
Patient with serratia bacteremia 2/2 ssti ordered for meropenem 1000mg BID. Sensitivities indicate pansensitive organism. Discussed de-escalation with ID Dr. Alex and suggested change to cefepime, MD agreed to de-escalation but would prefer to switch to Zosyn for anaerobic coverage. Given patient's renal function, discussed increased risk of STARR associated with concomitant use of Zosyn with Vancomycin by level, MD aware and would like to proceed with therapy. Entered order for Zosyn 3.375g q12. and discussed therapy update with ICU team.

## 2022-05-06 NOTE — DISCHARGE NOTE PROVIDER - NSDCMRMEDTOKEN_GEN_ALL_CORE_FT
albuterol 90 mcg/inh inhalation aerosol: 1 puff(s) inhaled every 6 hours, As Needed  Alvesco 80 mcg/inh inhalation aerosol: 1 puff(s) inhaled 2 times a day  aspirin 81 mg oral delayed release tablet: 1 tab(s) orally once a day  Bevespi Aerosphere 9 mcg-4.8 mcg/inh inhalation aerosol: 2 puff(s) inhaled once a day  CoQ10: 2  orally once a day  Dupixent 100 mg/0.67 mL subcutaneous solution: every 2 weeks  gabapentin 100 mg oral capsule: 1 cap(s) orally 2 times a day in the morning and afternoon   gabapentin 300 mg oral capsule: 1 cap(s) orally once a day (at bedtime)  Januvia 100 mg oral tablet: 1 tab(s) orally once a day  Lantus 100 units/mL subcutaneous solution: 15 unit(s) subcutaneous once a day (at bedtime)  Lasix: 10 milligram(s) orally--1-2 x a week   levothyroxine 137 mcg (0.137 mg) oral tablet: 1 tab(s) orally once a day  melatonin 3 mg oral tablet: 1 tab(s) orally once a day (at bedtime)  metFORMIN 500 mg oral tablet: 1 tab(s) orally 2 times a day--with breakast and dinner  olopatadine 665 mcg/inh nasal spray: 2 spray(s) nasal 2 times a day  Plavix 75 mg oral tablet: 1 tab(s) orally once a day  predniSONE: 15 milligram(s) orally 2 times a day--divided dose (7.5 mg am &amp; 7.5 mg pm) on for over 35 yrs   Prolia 60 mg/mL subcutaneous solution: every 6 months   Singulair 10 mg oral tablet: 1 tab(s) orally once a day  tamsulosin 0.4 mg oral capsule: 1 cap(s) orally once a day (at bedtime)  traMADol 50 mg oral tablet: 1 tab(s) orally every 8 hours, As Needed  Vitamin D3 25 mcg (1000 intl units) oral tablet: 1 tab(s) orally 3 times a day

## 2022-05-06 NOTE — DISCHARGE NOTE PROVIDER - NSDCFUSCHEDAPPT_GEN_ALL_CORE_FT
Dash Plata  [facility]  [address]ulmMed [address]  Scheduled Appointment: 06/03/2022    [facility]  [address]uled [address]  Scheduled Appointment: 06/03/2022    [facility]  Surg Vasc [address]  Scheduled Appointment: 06/20/2022    James Dailey  [facility]  Surg Vasc [address]  Scheduled Appointment: 06/20/2022    Meera Smith  [facility]  Med  [phone]   Scheduled Appointment: 07/19/2022

## 2022-05-06 NOTE — PROGRESS NOTE ADULT - ASSESSMENT
The patient is a 67-year-old woman with PMH of Type 2 DM, HLD, hyperparathyroidism, asthma, hypothyroidism, PAF (not currently on AC), type B thoracic dissection, treated medically, c/b spinal cord infarction, PE/VTE, colon cancer s/p colon resection, history of bowel perforation, PAD s/p angioplasty to right femoropopliteal artery (3/7/22) due to non-healing wounds to the first digit, recently completed a course of antibiotics on 5/1 for suspected osteomyelitis, presented to the ED with increasing weakness.  The patient was admitted to the ICU for septic shock, now intubated, on three pressors.

## 2022-05-06 NOTE — PHARMACOTHERAPY INTERVENTION NOTE - COMMENTS
Patient is being dosed by level on vancomycin and received a dose of 750mg on 05/05 which resulted in a level of 17.8. Discussed therapy with ID Dr. Alex and suggested repeat dose of vancomycin 750mg x1 with trough of 14:00 on 05/07, MD agreed. Entered orders to reflect change in therapy. Inquired if MD would like to dose per pharmacy, MD agreed.  Patient is being dosed by level on vancomycin and received a dose of 750mg on 05/05 which resulted in a level of 17.8. Discussed therapy with ID Dr. Alex and suggested repeat dose of vancomycin 750mg x1 with trough of 14:00 on 05/07, MD agreed. Entered orders to reflect change in therapy. Inquired if MD would like to dose per pharmacy, MD agreed.     Vancomycin Dosing Per Pharmacy    1. Indication for the vancomycin: sepsis 2/2 ssti  2. Requesting Provider: Dr. Alex  3. Current plan and dosing regimen: Dosing by level. 750mg x 1  4. Creatinine Clearance (CrCl): 24.6  5. Scr: 2.4  6. Day of therapy: 4  7. Cultures: Bcx NGTD. BCx Serratia Marcescens [5/4] UCx Proteus Mirabilis [5/4]  8. WBC: 12.31  9. Target trough: 15-20  10. Day and time trough is due: 05/07 at 14:00   11. Previous troughs: 17.8 based on 750mg x1 and 1000mg x1

## 2022-05-06 NOTE — PROGRESS NOTE ADULT - ASSESSMENT
Oliguric STARR: Ischemic/Septic ATN, Contrast Nephropathy  PAD, Ischemic LE  Metabolic acidosis, Lactate  Gram negative bacteremia  Shock, Sepsis on pressors    Pt with low UO. Continue pressor support. Surgery evaluation in progress. Avoid nephrotoxic meds as possible.   Overall prognosis poor. No emergent need for hemodialysis but may need in the next 24 hours.  D/w pt's  over the phone. Discussed prognosis. Pt will be a poor candidate for hemodialysis unless hemodynamics improve.   Will follow electrolytes and renal function trend. Avoid nephrotoxic meds as possible. D/w ICU team.   05/06/22:  Oliguric STARR: Ischemic/Septic ATN, Contrast Nephropathy  PAD, Ischemic LE  Metabolic acidosis, Lactate  Gram negative bacteremia  Shock, Sepsis on pressors    Pt with low UO. Continue pressor support. Surgery evaluation in progress. Avoid nephrotoxic meds as possible.   Overall prognosis poor. No emergent need for hemodialysis but may need in the next 24 hours.  D/w pt's  over the phone. Discussed prognosis. Pt will be a poor candidate for hemodialysis unless hemodynamics improve.   Will follow electrolytes and renal function trend. Avoid nephrotoxic meds as possible. D/w ICU team.   05/06/22: Worsening renal indices. Lokelma PRN for hyperkalemia. Family decided against surgical intervention. Not candidate for hemodialysis.   Overall prognosis poor. Family aware.  Oliguric STARR: Ischemic/Septic ATN, Contrast Nephropathy  PAD, Ischemic LE  Metabolic acidosis, Lactate  Gram negative bacteremia  Shock, Sepsis on pressors    Pt with low UO. Continue pressor support. Surgery evaluation in progress. Avoid nephrotoxic meds as possible.   Overall prognosis poor. No emergent need for hemodialysis but may need in the next 24 hours.  D/w pt's  over the phone. Discussed prognosis. Pt will be a poor candidate for hemodialysis unless hemodynamics improve.   Will follow electrolytes and renal function trend. Avoid nephrotoxic meds as possible. D/w ICU team.   05/06/22: Worsening renal indices. On sodium bicarb infusion. Lokelma PRN for hyperkalemia. Family decided against surgical intervention. Not candidate for hemodialysis.   Overall prognosis poor. Family aware.

## 2022-05-07 LAB
-  AMIKACIN: SIGNIFICANT CHANGE UP
-  AMOXICILLIN/CLAVULANIC ACID: SIGNIFICANT CHANGE UP
-  AMPICILLIN/SULBACTAM: SIGNIFICANT CHANGE UP
-  AMPICILLIN: SIGNIFICANT CHANGE UP
-  AZTREONAM: SIGNIFICANT CHANGE UP
-  CEFAZOLIN: SIGNIFICANT CHANGE UP
-  CEFEPIME: SIGNIFICANT CHANGE UP
-  CEFOXITIN: SIGNIFICANT CHANGE UP
-  CEFTRIAXONE: SIGNIFICANT CHANGE UP
-  CIPROFLOXACIN: SIGNIFICANT CHANGE UP
-  ERTAPENEM: SIGNIFICANT CHANGE UP
-  GENTAMICIN: SIGNIFICANT CHANGE UP
-  LEVOFLOXACIN: SIGNIFICANT CHANGE UP
-  MEROPENEM: SIGNIFICANT CHANGE UP
-  NITROFURANTOIN: SIGNIFICANT CHANGE UP
-  PIPERACILLIN/TAZOBACTAM: SIGNIFICANT CHANGE UP
-  TOBRAMYCIN: SIGNIFICANT CHANGE UP
-  TRIMETHOPRIM/SULFAMETHOXAZOLE: SIGNIFICANT CHANGE UP
CULTURE RESULTS: SIGNIFICANT CHANGE UP
METHOD TYPE: SIGNIFICANT CHANGE UP
ORGANISM # SPEC MICROSCOPIC CNT: SIGNIFICANT CHANGE UP
ORGANISM # SPEC MICROSCOPIC CNT: SIGNIFICANT CHANGE UP
SPECIMEN SOURCE: SIGNIFICANT CHANGE UP

## 2022-05-10 LAB
CULTURE RESULTS: SIGNIFICANT CHANGE UP
CULTURE RESULTS: SIGNIFICANT CHANGE UP
SPECIMEN SOURCE: SIGNIFICANT CHANGE UP
SPECIMEN SOURCE: SIGNIFICANT CHANGE UP

## 2022-05-12 LAB — GALACTOMANNAN AG SERPL-ACNC: 0.2 INDEX — SIGNIFICANT CHANGE UP (ref 0–0.49)

## 2022-05-26 NOTE — PROCEDURE NOTE - NSTRACHPOSTINTU_RESP_A_CORE
Plan:  1. Recommend a mediterranean healthy diet (low salt, avoid red meat, avoid fatty or fried foods, lots of fruits and vegetables) as well as regular moderate intensity activity for 30 minutes per day 3-5 times per week. 2. Discussed alcohol use could be a contributing factor to increased cholesterol levels  3. Okay to proceed with shoulder surgery   - Recommend continuing Aspirin during the procedure. Will defer to the surgeon if they want to stop Aspirin   - The morning of the procedure take Toprol, Aspirin   - Hold Brilinta one week prior to procedure   4. Goal BP less than 130/80  5. Labs in one week: CMP  6.  Follow up as needed
Appropriate capnography/Breath sounds bilateral/Breath sounds equal/Chest excursion noted/Chest X-Ray/Positive end tidal Co2 noted

## 2022-06-03 ENCOUNTER — APPOINTMENT (OUTPATIENT)
Dept: PULMONOLOGY | Facility: CLINIC | Age: 67
End: 2022-06-03

## 2022-06-20 ENCOUNTER — APPOINTMENT (OUTPATIENT)
Dept: VASCULAR SURGERY | Facility: CLINIC | Age: 67
End: 2022-06-20

## 2022-07-19 ENCOUNTER — APPOINTMENT (OUTPATIENT)
Dept: FAMILY MEDICINE | Facility: CLINIC | Age: 67
End: 2022-07-19

## 2022-07-19 NOTE — ED ADULT NURSE REASSESSMENT NOTE - NSIMPLEMENTINTERV_GEN_ALL_ED
Monica Shaw  [address] [address]    To Whom It May Concern,    This is to certify that Monica Shaw was seen in the clinic on 7/19/2022.    We recommend isolation until their COVID-19 test result is available. The latest CDC guidelines are listed below.    If COVID-19 test result is positive OR they are symptomatic with a known exposure to COVID-19, then patient must isolate for 5 full days (until at least 7/23/2022), and may discontinue isolation if the following criteria are met:  · At least 24 hours have passed since last fever without the use of fever reducing medication and  · Improvement in symptoms    At this point, the patient can return to work/school, but must wear a mask around others for an additional 5 days (until at least 7/28/2022). They do not need a negative test before returning to work/school if they have met the isolation criteria above.      If COVID-19 test result is negative, then patient may discontinue isolation if symptoms have improved and patient remains fever-free for 24 hours without the use of fever-reducing medications.    Obviously, the Coronavirus is a rapidly evolving situation and recommendations are changing regularly to prevent spread of the disease and further loss of life. Unfortunately, I cannot say when their symptoms will resolve. Please allow them the time off to meet the criteria listed above before returning to work/school.      SIGNATURE:_________________________________________, 7/19/2022  Olivia Dowling PA-C    [place] [facility] [place] [facility] CHUN HAMIL[facility]  [address]  [facility] [address]  [phone]      
Implemented All Fall with Harm Risk Interventions:  [place] to call system. Call bell, personal items and telephone within reach. Instruct patient to call for assistance. Room bathroom lighting operational. Non-slip footwear when patient is off stretcher. Physically safe environment: no spills, clutter or unnecessary equipment. Stretcher in lowest position, wheels locked, appropriate side rails in place. Provide visual cue, wrist band, yellow gown, etc. Monitor gait and stability. Monitor for mental status changes and reorient to person, place, and time. Review medications for side effects contributing to fall risk. Reinforce activity limits and safety measures with patient and family. Provide visual clues: red socks.

## 2022-09-01 ENCOUNTER — APPOINTMENT (OUTPATIENT)
Dept: CARDIOLOGY | Facility: CLINIC | Age: 67
End: 2022-09-01

## 2022-09-13 ENCOUNTER — APPOINTMENT (OUTPATIENT)
Dept: RHEUMATOLOGY | Facility: CLINIC | Age: 67
End: 2022-09-13

## 2023-01-06 NOTE — DISCHARGE NOTE ADULT - FLU SEASON?
HR=73 bpm, NBHZ=098/66 mmhg, SpO2=99.0 %, Resp=12 B/min, EtCO2=39 mmHg, Apnea=3 Seconds, [place]=2 Yes...

## 2023-02-22 NOTE — ED PROVIDER NOTE - WET READ LAUNCH FT
Mounjaro Pending    Insurance response  Prescription Drug Insurance: [employer]  Notes: Prior authorization submitted - will update provider when decision has been made by insurance.           
Received approval notice from [facility] for Mounjaro 5mg/0.5mL     Valid dates- 2/21/2023-2/21/2024  Patient id# [id number]  [facility]- [id number]  Ref# [id number]    Pt was informed of approval via patient portal message       
There are no Wet Read(s) to document.

## 2023-10-06 NOTE — REASON FOR VISIT
[Arrhythmia/ECG Abnorrmalities] : arrhythmia/ECG abnormalities [FreeTextEntry1] : She returns for followup. She is doing well from a cardiac perspective. She just had a peripheral vascular stent placed to promote healing of a foot ulcer. Primary osteoarthritis of left knee

## 2024-02-20 NOTE — COUNSELING
[Weight management counseling provided] : Weight management [Healthy eating counseling provided] : healthy eating [Activity counseling provided] : activity [Disease Management counseling provided] : disease management  [Behavioral health counseling provided] : behavioral health  [Decrease Portions] : Decrease food portions [Take medications as directed] : Take medications as directed [Check sugar ___ times per week] : Check blood sugar [unfilled]  times per week [Keep FS  log to bring to next visit] : Keep finger stick log and  bring  to next visit [Maintain symptoms  log] : Maintain symptoms log [Sleep ___ hours/day] : Sleep [unfilled] hours/day [Engage in a relaxing activity] : Engage in a relaxing activity [FreeTextEntry1] : MRI of head Finish antibiotics  sent to Neurologist Start Lexapro. follow up with Therapist stop Zoloft

## 2024-05-09 NOTE — ASU DISCHARGE PLAN (ADULT/PEDIATRIC) - CLICK TO LAUNCH ORM
Tolerated procedure well. Denies pain at this time. Transferred out of facility via wheelchair to son without incident. Discharge instructions in hand upon departure.     
.

## 2024-09-10 NOTE — DISCHARGE NOTE ADULT - ABILITY TO HEAR (WITH HEARING AID OR HEARING APPLIANCE IF NORMALLY USED):
THANK YOU FROM YOUR CARE TEAM    Our staff would like to THANK YOU for choosing [facility]. Our goal is to always provide you with the best of care and we continue to look for better ways to improve the services we provide.     You may receive a survey in the mail with questions specific to your encounter with our clinic. Should you receive a survey, please take a few minutes to rate your experience.   Our providers and staff value your feedback.  Thank you in advance for your time and participation.     We appreciate the opportunity to partner with you to meet your health care needs. THANK YOU, again, for choosing us to be your care team.     Medical Assistant: Jyoti  Provider: Benitez Aly MD, Medical Director Back & Spine  Care Coordinator:  Nelida LEGGETT RN     [facility]  [address] [place] [address], [address]  [place] [address]  Phone:  [phone]  Fax:  [phone]      Marita Malone, Manager Clinic operations                                              ... Antonio Reyes    DURING YOUR APPOINTMENT TODAY    Please review the following instructions carefully for the next steps in your care.   We recommend that you take your after-visit summary to your pharmacy to update their records.          MRI    Please call to schedule your test. Once scheduled, it will be sent to insurance for authorization.      [phone] (Monday - Friday, 7 am to 7 pm and Saturday 7:30 - 4 pm)    IMAGING RESULTS:  Your provider will review your test results at your follow up appointment and discuss your treatment plan.  In some cases, we may call you to discuss your plan of care.    FOR MRI DONE OUTSIDE OF [place]:    If you choose to have your MRI done outside [facility], you will need to:  Provide the name and fax where you are going, so a referral can be sent.   Schedule your appointment and check on insurance authorization.  Get the disc of images and drop it off at our  office at least 3 days prior to your follow up visit.  We will return these to you at your office visit.  Tell the imaging center to fax the report to us at [phone]    If the imaging study is denied by your insurance company:  You will be notified prior to the test and instructed regarding the next steps in your plan of care.        Steroids and Vaccine Information  This information may or not pertain to your visit today.    If you need to take an oral steroid, get a steroid injection and are getting a vaccine:   Please ensure any vaccine is 1 to 2 weeks before the steroid and at least one-week after.       Insurance Authorization Information    Our team will contact your Insurance Company to initiate a Pre-Authorization request.    This is not a guarantee of payment from your insurance company, but rather a step taken to ensure that we have all the information and documentation for them to confirm the procedure is one that is eligible for coverage under your plan.    We will contact you if we either need more information from you to fulfill the requirements of your insurance company, or if we need to discuss any concerns that may lead to postponement or cancellation of your procedure. If you have any questions regarding your authorization, please check with your insurance company or call our office for an update.  If you need information regarding your level of benefits or out-of-pocket expenses, please contact your insurance company directly.  They can also confirm for you if the doctor and facility is in your plan's preferred network (aka 'in-network').    What to do if:    My Insurance Changes:  If, at any time, your insurance company, plan or even card changes:    Please call our office so that our team can be sure to update your records.  We will need to make sure to submit any Pre-Auth or deepali to the correct, up-to-date insurance plan.    My Insurance Requires a Referral:  If your insurance company  requires a Referral for Specialty Care or to see a Specialist, you will need to confirm with them if you have one on file.    If your insurance carrier does not have a referral, then you will need to contact your Primary Care Physician to have one directly submitted to your insurance company ASAP.    Without a referral on file, your insurance company will not Pre-Authorize your procedure/test and may not cover any of your care with our specialty.  I have a Workers Compensation (W/C) Claim:  If you have a W/C claim, please be sure to provide our reception team with the information you have regarding your claim ASAP.  We will contact your W/C carrier/adjustor to inform them of your upcoming procedure/test and check the status of your claim (open vs closed).  We will let you know if they advise of any concerns or issues with your claim.  Even if you have an open W/C claim, please also provide us with your personal/family insurance.  We will want to be sure this plan is loaded into your account.  We always Pre-Authorize with personal insurance as a back-up to W/C.  Otherwise, if W/C doesn't cover something along the way, you will receive a bill for the services.  I have Month-to-Month Coverage/Premiums:  If you have an insurance plan that is paid for month to month or is subject to plan change on a monthly basis, please be aware we cannot initiate Pre-Authorization until just before the month of your surgery, as your insurance company will need to verify your premium payments/eligibility first.  I Do Not Have Insurance Coverage or Have other Insurance/Billing Questions:  Please call our Patient Contact Center:  [phone] to speak with a team member about your billing needs, including possible coverage options, setting up payment plans, our fee schedule, etc.     Adequate: hears normal conversation without difficulty

## 2025-01-10 NOTE — REASON FOR VISIT
Telemetry Shift Summary     Rhythm: SR/ST  Rate: 70s-100s  Measurements: 0.20/0.10/0.32  Ectopy (reported by Monitor Tech): rare PVC/PAC     Normal Values  Rhythm: Sinus  HR:   Measurements: 0.12-0.20/0.06-0.10/0.30-0.52    [Follow-Up Visit] : a follow-up visit for [Shoulder Pain] : shoulder pain

## 2025-02-06 NOTE — PATIENT PROFILE ADULT - FALL HARM RISK
[Seizure frequency] : Seizure frequency: Yes [Etiology, seizure type, and epilepsy syndrome] : Etiology, seizure type, and epilepsy syndrome: Yes [Side effects of anti-seizure medications] : Side effects of anti-seizure medications: Yes [Safety and education around seizures] : Safety and education around seizures: Yes [Sudden unexpected death in epilepsy (SUDEP)] : Sudden unexpected death in epilepsy: Yes [Issues around driving] : Issues around driving: Yes [Screening for anxiety, depression] : Screening for anxiety, depression: Yes [Treatment-resistant epilepsy (every visit)] : Treatment-resistant epilepsy (every visit): Yes [Adherence to medication(s)] : Adherence to medication(s): Yes [Counseling for women of childbearing potential with epilepsy (including folic acid supplement)] : Counseling for women of childbearing potential with epilepsy (including folic acid supplement): Yes [Options for adjunctive therapy (Neurostimulation, CBD, Dietary Therapy, Epilepsy Surgery)] : Options for adjunctive therapy (Neurostimulation, CBD, Dietary Therapy, Epilepsy Surgery): Yes [25 Hydroxy Vitamin D level assessed and Vitamin D3 ordered] : 25 Hydroxy Vitamin D level assessed and Vitamin D3 ordered: Yes [Thyroid profile ordered] : Thyroid profile ordered: Yes coagulation(Bleeding disorder R/T clinical cond/anti-coags)

## 2025-03-20 NOTE — H&P PST ADULT - RS GEN PE MLT RESP DETAILS PC
Chief Complaint   Patient presents with    Follow-Up from Hospital    Dizziness    Hypertension    Diabetes     \"Have you been to the ER, urgent care clinic since your last visit?  Hospitalized since your last visit?\"    3/17/2025 - 3/18/2025 (2 hours)  [facility]  Dehydration/lightheaded      “Have you seen or consulted any other health care providers outside our system since your last visit?”    NO              Chief Complaint   Patient presents with    Follow-Up from Hospital    Dizziness    Hypertension    Diabetes     \"Have you been to the ER, urgent care clinic since your last visit?  Hospitalized since your last visit?\"    NO    “Have you seen or consulted any other health care providers outside our system since your last visit?”    NO            than 140/90  -     Comprehensive Metabolic Panel    Primary insomnia    Labile hypertension    Type 2 diabetes mellitus with stage 4 chronic kidney disease, without long-term current use of insulin (HCC)    Chronic nasal congestion        Assessment & Plan  1. Blood pressure management: Stable.  - Continue chlorthalidone for blood pressure control.  - Repeat kidney function test to ensure improvement.  - If kidney function test results are satisfactory, maintain the current medication regimen.  Will discontinue lisinopril, spironolactone and metoprolol for now.  Heart rate is normal.  2. Diabetes mellitus: Uncontrolled. A1c 11.2.  - Continue Jardiance and glipizide.  - Adhere to a diet devoid of sweets; sugar-free candy is permissible in moderation.  - Referral to an endocrinologist for further management.  - Repeat A1c test.  - Consider insulin therapy if A1c remains high.    Follow-up  - Endocrinologist appointment for diabetes management.      The patient (or guardian, if applicable) and other individuals in attendance with the patient were advised that Artificial Intelligence will be utilized during this visit to record and process the conversation to generate a clinical note. The patient (or guardian, if applicable) and other individuals in attendance at the appointment consented to the use of AI, including the recording.                             no rales/no rhonchi/no wheezes/diminished breath sounds, L/diminished breath sounds, R

## 2025-03-21 NOTE — ED ADULT NURSE REASSESSMENT NOTE - NSFALLRSKINDICATORS_ED_ALL_ED
Outpatient Occupational Therapy Lymphedema Treatment Note   [facility]     Patient Name: Damaris Andrews  : 1962  MRN: [id number]  Today's Date: 3/21/2025      Visit Date: 2025    Patient Active Problem List   Diagnosis    Right thigh pain    Lipoma of left thigh    Arthritis    Breast cancer    High blood pressure    Hypothyroidism    Vaginal atrophy    Heart valve disorder    Women's annual routine gynecological examination    Primary insomnia        Past Medical History:   Diagnosis Date    Breast cancer     RIGHT MASECTOMY    Disease of thyroid gland     Drug therapy     Frequent nosebleeds     Hx of radiation therapy     Hypertension         Past Surgical History:   Procedure Laterality Date    ABDOMINOPLASTY       SECTION      x2    COLONOSCOPY      DR. NOLASCO, DR. DELGADILLO    COLONOSCOPY N/A 2022    Procedure: COLONOSCOPY FOR SCREENING;  Surgeon: Star Covignton MD;  Location: [facility] ENDOSCOPY;  Service: Gastroenterology;  Laterality: N/A;  diverticulosis    MASTECTOMY Right     VENOUS ACCESS DEVICE (PORT) INSERTION AND REMOVAL           Visit Dx:    No diagnosis found.     Lymphedema       Row Name 25 0900             Subjective Pain    Able to rate subjective pain? yes  -TD      Pre-Treatment Pain Level 0  -TD      Post-Treatment Pain Level 0  -TD         Lymphedema Assessment    Lymphedema Classification RUE:;stage 3 (Lymphostatic Elephantiasis)  -TD      Lymphedema Cancer Related Sx right;radical mastectomy;sentinel node biopsy  -TD      Lymphedema Surgery Comments   -TD      Lymph Nodes Removed # 22  -TD      Positive Lymph Nodes # 18  -TD         Manual Lymphatic Drainage    Manual Lymphatic Drainage initial sequence;opened regional lymph nodes;opened anastamoses;extremity treatment  -TD      Initial Sequence short neck;cervical;supraclavicular;shoulder collectors;abdomen  -TD      Cervical right;left  -TD      Supraclavicular right;left  -TD      Shoulder  Collectors right;left  -TD      Abdomen superficial  -TD      Opened Regional Lymph Nodes axillary;inguinal;ribs;paraspinal  -TD      Axillary right;left  -TD      Inguinal right  -TD      Opened Anastamoses anterior axillo-axillary;posterior axillo-axillary;axillo-inguinal  -TD      Extremity Treatment MLD to full limb  -TD      Manual Therapy Therapist completed PORI protocol to address edema  -TD         Compression/Skin Care    Compression/Skin Care skin care;wrapping location;bandaging  -TD      Skin Care lotion applied  -TD      Wrapping Location upper extremity  -TD      Wrapping Location UE right:;fingers to axilla  -TD      Bandage Layers cotton liner;padding/fluff layer;full limb;short-stretch bandages (comment size/quantity)  -TD      Bandaging Technique circumferential/spiral;moderate compression  -TD                User Key  (r) = Recorded By, (t) = Taken By, (c) = Cosigned By      Initials Name Provider Type    TD Jessica Ochoa, OT Occupational Therapist                             OT Assessment/Plan       Row Name 03/21/25 0959          OT Assessment    Functional Limitations Decreased safety during functional activities;Limitations in functional capacity and performance;Performance in leisure activities;Limitation in home management;Performance in self-care ADL  -TD     Impairments Impaired lymphatic circulation  -TD     Assessment Comments Damaris is a 62 year old female with stage 3 lymphedema of the right arm. Damaris had 22 lymphnodes removed during a right side mastecotmy secondary to breast cancer in 1998.  Pt reports she sees a good decrease in her edema and notices it is decreasing. Pt will continue to benefit from skilled occupational therapy to facilitate a reduction of her right upper extremity lymphedema until a plateau is achieved, patient has obtained permanent compression garments and is independent with complete decongestive therapy.  -TD     OT Diagnosis lymphedema  -TD     OT Rehab  Potential Good  -TD     Patient/caregiver participated in establishment of treatment plan and goals Yes  -TD     Patient would benefit from skilled therapy intervention Yes  -TD        OT Plan    OT Plan Comments continue POC  -TD               User Key  (r) = Recorded By, (t) = Taken By, (c) = Cosigned By      Initials Name Provider Type    TD Jessica Ochoa, OT Occupational Therapist                        Manual Rx (Last 36 Hours)       Manual Treatments       Row Name 03/21/25 1000             Total Minutes    89407 - OT Manual Therapy Minutes 55  -TD                User Key  (r) = Recorded By, (t) = Taken By, (c) = Cosigned By      Initials Name Provider Type    TD Jessica Ochoa, OT Occupational Therapist                      Therapy Education  Education Details: Reviewed treatment plans  Given: HEP, Symptoms/condition management, Pain management, Edema management  Program: Reinforced  How Provided: Verbal, Demonstration  Provided to: Patient  Level of Understanding: Teach back education performed, Verbalized, Demonstrated                Time Calculation:   Timed Charges  25825 - OT Manual Therapy Minutes: 55  Total Minutes  Timed Charges Total Minutes: 55   Total Minutes: 55     Therapy Charges for Today       Code Description Service Date Service Provider Modifiers Qty    [id number]  OT MANUAL THERAPY EA 15 MIN 3/21/2025 Jessica Ochoa OT GO 4                        Jessica Ochoa OT  3/21/2025   no
